# Patient Record
Sex: MALE | Race: WHITE | NOT HISPANIC OR LATINO | Employment: OTHER | RURAL
[De-identification: names, ages, dates, MRNs, and addresses within clinical notes are randomized per-mention and may not be internally consistent; named-entity substitution may affect disease eponyms.]

---

## 2020-11-16 ENCOUNTER — HISTORICAL (OUTPATIENT)
Dept: ADMINISTRATIVE | Facility: HOSPITAL | Age: 75
End: 2020-11-16

## 2020-11-17 LAB

## 2021-11-05 ENCOUNTER — HOSPITAL ENCOUNTER (INPATIENT)
Facility: HOSPITAL | Age: 76
LOS: 18 days | Discharge: NURSING, REHAB OR LONG-TERM CARE | DRG: 377 | End: 2021-11-23
Attending: EMERGENCY MEDICINE | Admitting: INTERNAL MEDICINE
Payer: MEDICARE

## 2021-11-05 DIAGNOSIS — K92.2 GIB (GASTROINTESTINAL BLEEDING): ICD-10-CM

## 2021-11-05 DIAGNOSIS — J69.0 ASPIRATION PNEUMONIA DUE TO GASTRIC SECRETIONS, UNSPECIFIED LATERALITY, UNSPECIFIED PART OF LUNG: ICD-10-CM

## 2021-11-05 DIAGNOSIS — R41.82 AMS (ALTERED MENTAL STATUS): ICD-10-CM

## 2021-11-05 DIAGNOSIS — Z01.818 PRE-OP EXAM: ICD-10-CM

## 2021-11-05 DIAGNOSIS — K92.2 GASTROINTESTINAL HEMORRHAGE, UNSPECIFIED GASTROINTESTINAL HEMORRHAGE TYPE: ICD-10-CM

## 2021-11-05 DIAGNOSIS — G93.41 ENCEPHALOPATHY, METABOLIC: ICD-10-CM

## 2021-11-05 DIAGNOSIS — K92.2 UPPER GI BLEED: Primary | ICD-10-CM

## 2021-11-05 LAB
ALBUMIN SERPL BCP-MCNC: 2.7 G/DL (ref 3.5–5)
ALBUMIN/GLOB SERPL: 0.8 {RATIO}
ALP SERPL-CCNC: 88 U/L (ref 45–115)
ALT SERPL W P-5'-P-CCNC: 25 U/L (ref 16–61)
AMMONIA PLAS-SCNC: 323 ΜMOL/L (ref 11–32)
ANION GAP SERPL CALCULATED.3IONS-SCNC: 13 MMOL/L (ref 7–16)
APAP SERPL-MCNC: <2 ΜG/ML (ref 10–30)
APTT PPP: 28 SECONDS (ref 25.2–37.3)
AST SERPL W P-5'-P-CCNC: 25 U/L (ref 15–37)
BASOPHILS # BLD AUTO: 0.02 K/UL (ref 0–0.2)
BASOPHILS NFR BLD AUTO: 0.3 % (ref 0–1)
BILIRUB DIRECT SERPL-MCNC: 0.4 MG/DL (ref 0–0.2)
BILIRUB SERPL-MCNC: 1.6 MG/DL (ref 0–1.2)
BILIRUB SERPL-MCNC: 2.3 MG/DL (ref 0–1.2)
BUN SERPL-MCNC: 24 MG/DL (ref 7–18)
BUN/CREAT SERPL: 32 (ref 6–20)
CALCIUM SERPL-MCNC: 7.8 MG/DL (ref 8.5–10.1)
CHLORIDE SERPL-SCNC: 106 MMOL/L (ref 98–107)
CO2 SERPL-SCNC: 26 MMOL/L (ref 21–32)
CREAT SERPL-MCNC: 0.75 MG/DL (ref 0.7–1.3)
DIFFERENTIAL METHOD BLD: ABNORMAL
EOSINOPHIL # BLD AUTO: 0.01 K/UL (ref 0–0.5)
EOSINOPHIL NFR BLD AUTO: 0.1 % (ref 1–4)
ERYTHROCYTE [DISTWIDTH] IN BLOOD BY AUTOMATED COUNT: 13.5 % (ref 11.5–14.5)
ETHANOL, BLOOD (CATEGORY): NOT DETECTED
GLOBULIN SER-MCNC: 3.2 G/DL (ref 2–4)
GLUCOSE SERPL-MCNC: 193 MG/DL (ref 74–106)
HAV IGM SER QL: NORMAL
HBV CORE IGM SER QL: NORMAL
HBV SURFACE AG SERPL QL IA: NORMAL
HCT VFR BLD AUTO: 27.5 % (ref 40–54)
HCT VFR BLD AUTO: 30.7 % (ref 40–54)
HCV AB SER QL: NORMAL
HGB BLD-MCNC: 10.6 G/DL (ref 13.5–18)
HGB BLD-MCNC: 9.6 G/DL (ref 13.5–18)
IMM GRANULOCYTES # BLD AUTO: 0.03 K/UL (ref 0–0.04)
IMM GRANULOCYTES NFR BLD: 0.4 % (ref 0–0.4)
INDIRECT COOMBS: NORMAL
INR BLD: 1.11 (ref 0.9–1.1)
LACTATE SERPL-SCNC: 2.8 MMOL/L (ref 0.4–2)
LACTATE SERPL-SCNC: 3 MMOL/L (ref 0.4–2)
LYMPHOCYTES # BLD AUTO: 0.99 K/UL (ref 1–4.8)
LYMPHOCYTES NFR BLD AUTO: 12.8 % (ref 27–41)
MAGNESIUM SERPL-MCNC: 1.7 MG/DL (ref 1.7–2.3)
MCH RBC QN AUTO: 33.5 PG (ref 27–31)
MCHC RBC AUTO-ENTMCNC: 34.5 G/DL (ref 32–36)
MCV RBC AUTO: 97.2 FL (ref 80–96)
MONOCYTES # BLD AUTO: 0.43 K/UL (ref 0–0.8)
MONOCYTES NFR BLD AUTO: 5.6 % (ref 2–6)
MPC BLD CALC-MCNC: 9.4 FL (ref 9.4–12.4)
NEUTROPHILS # BLD AUTO: 6.23 K/UL (ref 1.8–7.7)
NEUTROPHILS NFR BLD AUTO: 80.8 % (ref 53–65)
NRBC # BLD AUTO: 0 X10E3/UL
NRBC, AUTO (.00): 0 %
NT-PROBNP SERPL-MCNC: 59 PG/ML (ref 1–450)
OCCULT BLOOD, OTHER: NEGATIVE
PH, OTHER: 3
PLATELET # BLD AUTO: 178 K/UL (ref 150–400)
POTASSIUM SERPL-SCNC: 4.2 MMOL/L (ref 3.5–5.1)
PROT SERPL-MCNC: 5.9 G/DL (ref 6.4–8.2)
PROTHROMBIN TIME: 14.3 SECONDS (ref 11.7–14.7)
RBC # BLD AUTO: 3.16 M/UL (ref 4.6–6.2)
RH BLD: NORMAL
SALICYLATES SERPL-MCNC: <0.2 MG/DL (ref 3–30)
SODIUM SERPL-SCNC: 141 MMOL/L (ref 136–145)
TROPONIN I SERPL HS-MCNC: 15.9 PG/ML
WBC # BLD AUTO: 7.71 K/UL (ref 4.5–11)

## 2021-11-05 PROCEDURE — 99284 PR EMERGENCY DEPT VISIT,LEVEL IV: ICD-10-PCS | Mod: ,,, | Performed by: EMERGENCY MEDICINE

## 2021-11-05 PROCEDURE — 63600175 PHARM REV CODE 636 W HCPCS: Performed by: INTERNAL MEDICINE

## 2021-11-05 PROCEDURE — 82248 BILIRUBIN DIRECT: CPT | Performed by: INTERNAL MEDICINE

## 2021-11-05 PROCEDURE — 80074 ACUTE HEPATITIS PANEL: CPT | Performed by: INTERNAL MEDICINE

## 2021-11-05 PROCEDURE — 85025 COMPLETE CBC W/AUTO DIFF WBC: CPT | Performed by: EMERGENCY MEDICINE

## 2021-11-05 PROCEDURE — 99285 EMERGENCY DEPT VISIT HI MDM: CPT | Mod: 25

## 2021-11-05 PROCEDURE — 93010 EKG 12-LEAD: ICD-10-PCS | Mod: ,,, | Performed by: HOSPITALIST

## 2021-11-05 PROCEDURE — 85014 HEMATOCRIT: CPT

## 2021-11-05 PROCEDURE — 11000001 HC ACUTE MED/SURG PRIVATE ROOM

## 2021-11-05 PROCEDURE — 86900 BLOOD TYPING SEROLOGIC ABO: CPT | Performed by: EMERGENCY MEDICINE

## 2021-11-05 PROCEDURE — 96376 TX/PRO/DX INJ SAME DRUG ADON: CPT

## 2021-11-05 PROCEDURE — 85610 PROTHROMBIN TIME: CPT | Performed by: EMERGENCY MEDICINE

## 2021-11-05 PROCEDURE — 96375 TX/PRO/DX INJ NEW DRUG ADDON: CPT

## 2021-11-05 PROCEDURE — 86923 COMPATIBILITY TEST ELECTRIC: CPT

## 2021-11-05 PROCEDURE — 83735 ASSAY OF MAGNESIUM: CPT | Performed by: EMERGENCY MEDICINE

## 2021-11-05 PROCEDURE — 83880 ASSAY OF NATRIURETIC PEPTIDE: CPT | Performed by: EMERGENCY MEDICINE

## 2021-11-05 PROCEDURE — 84484 ASSAY OF TROPONIN QUANT: CPT | Performed by: EMERGENCY MEDICINE

## 2021-11-05 PROCEDURE — 96374 THER/PROPH/DIAG INJ IV PUSH: CPT

## 2021-11-05 PROCEDURE — 83605 ASSAY OF LACTIC ACID: CPT

## 2021-11-05 PROCEDURE — 63600175 PHARM REV CODE 636 W HCPCS

## 2021-11-05 PROCEDURE — 80143 DRUG ASSAY ACETAMINOPHEN: CPT | Performed by: INTERNAL MEDICINE

## 2021-11-05 PROCEDURE — 84450 TRANSFERASE (AST) (SGOT): CPT | Performed by: EMERGENCY MEDICINE

## 2021-11-05 PROCEDURE — 36415 COLL VENOUS BLD VENIPUNCTURE: CPT

## 2021-11-05 PROCEDURE — 80053 COMPREHEN METABOLIC PANEL: CPT | Performed by: EMERGENCY MEDICINE

## 2021-11-05 PROCEDURE — 36415 COLL VENOUS BLD VENIPUNCTURE: CPT | Performed by: EMERGENCY MEDICINE

## 2021-11-05 PROCEDURE — 82077 ASSAY SPEC XCP UR&BREATH IA: CPT | Performed by: INTERNAL MEDICINE

## 2021-11-05 PROCEDURE — 82140 ASSAY OF AMMONIA: CPT

## 2021-11-05 PROCEDURE — C9113 INJ PANTOPRAZOLE SODIUM, VIA: HCPCS | Performed by: INTERNAL MEDICINE

## 2021-11-05 PROCEDURE — 85730 THROMBOPLASTIN TIME PARTIAL: CPT | Performed by: EMERGENCY MEDICINE

## 2021-11-05 PROCEDURE — 93005 ELECTROCARDIOGRAM TRACING: CPT

## 2021-11-05 PROCEDURE — C9113 INJ PANTOPRAZOLE SODIUM, VIA: HCPCS | Performed by: EMERGENCY MEDICINE

## 2021-11-05 PROCEDURE — 83605 ASSAY OF LACTIC ACID: CPT | Performed by: EMERGENCY MEDICINE

## 2021-11-05 PROCEDURE — 99223 PR INITIAL HOSPITAL CARE,LEVL III: ICD-10-PCS | Mod: AI,,, | Performed by: INTERNAL MEDICINE

## 2021-11-05 PROCEDURE — 25000003 PHARM REV CODE 250: Performed by: INTERNAL MEDICINE

## 2021-11-05 PROCEDURE — 82271 OCCULT BLOOD OTHER SOURCES: CPT | Performed by: EMERGENCY MEDICINE

## 2021-11-05 PROCEDURE — 99223 1ST HOSP IP/OBS HIGH 75: CPT | Mod: AI,,, | Performed by: INTERNAL MEDICINE

## 2021-11-05 PROCEDURE — 93010 ELECTROCARDIOGRAM REPORT: CPT | Mod: ,,, | Performed by: HOSPITALIST

## 2021-11-05 PROCEDURE — 82247 BILIRUBIN TOTAL: CPT | Performed by: INTERNAL MEDICINE

## 2021-11-05 PROCEDURE — 63600175 PHARM REV CODE 636 W HCPCS: Performed by: EMERGENCY MEDICINE

## 2021-11-05 PROCEDURE — 99284 EMERGENCY DEPT VISIT MOD MDM: CPT | Mod: ,,, | Performed by: EMERGENCY MEDICINE

## 2021-11-05 PROCEDURE — 85018 HEMOGLOBIN: CPT

## 2021-11-05 PROCEDURE — 80307 DRUG TEST PRSMV CHEM ANLYZR: CPT | Performed by: INTERNAL MEDICINE

## 2021-11-05 RX ORDER — TALC
6 POWDER (GRAM) TOPICAL NIGHTLY PRN
Status: DISCONTINUED | OUTPATIENT
Start: 2021-11-05 | End: 2021-11-08

## 2021-11-05 RX ORDER — ONDANSETRON 2 MG/ML
INJECTION INTRAMUSCULAR; INTRAVENOUS
Status: COMPLETED
Start: 2021-11-05 | End: 2021-11-05

## 2021-11-05 RX ORDER — GLUCAGON 1 MG
1 KIT INJECTION
Status: DISCONTINUED | OUTPATIENT
Start: 2021-11-05 | End: 2021-11-23 | Stop reason: HOSPADM

## 2021-11-05 RX ORDER — LORAZEPAM 2 MG/ML
1 INJECTION INTRAMUSCULAR ONCE
Status: COMPLETED | OUTPATIENT
Start: 2021-11-05 | End: 2021-11-05

## 2021-11-05 RX ORDER — HALOPERIDOL 5 MG/ML
5 INJECTION INTRAMUSCULAR ONCE
Status: COMPLETED | OUTPATIENT
Start: 2021-11-05 | End: 2021-11-05

## 2021-11-05 RX ORDER — AMOXICILLIN 500 MG
1 CAPSULE ORAL DAILY
COMMUNITY

## 2021-11-05 RX ORDER — SODIUM CHLORIDE, SODIUM LACTATE, POTASSIUM CHLORIDE, CALCIUM CHLORIDE 600; 310; 30; 20 MG/100ML; MG/100ML; MG/100ML; MG/100ML
INJECTION, SOLUTION INTRAVENOUS CONTINUOUS
Status: DISCONTINUED | OUTPATIENT
Start: 2021-11-05 | End: 2021-11-07

## 2021-11-05 RX ORDER — SODIUM CHLORIDE 0.9 % (FLUSH) 0.9 %
10 SYRINGE (ML) INJECTION
Status: DISCONTINUED | OUTPATIENT
Start: 2021-11-05 | End: 2021-11-23 | Stop reason: HOSPADM

## 2021-11-05 RX ORDER — RIFAMPIN 300 MG/1
300 CAPSULE ORAL EVERY 12 HOURS
Status: ON HOLD | COMMUNITY
End: 2021-11-22 | Stop reason: HOSPADM

## 2021-11-05 RX ORDER — INSULIN ASPART 100 [IU]/ML
1-10 INJECTION, SOLUTION INTRAVENOUS; SUBCUTANEOUS EVERY 6 HOURS PRN
Status: DISCONTINUED | OUTPATIENT
Start: 2021-11-05 | End: 2021-11-23 | Stop reason: HOSPADM

## 2021-11-05 RX ORDER — ONDANSETRON 2 MG/ML
4 INJECTION INTRAMUSCULAR; INTRAVENOUS EVERY 8 HOURS PRN
Status: DISCONTINUED | OUTPATIENT
Start: 2021-11-05 | End: 2021-11-23 | Stop reason: HOSPADM

## 2021-11-05 RX ORDER — AMPICILLIN TRIHYDRATE 250 MG
CAPSULE ORAL
Status: ON HOLD | COMMUNITY
End: 2021-11-22 | Stop reason: HOSPADM

## 2021-11-05 RX ORDER — PANTOPRAZOLE SODIUM 40 MG/10ML
80 INJECTION, POWDER, LYOPHILIZED, FOR SOLUTION INTRAVENOUS
Status: COMPLETED | OUTPATIENT
Start: 2021-11-05 | End: 2021-11-05

## 2021-11-05 RX ORDER — NIACIN 500 MG/1
500 TABLET, EXTENDED RELEASE ORAL DAILY
COMMUNITY

## 2021-11-05 RX ORDER — LACTULOSE 10 G/15ML
30 SOLUTION ORAL EVERY 4 HOURS
Status: DISPENSED | OUTPATIENT
Start: 2021-11-05 | End: 2021-11-06

## 2021-11-05 RX ORDER — ACETAMINOPHEN 325 MG/1
650 TABLET ORAL EVERY 4 HOURS PRN
Status: DISCONTINUED | OUTPATIENT
Start: 2021-11-05 | End: 2021-11-05

## 2021-11-05 RX ORDER — LACTULOSE 10 G/15ML
200 SOLUTION ORAL; RECTAL EVERY 4 HOURS
Status: DISCONTINUED | OUTPATIENT
Start: 2021-11-05 | End: 2021-11-05

## 2021-11-05 RX ORDER — ACETAMINOPHEN 500 MG
TABLET ORAL
COMMUNITY

## 2021-11-05 RX ORDER — ONDANSETRON 2 MG/ML
4 INJECTION INTRAMUSCULAR; INTRAVENOUS
Status: COMPLETED | OUTPATIENT
Start: 2021-11-05 | End: 2021-11-05

## 2021-11-05 RX ORDER — SULFAMETHOXAZOLE AND TRIMETHOPRIM 800; 160 MG/1; MG/1
1 TABLET ORAL 2 TIMES DAILY
Status: ON HOLD | COMMUNITY
End: 2021-11-22 | Stop reason: HOSPADM

## 2021-11-05 RX ADMIN — PANTOPRAZOLE SODIUM 8 MG/HR: 40 INJECTION, POWDER, FOR SOLUTION INTRAVENOUS at 07:11

## 2021-11-05 RX ADMIN — LORAZEPAM 1 MG: 2 INJECTION INTRAMUSCULAR; INTRAVENOUS at 07:11

## 2021-11-05 RX ADMIN — ONDANSETRON 4 MG: 2 INJECTION INTRAMUSCULAR; INTRAVENOUS at 03:11

## 2021-11-05 RX ADMIN — SODIUM CHLORIDE, POTASSIUM CHLORIDE, SODIUM LACTATE AND CALCIUM CHLORIDE 1000 ML: 600; 310; 30; 20 INJECTION, SOLUTION INTRAVENOUS at 07:11

## 2021-11-05 RX ADMIN — SODIUM CHLORIDE, POTASSIUM CHLORIDE, SODIUM LACTATE AND CALCIUM CHLORIDE: 600; 310; 30; 20 INJECTION, SOLUTION INTRAVENOUS at 05:11

## 2021-11-05 RX ADMIN — PANTOPRAZOLE SODIUM 80 MG: 40 INJECTION, POWDER, FOR SOLUTION INTRAVENOUS at 04:11

## 2021-11-05 RX ADMIN — HALOPERIDOL LACTATE 5 MG: 5 INJECTION, SOLUTION INTRAMUSCULAR at 06:11

## 2021-11-05 RX ADMIN — LACTULOSE 200 G: 10 SOLUTION ORAL; RECTAL at 09:11

## 2021-11-06 ENCOUNTER — ANESTHESIA EVENT (OUTPATIENT)
Dept: GASTROENTEROLOGY | Facility: HOSPITAL | Age: 76
DRG: 377 | End: 2021-11-06
Payer: MEDICARE

## 2021-11-06 ENCOUNTER — ANESTHESIA (OUTPATIENT)
Dept: GASTROENTEROLOGY | Facility: HOSPITAL | Age: 76
DRG: 377 | End: 2021-11-06
Payer: MEDICARE

## 2021-11-06 PROBLEM — G93.41 ENCEPHALOPATHY, METABOLIC: Status: RESOLVED | Noted: 2021-11-05 | Resolved: 2021-11-06

## 2021-11-06 LAB
ABO + RH BLD: NORMAL
AFP-TM SERPL-MCNC: 1.5 NG/ML (ref 0–8)
ALBUMIN SERPL BCP-MCNC: 2.6 G/DL (ref 3.5–5)
ALP SERPL-CCNC: 84 U/L (ref 45–115)
ALT SERPL W P-5'-P-CCNC: 23 U/L (ref 16–61)
AMMONIA PLAS-SCNC: 130 ΜMOL/L (ref 11–32)
ANION GAP SERPL CALCULATED.3IONS-SCNC: 14 MMOL/L (ref 7–16)
AST SERPL W P-5'-P-CCNC: 39 U/L (ref 15–37)
BACTERIA #/AREA URNS HPF: ABNORMAL /HPF
BILIRUB DIRECT SERPL-MCNC: 0.3 MG/DL (ref 0–0.2)
BILIRUB SERPL-MCNC: 1.8 MG/DL (ref 0–1.2)
BILIRUB UR QL STRIP: NEGATIVE
BLD PROD TYP BPU: NORMAL
BLOOD UNIT EXPIRATION DATE: NORMAL
BLOOD UNIT TYPE CODE: 6200
BUN SERPL-MCNC: 33 MG/DL (ref 7–18)
BUN/CREAT SERPL: 33 (ref 6–20)
CALCIUM SERPL-MCNC: 8 MG/DL (ref 8.5–10.1)
CHLORIDE SERPL-SCNC: 112 MMOL/L (ref 98–107)
CLARITY UR: CLEAR
CO2 SERPL-SCNC: 22 MMOL/L (ref 21–32)
COLOR UR: YELLOW
CREAT SERPL-MCNC: 1.01 MG/DL (ref 0.7–1.3)
CROSSMATCH INTERPRETATION: NORMAL
DISPENSE STATUS: NORMAL
EST. AVERAGE GLUCOSE BLD GHB EST-MCNC: 100 MG/DL
GLUCOSE SERPL-MCNC: 120 MG/DL (ref 70–105)
GLUCOSE SERPL-MCNC: 148 MG/DL (ref 70–105)
GLUCOSE SERPL-MCNC: 166 MG/DL (ref 70–105)
GLUCOSE SERPL-MCNC: 220 MG/DL (ref 74–106)
GLUCOSE SERPL-MCNC: 222 MG/DL (ref 70–105)
GLUCOSE UR STRIP-MCNC: NEGATIVE MG/DL
HBA1C MFR BLD HPLC: 5.6 % (ref 4.5–6.6)
HCT VFR BLD AUTO: 23.8 % (ref 40–54)
HCT VFR BLD AUTO: 24.6 % (ref 40–54)
HCT VFR BLD AUTO: 24.7 % (ref 40–54)
HCT VFR BLD AUTO: 24.9 % (ref 40–54)
HCT VFR BLD AUTO: 25.6 % (ref 40–54)
HGB BLD-MCNC: 8.7 G/DL (ref 13.5–18)
HGB BLD-MCNC: 8.8 G/DL (ref 13.5–18)
HGB BLD-MCNC: 8.9 G/DL (ref 13.5–18)
HGB BLD-MCNC: 9.1 G/DL (ref 13.5–18)
HGB BLD-MCNC: 9.4 G/DL (ref 13.5–18)
KETONES UR STRIP-SCNC: 15 MG/DL
LEUKOCYTE ESTERASE UR QL STRIP: NEGATIVE
MUCOUS THREADS #/AREA URNS HPF: ABNORMAL /HPF
NITRITE UR QL STRIP: NEGATIVE
PH UR STRIP: 6 PH UNITS
POTASSIUM SERPL-SCNC: 4.1 MMOL/L (ref 3.5–5.1)
PROT SERPL-MCNC: 5.6 G/DL (ref 6.4–8.2)
PROT UR QL STRIP: NEGATIVE
RBC # UR STRIP: ABNORMAL /UL
RBC #/AREA URNS HPF: ABNORMAL /HPF
SODIUM SERPL-SCNC: 144 MMOL/L (ref 136–145)
SP GR UR STRIP: 1.02
SQUAMOUS #/AREA URNS LPF: ABNORMAL /LPF
TRICHOMONAS #/AREA URNS HPF: ABNORMAL /HPF
TSH SERPL DL<=0.005 MIU/L-ACNC: 0.89 UIU/ML (ref 0.36–3.74)
UNIT NUMBER: NORMAL
UROBILINOGEN UR STRIP-ACNC: 0.2 MG/DL
WBC #/AREA URNS HPF: ABNORMAL /HPF
YEAST #/AREA URNS HPF: ABNORMAL /HPF

## 2021-11-06 PROCEDURE — 81003 URINALYSIS AUTO W/O SCOPE: CPT | Performed by: INTERNAL MEDICINE

## 2021-11-06 PROCEDURE — 43239 EGD BIOPSY SINGLE/MULTIPLE: CPT | Mod: 59,,, | Performed by: STUDENT IN AN ORGANIZED HEALTH CARE EDUCATION/TRAINING PROGRAM

## 2021-11-06 PROCEDURE — 43239 PR EGD, FLEX, W/BIOPSY, SGL/MULTI: ICD-10-PCS | Mod: 59,,, | Performed by: STUDENT IN AN ORGANIZED HEALTH CARE EDUCATION/TRAINING PROGRAM

## 2021-11-06 PROCEDURE — D9220A PRA ANESTHESIA: Mod: CRNA,,, | Performed by: NURSE ANESTHETIST, CERTIFIED REGISTERED

## 2021-11-06 PROCEDURE — P9016 RBC LEUKOCYTES REDUCED: HCPCS

## 2021-11-06 PROCEDURE — 43255 PR EGD, FLEX, W/CTRL BLEED, ANY METHOD: ICD-10-PCS | Mod: ,,, | Performed by: STUDENT IN AN ORGANIZED HEALTH CARE EDUCATION/TRAINING PROGRAM

## 2021-11-06 PROCEDURE — 85014 HEMATOCRIT: CPT

## 2021-11-06 PROCEDURE — 25000003 PHARM REV CODE 250: Performed by: INTERNAL MEDICINE

## 2021-11-06 PROCEDURE — 99233 PR SUBSEQUENT HOSPITAL CARE,LEVL III: ICD-10-PCS | Mod: GC,,, | Performed by: INTERNAL MEDICINE

## 2021-11-06 PROCEDURE — 36415 COLL VENOUS BLD VENIPUNCTURE: CPT

## 2021-11-06 PROCEDURE — 88341 SURGICAL PATHOLOGY: ICD-10-PCS | Mod: 26,,, | Performed by: PATHOLOGY

## 2021-11-06 PROCEDURE — D9220A PRA ANESTHESIA: ICD-10-PCS | Mod: CRNA,,, | Performed by: NURSE ANESTHETIST, CERTIFIED REGISTERED

## 2021-11-06 PROCEDURE — 63600175 PHARM REV CODE 636 W HCPCS: Performed by: INTERNAL MEDICINE

## 2021-11-06 PROCEDURE — 84075 ASSAY ALKALINE PHOSPHATASE: CPT | Performed by: INTERNAL MEDICINE

## 2021-11-06 PROCEDURE — 85018 HEMOGLOBIN: CPT

## 2021-11-06 PROCEDURE — C9113 INJ PANTOPRAZOLE SODIUM, VIA: HCPCS | Performed by: INTERNAL MEDICINE

## 2021-11-06 PROCEDURE — D9220A PRA ANESTHESIA: Mod: ANES,,, | Performed by: ANESTHESIOLOGY

## 2021-11-06 PROCEDURE — 82962 GLUCOSE BLOOD TEST: CPT

## 2021-11-06 PROCEDURE — 88305 TISSUE EXAM BY PATHOLOGIST: CPT | Mod: SUR | Performed by: STUDENT IN AN ORGANIZED HEALTH CARE EDUCATION/TRAINING PROGRAM

## 2021-11-06 PROCEDURE — 88341 IMHCHEM/IMCYTCHM EA ADD ANTB: CPT | Mod: 26,,, | Performed by: PATHOLOGY

## 2021-11-06 PROCEDURE — 84443 ASSAY THYROID STIM HORMONE: CPT | Performed by: INTERNAL MEDICINE

## 2021-11-06 PROCEDURE — 88305 TISSUE EXAM BY PATHOLOGIST: CPT | Mod: 26,,, | Performed by: PATHOLOGY

## 2021-11-06 PROCEDURE — 99223 PR INITIAL HOSPITAL CARE,LEVL III: ICD-10-PCS | Mod: 25,,, | Performed by: STUDENT IN AN ORGANIZED HEALTH CARE EDUCATION/TRAINING PROGRAM

## 2021-11-06 PROCEDURE — 43239 EGD BIOPSY SINGLE/MULTIPLE: CPT

## 2021-11-06 PROCEDURE — 36415 COLL VENOUS BLD VENIPUNCTURE: CPT | Performed by: INTERNAL MEDICINE

## 2021-11-06 PROCEDURE — 25000003 PHARM REV CODE 250: Performed by: NURSE ANESTHETIST, CERTIFIED REGISTERED

## 2021-11-06 PROCEDURE — 82105 ALPHA-FETOPROTEIN SERUM: CPT | Performed by: INTERNAL MEDICINE

## 2021-11-06 PROCEDURE — D9220A PRA ANESTHESIA: ICD-10-PCS | Mod: ANES,,, | Performed by: ANESTHESIOLOGY

## 2021-11-06 PROCEDURE — 88342 IMHCHEM/IMCYTCHM 1ST ANTB: CPT | Mod: 26,,, | Performed by: PATHOLOGY

## 2021-11-06 PROCEDURE — 81001 URINALYSIS AUTO W/SCOPE: CPT | Performed by: INTERNAL MEDICINE

## 2021-11-06 PROCEDURE — 43255 EGD CONTROL BLEEDING ANY: CPT | Mod: ,,, | Performed by: STUDENT IN AN ORGANIZED HEALTH CARE EDUCATION/TRAINING PROGRAM

## 2021-11-06 PROCEDURE — 99223 1ST HOSP IP/OBS HIGH 75: CPT | Mod: 25,,, | Performed by: STUDENT IN AN ORGANIZED HEALTH CARE EDUCATION/TRAINING PROGRAM

## 2021-11-06 PROCEDURE — 88305 SURGICAL PATHOLOGY: ICD-10-PCS | Mod: 26,,, | Performed by: PATHOLOGY

## 2021-11-06 PROCEDURE — 36430 TRANSFUSION BLD/BLD COMPNT: CPT

## 2021-11-06 PROCEDURE — 11000001 HC ACUTE MED/SURG PRIVATE ROOM

## 2021-11-06 PROCEDURE — 80346 BENZODIAZEPINES1-12: CPT

## 2021-11-06 PROCEDURE — 63600175 PHARM REV CODE 636 W HCPCS: Performed by: NURSE ANESTHETIST, CERTIFIED REGISTERED

## 2021-11-06 PROCEDURE — 83036 HEMOGLOBIN GLYCOSYLATED A1C: CPT | Performed by: INTERNAL MEDICINE

## 2021-11-06 PROCEDURE — 82140 ASSAY OF AMMONIA: CPT | Performed by: INTERNAL MEDICINE

## 2021-11-06 PROCEDURE — 86923 COMPATIBILITY TEST ELECTRIC: CPT

## 2021-11-06 PROCEDURE — 99233 SBSQ HOSP IP/OBS HIGH 50: CPT | Mod: GC,,, | Performed by: INTERNAL MEDICINE

## 2021-11-06 PROCEDURE — 80358 DRUG SCREENING METHADONE: CPT

## 2021-11-06 PROCEDURE — 84450 TRANSFERASE (AST) (SGOT): CPT | Performed by: INTERNAL MEDICINE

## 2021-11-06 PROCEDURE — 80048 BASIC METABOLIC PNL TOTAL CA: CPT

## 2021-11-06 PROCEDURE — S0166 INJ OLANZAPINE 2.5MG: HCPCS | Performed by: INTERNAL MEDICINE

## 2021-11-06 PROCEDURE — 88342 SURGICAL PATHOLOGY: ICD-10-PCS | Mod: 26,,, | Performed by: PATHOLOGY

## 2021-11-06 PROCEDURE — 43255 EGD CONTROL BLEEDING ANY: CPT

## 2021-11-06 RX ORDER — ETOMIDATE 2 MG/ML
INJECTION INTRAVENOUS
Status: DISCONTINUED | OUTPATIENT
Start: 2021-11-06 | End: 2021-11-06

## 2021-11-06 RX ORDER — HALOPERIDOL 5 MG/ML
5 INJECTION INTRAMUSCULAR EVERY 6 HOURS PRN
Status: DISCONTINUED | OUTPATIENT
Start: 2021-11-06 | End: 2021-11-07

## 2021-11-06 RX ORDER — OLANZAPINE 10 MG/2ML
5 INJECTION, POWDER, FOR SOLUTION INTRAMUSCULAR EVERY 6 HOURS PRN
Status: DISCONTINUED | OUTPATIENT
Start: 2021-11-06 | End: 2021-11-11

## 2021-11-06 RX ORDER — LIDOCAINE HYDROCHLORIDE 20 MG/ML
INJECTION, SOLUTION EPIDURAL; INFILTRATION; INTRACAUDAL; PERINEURAL
Status: DISCONTINUED | OUTPATIENT
Start: 2021-11-06 | End: 2021-11-06

## 2021-11-06 RX ORDER — PROPOFOL 10 MG/ML
VIAL (ML) INTRAVENOUS
Status: DISCONTINUED | OUTPATIENT
Start: 2021-11-06 | End: 2021-11-06

## 2021-11-06 RX ORDER — HYDROCODONE BITARTRATE AND ACETAMINOPHEN 500; 5 MG/1; MG/1
TABLET ORAL
Status: DISCONTINUED | OUTPATIENT
Start: 2021-11-06 | End: 2021-11-07

## 2021-11-06 RX ORDER — LACTULOSE 10 G/15ML
30 SOLUTION ORAL EVERY 4 HOURS
Status: DISCONTINUED | OUTPATIENT
Start: 2021-11-06 | End: 2021-11-08

## 2021-11-06 RX ADMIN — HALOPERIDOL LACTATE 5 MG: 5 INJECTION, SOLUTION INTRAMUSCULAR at 04:11

## 2021-11-06 RX ADMIN — SODIUM CHLORIDE: 9 INJECTION, SOLUTION INTRAVENOUS at 10:11

## 2021-11-06 RX ADMIN — PROPOFOL 10 MG: 10 INJECTION, EMULSION INTRAVENOUS at 10:11

## 2021-11-06 RX ADMIN — OLANZAPINE 5 MG: 10 INJECTION, POWDER, FOR SOLUTION INTRAMUSCULAR at 09:11

## 2021-11-06 RX ADMIN — SODIUM CHLORIDE, POTASSIUM CHLORIDE, SODIUM LACTATE AND CALCIUM CHLORIDE: 600; 310; 30; 20 INJECTION, SOLUTION INTRAVENOUS at 01:11

## 2021-11-06 RX ADMIN — PANTOPRAZOLE SODIUM 8 MG/HR: 40 INJECTION, POWDER, FOR SOLUTION INTRAVENOUS at 06:11

## 2021-11-06 RX ADMIN — ETOMIDATE 15 MG: 2 INJECTION, SOLUTION INTRAVENOUS at 10:11

## 2021-11-06 RX ADMIN — ETOMIDATE 5 MG: 2 INJECTION, SOLUTION INTRAVENOUS at 10:11

## 2021-11-06 RX ADMIN — LACTULOSE 30 G: 20 SOLUTION ORAL at 03:11

## 2021-11-06 RX ADMIN — LACTULOSE 30 G: 20 SOLUTION ORAL at 06:11

## 2021-11-06 RX ADMIN — LIDOCAINE HYDROCHLORIDE 100 MG: 20 INJECTION, SOLUTION INTRAVENOUS at 10:11

## 2021-11-06 RX ADMIN — SODIUM CHLORIDE: 9 INJECTION, SOLUTION INTRAVENOUS at 05:11

## 2021-11-06 RX ADMIN — PANTOPRAZOLE SODIUM 8 MG/HR: 40 INJECTION, POWDER, FOR SOLUTION INTRAVENOUS at 01:11

## 2021-11-07 PROBLEM — E72.20 HYPERAMMONEMIA: Status: ACTIVE | Noted: 2021-11-07

## 2021-11-07 LAB
ABO + RH BLD: NORMAL
ABO + RH BLD: NORMAL
ANION GAP SERPL CALCULATED.3IONS-SCNC: 10 MMOL/L (ref 7–16)
BASOPHILS # BLD AUTO: 0.06 K/UL (ref 0–0.2)
BASOPHILS NFR BLD AUTO: 0.8 % (ref 0–1)
BLD PROD TYP BPU: NORMAL
BLD PROD TYP BPU: NORMAL
BLOOD UNIT EXPIRATION DATE: NORMAL
BLOOD UNIT EXPIRATION DATE: NORMAL
BLOOD UNIT TYPE CODE: 6200
BLOOD UNIT TYPE CODE: 6200
BUN SERPL-MCNC: 18 MG/DL (ref 7–18)
BUN/CREAT SERPL: 23 (ref 6–20)
CALCIUM SERPL-MCNC: 7.2 MG/DL (ref 8.5–10.1)
CHLORIDE SERPL-SCNC: 115 MMOL/L (ref 98–107)
CO2 SERPL-SCNC: 25 MMOL/L (ref 21–32)
CREAT SERPL-MCNC: 0.79 MG/DL (ref 0.7–1.3)
CROSSMATCH INTERPRETATION: NORMAL
CROSSMATCH INTERPRETATION: NORMAL
DIFFERENTIAL METHOD BLD: ABNORMAL
DISPENSE STATUS: NORMAL
DISPENSE STATUS: NORMAL
EOSINOPHIL # BLD AUTO: 0.03 K/UL (ref 0–0.5)
EOSINOPHIL NFR BLD AUTO: 0.4 % (ref 1–4)
ERYTHROCYTE [DISTWIDTH] IN BLOOD BY AUTOMATED COUNT: 15.4 % (ref 11.5–14.5)
GLUCOSE SERPL-MCNC: 143 MG/DL (ref 74–106)
GLUCOSE SERPL-MCNC: 146 MG/DL (ref 70–105)
GLUCOSE SERPL-MCNC: 150 MG/DL (ref 70–105)
GLUCOSE SERPL-MCNC: 159 MG/DL (ref 70–105)
HCT VFR BLD AUTO: 25 % (ref 40–54)
HCT VFR BLD AUTO: 25.2 % (ref 40–54)
HCT VFR BLD AUTO: 26.1 % (ref 40–54)
HCT VFR BLD AUTO: 26.5 % (ref 40–54)
HCT VFR BLD AUTO: 28.4 % (ref 40–54)
HGB BLD-MCNC: 10 G/DL (ref 13.5–18)
HGB BLD-MCNC: 8.7 G/DL (ref 13.5–18)
HGB BLD-MCNC: 9.1 G/DL (ref 13.5–18)
HGB BLD-MCNC: 9.2 G/DL (ref 13.5–18)
HGB BLD-MCNC: 9.3 G/DL (ref 13.5–18)
IMM GRANULOCYTES # BLD AUTO: 0.06 K/UL (ref 0–0.04)
IMM GRANULOCYTES NFR BLD: 0.8 % (ref 0–0.4)
LYMPHOCYTES # BLD AUTO: 1.45 K/UL (ref 1–4.8)
LYMPHOCYTES NFR BLD AUTO: 19.5 % (ref 27–41)
MAGNESIUM SERPL-MCNC: 1.6 MG/DL (ref 1.7–2.3)
MCH RBC QN AUTO: 31.6 PG (ref 27–31)
MCHC RBC AUTO-ENTMCNC: 34.7 G/DL (ref 32–36)
MCV RBC AUTO: 91.1 FL (ref 80–96)
MONOCYTES # BLD AUTO: 0.89 K/UL (ref 0–0.8)
MONOCYTES NFR BLD AUTO: 12 % (ref 2–6)
MPC BLD CALC-MCNC: 9.6 FL (ref 9.4–12.4)
NEUTROPHILS # BLD AUTO: 4.95 K/UL (ref 1.8–7.7)
NEUTROPHILS NFR BLD AUTO: 66.5 % (ref 53–65)
NRBC # BLD AUTO: 0.04 X10E3/UL
NRBC, AUTO (.00): 0.5 %
PLATELET # BLD AUTO: 99 K/UL (ref 150–400)
POTASSIUM SERPL-SCNC: 2.8 MMOL/L (ref 3.5–5.1)
RBC # BLD AUTO: 2.91 M/UL (ref 4.6–6.2)
SODIUM SERPL-SCNC: 147 MMOL/L (ref 136–145)
UNIT NUMBER: NORMAL
UNIT NUMBER: NORMAL
WBC # BLD AUTO: 7.44 K/UL (ref 4.5–11)

## 2021-11-07 PROCEDURE — 93010 EKG 12-LEAD: ICD-10-PCS | Mod: ,,, | Performed by: INTERNAL MEDICINE

## 2021-11-07 PROCEDURE — 25000003 PHARM REV CODE 250: Performed by: INTERNAL MEDICINE

## 2021-11-07 PROCEDURE — P9016 RBC LEUKOCYTES REDUCED: HCPCS

## 2021-11-07 PROCEDURE — 36430 TRANSFUSION BLD/BLD COMPNT: CPT

## 2021-11-07 PROCEDURE — 63600175 PHARM REV CODE 636 W HCPCS

## 2021-11-07 PROCEDURE — 63600175 PHARM REV CODE 636 W HCPCS: Performed by: FAMILY MEDICINE

## 2021-11-07 PROCEDURE — 99232 SBSQ HOSP IP/OBS MODERATE 35: CPT | Mod: GC,,, | Performed by: INTERNAL MEDICINE

## 2021-11-07 PROCEDURE — 83735 ASSAY OF MAGNESIUM: CPT

## 2021-11-07 PROCEDURE — 82962 GLUCOSE BLOOD TEST: CPT

## 2021-11-07 PROCEDURE — C9113 INJ PANTOPRAZOLE SODIUM, VIA: HCPCS | Performed by: INTERNAL MEDICINE

## 2021-11-07 PROCEDURE — 85014 HEMATOCRIT: CPT

## 2021-11-07 PROCEDURE — S0166 INJ OLANZAPINE 2.5MG: HCPCS | Performed by: INTERNAL MEDICINE

## 2021-11-07 PROCEDURE — 11000001 HC ACUTE MED/SURG PRIVATE ROOM

## 2021-11-07 PROCEDURE — 85018 HEMOGLOBIN: CPT

## 2021-11-07 PROCEDURE — 93010 ELECTROCARDIOGRAM REPORT: CPT | Mod: ,,, | Performed by: INTERNAL MEDICINE

## 2021-11-07 PROCEDURE — 36415 COLL VENOUS BLD VENIPUNCTURE: CPT

## 2021-11-07 PROCEDURE — P9016 RBC LEUKOCYTES REDUCED: HCPCS | Performed by: FAMILY MEDICINE

## 2021-11-07 PROCEDURE — 99232 SBSQ HOSP IP/OBS MODERATE 35: CPT | Mod: ,,, | Performed by: STUDENT IN AN ORGANIZED HEALTH CARE EDUCATION/TRAINING PROGRAM

## 2021-11-07 PROCEDURE — 63600175 PHARM REV CODE 636 W HCPCS: Performed by: INTERNAL MEDICINE

## 2021-11-07 PROCEDURE — 86923 COMPATIBILITY TEST ELECTRIC: CPT | Performed by: FAMILY MEDICINE

## 2021-11-07 PROCEDURE — 99232 PR SUBSEQUENT HOSPITAL CARE,LEVL II: ICD-10-PCS | Mod: GC,,, | Performed by: INTERNAL MEDICINE

## 2021-11-07 PROCEDURE — 25000003 PHARM REV CODE 250: Performed by: FAMILY MEDICINE

## 2021-11-07 PROCEDURE — 80048 BASIC METABOLIC PNL TOTAL CA: CPT

## 2021-11-07 PROCEDURE — 99232 PR SUBSEQUENT HOSPITAL CARE,LEVL II: ICD-10-PCS | Mod: ,,, | Performed by: STUDENT IN AN ORGANIZED HEALTH CARE EDUCATION/TRAINING PROGRAM

## 2021-11-07 PROCEDURE — 25000003 PHARM REV CODE 250

## 2021-11-07 PROCEDURE — 94761 N-INVAS EAR/PLS OXIMETRY MLT: CPT

## 2021-11-07 PROCEDURE — 85025 COMPLETE CBC W/AUTO DIFF WBC: CPT

## 2021-11-07 PROCEDURE — 93005 ELECTROCARDIOGRAM TRACING: CPT

## 2021-11-07 RX ORDER — HYDROCODONE BITARTRATE AND ACETAMINOPHEN 500; 5 MG/1; MG/1
TABLET ORAL
Status: DISCONTINUED | OUTPATIENT
Start: 2021-11-07 | End: 2021-11-23 | Stop reason: HOSPADM

## 2021-11-07 RX ORDER — MAGNESIUM SULFATE HEPTAHYDRATE 40 MG/ML
2 INJECTION, SOLUTION INTRAVENOUS ONCE
Status: COMPLETED | OUTPATIENT
Start: 2021-11-07 | End: 2021-11-07

## 2021-11-07 RX ORDER — HALOPERIDOL 5 MG/ML
5 INJECTION INTRAMUSCULAR ONCE
Status: COMPLETED | OUTPATIENT
Start: 2021-11-07 | End: 2021-11-07

## 2021-11-07 RX ORDER — HYDROCODONE BITARTRATE AND ACETAMINOPHEN 500; 5 MG/1; MG/1
TABLET ORAL
Status: DISCONTINUED | OUTPATIENT
Start: 2021-11-07 | End: 2021-11-10

## 2021-11-07 RX ADMIN — RIFAXIMIN 550 MG: 550 TABLET ORAL at 09:11

## 2021-11-07 RX ADMIN — PANTOPRAZOLE SODIUM 8 MG/HR: 40 INJECTION, POWDER, FOR SOLUTION INTRAVENOUS at 03:11

## 2021-11-07 RX ADMIN — MAGNESIUM SULFATE IN WATER 2 G: 40 INJECTION, SOLUTION INTRAVENOUS at 04:11

## 2021-11-07 RX ADMIN — FOLIC ACID-PYRIDOXINE-CYANOCOBALAMIN TAB 2.5-25-2 MG 1 TABLET: 2.5-25-2 TAB at 10:11

## 2021-11-07 RX ADMIN — LACTULOSE 30 G: 20 SOLUTION ORAL at 09:11

## 2021-11-07 RX ADMIN — LACTULOSE 30 G: 20 SOLUTION ORAL at 05:11

## 2021-11-07 RX ADMIN — HALOPERIDOL LACTATE 5 MG: 5 INJECTION, SOLUTION INTRAMUSCULAR at 01:11

## 2021-11-07 RX ADMIN — OLANZAPINE 5 MG: 10 INJECTION, POWDER, FOR SOLUTION INTRAMUSCULAR at 10:11

## 2021-11-07 RX ADMIN — LACTULOSE 30 G: 20 SOLUTION ORAL at 06:11

## 2021-11-07 RX ADMIN — SODIUM CHLORIDE, POTASSIUM CHLORIDE, SODIUM LACTATE AND CALCIUM CHLORIDE: 600; 310; 30; 20 INJECTION, SOLUTION INTRAVENOUS at 12:11

## 2021-11-07 RX ADMIN — RIFAXIMIN 550 MG: 550 TABLET ORAL at 10:11

## 2021-11-07 RX ADMIN — POTASSIUM CHLORIDE: 149 INJECTION, SOLUTION, CONCENTRATE INTRAVENOUS at 11:11

## 2021-11-07 RX ADMIN — PANTOPRAZOLE SODIUM 8 MG/HR: 40 INJECTION, POWDER, FOR SOLUTION INTRAVENOUS at 10:11

## 2021-11-07 RX ADMIN — SODIUM CHLORIDE: 9 INJECTION, SOLUTION INTRAVENOUS at 02:11

## 2021-11-07 RX ADMIN — SODIUM CHLORIDE, POTASSIUM CHLORIDE, SODIUM LACTATE AND CALCIUM CHLORIDE: 600; 310; 30; 20 INJECTION, SOLUTION INTRAVENOUS at 10:11

## 2021-11-07 RX ADMIN — PANTOPRAZOLE SODIUM 8 MG/HR: 40 INJECTION, POWDER, FOR SOLUTION INTRAVENOUS at 01:11

## 2021-11-07 RX ADMIN — SODIUM CHLORIDE: 9 INJECTION, SOLUTION INTRAVENOUS at 06:11

## 2021-11-07 RX ADMIN — LACTULOSE 30 G: 20 SOLUTION ORAL at 01:11

## 2021-11-07 RX ADMIN — LACTULOSE 30 G: 20 SOLUTION ORAL at 03:11

## 2021-11-07 RX ADMIN — LACTULOSE 30 G: 20 SOLUTION ORAL at 10:11

## 2021-11-08 LAB
ALBUMIN SERPL BCP-MCNC: 2.5 G/DL (ref 3.5–5)
ALBUMIN/GLOB SERPL: 0.9 {RATIO}
ALP SERPL-CCNC: 82 U/L (ref 45–115)
ALT SERPL W P-5'-P-CCNC: 49 U/L (ref 16–61)
ANION GAP SERPL CALCULATED.3IONS-SCNC: 10 MMOL/L (ref 7–16)
ANION GAP SERPL CALCULATED.3IONS-SCNC: 8 MMOL/L (ref 7–16)
AST SERPL W P-5'-P-CCNC: 100 U/L (ref 15–37)
BASOPHILS # BLD AUTO: 0.05 K/UL (ref 0–0.2)
BASOPHILS NFR BLD AUTO: 0.7 % (ref 0–1)
BILIRUB SERPL-MCNC: 2.1 MG/DL (ref 0–1.2)
BUN SERPL-MCNC: 11 MG/DL (ref 7–18)
BUN SERPL-MCNC: 12 MG/DL (ref 7–18)
BUN/CREAT SERPL: 16 (ref 6–20)
BUN/CREAT SERPL: 16 (ref 6–20)
CALCIUM SERPL-MCNC: 6.9 MG/DL (ref 8.5–10.1)
CALCIUM SERPL-MCNC: 7.2 MG/DL (ref 8.5–10.1)
CERULOPLASMIN SERPL-MCNC: 20 MG/DL
CHLORIDE SERPL-SCNC: 108 MMOL/L (ref 98–107)
CHLORIDE SERPL-SCNC: 113 MMOL/L (ref 98–107)
CO2 SERPL-SCNC: 25 MMOL/L (ref 21–32)
CO2 SERPL-SCNC: 28 MMOL/L (ref 21–32)
CREAT SERPL-MCNC: 0.69 MG/DL (ref 0.7–1.3)
CREAT SERPL-MCNC: 0.74 MG/DL (ref 0.7–1.3)
DIFFERENTIAL METHOD BLD: ABNORMAL
EOSINOPHIL # BLD AUTO: 0.05 K/UL (ref 0–0.5)
EOSINOPHIL NFR BLD AUTO: 0.7 % (ref 1–4)
ERYTHROCYTE [DISTWIDTH] IN BLOOD BY AUTOMATED COUNT: 15.9 % (ref 11.5–14.5)
FERRITIN SERPL-MCNC: 75 NG/ML (ref 26–388)
GLOBULIN SER-MCNC: 2.9 G/DL (ref 2–4)
GLUCOSE SERPL-MCNC: 131 MG/DL (ref 70–105)
GLUCOSE SERPL-MCNC: 144 MG/DL (ref 70–105)
GLUCOSE SERPL-MCNC: 145 MG/DL (ref 74–106)
GLUCOSE SERPL-MCNC: 150 MG/DL (ref 70–105)
GLUCOSE SERPL-MCNC: 325 MG/DL (ref 74–106)
HCT VFR BLD AUTO: 27.5 % (ref 40–54)
HCT VFR BLD AUTO: 28.1 % (ref 40–54)
HCT VFR BLD AUTO: 29.4 % (ref 40–54)
HGB BLD-MCNC: 10 G/DL (ref 13.5–18)
HGB BLD-MCNC: 10.1 G/DL (ref 13.5–18)
HGB BLD-MCNC: 10.4 G/DL (ref 13.5–18)
HIV 1+O+2 AB SERPL QL: NORMAL
IGA SERPL-MCNC: 244 MG/DL (ref 61–356)
IGG SERPL-MCNC: 972 MG/DL (ref 767–1590)
IGM SERPL-MCNC: 41 MG/DL (ref 37–286)
IMM GRANULOCYTES # BLD AUTO: 0.05 K/UL (ref 0–0.04)
IMM GRANULOCYTES NFR BLD: 0.7 % (ref 0–0.4)
IRON SATN MFR SERPL: 18 % (ref 14–50)
IRON SERPL-MCNC: 39 ΜG/DL (ref 65–175)
LYMPHOCYTES # BLD AUTO: 1.15 K/UL (ref 1–4.8)
LYMPHOCYTES NFR BLD AUTO: 16.3 % (ref 27–41)
MAGNESIUM SERPL-MCNC: 1.9 MG/DL (ref 1.7–2.3)
MCH RBC QN AUTO: 32.1 PG (ref 27–31)
MCHC RBC AUTO-ENTMCNC: 36.4 G/DL (ref 32–36)
MCV RBC AUTO: 88.1 FL (ref 80–96)
MONOCYTES # BLD AUTO: 0.89 K/UL (ref 0–0.8)
MONOCYTES NFR BLD AUTO: 12.6 % (ref 2–6)
MPC BLD CALC-MCNC: 10 FL (ref 9.4–12.4)
NEUTROPHILS # BLD AUTO: 4.86 K/UL (ref 1.8–7.7)
NEUTROPHILS NFR BLD AUTO: 69 % (ref 53–65)
NRBC # BLD AUTO: 0.05 X10E3/UL
NRBC, AUTO (.00): 0.7 %
PLATELET # BLD AUTO: 95 K/UL (ref 150–400)
POTASSIUM SERPL-SCNC: 2.6 MMOL/L (ref 3.5–5.1)
POTASSIUM SERPL-SCNC: 3.6 MMOL/L (ref 3.5–5.1)
PROT SERPL-MCNC: 5.4 G/DL (ref 6.4–8.2)
RBC # BLD AUTO: 3.12 M/UL (ref 4.6–6.2)
SODIUM SERPL-SCNC: 140 MMOL/L (ref 136–145)
SODIUM SERPL-SCNC: 145 MMOL/L (ref 136–145)
TIBC SERPL-MCNC: 216 ΜG/DL (ref 250–450)
TRANSFERRIN SERPL-MCNC: 153 MG/DL (ref 200–360)
WBC # BLD AUTO: 7.05 K/UL (ref 4.5–11)

## 2021-11-08 PROCEDURE — 85014 HEMATOCRIT: CPT | Performed by: INTERNAL MEDICINE

## 2021-11-08 PROCEDURE — S0166 INJ OLANZAPINE 2.5MG: HCPCS | Performed by: INTERNAL MEDICINE

## 2021-11-08 PROCEDURE — 11000001 HC ACUTE MED/SURG PRIVATE ROOM

## 2021-11-08 PROCEDURE — 25000003 PHARM REV CODE 250

## 2021-11-08 PROCEDURE — 85025 COMPLETE CBC W/AUTO DIFF WBC: CPT

## 2021-11-08 PROCEDURE — 84450 TRANSFERASE (AST) (SGOT): CPT

## 2021-11-08 PROCEDURE — 36415 COLL VENOUS BLD VENIPUNCTURE: CPT

## 2021-11-08 PROCEDURE — 86038 ANTINUCLEAR ANTIBODIES: CPT | Performed by: INTERNAL MEDICINE

## 2021-11-08 PROCEDURE — 82962 GLUCOSE BLOOD TEST: CPT

## 2021-11-08 PROCEDURE — 83735 ASSAY OF MAGNESIUM: CPT | Performed by: INTERNAL MEDICINE

## 2021-11-08 PROCEDURE — 63600175 PHARM REV CODE 636 W HCPCS: Performed by: INTERNAL MEDICINE

## 2021-11-08 PROCEDURE — C9113 INJ PANTOPRAZOLE SODIUM, VIA: HCPCS | Performed by: INTERNAL MEDICINE

## 2021-11-08 PROCEDURE — 99232 SBSQ HOSP IP/OBS MODERATE 35: CPT | Mod: GC,,, | Performed by: INTERNAL MEDICINE

## 2021-11-08 PROCEDURE — 82390 ASSAY OF CERULOPLASMIN: CPT | Performed by: INTERNAL MEDICINE

## 2021-11-08 PROCEDURE — 83540 ASSAY OF IRON: CPT | Performed by: INTERNAL MEDICINE

## 2021-11-08 PROCEDURE — 27000221 HC OXYGEN, UP TO 24 HOURS

## 2021-11-08 PROCEDURE — 83516 IMMUNOASSAY NONANTIBODY: CPT | Performed by: INTERNAL MEDICINE

## 2021-11-08 PROCEDURE — 25000003 PHARM REV CODE 250: Performed by: INTERNAL MEDICINE

## 2021-11-08 PROCEDURE — 94761 N-INVAS EAR/PLS OXIMETRY MLT: CPT

## 2021-11-08 PROCEDURE — 85018 HEMOGLOBIN: CPT | Performed by: INTERNAL MEDICINE

## 2021-11-08 PROCEDURE — 80053 COMPREHEN METABOLIC PANEL: CPT

## 2021-11-08 PROCEDURE — 83550 IRON BINDING TEST: CPT | Performed by: INTERNAL MEDICINE

## 2021-11-08 PROCEDURE — 82784 ASSAY IGA/IGD/IGG/IGM EACH: CPT | Performed by: INTERNAL MEDICINE

## 2021-11-08 PROCEDURE — 99232 PR SUBSEQUENT HOSPITAL CARE,LEVL II: ICD-10-PCS | Mod: GC,,, | Performed by: INTERNAL MEDICINE

## 2021-11-08 PROCEDURE — 25500020 PHARM REV CODE 255: Performed by: INTERNAL MEDICINE

## 2021-11-08 PROCEDURE — 82728 ASSAY OF FERRITIN: CPT | Performed by: INTERNAL MEDICINE

## 2021-11-08 PROCEDURE — 80048 BASIC METABOLIC PNL TOTAL CA: CPT | Mod: XB

## 2021-11-08 PROCEDURE — 87389 HIV-1 AG W/HIV-1&-2 AB AG IA: CPT | Performed by: INTERNAL MEDICINE

## 2021-11-08 PROCEDURE — 87040 BLOOD CULTURE FOR BACTERIA: CPT | Performed by: INTERNAL MEDICINE

## 2021-11-08 PROCEDURE — 63600175 PHARM REV CODE 636 W HCPCS

## 2021-11-08 PROCEDURE — 36415 COLL VENOUS BLD VENIPUNCTURE: CPT | Performed by: INTERNAL MEDICINE

## 2021-11-08 PROCEDURE — 84466 ASSAY OF TRANSFERRIN: CPT | Performed by: INTERNAL MEDICINE

## 2021-11-08 RX ORDER — SODIUM CHLORIDE, SODIUM LACTATE, POTASSIUM CHLORIDE, CALCIUM CHLORIDE 600; 310; 30; 20 MG/100ML; MG/100ML; MG/100ML; MG/100ML
INJECTION, SOLUTION INTRAVENOUS CONTINUOUS
Status: DISCONTINUED | OUTPATIENT
Start: 2021-11-08 | End: 2021-11-09

## 2021-11-08 RX ORDER — LACTULOSE 10 G/15ML
30 SOLUTION ORAL 2 TIMES DAILY
Status: DISCONTINUED | OUTPATIENT
Start: 2021-11-08 | End: 2021-11-08

## 2021-11-08 RX ORDER — TALC
9 POWDER (GRAM) TOPICAL NIGHTLY PRN
Status: DISCONTINUED | OUTPATIENT
Start: 2021-11-08 | End: 2021-11-23 | Stop reason: HOSPADM

## 2021-11-08 RX ORDER — HYDROCHLOROTHIAZIDE 25 MG/1
25 TABLET ORAL DAILY
Status: DISCONTINUED | OUTPATIENT
Start: 2021-11-08 | End: 2021-11-08

## 2021-11-08 RX ORDER — HYDRALAZINE HYDROCHLORIDE 20 MG/ML
10 INJECTION INTRAMUSCULAR; INTRAVENOUS EVERY 6 HOURS PRN
Status: DISCONTINUED | OUTPATIENT
Start: 2021-11-08 | End: 2021-11-23 | Stop reason: HOSPADM

## 2021-11-08 RX ORDER — LACTULOSE 10 G/15ML
30 SOLUTION ORAL 3 TIMES DAILY
Status: DISCONTINUED | OUTPATIENT
Start: 2021-11-08 | End: 2021-11-09

## 2021-11-08 RX ORDER — LISINOPRIL 10 MG/1
10 TABLET ORAL DAILY
Status: DISCONTINUED | OUTPATIENT
Start: 2021-11-08 | End: 2021-11-12

## 2021-11-08 RX ORDER — LISINOPRIL 10 MG/1
10 TABLET ORAL DAILY
Status: DISCONTINUED | OUTPATIENT
Start: 2021-11-08 | End: 2021-11-08

## 2021-11-08 RX ORDER — LACTULOSE 10 G/15ML
30 SOLUTION ORAL 3 TIMES DAILY
Status: DISCONTINUED | OUTPATIENT
Start: 2021-11-08 | End: 2021-11-08

## 2021-11-08 RX ORDER — HYDROCHLOROTHIAZIDE 25 MG/1
25 TABLET ORAL DAILY
Status: DISCONTINUED | OUTPATIENT
Start: 2021-11-08 | End: 2021-11-12

## 2021-11-08 RX ADMIN — OLANZAPINE 5 MG: 10 INJECTION, POWDER, FOR SOLUTION INTRAMUSCULAR at 02:11

## 2021-11-08 RX ADMIN — LACTULOSE 30 G: 20 SOLUTION ORAL at 02:11

## 2021-11-08 RX ADMIN — PANTOPRAZOLE SODIUM 8 MG/HR: 40 INJECTION, POWDER, FOR SOLUTION INTRAVENOUS at 03:11

## 2021-11-08 RX ADMIN — SODIUM CHLORIDE, POTASSIUM CHLORIDE, SODIUM LACTATE AND CALCIUM CHLORIDE: 600; 310; 30; 20 INJECTION, SOLUTION INTRAVENOUS at 11:11

## 2021-11-08 RX ADMIN — RIFAXIMIN 550 MG: 550 TABLET ORAL at 10:11

## 2021-11-08 RX ADMIN — SODIUM CHLORIDE, POTASSIUM CHLORIDE, SODIUM LACTATE AND CALCIUM CHLORIDE: 600; 310; 30; 20 INJECTION, SOLUTION INTRAVENOUS at 03:11

## 2021-11-08 RX ADMIN — RIFAXIMIN 550 MG: 550 TABLET ORAL at 09:11

## 2021-11-08 RX ADMIN — LACTULOSE 30 G: 20 SOLUTION ORAL at 06:11

## 2021-11-08 RX ADMIN — POTASSIUM BICARBONATE 40 MEQ: 391 TABLET, EFFERVESCENT ORAL at 03:11

## 2021-11-08 RX ADMIN — PERFLUTREN 1.5 ML: 6.52 INJECTION, SUSPENSION INTRAVENOUS at 02:11

## 2021-11-08 RX ADMIN — OLANZAPINE 5 MG: 10 INJECTION, POWDER, FOR SOLUTION INTRAMUSCULAR at 11:11

## 2021-11-08 RX ADMIN — PANTOPRAZOLE SODIUM 8 MG/HR: 40 INJECTION, POWDER, FOR SOLUTION INTRAVENOUS at 11:11

## 2021-11-08 RX ADMIN — MELATONIN 9 MG: at 09:11

## 2021-11-08 RX ADMIN — LISINOPRIL 10 MG: 10 TABLET ORAL at 03:11

## 2021-11-08 RX ADMIN — LACTULOSE 30 G: 20 SOLUTION ORAL at 09:11

## 2021-11-08 RX ADMIN — HYDROCHLOROTHIAZIDE 25 MG: 25 TABLET ORAL at 03:11

## 2021-11-08 RX ADMIN — FOLIC ACID-PYRIDOXINE-CYANOCOBALAMIN TAB 2.5-25-2 MG 1 TABLET: 2.5-25-2 TAB at 10:11

## 2021-11-08 RX ADMIN — POTASSIUM CHLORIDE: 149 INJECTION, SOLUTION, CONCENTRATE INTRAVENOUS at 06:11

## 2021-11-08 RX ADMIN — PANTOPRAZOLE SODIUM 8 MG/HR: 40 INJECTION, POWDER, FOR SOLUTION INTRAVENOUS at 04:11

## 2021-11-08 RX ADMIN — PANTOPRAZOLE SODIUM 8 MG/HR: 40 INJECTION, POWDER, FOR SOLUTION INTRAVENOUS at 10:11

## 2021-11-09 PROBLEM — E83.42 HYPOMAGNESEMIA: Status: ACTIVE | Noted: 2021-11-09

## 2021-11-09 PROBLEM — J98.11 ATELECTASIS: Status: ACTIVE | Noted: 2021-11-09

## 2021-11-09 PROBLEM — E87.6 HYPOKALEMIA: Status: ACTIVE | Noted: 2021-11-09

## 2021-11-09 LAB
6-ACETYLMORPHINE, URINE (RUSH): NEGATIVE 10 NG/ML
7-AMINOCLONAZEPAM, URINE (RUSH): NEGATIVE 25 NG/ML
A-HYDROXYALPRAZOLAM, URINE (RUSH): NEGATIVE 25 NG/ML
AMMONIA PLAS-SCNC: 32 ΜMOL/L (ref 11–32)
AMPHET UR QL SCN: NEGATIVE 100 NG/ML
ANA SER QL: NEGATIVE
ANION GAP SERPL CALCULATED.3IONS-SCNC: 12 MMOL/L (ref 7–16)
BENZOYLECGONINE, URINE (RUSH): NEGATIVE 100 NG/ML
BUN SERPL-MCNC: 5 MG/DL (ref 7–18)
BUN/CREAT SERPL: 10 (ref 6–20)
BUTALBITAL, URINE (RUSH): NEGATIVE 50 NG/ML
CALCIUM SERPL-MCNC: 7.5 MG/DL (ref 8.5–10.1)
CARISOPRODOL, URINE (RUSH): NEGATIVE 100 NG/ML
CHLORIDE SERPL-SCNC: 103 MMOL/L (ref 98–107)
CO2 SERPL-SCNC: 26 MMOL/L (ref 21–32)
CODEINE, URINE (RUSH): NEGATIVE 25 NG/ML
CREAT SERPL-MCNC: 0.51 MG/DL (ref 0.7–1.3)
CREAT UR-MCNC: 154 MG/DL (ref 39–259)
EDDP, URINE (RUSH): NEGATIVE 25 NG/ML
ESTROGEN SERPL-MCNC: NORMAL PG/ML
GLUCOSE SERPL-MCNC: 113 MG/DL (ref 70–105)
GLUCOSE SERPL-MCNC: 118 MG/DL (ref 70–105)
GLUCOSE SERPL-MCNC: 125 MG/DL (ref 74–106)
GLUCOSE SERPL-MCNC: 135 MG/DL (ref 70–105)
GLUCOSE SERPL-MCNC: 142 MG/DL (ref 70–105)
HCT VFR BLD AUTO: 31 % (ref 40–54)
HCT VFR BLD AUTO: 32.9 % (ref 40–54)
HCT VFR BLD AUTO: 35.8 % (ref 40–54)
HGB BLD-MCNC: 11.2 G/DL (ref 13.5–18)
HGB BLD-MCNC: 11.5 G/DL (ref 13.5–18)
HGB BLD-MCNC: 12.4 G/DL (ref 13.5–18)
HYDROCODONE, URINE (RUSH): NEGATIVE 25 NG/ML
HYDROMORPHONE, URINE (RUSH): NEGATIVE 25 NG/ML
LAB AP GROSS DESCRIPTION: NORMAL
LAB AP LABORATORY NOTES: NORMAL
LORAZEPAM, URINE (RUSH): >250 25 NG/ML
MAGNESIUM SERPL-MCNC: 1.7 MG/DL (ref 1.7–2.3)
MEPROBAMATE, URINE (RUSH): NEGATIVE 100 NG/ML
METHADONE UR QL SCN: NEGATIVE 25 NG/ML
METHAMPHET UR QL SCN: NEGATIVE 100 NG/ML
MORPHINE, URINE (RUSH): NEGATIVE 25 NG/ML
NORDIAZEPAM, URINE (RUSH): NEGATIVE 25 NG/ML
NORHYDROCODONE, URINE (RUSH): NEGATIVE 50 NG/ML
NOROXYCODONE HCL, URINE (RUSH): NEGATIVE 50 NG/ML
OXAZEPAM, URINE (RUSH): NEGATIVE 25 NG/ML
OXYCODONE UR QL SCN: NEGATIVE 25 NG/ML
OXYMORPHONE, URINE (RUSH): NEGATIVE 25 NG/ML
PH UR STRIP: 6 PH UNITS
PHENOBARBITAL, URINE (RUSH): NEGATIVE 50 NG/ML
POTASSIUM SERPL-SCNC: 3 MMOL/L (ref 3.5–5.1)
SECOBARBITAL, URINE (RUSH): NEGATIVE 50 NG/ML
SODIUM SERPL-SCNC: 138 MMOL/L (ref 136–145)
SP GR UR STRIP: 1.02
T3RU NFR SERPL: NORMAL %
TEMAZEPAM, URINE (RUSH): NEGATIVE 25 NG/ML

## 2021-11-09 PROCEDURE — 97162 PT EVAL MOD COMPLEX 30 MIN: CPT

## 2021-11-09 PROCEDURE — 63600175 PHARM REV CODE 636 W HCPCS: Performed by: INTERNAL MEDICINE

## 2021-11-09 PROCEDURE — 85018 HEMOGLOBIN: CPT | Performed by: INTERNAL MEDICINE

## 2021-11-09 PROCEDURE — 25000003 PHARM REV CODE 250: Performed by: INTERNAL MEDICINE

## 2021-11-09 PROCEDURE — 80048 BASIC METABOLIC PNL TOTAL CA: CPT

## 2021-11-09 PROCEDURE — S0166 INJ OLANZAPINE 2.5MG: HCPCS | Performed by: INTERNAL MEDICINE

## 2021-11-09 PROCEDURE — 86255 FLUORESCENT ANTIBODY SCREEN: CPT | Mod: 90 | Performed by: INTERNAL MEDICINE

## 2021-11-09 PROCEDURE — 86376 MICROSOMAL ANTIBODY EACH: CPT | Mod: 90 | Performed by: INTERNAL MEDICINE

## 2021-11-09 PROCEDURE — 11000001 HC ACUTE MED/SURG PRIVATE ROOM

## 2021-11-09 PROCEDURE — 97166 OT EVAL MOD COMPLEX 45 MIN: CPT

## 2021-11-09 PROCEDURE — 25000003 PHARM REV CODE 250: Performed by: FAMILY MEDICINE

## 2021-11-09 PROCEDURE — 94761 N-INVAS EAR/PLS OXIMETRY MLT: CPT

## 2021-11-09 PROCEDURE — 63600175 PHARM REV CODE 636 W HCPCS

## 2021-11-09 PROCEDURE — 85014 HEMATOCRIT: CPT | Performed by: INTERNAL MEDICINE

## 2021-11-09 PROCEDURE — C9113 INJ PANTOPRAZOLE SODIUM, VIA: HCPCS | Performed by: INTERNAL MEDICINE

## 2021-11-09 PROCEDURE — 82962 GLUCOSE BLOOD TEST: CPT

## 2021-11-09 PROCEDURE — 87040 BLOOD CULTURE FOR BACTERIA: CPT

## 2021-11-09 PROCEDURE — 83735 ASSAY OF MAGNESIUM: CPT

## 2021-11-09 PROCEDURE — 25000003 PHARM REV CODE 250

## 2021-11-09 PROCEDURE — 92610 EVALUATE SWALLOWING FUNCTION: CPT

## 2021-11-09 PROCEDURE — 36415 COLL VENOUS BLD VENIPUNCTURE: CPT | Performed by: INTERNAL MEDICINE

## 2021-11-09 PROCEDURE — 99232 PR SUBSEQUENT HOSPITAL CARE,LEVL II: ICD-10-PCS | Mod: ,,, | Performed by: INTERNAL MEDICINE

## 2021-11-09 PROCEDURE — 82140 ASSAY OF AMMONIA: CPT

## 2021-11-09 PROCEDURE — C9113 INJ PANTOPRAZOLE SODIUM, VIA: HCPCS

## 2021-11-09 PROCEDURE — 99232 SBSQ HOSP IP/OBS MODERATE 35: CPT | Mod: ,,, | Performed by: INTERNAL MEDICINE

## 2021-11-09 RX ORDER — POTASSIUM CHLORIDE 7.45 MG/ML
20 INJECTION INTRAVENOUS ONCE
Status: COMPLETED | OUTPATIENT
Start: 2021-11-09 | End: 2021-11-09

## 2021-11-09 RX ORDER — LACTULOSE 10 G/15ML
30 SOLUTION ORAL 3 TIMES DAILY
Status: DISCONTINUED | OUTPATIENT
Start: 2021-11-09 | End: 2021-11-10

## 2021-11-09 RX ORDER — ACETAMINOPHEN 325 MG/1
650 TABLET ORAL EVERY 6 HOURS PRN
Status: DISCONTINUED | OUTPATIENT
Start: 2021-11-09 | End: 2021-11-23 | Stop reason: HOSPADM

## 2021-11-09 RX ORDER — MAGNESIUM SULFATE HEPTAHYDRATE 40 MG/ML
2 INJECTION, SOLUTION INTRAVENOUS ONCE
Status: COMPLETED | OUTPATIENT
Start: 2021-11-09 | End: 2021-11-09

## 2021-11-09 RX ORDER — LACTULOSE 10 G/15ML
30 SOLUTION ORAL 4 TIMES DAILY
Status: DISCONTINUED | OUTPATIENT
Start: 2021-11-09 | End: 2021-11-09

## 2021-11-09 RX ORDER — CLINDAMYCIN PHOSPHATE 600 MG/50ML
600 INJECTION, SOLUTION INTRAVENOUS
Status: DISCONTINUED | OUTPATIENT
Start: 2021-11-09 | End: 2021-11-10

## 2021-11-09 RX ORDER — POTASSIUM CHLORIDE 7.45 MG/ML
10 INJECTION INTRAVENOUS ONCE
Status: DISCONTINUED | OUTPATIENT
Start: 2021-11-09 | End: 2021-11-09

## 2021-11-09 RX ORDER — PANTOPRAZOLE SODIUM 40 MG/10ML
40 INJECTION, POWDER, LYOPHILIZED, FOR SOLUTION INTRAVENOUS 2 TIMES DAILY
Status: DISCONTINUED | OUTPATIENT
Start: 2021-11-09 | End: 2021-11-23 | Stop reason: HOSPADM

## 2021-11-09 RX ADMIN — CLINDAMYCIN PHOSPHATE 600 MG: 600 INJECTION, SOLUTION INTRAVENOUS at 02:11

## 2021-11-09 RX ADMIN — SODIUM CHLORIDE: 9 INJECTION, SOLUTION INTRAVENOUS at 12:11

## 2021-11-09 RX ADMIN — MAGNESIUM SULFATE IN WATER 2 G: 40 INJECTION, SOLUTION INTRAVENOUS at 02:11

## 2021-11-09 RX ADMIN — RIFAXIMIN 550 MG: 550 TABLET ORAL at 09:11

## 2021-11-09 RX ADMIN — PANTOPRAZOLE SODIUM 8 MG/HR: 40 INJECTION, POWDER, FOR SOLUTION INTRAVENOUS at 05:11

## 2021-11-09 RX ADMIN — LACTULOSE 30 G: 20 SOLUTION ORAL at 02:11

## 2021-11-09 RX ADMIN — CLINDAMYCIN PHOSPHATE 600 MG: 600 INJECTION, SOLUTION INTRAVENOUS at 10:11

## 2021-11-09 RX ADMIN — HYDROCHLOROTHIAZIDE 25 MG: 25 TABLET ORAL at 09:11

## 2021-11-09 RX ADMIN — PANTOPRAZOLE SODIUM 40 MG: 40 INJECTION, POWDER, FOR SOLUTION INTRAVENOUS at 12:11

## 2021-11-09 RX ADMIN — LISINOPRIL 10 MG: 10 TABLET ORAL at 09:11

## 2021-11-09 RX ADMIN — OLANZAPINE 5 MG: 10 INJECTION, POWDER, FOR SOLUTION INTRAMUSCULAR at 07:11

## 2021-11-09 RX ADMIN — ACETAMINOPHEN 650 MG: 325 TABLET ORAL at 07:11

## 2021-11-09 RX ADMIN — RIFAXIMIN 550 MG: 550 TABLET ORAL at 10:11

## 2021-11-09 RX ADMIN — PIPERACILLIN AND TAZOBACTAM 4.5 G: 4; .5 INJECTION, POWDER, LYOPHILIZED, FOR SOLUTION INTRAVENOUS; PARENTERAL at 09:11

## 2021-11-09 RX ADMIN — PANTOPRAZOLE SODIUM 40 MG: 40 INJECTION, POWDER, FOR SOLUTION INTRAVENOUS at 10:11

## 2021-11-09 RX ADMIN — POTASSIUM CHLORIDE 20 MEQ: 7.46 INJECTION, SOLUTION INTRAVENOUS at 12:11

## 2021-11-09 RX ADMIN — LACTULOSE 30 G: 20 SOLUTION ORAL at 09:11

## 2021-11-09 RX ADMIN — LACTULOSE 30 G: 20 SOLUTION ORAL at 10:11

## 2021-11-10 LAB
ALBUMIN SERPL BCP-MCNC: 2.1 G/DL (ref 3.5–5)
ALBUMIN/GLOB SERPL: 0.7 {RATIO}
ALP SERPL-CCNC: 78 U/L (ref 45–115)
ALT SERPL W P-5'-P-CCNC: 39 U/L (ref 16–61)
ANION GAP SERPL CALCULATED.3IONS-SCNC: 11 MMOL/L (ref 7–16)
AORTIC VALVE CUSP SEPERATION: 20.5 CM
AST SERPL W P-5'-P-CCNC: 46 U/L (ref 15–37)
AV INDEX (PROSTH): 0.89
AV MEAN GRADIENT: 6 MMHG
AV VALVE AREA: 2.82 CM2
BASOPHILS # BLD AUTO: 0.03 K/UL (ref 0–0.2)
BASOPHILS NFR BLD AUTO: 0.2 % (ref 0–1)
BILIRUB SERPL-MCNC: 2.8 MG/DL (ref 0–1.2)
BSA FOR ECHO PROCEDURE: 1.94 M2
BUN SERPL-MCNC: 9 MG/DL (ref 7–18)
BUN/CREAT SERPL: 12 (ref 6–20)
CALCIUM SERPL-MCNC: 7.2 MG/DL (ref 8.5–10.1)
CHLORIDE SERPL-SCNC: 102 MMOL/L (ref 98–107)
CO2 SERPL-SCNC: 28 MMOL/L (ref 21–32)
CREAT SERPL-MCNC: 0.77 MG/DL (ref 0.7–1.3)
CV ECHO LV RWT: 0.51 CM
DIFFERENTIAL METHOD BLD: ABNORMAL
DOP CALC AO VTI: 30.99 CM
DOP CALC LVOT AREA: 3.2 CM2
DOP CALC LVOT DIAMETER: 2.01 CM
DOP CALC LVOT STROKE VOLUME: 87.25 CM3
DOP CALCLVOT PEAK VEL VTI: 27.51 CM
E WAVE DECELERATION TIME: 150 MSEC
E/A RATIO: 0.77
E/E' RATIO: 7.12 M/S
ECHO EF ESTIMATED: 79 %
ECHO LV POSTERIOR WALL: 1.06 CM (ref 0.6–1.1)
EJECTION FRACTION: 50 %
EOSINOPHIL # BLD AUTO: 0.11 K/UL (ref 0–0.5)
EOSINOPHIL NFR BLD AUTO: 0.8 % (ref 1–4)
ERYTHROCYTE [DISTWIDTH] IN BLOOD BY AUTOMATED COUNT: 15.6 % (ref 11.5–14.5)
FRACTIONAL SHORTENING: 47 % (ref 28–44)
GLOBULIN SER-MCNC: 3.2 G/DL (ref 2–4)
GLUCOSE SERPL-MCNC: 112 MG/DL (ref 70–105)
GLUCOSE SERPL-MCNC: 115 MG/DL (ref 70–105)
GLUCOSE SERPL-MCNC: 133 MG/DL (ref 70–105)
GLUCOSE SERPL-MCNC: 99 MG/DL (ref 70–105)
GLUCOSE SERPL-MCNC: 99 MG/DL (ref 74–106)
HCT VFR BLD AUTO: 28.9 % (ref 40–54)
HCT VFR BLD AUTO: 30.6 % (ref 40–54)
HCT VFR BLD AUTO: 31.5 % (ref 40–54)
HCT VFR BLD AUTO: 34 % (ref 40–54)
HGB BLD-MCNC: 10.3 G/DL (ref 13.5–18)
HGB BLD-MCNC: 10.5 G/DL (ref 13.5–18)
HGB BLD-MCNC: 10.9 G/DL (ref 13.5–18)
HGB BLD-MCNC: 11.5 G/DL (ref 13.5–18)
IMM GRANULOCYTES # BLD AUTO: 0.09 K/UL (ref 0–0.04)
IMM GRANULOCYTES NFR BLD: 0.7 % (ref 0–0.4)
INDIRECT COOMBS: NORMAL
INTERVENTRICULAR SEPTUM: 1.06 CM (ref 0.6–1.1)
LEFT ATRIUM SIZE: 2.93 CM
LEFT INTERNAL DIMENSION IN SYSTOLE: 2.18 CM (ref 2.1–4)
LEFT VENTRICLE DIASTOLIC VOLUME INDEX: 39.15 ML/M2
LEFT VENTRICLE DIASTOLIC VOLUME: 75.95 ML
LEFT VENTRICLE MASS INDEX: 75 G/M2
LEFT VENTRICLE SYSTOLIC VOLUME INDEX: 8.2 ML/M2
LEFT VENTRICLE SYSTOLIC VOLUME: 15.83 ML
LEFT VENTRICULAR INTERNAL DIMENSION IN DIASTOLE: 4.14 CM (ref 3.5–6)
LEFT VENTRICULAR MASS: 145.66 G
LV LATERAL E/E' RATIO: 7.42 M/S
LV SEPTAL E/E' RATIO: 6.85 M/S
LVOT MG: 3 MMHG
LYMPHOCYTES # BLD AUTO: 1.08 K/UL (ref 1–4.8)
LYMPHOCYTES NFR BLD AUTO: 7.8 % (ref 27–41)
MAGNESIUM SERPL-MCNC: 2.2 MG/DL (ref 1.7–2.3)
MCH RBC QN AUTO: 31.8 PG (ref 27–31)
MCHC RBC AUTO-ENTMCNC: 34.6 G/DL (ref 32–36)
MCV RBC AUTO: 91.8 FL (ref 80–96)
MONOCYTES # BLD AUTO: 1.17 K/UL (ref 0–0.8)
MONOCYTES NFR BLD AUTO: 8.5 % (ref 2–6)
MPC BLD CALC-MCNC: 10.1 FL (ref 9.4–12.4)
MV PEAK A VEL: 1.16 M/S
MV PEAK E VEL: 0.89 M/S
NEUTROPHILS # BLD AUTO: 11.29 K/UL (ref 1.8–7.7)
NEUTROPHILS NFR BLD AUTO: 82 % (ref 53–65)
NRBC # BLD AUTO: 0 X10E3/UL
NRBC, AUTO (.00): 0 %
PISA TR MAX VEL: 2.47 M/S
PLATELET # BLD AUTO: 135 K/UL (ref 150–400)
POTASSIUM SERPL-SCNC: 2.9 MMOL/L (ref 3.5–5.1)
POTASSIUM SERPL-SCNC: 3.3 MMOL/L (ref 3.5–5.1)
PROT SERPL-MCNC: 5.3 G/DL (ref 6.4–8.2)
RBC # BLD AUTO: 3.43 M/UL (ref 4.6–6.2)
RH BLD: NORMAL
SODIUM SERPL-SCNC: 138 MMOL/L (ref 136–145)
TDI LATERAL: 0.12 M/S
TDI SEPTAL: 0.13 M/S
TDI: 0.13 M/S
TR MAX PG: 24 MMHG
WBC # BLD AUTO: 13.77 K/UL (ref 4.5–11)

## 2021-11-10 PROCEDURE — 25000003 PHARM REV CODE 250: Performed by: INTERNAL MEDICINE

## 2021-11-10 PROCEDURE — 25000003 PHARM REV CODE 250

## 2021-11-10 PROCEDURE — 99232 PR SUBSEQUENT HOSPITAL CARE,LEVL II: ICD-10-PCS | Mod: ,,, | Performed by: INTERNAL MEDICINE

## 2021-11-10 PROCEDURE — 94761 N-INVAS EAR/PLS OXIMETRY MLT: CPT

## 2021-11-10 PROCEDURE — 97530 THERAPEUTIC ACTIVITIES: CPT

## 2021-11-10 PROCEDURE — 99232 SBSQ HOSP IP/OBS MODERATE 35: CPT | Mod: ,,, | Performed by: INTERNAL MEDICINE

## 2021-11-10 PROCEDURE — C9113 INJ PANTOPRAZOLE SODIUM, VIA: HCPCS

## 2021-11-10 PROCEDURE — 82962 GLUCOSE BLOOD TEST: CPT

## 2021-11-10 PROCEDURE — 84450 TRANSFERASE (AST) (SGOT): CPT

## 2021-11-10 PROCEDURE — 97535 SELF CARE MNGMENT TRAINING: CPT

## 2021-11-10 PROCEDURE — 83735 ASSAY OF MAGNESIUM: CPT

## 2021-11-10 PROCEDURE — 36415 COLL VENOUS BLD VENIPUNCTURE: CPT

## 2021-11-10 PROCEDURE — 85018 HEMOGLOBIN: CPT

## 2021-11-10 PROCEDURE — 25500020 PHARM REV CODE 255: Performed by: INTERNAL MEDICINE

## 2021-11-10 PROCEDURE — 97110 THERAPEUTIC EXERCISES: CPT

## 2021-11-10 PROCEDURE — 80053 COMPREHEN METABOLIC PANEL: CPT

## 2021-11-10 PROCEDURE — 25000003 PHARM REV CODE 250: Performed by: FAMILY MEDICINE

## 2021-11-10 PROCEDURE — 85025 COMPLETE CBC W/AUTO DIFF WBC: CPT

## 2021-11-10 PROCEDURE — 85014 HEMATOCRIT: CPT

## 2021-11-10 PROCEDURE — 63600175 PHARM REV CODE 636 W HCPCS: Performed by: INTERNAL MEDICINE

## 2021-11-10 PROCEDURE — 84075 ASSAY ALKALINE PHOSPHATASE: CPT

## 2021-11-10 PROCEDURE — 11000001 HC ACUTE MED/SURG PRIVATE ROOM

## 2021-11-10 PROCEDURE — 87641 MR-STAPH DNA AMP PROBE: CPT

## 2021-11-10 PROCEDURE — 84132 ASSAY OF SERUM POTASSIUM: CPT

## 2021-11-10 PROCEDURE — 86900 BLOOD TYPING SEROLOGIC ABO: CPT

## 2021-11-10 PROCEDURE — 63600175 PHARM REV CODE 636 W HCPCS

## 2021-11-10 RX ORDER — LACTULOSE 10 G/15ML
30 SOLUTION ORAL DAILY
Status: DISCONTINUED | OUTPATIENT
Start: 2021-11-11 | End: 2021-11-10

## 2021-11-10 RX ORDER — LACTULOSE 10 G/15ML
20 SOLUTION ORAL DAILY
Status: DISCONTINUED | OUTPATIENT
Start: 2021-11-11 | End: 2021-11-23 | Stop reason: HOSPADM

## 2021-11-10 RX ORDER — SODIUM CHLORIDE, SODIUM LACTATE, POTASSIUM CHLORIDE, CALCIUM CHLORIDE 600; 310; 30; 20 MG/100ML; MG/100ML; MG/100ML; MG/100ML
INJECTION, SOLUTION INTRAVENOUS CONTINUOUS
Status: DISCONTINUED | OUTPATIENT
Start: 2021-11-10 | End: 2021-11-10

## 2021-11-10 RX ORDER — POTASSIUM CHLORIDE 7.45 MG/ML
40 INJECTION INTRAVENOUS ONCE
Status: COMPLETED | OUTPATIENT
Start: 2021-11-10 | End: 2021-11-10

## 2021-11-10 RX ORDER — HALOPERIDOL 5 MG/ML
1 INJECTION INTRAMUSCULAR EVERY 6 HOURS PRN
Status: COMPLETED | OUTPATIENT
Start: 2021-11-10 | End: 2021-11-11

## 2021-11-10 RX ORDER — POTASSIUM CHLORIDE 7.45 MG/ML
20 INJECTION INTRAVENOUS ONCE
Status: COMPLETED | OUTPATIENT
Start: 2021-11-10 | End: 2021-11-10

## 2021-11-10 RX ADMIN — PIPERACILLIN SODIUM AND TAZOBACTAM SODIUM 4.5 G: 4; .5 INJECTION, POWDER, LYOPHILIZED, FOR SOLUTION INTRAVENOUS at 12:11

## 2021-11-10 RX ADMIN — RIFAXIMIN 550 MG: 550 TABLET ORAL at 09:11

## 2021-11-10 RX ADMIN — SODIUM CHLORIDE: 9 INJECTION, SOLUTION INTRAVENOUS at 12:11

## 2021-11-10 RX ADMIN — SODIUM CHLORIDE, POTASSIUM CHLORIDE, SODIUM LACTATE AND CALCIUM CHLORIDE: 600; 310; 30; 20 INJECTION, SOLUTION INTRAVENOUS at 01:11

## 2021-11-10 RX ADMIN — POTASSIUM CHLORIDE 40 MEQ: 7.46 INJECTION, SOLUTION INTRAVENOUS at 03:11

## 2021-11-10 RX ADMIN — PANTOPRAZOLE SODIUM 40 MG: 40 INJECTION, POWDER, FOR SOLUTION INTRAVENOUS at 10:11

## 2021-11-10 RX ADMIN — LACTULOSE 30 G: 20 SOLUTION ORAL at 09:11

## 2021-11-10 RX ADMIN — HALOPERIDOL LACTATE 1 MG: 5 INJECTION, SOLUTION INTRAMUSCULAR at 03:11

## 2021-11-10 RX ADMIN — MELATONIN 9 MG: at 10:11

## 2021-11-10 RX ADMIN — HYDROCHLOROTHIAZIDE 25 MG: 25 TABLET ORAL at 09:11

## 2021-11-10 RX ADMIN — CLINDAMYCIN PHOSPHATE 600 MG: 600 INJECTION, SOLUTION INTRAVENOUS at 04:11

## 2021-11-10 RX ADMIN — SODIUM CHLORIDE: 9 INJECTION, SOLUTION INTRAVENOUS at 05:11

## 2021-11-10 RX ADMIN — PIPERACILLIN SODIUM AND TAZOBACTAM SODIUM 4.5 G: 4; .5 INJECTION, POWDER, LYOPHILIZED, FOR SOLUTION INTRAVENOUS at 10:11

## 2021-11-10 RX ADMIN — RIFAXIMIN 550 MG: 550 TABLET ORAL at 10:11

## 2021-11-10 RX ADMIN — IOPAMIDOL 100 ML: 755 INJECTION, SOLUTION INTRAVENOUS at 10:11

## 2021-11-10 RX ADMIN — POTASSIUM CHLORIDE 20 MEQ: 7.46 INJECTION, SOLUTION INTRAVENOUS at 09:11

## 2021-11-10 RX ADMIN — PANTOPRAZOLE SODIUM 40 MG: 40 INJECTION, POWDER, FOR SOLUTION INTRAVENOUS at 09:11

## 2021-11-10 RX ADMIN — FOLIC ACID-PYRIDOXINE-CYANOCOBALAMIN TAB 2.5-25-2 MG 1 TABLET: 2.5-25-2 TAB at 09:11

## 2021-11-11 PROBLEM — J69.0 ASPIRATION PNEUMONIA: Status: ACTIVE | Noted: 2021-11-09

## 2021-11-11 LAB
ANION GAP SERPL CALCULATED.3IONS-SCNC: 8 MMOL/L (ref 7–16)
ANTI-MITOCHONDRIAL (M2) AB INDEX: 0.46
BASOPHILS # BLD AUTO: 0.04 K/UL (ref 0–0.2)
BASOPHILS NFR BLD AUTO: 0.3 % (ref 0–1)
BUN SERPL-MCNC: 12 MG/DL (ref 7–18)
BUN/CREAT SERPL: 23 (ref 6–20)
CALCIUM SERPL-MCNC: 7.1 MG/DL (ref 8.5–10.1)
CHLORIDE SERPL-SCNC: 101 MMOL/L (ref 98–107)
CO2 SERPL-SCNC: 29 MMOL/L (ref 21–32)
CREAT SERPL-MCNC: 0.53 MG/DL (ref 0.7–1.3)
DIFFERENTIAL METHOD BLD: ABNORMAL
EOSINOPHIL # BLD AUTO: 0.13 K/UL (ref 0–0.5)
EOSINOPHIL NFR BLD AUTO: 1.1 % (ref 1–4)
ERYTHROCYTE [DISTWIDTH] IN BLOOD BY AUTOMATED COUNT: 15.9 % (ref 11.5–14.5)
GLUCOSE SERPL-MCNC: 134 MG/DL (ref 70–105)
GLUCOSE SERPL-MCNC: 137 MG/DL (ref 74–106)
GLUCOSE SERPL-MCNC: 139 MG/DL (ref 70–105)
GLUCOSE SERPL-MCNC: 194 MG/DL (ref 70–105)
HCT VFR BLD AUTO: 28.9 % (ref 40–54)
HCT VFR BLD AUTO: 30.3 % (ref 40–54)
HCT VFR BLD AUTO: 31.1 % (ref 40–54)
HGB BLD-MCNC: 10.2 G/DL (ref 13.5–18)
HGB BLD-MCNC: 10.7 G/DL (ref 13.5–18)
HGB BLD-MCNC: 10.8 G/DL (ref 13.5–18)
HGB FRACT BLD ELPH-IMP: NEGATIVE
IMM GRANULOCYTES # BLD AUTO: 0.08 K/UL (ref 0–0.04)
IMM GRANULOCYTES NFR BLD: 0.7 % (ref 0–0.4)
LKM-1 AB SER-ACNC: <5 U
LYMPHOCYTES # BLD AUTO: 1.04 K/UL (ref 1–4.8)
LYMPHOCYTES NFR BLD AUTO: 8.8 % (ref 27–41)
MAGNESIUM SERPL-MCNC: 2 MG/DL (ref 1.7–2.3)
MCH RBC QN AUTO: 31.8 PG (ref 27–31)
MCHC RBC AUTO-ENTMCNC: 34.7 G/DL (ref 32–36)
MCV RBC AUTO: 91.5 FL (ref 80–96)
METHICILLIN RESISTANT STAPHYLOCOCCUS AUREUS: NEGATIVE
MONOCYTES # BLD AUTO: 1.14 K/UL (ref 0–0.8)
MONOCYTES NFR BLD AUTO: 9.7 % (ref 2–6)
MPC BLD CALC-MCNC: 9.9 FL (ref 9.4–12.4)
NEUTROPHILS # BLD AUTO: 9.33 K/UL (ref 1.8–7.7)
NEUTROPHILS NFR BLD AUTO: 79.4 % (ref 53–65)
NRBC # BLD AUTO: 0 X10E3/UL
NRBC, AUTO (.00): 0 %
PHOSPHATE SERPL-MCNC: 2.2 MG/DL (ref 2.5–4.5)
PLATELET # BLD AUTO: 155 K/UL (ref 150–400)
POTASSIUM SERPL-SCNC: 3.3 MMOL/L (ref 3.5–5.1)
RBC # BLD AUTO: 3.4 M/UL (ref 4.6–6.2)
SODIUM SERPL-SCNC: 135 MMOL/L (ref 136–145)
WBC # BLD AUTO: 11.76 K/UL (ref 4.5–11)

## 2021-11-11 PROCEDURE — 85018 HEMOGLOBIN: CPT

## 2021-11-11 PROCEDURE — 99233 PR SUBSEQUENT HOSPITAL CARE,LEVL III: ICD-10-PCS | Mod: ,,, | Performed by: INTERNAL MEDICINE

## 2021-11-11 PROCEDURE — 83735 ASSAY OF MAGNESIUM: CPT

## 2021-11-11 PROCEDURE — 85014 HEMATOCRIT: CPT

## 2021-11-11 PROCEDURE — 85025 COMPLETE CBC W/AUTO DIFF WBC: CPT

## 2021-11-11 PROCEDURE — 36415 COLL VENOUS BLD VENIPUNCTURE: CPT

## 2021-11-11 PROCEDURE — 25000003 PHARM REV CODE 250

## 2021-11-11 PROCEDURE — 25000003 PHARM REV CODE 250: Performed by: INTERNAL MEDICINE

## 2021-11-11 PROCEDURE — 82962 GLUCOSE BLOOD TEST: CPT

## 2021-11-11 PROCEDURE — 94761 N-INVAS EAR/PLS OXIMETRY MLT: CPT

## 2021-11-11 PROCEDURE — 63600175 PHARM REV CODE 636 W HCPCS: Performed by: INTERNAL MEDICINE

## 2021-11-11 PROCEDURE — 97530 THERAPEUTIC ACTIVITIES: CPT

## 2021-11-11 PROCEDURE — 80048 BASIC METABOLIC PNL TOTAL CA: CPT

## 2021-11-11 PROCEDURE — 27000221 HC OXYGEN, UP TO 24 HOURS

## 2021-11-11 PROCEDURE — 63600175 PHARM REV CODE 636 W HCPCS

## 2021-11-11 PROCEDURE — 99233 SBSQ HOSP IP/OBS HIGH 50: CPT | Mod: ,,, | Performed by: INTERNAL MEDICINE

## 2021-11-11 PROCEDURE — 84100 ASSAY OF PHOSPHORUS: CPT

## 2021-11-11 PROCEDURE — 97116 GAIT TRAINING THERAPY: CPT

## 2021-11-11 PROCEDURE — 11000001 HC ACUTE MED/SURG PRIVATE ROOM

## 2021-11-11 PROCEDURE — C9113 INJ PANTOPRAZOLE SODIUM, VIA: HCPCS

## 2021-11-11 RX ORDER — POTASSIUM CHLORIDE 20 MEQ/1
40 TABLET, EXTENDED RELEASE ORAL ONCE
Status: COMPLETED | OUTPATIENT
Start: 2021-11-11 | End: 2021-11-11

## 2021-11-11 RX ORDER — HYDROXYZINE HYDROCHLORIDE 25 MG/ML
50 INJECTION, SOLUTION INTRAMUSCULAR EVERY 6 HOURS PRN
Status: CANCELLED | OUTPATIENT
Start: 2021-11-11

## 2021-11-11 RX ORDER — QUETIAPINE FUMARATE 25 MG/1
25 TABLET, FILM COATED ORAL DAILY
Status: DISCONTINUED | OUTPATIENT
Start: 2021-11-11 | End: 2021-11-12

## 2021-11-11 RX ORDER — OLANZAPINE 5 MG/1
5 TABLET ORAL DAILY
Status: DISCONTINUED | OUTPATIENT
Start: 2021-11-11 | End: 2021-11-12

## 2021-11-11 RX ORDER — POTASSIUM CHLORIDE 7.45 MG/ML
20 INJECTION INTRAVENOUS ONCE
Status: COMPLETED | OUTPATIENT
Start: 2021-11-11 | End: 2021-11-11

## 2021-11-11 RX ADMIN — PIPERACILLIN SODIUM AND TAZOBACTAM SODIUM 4.5 G: 4; .5 INJECTION, POWDER, LYOPHILIZED, FOR SOLUTION INTRAVENOUS at 06:11

## 2021-11-11 RX ADMIN — VANCOMYCIN HYDROCHLORIDE 1500 MG: 1 INJECTION, POWDER, LYOPHILIZED, FOR SOLUTION INTRAVENOUS at 01:11

## 2021-11-11 RX ADMIN — RIFAXIMIN 550 MG: 550 TABLET ORAL at 09:11

## 2021-11-11 RX ADMIN — MELATONIN 9 MG: at 10:11

## 2021-11-11 RX ADMIN — POTASSIUM CHLORIDE 40 MEQ: 20 TABLET, EXTENDED RELEASE ORAL at 12:11

## 2021-11-11 RX ADMIN — HYDROCHLOROTHIAZIDE 25 MG: 25 TABLET ORAL at 09:11

## 2021-11-11 RX ADMIN — PANTOPRAZOLE SODIUM 40 MG: 40 INJECTION, POWDER, FOR SOLUTION INTRAVENOUS at 09:11

## 2021-11-11 RX ADMIN — QUETIAPINE 25 MG: 25 TABLET ORAL at 12:11

## 2021-11-11 RX ADMIN — FOLIC ACID-PYRIDOXINE-CYANOCOBALAMIN TAB 2.5-25-2 MG 1 TABLET: 2.5-25-2 TAB at 09:11

## 2021-11-11 RX ADMIN — PANTOPRAZOLE SODIUM 40 MG: 40 INJECTION, POWDER, FOR SOLUTION INTRAVENOUS at 10:11

## 2021-11-11 RX ADMIN — RIFAXIMIN 550 MG: 550 TABLET ORAL at 10:11

## 2021-11-11 RX ADMIN — OLANZAPINE 5 MG: 5 TABLET, FILM COATED ORAL at 03:11

## 2021-11-11 RX ADMIN — LACTULOSE 20 G: 20 SOLUTION ORAL at 09:11

## 2021-11-11 RX ADMIN — PIPERACILLIN SODIUM AND TAZOBACTAM SODIUM 4.5 G: 4; .5 INJECTION, POWDER, LYOPHILIZED, FOR SOLUTION INTRAVENOUS at 10:11

## 2021-11-11 RX ADMIN — PIPERACILLIN SODIUM AND TAZOBACTAM SODIUM 4.5 G: 4; .5 INJECTION, POWDER, LYOPHILIZED, FOR SOLUTION INTRAVENOUS at 03:11

## 2021-11-11 RX ADMIN — POTASSIUM CHLORIDE 20 MEQ: 7.46 INJECTION, SOLUTION INTRAVENOUS at 09:11

## 2021-11-11 RX ADMIN — HALOPERIDOL LACTATE 1 MG: 5 INJECTION, SOLUTION INTRAMUSCULAR at 01:11

## 2021-11-12 LAB
ANION GAP SERPL CALCULATED.3IONS-SCNC: 5 MMOL/L (ref 7–16)
ANISOCYTOSIS BLD QL SMEAR: ABNORMAL
BASOPHILS # BLD AUTO: 0.04 K/UL (ref 0–0.2)
BASOPHILS NFR BLD AUTO: 0.3 % (ref 0–1)
BASOPHILS NFR BLD MANUAL: 1 % (ref 0–1)
BUN SERPL-MCNC: 14 MG/DL (ref 7–18)
BUN/CREAT SERPL: 12 (ref 6–20)
CALCIUM SERPL-MCNC: 7.5 MG/DL (ref 8.5–10.1)
CHLORIDE SERPL-SCNC: 102 MMOL/L (ref 98–107)
CO2 SERPL-SCNC: 32 MMOL/L (ref 21–32)
CREAT SERPL-MCNC: 1.16 MG/DL (ref 0.7–1.3)
DIFFERENTIAL METHOD BLD: ABNORMAL
EOSINOPHIL # BLD AUTO: 0.05 K/UL (ref 0–0.5)
EOSINOPHIL NFR BLD AUTO: 0.4 % (ref 1–4)
EOSINOPHIL NFR BLD MANUAL: 1 % (ref 1–4)
ERYTHROCYTE [DISTWIDTH] IN BLOOD BY AUTOMATED COUNT: 15.3 % (ref 11.5–14.5)
GLUCOSE SERPL-MCNC: 136 MG/DL (ref 74–106)
GLUCOSE SERPL-MCNC: 139 MG/DL (ref 70–105)
GLUCOSE SERPL-MCNC: 189 MG/DL (ref 70–105)
HCT VFR BLD AUTO: 30 % (ref 40–54)
HCT VFR BLD AUTO: 31.3 % (ref 40–54)
HGB BLD-MCNC: 10.5 G/DL (ref 13.5–18)
HGB BLD-MCNC: 11.2 G/DL (ref 13.5–18)
IMM GRANULOCYTES # BLD AUTO: 0.06 K/UL (ref 0–0.04)
IMM GRANULOCYTES NFR BLD: 0.5 % (ref 0–0.4)
LYMPHOCYTES # BLD AUTO: 0.69 K/UL (ref 1–4.8)
LYMPHOCYTES NFR BLD AUTO: 5.4 % (ref 27–41)
LYMPHOCYTES NFR BLD MANUAL: 2 % (ref 27–41)
MCH RBC QN AUTO: 32.3 PG (ref 27–31)
MCHC RBC AUTO-ENTMCNC: 35.8 G/DL (ref 32–36)
MCV RBC AUTO: 90.2 FL (ref 80–96)
MONOCYTES # BLD AUTO: 1.05 K/UL (ref 0–0.8)
MONOCYTES NFR BLD AUTO: 8.2 % (ref 2–6)
MONOCYTES NFR BLD MANUAL: 7 % (ref 2–6)
MPC BLD CALC-MCNC: 9.5 FL (ref 9.4–12.4)
NEUTROPHILS # BLD AUTO: 10.84 K/UL (ref 1.8–7.7)
NEUTROPHILS NFR BLD AUTO: 85.2 % (ref 53–65)
NEUTS BAND NFR BLD MANUAL: 9 % (ref 1–5)
NEUTS SEG NFR BLD MANUAL: 80 % (ref 50–62)
NRBC # BLD AUTO: 0 X10E3/UL
NRBC, AUTO (.00): 0 %
OVALOCYTES BLD QL SMEAR: ABNORMAL
PLATELET # BLD AUTO: 166 K/UL (ref 150–400)
PLATELET MORPHOLOGY: ABNORMAL
POLYCHROMASIA BLD QL SMEAR: ABNORMAL
POTASSIUM SERPL-SCNC: 3.6 MMOL/L (ref 3.5–5.1)
RBC # BLD AUTO: 3.47 M/UL (ref 4.6–6.2)
SMOOTH MUSCLE AB SER QL IF: NEGATIVE
SODIUM SERPL-SCNC: 135 MMOL/L (ref 136–145)
TARGETS BLD QL SMEAR: ABNORMAL
WBC # BLD AUTO: 12.73 K/UL (ref 4.5–11)

## 2021-11-12 PROCEDURE — 36415 COLL VENOUS BLD VENIPUNCTURE: CPT

## 2021-11-12 PROCEDURE — 99232 SBSQ HOSP IP/OBS MODERATE 35: CPT | Mod: ,,, | Performed by: INTERNAL MEDICINE

## 2021-11-12 PROCEDURE — 99900026 HC AIRWAY MAINTENANCE (STAT)

## 2021-11-12 PROCEDURE — 27000221 HC OXYGEN, UP TO 24 HOURS

## 2021-11-12 PROCEDURE — 94761 N-INVAS EAR/PLS OXIMETRY MLT: CPT

## 2021-11-12 PROCEDURE — 25000003 PHARM REV CODE 250

## 2021-11-12 PROCEDURE — 99232 PR SUBSEQUENT HOSPITAL CARE,LEVL II: ICD-10-PCS | Mod: ,,, | Performed by: INTERNAL MEDICINE

## 2021-11-12 PROCEDURE — 99900035 HC TECH TIME PER 15 MIN (STAT)

## 2021-11-12 PROCEDURE — 25000003 PHARM REV CODE 250: Performed by: INTERNAL MEDICINE

## 2021-11-12 PROCEDURE — 63600175 PHARM REV CODE 636 W HCPCS: Performed by: INTERNAL MEDICINE

## 2021-11-12 PROCEDURE — 85014 HEMATOCRIT: CPT

## 2021-11-12 PROCEDURE — 63600175 PHARM REV CODE 636 W HCPCS

## 2021-11-12 PROCEDURE — 80048 BASIC METABOLIC PNL TOTAL CA: CPT

## 2021-11-12 PROCEDURE — 11000001 HC ACUTE MED/SURG PRIVATE ROOM

## 2021-11-12 PROCEDURE — 85025 COMPLETE CBC W/AUTO DIFF WBC: CPT

## 2021-11-12 PROCEDURE — 82962 GLUCOSE BLOOD TEST: CPT

## 2021-11-12 PROCEDURE — 85018 HEMOGLOBIN: CPT

## 2021-11-12 PROCEDURE — C9113 INJ PANTOPRAZOLE SODIUM, VIA: HCPCS

## 2021-11-12 RX ORDER — HEPARIN SODIUM 5000 [USP'U]/ML
5000 INJECTION, SOLUTION INTRAVENOUS; SUBCUTANEOUS EVERY 12 HOURS
Status: COMPLETED | OUTPATIENT
Start: 2021-11-12 | End: 2021-11-16

## 2021-11-12 RX ORDER — QUETIAPINE FUMARATE 25 MG/1
25 TABLET, FILM COATED ORAL 2 TIMES DAILY
Status: DISCONTINUED | OUTPATIENT
Start: 2021-11-12 | End: 2021-11-13

## 2021-11-12 RX ORDER — LISINOPRIL 20 MG/1
20 TABLET ORAL DAILY
Status: DISCONTINUED | OUTPATIENT
Start: 2021-11-12 | End: 2021-11-13

## 2021-11-12 RX ORDER — LORAZEPAM 2 MG/ML
2 INJECTION INTRAMUSCULAR ONCE
Status: COMPLETED | OUTPATIENT
Start: 2021-11-12 | End: 2021-11-12

## 2021-11-12 RX ADMIN — AZITHROMYCIN MONOHYDRATE 500 MG: 500 INJECTION, POWDER, LYOPHILIZED, FOR SOLUTION INTRAVENOUS at 12:11

## 2021-11-12 RX ADMIN — LISINOPRIL 20 MG: 20 TABLET ORAL at 08:11

## 2021-11-12 RX ADMIN — FOLIC ACID-PYRIDOXINE-CYANOCOBALAMIN TAB 2.5-25-2 MG 1 TABLET: 2.5-25-2 TAB at 08:11

## 2021-11-12 RX ADMIN — QUETIAPINE 25 MG: 25 TABLET ORAL at 10:11

## 2021-11-12 RX ADMIN — VANCOMYCIN HYDROCHLORIDE 1500 MG: 1 INJECTION, POWDER, LYOPHILIZED, FOR SOLUTION INTRAVENOUS at 12:11

## 2021-11-12 RX ADMIN — PIPERACILLIN SODIUM AND TAZOBACTAM SODIUM 4.5 G: 4; .5 INJECTION, POWDER, LYOPHILIZED, FOR SOLUTION INTRAVENOUS at 11:11

## 2021-11-12 RX ADMIN — PANTOPRAZOLE SODIUM 40 MG: 40 INJECTION, POWDER, FOR SOLUTION INTRAVENOUS at 08:11

## 2021-11-12 RX ADMIN — RIFAXIMIN 550 MG: 550 TABLET ORAL at 08:11

## 2021-11-12 RX ADMIN — HEPARIN SODIUM 5000 UNITS: 5000 INJECTION INTRAVENOUS; SUBCUTANEOUS at 12:11

## 2021-11-12 RX ADMIN — PANTOPRAZOLE SODIUM 40 MG: 40 INJECTION, POWDER, FOR SOLUTION INTRAVENOUS at 10:11

## 2021-11-12 RX ADMIN — LACTULOSE 20 G: 20 SOLUTION ORAL at 08:11

## 2021-11-12 RX ADMIN — PIPERACILLIN SODIUM AND TAZOBACTAM SODIUM 4.5 G: 4; .5 INJECTION, POWDER, LYOPHILIZED, FOR SOLUTION INTRAVENOUS at 06:11

## 2021-11-12 RX ADMIN — QUETIAPINE 25 MG: 25 TABLET ORAL at 08:11

## 2021-11-12 RX ADMIN — PIPERACILLIN SODIUM AND TAZOBACTAM SODIUM 4.5 G: 4; .5 INJECTION, POWDER, LYOPHILIZED, FOR SOLUTION INTRAVENOUS at 03:11

## 2021-11-12 RX ADMIN — RIFAXIMIN 550 MG: 550 TABLET ORAL at 10:11

## 2021-11-12 RX ADMIN — LORAZEPAM 2 MG: 2 INJECTION INTRAMUSCULAR; INTRAVENOUS at 12:11

## 2021-11-12 RX ADMIN — HEPARIN SODIUM 5000 UNITS: 5000 INJECTION INTRAVENOUS; SUBCUTANEOUS at 09:11

## 2021-11-13 PROBLEM — E83.42 HYPOMAGNESEMIA: Status: RESOLVED | Noted: 2021-11-09 | Resolved: 2021-11-13

## 2021-11-13 PROBLEM — N17.9 AKI (ACUTE KIDNEY INJURY): Status: ACTIVE | Noted: 2021-11-13

## 2021-11-13 LAB
ANION GAP SERPL CALCULATED.3IONS-SCNC: 7 MMOL/L (ref 7–16)
BACTERIA BLD CULT: NORMAL
BACTERIA BLD CULT: NORMAL
BASOPHILS # BLD AUTO: 0.05 K/UL (ref 0–0.2)
BASOPHILS NFR BLD AUTO: 0.5 % (ref 0–1)
BUN SERPL-MCNC: 20 MG/DL (ref 7–18)
BUN/CREAT SERPL: 13 (ref 6–20)
CALCIUM SERPL-MCNC: 7.1 MG/DL (ref 8.5–10.1)
CHLORIDE SERPL-SCNC: 102 MMOL/L (ref 98–107)
CO2 SERPL-SCNC: 31 MMOL/L (ref 21–32)
CREAT SERPL-MCNC: 1.49 MG/DL (ref 0.7–1.3)
DIFFERENTIAL METHOD BLD: ABNORMAL
EOSINOPHIL # BLD AUTO: 0.09 K/UL (ref 0–0.5)
EOSINOPHIL NFR BLD AUTO: 0.9 % (ref 1–4)
ERYTHROCYTE [DISTWIDTH] IN BLOOD BY AUTOMATED COUNT: 15.3 % (ref 11.5–14.5)
GLUCOSE SERPL-MCNC: 129 MG/DL (ref 74–106)
GLUCOSE SERPL-MCNC: 134 MG/DL (ref 70–105)
GLUCOSE SERPL-MCNC: 139 MG/DL (ref 70–105)
GLUCOSE SERPL-MCNC: 141 MG/DL (ref 70–105)
GLUCOSE SERPL-MCNC: 155 MG/DL (ref 70–105)
HCT VFR BLD AUTO: 28.5 % (ref 40–54)
HGB BLD-MCNC: 9.8 G/DL (ref 13.5–18)
IMM GRANULOCYTES # BLD AUTO: 0.05 K/UL (ref 0–0.04)
IMM GRANULOCYTES NFR BLD: 0.5 % (ref 0–0.4)
LYMPHOCYTES # BLD AUTO: 0.96 K/UL (ref 1–4.8)
LYMPHOCYTES NFR BLD AUTO: 9.5 % (ref 27–41)
MAGNESIUM SERPL-MCNC: 2.4 MG/DL (ref 1.7–2.3)
MCH RBC QN AUTO: 31.7 PG (ref 27–31)
MCHC RBC AUTO-ENTMCNC: 34.4 G/DL (ref 32–36)
MCV RBC AUTO: 92.2 FL (ref 80–96)
MONOCYTES # BLD AUTO: 0.76 K/UL (ref 0–0.8)
MONOCYTES NFR BLD AUTO: 7.5 % (ref 2–6)
MPC BLD CALC-MCNC: 10.1 FL (ref 9.4–12.4)
NEUTROPHILS # BLD AUTO: 8.16 K/UL (ref 1.8–7.7)
NEUTROPHILS NFR BLD AUTO: 81.1 % (ref 53–65)
NRBC # BLD AUTO: 0 X10E3/UL
NRBC, AUTO (.00): 0 %
PLATELET # BLD AUTO: 191 K/UL (ref 150–400)
POTASSIUM SERPL-SCNC: 3.2 MMOL/L (ref 3.5–5.1)
RBC # BLD AUTO: 3.09 M/UL (ref 4.6–6.2)
SODIUM SERPL-SCNC: 137 MMOL/L (ref 136–145)
WBC # BLD AUTO: 10.07 K/UL (ref 4.5–11)

## 2021-11-13 PROCEDURE — 82962 GLUCOSE BLOOD TEST: CPT

## 2021-11-13 PROCEDURE — 83735 ASSAY OF MAGNESIUM: CPT | Performed by: INTERNAL MEDICINE

## 2021-11-13 PROCEDURE — 36415 COLL VENOUS BLD VENIPUNCTURE: CPT

## 2021-11-13 PROCEDURE — 99900026 HC AIRWAY MAINTENANCE (STAT)

## 2021-11-13 PROCEDURE — 11000001 HC ACUTE MED/SURG PRIVATE ROOM

## 2021-11-13 PROCEDURE — 27000221 HC OXYGEN, UP TO 24 HOURS

## 2021-11-13 PROCEDURE — 99232 PR SUBSEQUENT HOSPITAL CARE,LEVL II: ICD-10-PCS | Mod: ,,, | Performed by: INTERNAL MEDICINE

## 2021-11-13 PROCEDURE — 99900035 HC TECH TIME PER 15 MIN (STAT)

## 2021-11-13 PROCEDURE — 63600175 PHARM REV CODE 636 W HCPCS

## 2021-11-13 PROCEDURE — 99232 SBSQ HOSP IP/OBS MODERATE 35: CPT | Mod: ,,, | Performed by: INTERNAL MEDICINE

## 2021-11-13 PROCEDURE — 25000003 PHARM REV CODE 250

## 2021-11-13 PROCEDURE — 80048 BASIC METABOLIC PNL TOTAL CA: CPT

## 2021-11-13 PROCEDURE — 85025 COMPLETE CBC W/AUTO DIFF WBC: CPT

## 2021-11-13 PROCEDURE — 25000003 PHARM REV CODE 250: Performed by: INTERNAL MEDICINE

## 2021-11-13 PROCEDURE — C9113 INJ PANTOPRAZOLE SODIUM, VIA: HCPCS

## 2021-11-13 PROCEDURE — 63600175 PHARM REV CODE 636 W HCPCS: Performed by: INTERNAL MEDICINE

## 2021-11-13 PROCEDURE — 94761 N-INVAS EAR/PLS OXIMETRY MLT: CPT

## 2021-11-13 RX ORDER — SODIUM CHLORIDE 9 MG/ML
INJECTION, SOLUTION INTRAVENOUS CONTINUOUS
Status: DISCONTINUED | OUTPATIENT
Start: 2021-11-13 | End: 2021-11-13

## 2021-11-13 RX ORDER — AMLODIPINE BESYLATE 10 MG/1
10 TABLET ORAL DAILY
Status: DISCONTINUED | OUTPATIENT
Start: 2021-11-13 | End: 2021-11-23 | Stop reason: HOSPADM

## 2021-11-13 RX ORDER — QUETIAPINE FUMARATE 25 MG/1
50 TABLET, FILM COATED ORAL 2 TIMES DAILY
Status: DISCONTINUED | OUTPATIENT
Start: 2021-11-13 | End: 2021-11-13

## 2021-11-13 RX ORDER — QUETIAPINE FUMARATE 25 MG/1
50 TABLET, FILM COATED ORAL 2 TIMES DAILY
Status: DISCONTINUED | OUTPATIENT
Start: 2021-11-13 | End: 2021-11-23 | Stop reason: HOSPADM

## 2021-11-13 RX ADMIN — HEPARIN SODIUM 5000 UNITS: 5000 INJECTION INTRAVENOUS; SUBCUTANEOUS at 10:11

## 2021-11-13 RX ADMIN — POTASSIUM BICARBONATE 40 MEQ: 391 TABLET, EFFERVESCENT ORAL at 09:11

## 2021-11-13 RX ADMIN — QUETIAPINE 50 MG: 25 TABLET ORAL at 09:11

## 2021-11-13 RX ADMIN — HEPARIN SODIUM 5000 UNITS: 5000 INJECTION INTRAVENOUS; SUBCUTANEOUS at 09:11

## 2021-11-13 RX ADMIN — RIFAXIMIN 550 MG: 550 TABLET ORAL at 09:11

## 2021-11-13 RX ADMIN — SODIUM CHLORIDE, POTASSIUM CHLORIDE, SODIUM LACTATE AND CALCIUM CHLORIDE 1000 ML: 600; 310; 30; 20 INJECTION, SOLUTION INTRAVENOUS at 10:11

## 2021-11-13 RX ADMIN — PANTOPRAZOLE SODIUM 40 MG: 40 INJECTION, POWDER, FOR SOLUTION INTRAVENOUS at 10:11

## 2021-11-13 RX ADMIN — AZITHROMYCIN MONOHYDRATE 500 MG: 500 INJECTION, POWDER, LYOPHILIZED, FOR SOLUTION INTRAVENOUS at 01:11

## 2021-11-13 RX ADMIN — AMLODIPINE BESYLATE 10 MG: 10 TABLET ORAL at 10:11

## 2021-11-13 RX ADMIN — FOLIC ACID-PYRIDOXINE-CYANOCOBALAMIN TAB 2.5-25-2 MG 1 TABLET: 2.5-25-2 TAB at 10:11

## 2021-11-13 RX ADMIN — PIPERACILLIN SODIUM AND TAZOBACTAM SODIUM 4.5 G: 4; .5 INJECTION, POWDER, LYOPHILIZED, FOR SOLUTION INTRAVENOUS at 09:11

## 2021-11-13 RX ADMIN — RIFAXIMIN 550 MG: 550 TABLET ORAL at 10:11

## 2021-11-13 RX ADMIN — LACTULOSE 20 G: 20 SOLUTION ORAL at 10:11

## 2021-11-13 RX ADMIN — POTASSIUM BICARBONATE 40 MEQ: 391 TABLET, EFFERVESCENT ORAL at 10:11

## 2021-11-13 RX ADMIN — QUETIAPINE 25 MG: 25 TABLET ORAL at 10:11

## 2021-11-13 RX ADMIN — PIPERACILLIN SODIUM AND TAZOBACTAM SODIUM 4.5 G: 4; .5 INJECTION, POWDER, LYOPHILIZED, FOR SOLUTION INTRAVENOUS at 01:11

## 2021-11-13 RX ADMIN — PIPERACILLIN SODIUM AND TAZOBACTAM SODIUM 4.5 G: 4; .5 INJECTION, POWDER, LYOPHILIZED, FOR SOLUTION INTRAVENOUS at 06:11

## 2021-11-13 RX ADMIN — PANTOPRAZOLE SODIUM 40 MG: 40 INJECTION, POWDER, FOR SOLUTION INTRAVENOUS at 09:11

## 2021-11-14 LAB
ANION GAP SERPL CALCULATED.3IONS-SCNC: 9 MMOL/L (ref 7–16)
BASOPHILS # BLD AUTO: 0.05 K/UL (ref 0–0.2)
BASOPHILS NFR BLD AUTO: 0.8 % (ref 0–1)
BUN SERPL-MCNC: 18 MG/DL (ref 7–18)
BUN/CREAT SERPL: 13 (ref 6–20)
CALCIUM SERPL-MCNC: 7.3 MG/DL (ref 8.5–10.1)
CHLORIDE SERPL-SCNC: 103 MMOL/L (ref 98–107)
CO2 SERPL-SCNC: 31 MMOL/L (ref 21–32)
CREAT SERPL-MCNC: 1.41 MG/DL (ref 0.7–1.3)
DIFFERENTIAL METHOD BLD: ABNORMAL
EOSINOPHIL # BLD AUTO: 0.16 K/UL (ref 0–0.5)
EOSINOPHIL NFR BLD AUTO: 2.6 % (ref 1–4)
ERYTHROCYTE [DISTWIDTH] IN BLOOD BY AUTOMATED COUNT: 15.3 % (ref 11.5–14.5)
GLUCOSE SERPL-MCNC: 117 MG/DL (ref 70–105)
GLUCOSE SERPL-MCNC: 127 MG/DL (ref 70–105)
GLUCOSE SERPL-MCNC: 140 MG/DL (ref 74–106)
HCT VFR BLD AUTO: 28.2 % (ref 40–54)
HGB BLD-MCNC: 9.8 G/DL (ref 13.5–18)
IMM GRANULOCYTES # BLD AUTO: 0.05 K/UL (ref 0–0.04)
IMM GRANULOCYTES NFR BLD: 0.8 % (ref 0–0.4)
LYMPHOCYTES # BLD AUTO: 0.76 K/UL (ref 1–4.8)
LYMPHOCYTES NFR BLD AUTO: 12.5 % (ref 27–41)
MCH RBC QN AUTO: 31.5 PG (ref 27–31)
MCHC RBC AUTO-ENTMCNC: 34.8 G/DL (ref 32–36)
MCV RBC AUTO: 90.7 FL (ref 80–96)
MONOCYTES # BLD AUTO: 0.69 K/UL (ref 0–0.8)
MONOCYTES NFR BLD AUTO: 11.3 % (ref 2–6)
MPC BLD CALC-MCNC: 9.4 FL (ref 9.4–12.4)
NEUTROPHILS # BLD AUTO: 4.39 K/UL (ref 1.8–7.7)
NEUTROPHILS NFR BLD AUTO: 72 % (ref 53–65)
NRBC # BLD AUTO: 0 X10E3/UL
NRBC, AUTO (.00): 0 %
PLATELET # BLD AUTO: 212 K/UL (ref 150–400)
POTASSIUM SERPL-SCNC: 4.2 MMOL/L (ref 3.5–5.1)
RBC # BLD AUTO: 3.11 M/UL (ref 4.6–6.2)
SODIUM SERPL-SCNC: 139 MMOL/L (ref 136–145)
WBC # BLD AUTO: 6.1 K/UL (ref 4.5–11)

## 2021-11-14 PROCEDURE — 63600175 PHARM REV CODE 636 W HCPCS

## 2021-11-14 PROCEDURE — 36415 COLL VENOUS BLD VENIPUNCTURE: CPT

## 2021-11-14 PROCEDURE — 25000003 PHARM REV CODE 250: Performed by: INTERNAL MEDICINE

## 2021-11-14 PROCEDURE — 99900026 HC AIRWAY MAINTENANCE (STAT)

## 2021-11-14 PROCEDURE — 82962 GLUCOSE BLOOD TEST: CPT

## 2021-11-14 PROCEDURE — 27000221 HC OXYGEN, UP TO 24 HOURS

## 2021-11-14 PROCEDURE — 99232 SBSQ HOSP IP/OBS MODERATE 35: CPT | Mod: ,,, | Performed by: INTERNAL MEDICINE

## 2021-11-14 PROCEDURE — 25000003 PHARM REV CODE 250

## 2021-11-14 PROCEDURE — 94761 N-INVAS EAR/PLS OXIMETRY MLT: CPT

## 2021-11-14 PROCEDURE — 99232 PR SUBSEQUENT HOSPITAL CARE,LEVL II: ICD-10-PCS | Mod: ,,, | Performed by: INTERNAL MEDICINE

## 2021-11-14 PROCEDURE — 11000001 HC ACUTE MED/SURG PRIVATE ROOM

## 2021-11-14 PROCEDURE — 99900035 HC TECH TIME PER 15 MIN (STAT)

## 2021-11-14 PROCEDURE — 85025 COMPLETE CBC W/AUTO DIFF WBC: CPT

## 2021-11-14 PROCEDURE — 80048 BASIC METABOLIC PNL TOTAL CA: CPT

## 2021-11-14 PROCEDURE — C9113 INJ PANTOPRAZOLE SODIUM, VIA: HCPCS

## 2021-11-14 RX ORDER — SODIUM CHLORIDE, SODIUM LACTATE, POTASSIUM CHLORIDE, CALCIUM CHLORIDE 600; 310; 30; 20 MG/100ML; MG/100ML; MG/100ML; MG/100ML
INJECTION, SOLUTION INTRAVENOUS CONTINUOUS
Status: DISCONTINUED | OUTPATIENT
Start: 2021-11-14 | End: 2021-11-18

## 2021-11-14 RX ADMIN — PIPERACILLIN SODIUM AND TAZOBACTAM SODIUM 4.5 G: 4; .5 INJECTION, POWDER, LYOPHILIZED, FOR SOLUTION INTRAVENOUS at 03:11

## 2021-11-14 RX ADMIN — AMLODIPINE BESYLATE 10 MG: 10 TABLET ORAL at 11:11

## 2021-11-14 RX ADMIN — HEPARIN SODIUM 5000 UNITS: 5000 INJECTION INTRAVENOUS; SUBCUTANEOUS at 11:11

## 2021-11-14 RX ADMIN — PANTOPRAZOLE SODIUM 40 MG: 40 INJECTION, POWDER, FOR SOLUTION INTRAVENOUS at 09:11

## 2021-11-14 RX ADMIN — LACTULOSE 20 G: 20 SOLUTION ORAL at 11:11

## 2021-11-14 RX ADMIN — RIFAXIMIN 550 MG: 550 TABLET ORAL at 11:11

## 2021-11-14 RX ADMIN — QUETIAPINE 50 MG: 25 TABLET ORAL at 11:11

## 2021-11-14 RX ADMIN — PANTOPRAZOLE SODIUM 40 MG: 40 INJECTION, POWDER, FOR SOLUTION INTRAVENOUS at 11:11

## 2021-11-14 RX ADMIN — SODIUM CHLORIDE, POTASSIUM CHLORIDE, SODIUM LACTATE AND CALCIUM CHLORIDE: 600; 310; 30; 20 INJECTION, SOLUTION INTRAVENOUS at 10:11

## 2021-11-14 RX ADMIN — AZITHROMYCIN MONOHYDRATE 500 MG: 500 INJECTION, POWDER, LYOPHILIZED, FOR SOLUTION INTRAVENOUS at 12:11

## 2021-11-14 RX ADMIN — FOLIC ACID-PYRIDOXINE-CYANOCOBALAMIN TAB 2.5-25-2 MG 1 TABLET: 2.5-25-2 TAB at 11:11

## 2021-11-14 RX ADMIN — SODIUM CHLORIDE, POTASSIUM CHLORIDE, SODIUM LACTATE AND CALCIUM CHLORIDE: 600; 310; 30; 20 INJECTION, SOLUTION INTRAVENOUS at 11:11

## 2021-11-14 RX ADMIN — HEPARIN SODIUM 5000 UNITS: 5000 INJECTION INTRAVENOUS; SUBCUTANEOUS at 09:11

## 2021-11-14 RX ADMIN — PIPERACILLIN SODIUM AND TAZOBACTAM SODIUM 4.5 G: 4; .5 INJECTION, POWDER, LYOPHILIZED, FOR SOLUTION INTRAVENOUS at 11:11

## 2021-11-14 RX ADMIN — PIPERACILLIN SODIUM AND TAZOBACTAM SODIUM 4.5 G: 4; .5 INJECTION, POWDER, LYOPHILIZED, FOR SOLUTION INTRAVENOUS at 06:11

## 2021-11-15 LAB
ANION GAP SERPL CALCULATED.3IONS-SCNC: 9 MMOL/L (ref 7–16)
BACTERIA BLD CULT: NORMAL
BACTERIA BLD CULT: NORMAL
BASOPHILS # BLD AUTO: 0.05 K/UL (ref 0–0.2)
BASOPHILS NFR BLD AUTO: 0.9 % (ref 0–1)
BUN SERPL-MCNC: 15 MG/DL (ref 7–18)
BUN/CREAT SERPL: 14 (ref 6–20)
CALCIUM SERPL-MCNC: 8.4 MG/DL (ref 8.5–10.1)
CHLORIDE SERPL-SCNC: 103 MMOL/L (ref 98–107)
CO2 SERPL-SCNC: 33 MMOL/L (ref 21–32)
CREAT SERPL-MCNC: 1.11 MG/DL (ref 0.7–1.3)
DIFFERENTIAL METHOD BLD: ABNORMAL
EOSINOPHIL # BLD AUTO: 0.29 K/UL (ref 0–0.5)
EOSINOPHIL NFR BLD AUTO: 5.2 % (ref 1–4)
ERYTHROCYTE [DISTWIDTH] IN BLOOD BY AUTOMATED COUNT: 15.3 % (ref 11.5–14.5)
GLUCOSE SERPL-MCNC: 113 MG/DL (ref 70–105)
GLUCOSE SERPL-MCNC: 130 MG/DL (ref 70–105)
GLUCOSE SERPL-MCNC: 132 MG/DL (ref 74–106)
GLUCOSE SERPL-MCNC: 143 MG/DL (ref 70–105)
HCT VFR BLD AUTO: 31.8 % (ref 40–54)
HGB BLD-MCNC: 10.5 G/DL (ref 13.5–18)
IMM GRANULOCYTES # BLD AUTO: 0.03 K/UL (ref 0–0.04)
IMM GRANULOCYTES NFR BLD: 0.5 % (ref 0–0.4)
LYMPHOCYTES # BLD AUTO: 0.76 K/UL (ref 1–4.8)
LYMPHOCYTES NFR BLD AUTO: 13.6 % (ref 27–41)
MCH RBC QN AUTO: 31 PG (ref 27–31)
MCHC RBC AUTO-ENTMCNC: 33 G/DL (ref 32–36)
MCV RBC AUTO: 93.8 FL (ref 80–96)
MONOCYTES # BLD AUTO: 0.49 K/UL (ref 0–0.8)
MONOCYTES NFR BLD AUTO: 8.8 % (ref 2–6)
MPC BLD CALC-MCNC: 9.9 FL (ref 9.4–12.4)
NEUTROPHILS # BLD AUTO: 3.96 K/UL (ref 1.8–7.7)
NEUTROPHILS NFR BLD AUTO: 71 % (ref 53–65)
NRBC # BLD AUTO: 0 X10E3/UL
NRBC, AUTO (.00): 0 %
PLATELET # BLD AUTO: 242 K/UL (ref 150–400)
POTASSIUM SERPL-SCNC: 4.2 MMOL/L (ref 3.5–5.1)
RBC # BLD AUTO: 3.39 M/UL (ref 4.6–6.2)
SODIUM SERPL-SCNC: 141 MMOL/L (ref 136–145)
WBC # BLD AUTO: 5.58 K/UL (ref 4.5–11)

## 2021-11-15 PROCEDURE — 25000003 PHARM REV CODE 250

## 2021-11-15 PROCEDURE — 11000001 HC ACUTE MED/SURG PRIVATE ROOM

## 2021-11-15 PROCEDURE — 97535 SELF CARE MNGMENT TRAINING: CPT

## 2021-11-15 PROCEDURE — 99900035 HC TECH TIME PER 15 MIN (STAT)

## 2021-11-15 PROCEDURE — 30200315 PPD INTRADERMAL TEST REV CODE 302

## 2021-11-15 PROCEDURE — 86580 TB INTRADERMAL TEST: CPT

## 2021-11-15 PROCEDURE — 92610 EVALUATE SWALLOWING FUNCTION: CPT

## 2021-11-15 PROCEDURE — 25000003 PHARM REV CODE 250: Performed by: INTERNAL MEDICINE

## 2021-11-15 PROCEDURE — 36415 COLL VENOUS BLD VENIPUNCTURE: CPT

## 2021-11-15 PROCEDURE — 97530 THERAPEUTIC ACTIVITIES: CPT

## 2021-11-15 PROCEDURE — 82962 GLUCOSE BLOOD TEST: CPT

## 2021-11-15 PROCEDURE — 99232 PR SUBSEQUENT HOSPITAL CARE,LEVL II: ICD-10-PCS | Mod: GC,,, | Performed by: FAMILY MEDICINE

## 2021-11-15 PROCEDURE — C9113 INJ PANTOPRAZOLE SODIUM, VIA: HCPCS

## 2021-11-15 PROCEDURE — 94761 N-INVAS EAR/PLS OXIMETRY MLT: CPT

## 2021-11-15 PROCEDURE — 63600175 PHARM REV CODE 636 W HCPCS

## 2021-11-15 PROCEDURE — 27000221 HC OXYGEN, UP TO 24 HOURS

## 2021-11-15 PROCEDURE — 99232 SBSQ HOSP IP/OBS MODERATE 35: CPT | Mod: GC,,, | Performed by: FAMILY MEDICINE

## 2021-11-15 PROCEDURE — 99900026 HC AIRWAY MAINTENANCE (STAT)

## 2021-11-15 PROCEDURE — 80048 BASIC METABOLIC PNL TOTAL CA: CPT

## 2021-11-15 PROCEDURE — 85025 COMPLETE CBC W/AUTO DIFF WBC: CPT

## 2021-11-15 RX ADMIN — SODIUM CHLORIDE, POTASSIUM CHLORIDE, SODIUM LACTATE AND CALCIUM CHLORIDE: 600; 310; 30; 20 INJECTION, SOLUTION INTRAVENOUS at 10:11

## 2021-11-15 RX ADMIN — AZITHROMYCIN MONOHYDRATE 500 MG: 500 INJECTION, POWDER, LYOPHILIZED, FOR SOLUTION INTRAVENOUS at 01:11

## 2021-11-15 RX ADMIN — HEPARIN SODIUM 5000 UNITS: 5000 INJECTION INTRAVENOUS; SUBCUTANEOUS at 09:11

## 2021-11-15 RX ADMIN — PANTOPRAZOLE SODIUM 40 MG: 40 INJECTION, POWDER, FOR SOLUTION INTRAVENOUS at 10:11

## 2021-11-15 RX ADMIN — HEPARIN SODIUM 5000 UNITS: 5000 INJECTION INTRAVENOUS; SUBCUTANEOUS at 10:11

## 2021-11-15 RX ADMIN — FOLIC ACID-PYRIDOXINE-CYANOCOBALAMIN TAB 2.5-25-2 MG 1 TABLET: 2.5-25-2 TAB at 09:11

## 2021-11-15 RX ADMIN — TUBERCULIN PURIFIED PROTEIN DERIVATIVE 5 UNITS: 5 INJECTION, SOLUTION INTRADERMAL at 02:11

## 2021-11-15 RX ADMIN — PANTOPRAZOLE SODIUM 40 MG: 40 INJECTION, POWDER, FOR SOLUTION INTRAVENOUS at 09:11

## 2021-11-15 RX ADMIN — PIPERACILLIN SODIUM AND TAZOBACTAM SODIUM 4.5 G: 4; .5 INJECTION, POWDER, LYOPHILIZED, FOR SOLUTION INTRAVENOUS at 03:11

## 2021-11-15 RX ADMIN — PIPERACILLIN SODIUM AND TAZOBACTAM SODIUM 4.5 G: 4; .5 INJECTION, POWDER, LYOPHILIZED, FOR SOLUTION INTRAVENOUS at 06:11

## 2021-11-15 RX ADMIN — SODIUM CHLORIDE, POTASSIUM CHLORIDE, SODIUM LACTATE AND CALCIUM CHLORIDE: 600; 310; 30; 20 INJECTION, SOLUTION INTRAVENOUS at 09:11

## 2021-11-15 RX ADMIN — AMLODIPINE BESYLATE 10 MG: 10 TABLET ORAL at 09:11

## 2021-11-15 RX ADMIN — PIPERACILLIN SODIUM AND TAZOBACTAM SODIUM 4.5 G: 4; .5 INJECTION, POWDER, LYOPHILIZED, FOR SOLUTION INTRAVENOUS at 10:11

## 2021-11-15 RX ADMIN — LACTULOSE 20 G: 20 SOLUTION ORAL at 09:11

## 2021-11-15 RX ADMIN — RIFAXIMIN 550 MG: 550 TABLET ORAL at 09:11

## 2021-11-15 RX ADMIN — QUETIAPINE 50 MG: 25 TABLET ORAL at 09:11

## 2021-11-15 RX ADMIN — QUETIAPINE 50 MG: 25 TABLET ORAL at 10:11

## 2021-11-15 RX ADMIN — RIFAXIMIN 550 MG: 550 TABLET ORAL at 10:11

## 2021-11-16 PROBLEM — E87.6 HYPOKALEMIA: Status: RESOLVED | Noted: 2021-11-09 | Resolved: 2021-11-16

## 2021-11-16 LAB
ANION GAP SERPL CALCULATED.3IONS-SCNC: 5 MMOL/L (ref 7–16)
BASOPHILS # BLD AUTO: 0.05 K/UL (ref 0–0.2)
BASOPHILS NFR BLD AUTO: 1 % (ref 0–1)
BUN SERPL-MCNC: 15 MG/DL (ref 7–18)
BUN/CREAT SERPL: 14 (ref 6–20)
CALCIUM SERPL-MCNC: 8.3 MG/DL (ref 8.5–10.1)
CHLORIDE SERPL-SCNC: 105 MMOL/L (ref 98–107)
CO2 SERPL-SCNC: 34 MMOL/L (ref 21–32)
CREAT SERPL-MCNC: 1.09 MG/DL (ref 0.7–1.3)
DIFFERENTIAL METHOD BLD: ABNORMAL
EOSINOPHIL # BLD AUTO: 0.3 K/UL (ref 0–0.5)
EOSINOPHIL NFR BLD AUTO: 6.3 % (ref 1–4)
ERYTHROCYTE [DISTWIDTH] IN BLOOD BY AUTOMATED COUNT: 15.2 % (ref 11.5–14.5)
GLUCOSE SERPL-MCNC: 112 MG/DL (ref 70–105)
GLUCOSE SERPL-MCNC: 120 MG/DL (ref 70–105)
GLUCOSE SERPL-MCNC: 127 MG/DL (ref 70–105)
GLUCOSE SERPL-MCNC: 139 MG/DL (ref 74–106)
HCT VFR BLD AUTO: 30.1 % (ref 40–54)
HGB BLD-MCNC: 9.7 G/DL (ref 13.5–18)
IMM GRANULOCYTES # BLD AUTO: 0.03 K/UL (ref 0–0.04)
IMM GRANULOCYTES NFR BLD: 0.6 % (ref 0–0.4)
LYMPHOCYTES # BLD AUTO: 0.81 K/UL (ref 1–4.8)
LYMPHOCYTES NFR BLD AUTO: 16.9 % (ref 27–41)
MCH RBC QN AUTO: 30.4 PG (ref 27–31)
MCHC RBC AUTO-ENTMCNC: 32.2 G/DL (ref 32–36)
MCV RBC AUTO: 94.4 FL (ref 80–96)
MONOCYTES # BLD AUTO: 0.43 K/UL (ref 0–0.8)
MONOCYTES NFR BLD AUTO: 9 % (ref 2–6)
MPC BLD CALC-MCNC: 9.4 FL (ref 9.4–12.4)
NEUTROPHILS # BLD AUTO: 3.16 K/UL (ref 1.8–7.7)
NEUTROPHILS NFR BLD AUTO: 66.2 % (ref 53–65)
NRBC # BLD AUTO: 0 X10E3/UL
NRBC, AUTO (.00): 0 %
PLATELET # BLD AUTO: 241 K/UL (ref 150–400)
POTASSIUM SERPL-SCNC: 4.5 MMOL/L (ref 3.5–5.1)
RBC # BLD AUTO: 3.19 M/UL (ref 4.6–6.2)
SODIUM SERPL-SCNC: 139 MMOL/L (ref 136–145)
WBC # BLD AUTO: 4.78 K/UL (ref 4.5–11)

## 2021-11-16 PROCEDURE — 80048 BASIC METABOLIC PNL TOTAL CA: CPT

## 2021-11-16 PROCEDURE — 25000003 PHARM REV CODE 250

## 2021-11-16 PROCEDURE — 92611 MOTION FLUOROSCOPY/SWALLOW: CPT

## 2021-11-16 PROCEDURE — 94761 N-INVAS EAR/PLS OXIMETRY MLT: CPT

## 2021-11-16 PROCEDURE — 25000003 PHARM REV CODE 250: Performed by: INTERNAL MEDICINE

## 2021-11-16 PROCEDURE — 97110 THERAPEUTIC EXERCISES: CPT | Mod: CQ

## 2021-11-16 PROCEDURE — 82962 GLUCOSE BLOOD TEST: CPT

## 2021-11-16 PROCEDURE — 99223 1ST HOSP IP/OBS HIGH 75: CPT | Mod: ,,, | Performed by: SURGERY

## 2021-11-16 PROCEDURE — 97116 GAIT TRAINING THERAPY: CPT | Mod: CQ

## 2021-11-16 PROCEDURE — 99232 SBSQ HOSP IP/OBS MODERATE 35: CPT | Mod: GC,,, | Performed by: FAMILY MEDICINE

## 2021-11-16 PROCEDURE — 85025 COMPLETE CBC W/AUTO DIFF WBC: CPT

## 2021-11-16 PROCEDURE — 63600175 PHARM REV CODE 636 W HCPCS

## 2021-11-16 PROCEDURE — C9113 INJ PANTOPRAZOLE SODIUM, VIA: HCPCS

## 2021-11-16 PROCEDURE — 11000001 HC ACUTE MED/SURG PRIVATE ROOM

## 2021-11-16 PROCEDURE — 99232 PR SUBSEQUENT HOSPITAL CARE,LEVL II: ICD-10-PCS | Mod: GC,,, | Performed by: FAMILY MEDICINE

## 2021-11-16 PROCEDURE — 27000221 HC OXYGEN, UP TO 24 HOURS

## 2021-11-16 PROCEDURE — 99223 PR INITIAL HOSPITAL CARE,LEVL III: ICD-10-PCS | Mod: ,,, | Performed by: SURGERY

## 2021-11-16 PROCEDURE — 97110 THERAPEUTIC EXERCISES: CPT

## 2021-11-16 PROCEDURE — 36415 COLL VENOUS BLD VENIPUNCTURE: CPT

## 2021-11-16 PROCEDURE — 92610 EVALUATE SWALLOWING FUNCTION: CPT

## 2021-11-16 RX ADMIN — FOLIC ACID-PYRIDOXINE-CYANOCOBALAMIN TAB 2.5-25-2 MG 1 TABLET: 2.5-25-2 TAB at 09:11

## 2021-11-16 RX ADMIN — PANTOPRAZOLE SODIUM 40 MG: 40 INJECTION, POWDER, FOR SOLUTION INTRAVENOUS at 09:11

## 2021-11-16 RX ADMIN — HEPARIN SODIUM 5000 UNITS: 5000 INJECTION INTRAVENOUS; SUBCUTANEOUS at 09:11

## 2021-11-16 RX ADMIN — SODIUM CHLORIDE, POTASSIUM CHLORIDE, SODIUM LACTATE AND CALCIUM CHLORIDE: 600; 310; 30; 20 INJECTION, SOLUTION INTRAVENOUS at 09:11

## 2021-11-16 RX ADMIN — AZITHROMYCIN MONOHYDRATE 500 MG: 500 INJECTION, POWDER, LYOPHILIZED, FOR SOLUTION INTRAVENOUS at 02:11

## 2021-11-16 RX ADMIN — AMLODIPINE BESYLATE 10 MG: 10 TABLET ORAL at 09:11

## 2021-11-16 RX ADMIN — RIFAXIMIN 550 MG: 550 TABLET ORAL at 09:11

## 2021-11-16 RX ADMIN — LACTULOSE 20 G: 20 SOLUTION ORAL at 09:11

## 2021-11-17 ENCOUNTER — ANESTHESIA EVENT (OUTPATIENT)
Dept: SURGERY | Facility: HOSPITAL | Age: 76
DRG: 377 | End: 2021-11-17
Payer: MEDICARE

## 2021-11-17 ENCOUNTER — ANESTHESIA (OUTPATIENT)
Dept: SURGERY | Facility: HOSPITAL | Age: 76
DRG: 377 | End: 2021-11-17
Payer: MEDICARE

## 2021-11-17 VITALS
DIASTOLIC BLOOD PRESSURE: 66 MMHG | SYSTOLIC BLOOD PRESSURE: 153 MMHG | OXYGEN SATURATION: 98 % | RESPIRATION RATE: 9 BRPM | HEART RATE: 87 BPM

## 2021-11-17 LAB
ANION GAP SERPL CALCULATED.3IONS-SCNC: 10 MMOL/L (ref 7–16)
BASOPHILS # BLD AUTO: 0.06 K/UL (ref 0–0.2)
BASOPHILS NFR BLD AUTO: 1.3 % (ref 0–1)
BUN SERPL-MCNC: 15 MG/DL (ref 7–18)
BUN/CREAT SERPL: 15 (ref 6–20)
CALCIUM SERPL-MCNC: 8.2 MG/DL (ref 8.5–10.1)
CHLORIDE SERPL-SCNC: 107 MMOL/L (ref 98–107)
CO2 SERPL-SCNC: 28 MMOL/L (ref 21–32)
CREAT SERPL-MCNC: 0.97 MG/DL (ref 0.7–1.3)
DIFFERENTIAL METHOD BLD: ABNORMAL
EOSINOPHIL # BLD AUTO: 0.33 K/UL (ref 0–0.5)
EOSINOPHIL NFR BLD AUTO: 7.1 % (ref 1–4)
ERYTHROCYTE [DISTWIDTH] IN BLOOD BY AUTOMATED COUNT: 15.7 % (ref 11.5–14.5)
GLUCOSE SERPL-MCNC: 74 MG/DL (ref 70–105)
GLUCOSE SERPL-MCNC: 76 MG/DL (ref 70–105)
GLUCOSE SERPL-MCNC: 77 MG/DL (ref 74–106)
GLUCOSE SERPL-MCNC: 83 MG/DL (ref 70–105)
HCT VFR BLD AUTO: 29.7 % (ref 40–54)
HGB BLD-MCNC: 9.6 G/DL (ref 13.5–18)
IMM GRANULOCYTES # BLD AUTO: 0.04 K/UL (ref 0–0.04)
IMM GRANULOCYTES NFR BLD: 0.9 % (ref 0–0.4)
LYMPHOCYTES # BLD AUTO: 1 K/UL (ref 1–4.8)
LYMPHOCYTES NFR BLD AUTO: 21.6 % (ref 27–41)
MCH RBC QN AUTO: 30.8 PG (ref 27–31)
MCHC RBC AUTO-ENTMCNC: 32.3 G/DL (ref 32–36)
MCV RBC AUTO: 95.2 FL (ref 80–96)
MONOCYTES # BLD AUTO: 0.36 K/UL (ref 0–0.8)
MONOCYTES NFR BLD AUTO: 7.8 % (ref 2–6)
MPC BLD CALC-MCNC: 9.8 FL (ref 9.4–12.4)
NEUTROPHILS # BLD AUTO: 2.83 K/UL (ref 1.8–7.7)
NEUTROPHILS NFR BLD AUTO: 61.3 % (ref 53–65)
NRBC # BLD AUTO: 0 X10E3/UL
NRBC, AUTO (.00): 0 %
PLATELET # BLD AUTO: 238 K/UL (ref 150–400)
POTASSIUM SERPL-SCNC: 4.6 MMOL/L (ref 3.5–5.1)
RBC # BLD AUTO: 3.12 M/UL (ref 4.6–6.2)
SODIUM SERPL-SCNC: 140 MMOL/L (ref 136–145)
WBC # BLD AUTO: 4.62 K/UL (ref 4.5–11)

## 2021-11-17 PROCEDURE — 82962 GLUCOSE BLOOD TEST: CPT

## 2021-11-17 PROCEDURE — 97110 THERAPEUTIC EXERCISES: CPT | Mod: CQ

## 2021-11-17 PROCEDURE — 92526 ORAL FUNCTION THERAPY: CPT

## 2021-11-17 PROCEDURE — 37000008 HC ANESTHESIA 1ST 15 MINUTES: Performed by: SURGERY

## 2021-11-17 PROCEDURE — 85025 COMPLETE CBC W/AUTO DIFF WBC: CPT

## 2021-11-17 PROCEDURE — 25000003 PHARM REV CODE 250: Performed by: NURSE ANESTHETIST, CERTIFIED REGISTERED

## 2021-11-17 PROCEDURE — D9220A PRA ANESTHESIA: ICD-10-PCS | Mod: CRNA,,, | Performed by: NURSE ANESTHETIST, CERTIFIED REGISTERED

## 2021-11-17 PROCEDURE — 36000707: Performed by: SURGERY

## 2021-11-17 PROCEDURE — 27000716 HC OXISENSOR PROBE, ANY SIZE: Performed by: NURSE ANESTHETIST, CERTIFIED REGISTERED

## 2021-11-17 PROCEDURE — 11000001 HC ACUTE MED/SURG PRIVATE ROOM

## 2021-11-17 PROCEDURE — D9220A PRA ANESTHESIA: Mod: CRNA,,, | Performed by: NURSE ANESTHETIST, CERTIFIED REGISTERED

## 2021-11-17 PROCEDURE — D9220A PRA ANESTHESIA: Mod: ANES,,, | Performed by: ANESTHESIOLOGY

## 2021-11-17 PROCEDURE — 80048 BASIC METABOLIC PNL TOTAL CA: CPT

## 2021-11-17 PROCEDURE — D9220A PRA ANESTHESIA: ICD-10-PCS | Mod: ANES,,, | Performed by: ANESTHESIOLOGY

## 2021-11-17 PROCEDURE — 37000009 HC ANESTHESIA EA ADD 15 MINS: Performed by: SURGERY

## 2021-11-17 PROCEDURE — 63600175 PHARM REV CODE 636 W HCPCS: Performed by: NURSE ANESTHETIST, CERTIFIED REGISTERED

## 2021-11-17 PROCEDURE — 71000033 HC RECOVERY, INTIAL HOUR: Performed by: SURGERY

## 2021-11-17 PROCEDURE — 99232 PR SUBSEQUENT HOSPITAL CARE,LEVL II: ICD-10-PCS | Mod: GC,,, | Performed by: FAMILY MEDICINE

## 2021-11-17 PROCEDURE — C9113 INJ PANTOPRAZOLE SODIUM, VIA: HCPCS

## 2021-11-17 PROCEDURE — 27000284 HC CANNULA NASAL: Performed by: NURSE ANESTHETIST, CERTIFIED REGISTERED

## 2021-11-17 PROCEDURE — 25000003 PHARM REV CODE 250: Performed by: INTERNAL MEDICINE

## 2021-11-17 PROCEDURE — 43246 EGD PLACE GASTROSTOMY TUBE: CPT | Mod: ,,, | Performed by: SURGERY

## 2021-11-17 PROCEDURE — 63600175 PHARM REV CODE 636 W HCPCS

## 2021-11-17 PROCEDURE — 43246 PR EGD, FLEX, W/PLCMT, GASTROSTOMY TUBE: ICD-10-PCS | Mod: ,,, | Performed by: SURGERY

## 2021-11-17 PROCEDURE — 27201423 OPTIME MED/SURG SUP & DEVICES STERILE SUPPLY: Performed by: SURGERY

## 2021-11-17 PROCEDURE — 94761 N-INVAS EAR/PLS OXIMETRY MLT: CPT

## 2021-11-17 PROCEDURE — 36000706: Performed by: SURGERY

## 2021-11-17 PROCEDURE — 99232 SBSQ HOSP IP/OBS MODERATE 35: CPT | Mod: GC,,, | Performed by: FAMILY MEDICINE

## 2021-11-17 PROCEDURE — 97116 GAIT TRAINING THERAPY: CPT | Mod: CQ

## 2021-11-17 PROCEDURE — 36415 COLL VENOUS BLD VENIPUNCTURE: CPT

## 2021-11-17 RX ORDER — LOSARTAN POTASSIUM 50 MG/1
50 TABLET ORAL DAILY
Status: DISCONTINUED | OUTPATIENT
Start: 2021-11-17 | End: 2021-11-21

## 2021-11-17 RX ORDER — LIDOCAINE HYDROCHLORIDE 20 MG/ML
INJECTION, SOLUTION EPIDURAL; INFILTRATION; INTRACAUDAL; PERINEURAL
Status: DISCONTINUED | OUTPATIENT
Start: 2021-11-17 | End: 2021-11-17

## 2021-11-17 RX ORDER — DIPHENHYDRAMINE HYDROCHLORIDE 50 MG/ML
25 INJECTION INTRAMUSCULAR; INTRAVENOUS EVERY 6 HOURS PRN
Status: DISCONTINUED | OUTPATIENT
Start: 2021-11-17 | End: 2021-11-17 | Stop reason: HOSPADM

## 2021-11-17 RX ORDER — ONDANSETRON 2 MG/ML
4 INJECTION INTRAMUSCULAR; INTRAVENOUS DAILY PRN
Status: DISCONTINUED | OUTPATIENT
Start: 2021-11-17 | End: 2021-11-17 | Stop reason: HOSPADM

## 2021-11-17 RX ORDER — PROPOFOL 10 MG/ML
VIAL (ML) INTRAVENOUS
Status: DISCONTINUED | OUTPATIENT
Start: 2021-11-17 | End: 2021-11-17

## 2021-11-17 RX ORDER — ETOMIDATE 2 MG/ML
INJECTION INTRAVENOUS
Status: DISCONTINUED | OUTPATIENT
Start: 2021-11-17 | End: 2021-11-17

## 2021-11-17 RX ADMIN — PROPOFOL 25 MG: 10 INJECTION, EMULSION INTRAVENOUS at 04:11

## 2021-11-17 RX ADMIN — SODIUM CHLORIDE: 9 INJECTION, SOLUTION INTRAVENOUS at 03:11

## 2021-11-17 RX ADMIN — ETOMIDATE 5 MG: 2 INJECTION, SOLUTION INTRAVENOUS at 04:11

## 2021-11-17 RX ADMIN — PANTOPRAZOLE SODIUM 40 MG: 40 INJECTION, POWDER, FOR SOLUTION INTRAVENOUS at 09:11

## 2021-11-17 RX ADMIN — RIFAXIMIN 550 MG: 550 TABLET ORAL at 09:11

## 2021-11-17 RX ADMIN — PANTOPRAZOLE SODIUM 40 MG: 40 INJECTION, POWDER, FOR SOLUTION INTRAVENOUS at 10:11

## 2021-11-17 RX ADMIN — LIDOCAINE HYDROCHLORIDE 60 MG: 20 INJECTION, SOLUTION INTRAVENOUS at 04:11

## 2021-11-17 RX ADMIN — QUETIAPINE 50 MG: 25 TABLET ORAL at 09:11

## 2021-11-17 RX ADMIN — SODIUM CHLORIDE, POTASSIUM CHLORIDE, SODIUM LACTATE AND CALCIUM CHLORIDE: 600; 310; 30; 20 INJECTION, SOLUTION INTRAVENOUS at 12:11

## 2021-11-17 RX ADMIN — SODIUM CHLORIDE, POTASSIUM CHLORIDE, SODIUM LACTATE AND CALCIUM CHLORIDE: 600; 310; 30; 20 INJECTION, SOLUTION INTRAVENOUS at 09:11

## 2021-11-18 PROBLEM — J69.0 ASPIRATION PNEUMONIA: Status: RESOLVED | Noted: 2021-11-09 | Resolved: 2021-11-18

## 2021-11-18 LAB
ANION GAP SERPL CALCULATED.3IONS-SCNC: 11 MMOL/L (ref 7–16)
BASOPHILS # BLD AUTO: 0.05 K/UL (ref 0–0.2)
BASOPHILS NFR BLD AUTO: 1.2 % (ref 0–1)
BUN SERPL-MCNC: 15 MG/DL (ref 7–18)
BUN/CREAT SERPL: 15 (ref 6–20)
CALCIUM SERPL-MCNC: 8 MG/DL (ref 8.5–10.1)
CHLORIDE SERPL-SCNC: 108 MMOL/L (ref 98–107)
CO2 SERPL-SCNC: 28 MMOL/L (ref 21–32)
CREAT SERPL-MCNC: 0.99 MG/DL (ref 0.7–1.3)
DIFFERENTIAL METHOD BLD: ABNORMAL
EOSINOPHIL # BLD AUTO: 0.19 K/UL (ref 0–0.5)
EOSINOPHIL NFR BLD AUTO: 4.5 % (ref 1–4)
ERYTHROCYTE [DISTWIDTH] IN BLOOD BY AUTOMATED COUNT: 15.5 % (ref 11.5–14.5)
GLUCOSE SERPL-MCNC: 112 MG/DL (ref 70–105)
GLUCOSE SERPL-MCNC: 121 MG/DL (ref 70–105)
GLUCOSE SERPL-MCNC: 126 MG/DL (ref 70–105)
GLUCOSE SERPL-MCNC: 64 MG/DL (ref 70–105)
GLUCOSE SERPL-MCNC: 82 MG/DL (ref 70–105)
GLUCOSE SERPL-MCNC: 88 MG/DL (ref 74–106)
HCT VFR BLD AUTO: 28.9 % (ref 40–54)
HGB BLD-MCNC: 9.4 G/DL (ref 13.5–18)
IMM GRANULOCYTES # BLD AUTO: 0.01 K/UL (ref 0–0.04)
IMM GRANULOCYTES NFR BLD: 0.2 % (ref 0–0.4)
LYMPHOCYTES # BLD AUTO: 1.04 K/UL (ref 1–4.8)
LYMPHOCYTES NFR BLD AUTO: 24.5 % (ref 27–41)
MCH RBC QN AUTO: 30.7 PG (ref 27–31)
MCHC RBC AUTO-ENTMCNC: 32.5 G/DL (ref 32–36)
MCV RBC AUTO: 94.4 FL (ref 80–96)
MONOCYTES # BLD AUTO: 0.4 K/UL (ref 0–0.8)
MONOCYTES NFR BLD AUTO: 9.4 % (ref 2–6)
MPC BLD CALC-MCNC: 9.6 FL (ref 9.4–12.4)
NEUTROPHILS # BLD AUTO: 2.56 K/UL (ref 1.8–7.7)
NEUTROPHILS NFR BLD AUTO: 60.2 % (ref 53–65)
NRBC # BLD AUTO: 0 X10E3/UL
NRBC, AUTO (.00): 0 %
PLATELET # BLD AUTO: 294 K/UL (ref 150–400)
POTASSIUM SERPL-SCNC: 3.8 MMOL/L (ref 3.5–5.1)
RBC # BLD AUTO: 3.06 M/UL (ref 4.6–6.2)
SODIUM SERPL-SCNC: 143 MMOL/L (ref 136–145)
TB INDURATION 48 - 72 HR READ: 0 0 MM
WBC # BLD AUTO: 4.25 K/UL (ref 4.5–11)

## 2021-11-18 PROCEDURE — 63600175 PHARM REV CODE 636 W HCPCS: Performed by: FAMILY MEDICINE

## 2021-11-18 PROCEDURE — 11000001 HC ACUTE MED/SURG PRIVATE ROOM

## 2021-11-18 PROCEDURE — 94761 N-INVAS EAR/PLS OXIMETRY MLT: CPT

## 2021-11-18 PROCEDURE — 97116 GAIT TRAINING THERAPY: CPT | Mod: CQ

## 2021-11-18 PROCEDURE — 92526 ORAL FUNCTION THERAPY: CPT

## 2021-11-18 PROCEDURE — 82962 GLUCOSE BLOOD TEST: CPT

## 2021-11-18 PROCEDURE — 25000003 PHARM REV CODE 250: Performed by: INTERNAL MEDICINE

## 2021-11-18 PROCEDURE — 25000003 PHARM REV CODE 250

## 2021-11-18 PROCEDURE — 80048 BASIC METABOLIC PNL TOTAL CA: CPT

## 2021-11-18 PROCEDURE — 85025 COMPLETE CBC W/AUTO DIFF WBC: CPT

## 2021-11-18 PROCEDURE — 97110 THERAPEUTIC EXERCISES: CPT | Mod: CQ

## 2021-11-18 PROCEDURE — 63600175 PHARM REV CODE 636 W HCPCS

## 2021-11-18 PROCEDURE — 99232 SBSQ HOSP IP/OBS MODERATE 35: CPT | Mod: GC,,, | Performed by: FAMILY MEDICINE

## 2021-11-18 PROCEDURE — C9113 INJ PANTOPRAZOLE SODIUM, VIA: HCPCS

## 2021-11-18 PROCEDURE — 97110 THERAPEUTIC EXERCISES: CPT

## 2021-11-18 PROCEDURE — 25000003 PHARM REV CODE 250: Performed by: FAMILY MEDICINE

## 2021-11-18 PROCEDURE — 36415 COLL VENOUS BLD VENIPUNCTURE: CPT

## 2021-11-18 PROCEDURE — 99232 PR SUBSEQUENT HOSPITAL CARE,LEVL II: ICD-10-PCS | Mod: GC,,, | Performed by: FAMILY MEDICINE

## 2021-11-18 PROCEDURE — 27000221 HC OXYGEN, UP TO 24 HOURS

## 2021-11-18 RX ORDER — SODIUM CHLORIDE, SODIUM LACTATE, POTASSIUM CHLORIDE, CALCIUM CHLORIDE 600; 310; 30; 20 MG/100ML; MG/100ML; MG/100ML; MG/100ML
INJECTION, SOLUTION INTRAVENOUS CONTINUOUS
Status: DISCONTINUED | OUTPATIENT
Start: 2021-11-18 | End: 2021-11-23 | Stop reason: HOSPADM

## 2021-11-18 RX ORDER — DEXTROSE, SODIUM CHLORIDE, SODIUM LACTATE, POTASSIUM CHLORIDE, AND CALCIUM CHLORIDE 5; .6; .31; .03; .02 G/100ML; G/100ML; G/100ML; G/100ML; G/100ML
INJECTION, SOLUTION INTRAVENOUS CONTINUOUS
Status: DISCONTINUED | OUTPATIENT
Start: 2021-11-18 | End: 2021-11-18

## 2021-11-18 RX ADMIN — LACTULOSE 20 G: 20 SOLUTION ORAL at 09:11

## 2021-11-18 RX ADMIN — QUETIAPINE 50 MG: 25 TABLET ORAL at 09:11

## 2021-11-18 RX ADMIN — LOSARTAN POTASSIUM 50 MG: 50 TABLET, FILM COATED ORAL at 09:11

## 2021-11-18 RX ADMIN — AMLODIPINE BESYLATE 10 MG: 10 TABLET ORAL at 09:11

## 2021-11-18 RX ADMIN — SODIUM CHLORIDE, SODIUM LACTATE, POTASSIUM CHLORIDE, CALCIUM CHLORIDE AND DEXTROSE MONOHYDRATE 100 ML/HR: 5; 600; 310; 30; 20 INJECTION, SOLUTION INTRAVENOUS at 06:11

## 2021-11-18 RX ADMIN — PANTOPRAZOLE SODIUM 40 MG: 40 INJECTION, POWDER, FOR SOLUTION INTRAVENOUS at 09:11

## 2021-11-18 RX ADMIN — SODIUM CHLORIDE, POTASSIUM CHLORIDE, SODIUM LACTATE AND CALCIUM CHLORIDE: 600; 310; 30; 20 INJECTION, SOLUTION INTRAVENOUS at 09:11

## 2021-11-18 RX ADMIN — DEXTROSE MONOHYDRATE 12.5 G: 25 INJECTION, SOLUTION INTRAVENOUS at 01:11

## 2021-11-18 RX ADMIN — FOLIC ACID-PYRIDOXINE-CYANOCOBALAMIN TAB 2.5-25-2 MG 1 TABLET: 2.5-25-2 TAB at 09:11

## 2021-11-18 RX ADMIN — RIFAXIMIN 550 MG: 550 TABLET ORAL at 09:11

## 2021-11-19 LAB
ANION GAP SERPL CALCULATED.3IONS-SCNC: 10 MMOL/L (ref 7–16)
BASOPHILS # BLD AUTO: 0.05 K/UL (ref 0–0.2)
BASOPHILS NFR BLD AUTO: 1 % (ref 0–1)
BUN SERPL-MCNC: 10 MG/DL (ref 7–18)
BUN/CREAT SERPL: 11 (ref 6–20)
CALCIUM SERPL-MCNC: 7.9 MG/DL (ref 8.5–10.1)
CHLORIDE SERPL-SCNC: 108 MMOL/L (ref 98–107)
CO2 SERPL-SCNC: 29 MMOL/L (ref 21–32)
CREAT SERPL-MCNC: 0.89 MG/DL (ref 0.7–1.3)
DIFFERENTIAL METHOD BLD: ABNORMAL
EOSINOPHIL # BLD AUTO: 0.2 K/UL (ref 0–0.5)
EOSINOPHIL NFR BLD AUTO: 4 % (ref 1–4)
ERYTHROCYTE [DISTWIDTH] IN BLOOD BY AUTOMATED COUNT: 15.3 % (ref 11.5–14.5)
GLUCOSE SERPL-MCNC: 115 MG/DL (ref 70–105)
GLUCOSE SERPL-MCNC: 120 MG/DL (ref 74–106)
GLUCOSE SERPL-MCNC: 129 MG/DL (ref 70–105)
GLUCOSE SERPL-MCNC: 95 MG/DL (ref 70–105)
HCT VFR BLD AUTO: 29 % (ref 40–54)
HGB BLD-MCNC: 9.7 G/DL (ref 13.5–18)
IMM GRANULOCYTES # BLD AUTO: 0.02 K/UL (ref 0–0.04)
IMM GRANULOCYTES NFR BLD: 0.4 % (ref 0–0.4)
LYMPHOCYTES # BLD AUTO: 0.83 K/UL (ref 1–4.8)
LYMPHOCYTES NFR BLD AUTO: 16.4 % (ref 27–41)
MCH RBC QN AUTO: 31.3 PG (ref 27–31)
MCHC RBC AUTO-ENTMCNC: 33.4 G/DL (ref 32–36)
MCV RBC AUTO: 93.5 FL (ref 80–96)
MONOCYTES # BLD AUTO: 0.45 K/UL (ref 0–0.8)
MONOCYTES NFR BLD AUTO: 8.9 % (ref 2–6)
MPC BLD CALC-MCNC: 9.7 FL (ref 9.4–12.4)
NEUTROPHILS # BLD AUTO: 3.51 K/UL (ref 1.8–7.7)
NEUTROPHILS NFR BLD AUTO: 69.3 % (ref 53–65)
NRBC # BLD AUTO: 0 X10E3/UL
NRBC, AUTO (.00): 0 %
PLATELET # BLD AUTO: 286 K/UL (ref 150–400)
POTASSIUM SERPL-SCNC: 3.6 MMOL/L (ref 3.5–5.1)
RBC # BLD AUTO: 3.1 M/UL (ref 4.6–6.2)
SODIUM SERPL-SCNC: 143 MMOL/L (ref 136–145)
WBC # BLD AUTO: 5.06 K/UL (ref 4.5–11)

## 2021-11-19 PROCEDURE — 80048 BASIC METABOLIC PNL TOTAL CA: CPT

## 2021-11-19 PROCEDURE — 97116 GAIT TRAINING THERAPY: CPT | Mod: CQ

## 2021-11-19 PROCEDURE — C9113 INJ PANTOPRAZOLE SODIUM, VIA: HCPCS

## 2021-11-19 PROCEDURE — 97110 THERAPEUTIC EXERCISES: CPT | Mod: CQ

## 2021-11-19 PROCEDURE — 25000003 PHARM REV CODE 250: Performed by: INTERNAL MEDICINE

## 2021-11-19 PROCEDURE — 25000003 PHARM REV CODE 250

## 2021-11-19 PROCEDURE — 82962 GLUCOSE BLOOD TEST: CPT

## 2021-11-19 PROCEDURE — 92526 ORAL FUNCTION THERAPY: CPT

## 2021-11-19 PROCEDURE — 11000001 HC ACUTE MED/SURG PRIVATE ROOM

## 2021-11-19 PROCEDURE — 25000003 PHARM REV CODE 250: Performed by: FAMILY MEDICINE

## 2021-11-19 PROCEDURE — 97110 THERAPEUTIC EXERCISES: CPT | Mod: CO

## 2021-11-19 PROCEDURE — 99232 SBSQ HOSP IP/OBS MODERATE 35: CPT | Mod: GC,,, | Performed by: FAMILY MEDICINE

## 2021-11-19 PROCEDURE — 36415 COLL VENOUS BLD VENIPUNCTURE: CPT

## 2021-11-19 PROCEDURE — 85025 COMPLETE CBC W/AUTO DIFF WBC: CPT

## 2021-11-19 PROCEDURE — 99232 PR SUBSEQUENT HOSPITAL CARE,LEVL II: ICD-10-PCS | Mod: GC,,, | Performed by: FAMILY MEDICINE

## 2021-11-19 PROCEDURE — 63600175 PHARM REV CODE 636 W HCPCS

## 2021-11-19 RX ADMIN — PANTOPRAZOLE SODIUM 40 MG: 40 INJECTION, POWDER, FOR SOLUTION INTRAVENOUS at 08:11

## 2021-11-19 RX ADMIN — RIFAXIMIN 550 MG: 550 TABLET ORAL at 08:11

## 2021-11-19 RX ADMIN — AMLODIPINE BESYLATE 10 MG: 10 TABLET ORAL at 08:11

## 2021-11-19 RX ADMIN — FOLIC ACID-PYRIDOXINE-CYANOCOBALAMIN TAB 2.5-25-2 MG 1 TABLET: 2.5-25-2 TAB at 08:11

## 2021-11-19 RX ADMIN — RIFAXIMIN 550 MG: 550 TABLET ORAL at 10:11

## 2021-11-19 RX ADMIN — SODIUM CHLORIDE, POTASSIUM CHLORIDE, SODIUM LACTATE AND CALCIUM CHLORIDE: 600; 310; 30; 20 INJECTION, SOLUTION INTRAVENOUS at 05:11

## 2021-11-19 RX ADMIN — PANTOPRAZOLE SODIUM 40 MG: 40 INJECTION, POWDER, FOR SOLUTION INTRAVENOUS at 09:11

## 2021-11-19 RX ADMIN — SODIUM CHLORIDE, POTASSIUM CHLORIDE, SODIUM LACTATE AND CALCIUM CHLORIDE: 600; 310; 30; 20 INJECTION, SOLUTION INTRAVENOUS at 03:11

## 2021-11-19 RX ADMIN — QUETIAPINE 50 MG: 25 TABLET ORAL at 10:11

## 2021-11-19 RX ADMIN — LACTULOSE 20 G: 20 SOLUTION ORAL at 08:11

## 2021-11-19 RX ADMIN — LOSARTAN POTASSIUM 50 MG: 50 TABLET, FILM COATED ORAL at 08:11

## 2021-11-19 RX ADMIN — QUETIAPINE 50 MG: 25 TABLET ORAL at 08:11

## 2021-11-20 LAB
ANION GAP SERPL CALCULATED.3IONS-SCNC: 7 MMOL/L (ref 7–16)
BASOPHILS # BLD AUTO: 0.04 K/UL (ref 0–0.2)
BASOPHILS NFR BLD AUTO: 0.8 % (ref 0–1)
BUN SERPL-MCNC: 12 MG/DL (ref 7–18)
BUN/CREAT SERPL: 13 (ref 6–20)
CALCIUM SERPL-MCNC: 7.7 MG/DL (ref 8.5–10.1)
CHLORIDE SERPL-SCNC: 109 MMOL/L (ref 98–107)
CO2 SERPL-SCNC: 29 MMOL/L (ref 21–32)
CREAT SERPL-MCNC: 0.92 MG/DL (ref 0.7–1.3)
DIFFERENTIAL METHOD BLD: ABNORMAL
EOSINOPHIL # BLD AUTO: 0.21 K/UL (ref 0–0.5)
EOSINOPHIL NFR BLD AUTO: 4.3 % (ref 1–4)
ERYTHROCYTE [DISTWIDTH] IN BLOOD BY AUTOMATED COUNT: 15.7 % (ref 11.5–14.5)
GLUCOSE SERPL-MCNC: 101 MG/DL (ref 70–105)
GLUCOSE SERPL-MCNC: 104 MG/DL (ref 70–105)
GLUCOSE SERPL-MCNC: 109 MG/DL (ref 70–105)
GLUCOSE SERPL-MCNC: 119 MG/DL (ref 74–106)
GLUCOSE SERPL-MCNC: 125 MG/DL (ref 70–105)
HCT VFR BLD AUTO: 29.1 % (ref 40–54)
HGB BLD-MCNC: 9.3 G/DL (ref 13.5–18)
IMM GRANULOCYTES # BLD AUTO: 0.02 K/UL (ref 0–0.04)
IMM GRANULOCYTES NFR BLD: 0.4 % (ref 0–0.4)
LYMPHOCYTES # BLD AUTO: 0.8 K/UL (ref 1–4.8)
LYMPHOCYTES NFR BLD AUTO: 16.6 % (ref 27–41)
MCH RBC QN AUTO: 30.9 PG (ref 27–31)
MCHC RBC AUTO-ENTMCNC: 32 G/DL (ref 32–36)
MCV RBC AUTO: 96.7 FL (ref 80–96)
MONOCYTES # BLD AUTO: 0.46 K/UL (ref 0–0.8)
MONOCYTES NFR BLD AUTO: 9.5 % (ref 2–6)
MPC BLD CALC-MCNC: 9.8 FL (ref 9.4–12.4)
NEUTROPHILS # BLD AUTO: 3.3 K/UL (ref 1.8–7.7)
NEUTROPHILS NFR BLD AUTO: 68.4 % (ref 53–65)
NRBC # BLD AUTO: 0 X10E3/UL
NRBC, AUTO (.00): 0 %
PLATELET # BLD AUTO: 282 K/UL (ref 150–400)
POTASSIUM SERPL-SCNC: 3.6 MMOL/L (ref 3.5–5.1)
RBC # BLD AUTO: 3.01 M/UL (ref 4.6–6.2)
SODIUM SERPL-SCNC: 141 MMOL/L (ref 136–145)
WBC # BLD AUTO: 4.83 K/UL (ref 4.5–11)

## 2021-11-20 PROCEDURE — 82962 GLUCOSE BLOOD TEST: CPT

## 2021-11-20 PROCEDURE — 99232 SBSQ HOSP IP/OBS MODERATE 35: CPT | Mod: GC,,, | Performed by: FAMILY MEDICINE

## 2021-11-20 PROCEDURE — 25000003 PHARM REV CODE 250: Performed by: INTERNAL MEDICINE

## 2021-11-20 PROCEDURE — 11000001 HC ACUTE MED/SURG PRIVATE ROOM

## 2021-11-20 PROCEDURE — 63600175 PHARM REV CODE 636 W HCPCS

## 2021-11-20 PROCEDURE — 25000003 PHARM REV CODE 250

## 2021-11-20 PROCEDURE — 85025 COMPLETE CBC W/AUTO DIFF WBC: CPT

## 2021-11-20 PROCEDURE — 99232 PR SUBSEQUENT HOSPITAL CARE,LEVL II: ICD-10-PCS | Mod: GC,,, | Performed by: FAMILY MEDICINE

## 2021-11-20 PROCEDURE — C9113 INJ PANTOPRAZOLE SODIUM, VIA: HCPCS

## 2021-11-20 PROCEDURE — 80048 BASIC METABOLIC PNL TOTAL CA: CPT

## 2021-11-20 PROCEDURE — 36415 COLL VENOUS BLD VENIPUNCTURE: CPT

## 2021-11-20 RX ADMIN — PANTOPRAZOLE SODIUM 40 MG: 40 INJECTION, POWDER, FOR SOLUTION INTRAVENOUS at 10:11

## 2021-11-20 RX ADMIN — PANTOPRAZOLE SODIUM 40 MG: 40 INJECTION, POWDER, FOR SOLUTION INTRAVENOUS at 09:11

## 2021-11-20 RX ADMIN — SODIUM CHLORIDE, POTASSIUM CHLORIDE, SODIUM LACTATE AND CALCIUM CHLORIDE: 600; 310; 30; 20 INJECTION, SOLUTION INTRAVENOUS at 02:11

## 2021-11-20 RX ADMIN — RIFAXIMIN 550 MG: 550 TABLET ORAL at 10:11

## 2021-11-20 RX ADMIN — LACTULOSE 20 G: 20 SOLUTION ORAL at 10:11

## 2021-11-20 RX ADMIN — SODIUM CHLORIDE, POTASSIUM CHLORIDE, SODIUM LACTATE AND CALCIUM CHLORIDE: 600; 310; 30; 20 INJECTION, SOLUTION INTRAVENOUS at 01:11

## 2021-11-20 RX ADMIN — QUETIAPINE 50 MG: 25 TABLET ORAL at 10:11

## 2021-11-20 RX ADMIN — QUETIAPINE 50 MG: 25 TABLET ORAL at 09:11

## 2021-11-20 RX ADMIN — RIFAXIMIN 550 MG: 550 TABLET ORAL at 09:11

## 2021-11-21 PROBLEM — N17.9 AKI (ACUTE KIDNEY INJURY): Status: RESOLVED | Noted: 2021-11-13 | Resolved: 2021-11-21

## 2021-11-21 LAB
ANION GAP SERPL CALCULATED.3IONS-SCNC: 9 MMOL/L (ref 7–16)
BASOPHILS # BLD AUTO: 0.05 K/UL (ref 0–0.2)
BASOPHILS NFR BLD AUTO: 1 % (ref 0–1)
BUN SERPL-MCNC: 12 MG/DL (ref 7–18)
BUN/CREAT SERPL: 14 (ref 6–20)
CALCIUM SERPL-MCNC: 7.9 MG/DL (ref 8.5–10.1)
CHLORIDE SERPL-SCNC: 110 MMOL/L (ref 98–107)
CO2 SERPL-SCNC: 30 MMOL/L (ref 21–32)
CREAT SERPL-MCNC: 0.83 MG/DL (ref 0.7–1.3)
DIFFERENTIAL METHOD BLD: ABNORMAL
EOSINOPHIL # BLD AUTO: 0.2 K/UL (ref 0–0.5)
EOSINOPHIL NFR BLD AUTO: 4.2 % (ref 1–4)
ERYTHROCYTE [DISTWIDTH] IN BLOOD BY AUTOMATED COUNT: 15.8 % (ref 11.5–14.5)
GLUCOSE SERPL-MCNC: 127 MG/DL (ref 70–105)
GLUCOSE SERPL-MCNC: 89 MG/DL (ref 70–105)
GLUCOSE SERPL-MCNC: 91 MG/DL (ref 74–106)
GLUCOSE SERPL-MCNC: 95 MG/DL (ref 70–105)
HCT VFR BLD AUTO: 28.2 % (ref 40–54)
HGB BLD-MCNC: 9.4 G/DL (ref 13.5–18)
IMM GRANULOCYTES # BLD AUTO: 0.02 K/UL (ref 0–0.04)
IMM GRANULOCYTES NFR BLD: 0.4 % (ref 0–0.4)
LYMPHOCYTES # BLD AUTO: 0.99 K/UL (ref 1–4.8)
LYMPHOCYTES NFR BLD AUTO: 20.6 % (ref 27–41)
MCH RBC QN AUTO: 31.1 PG (ref 27–31)
MCHC RBC AUTO-ENTMCNC: 33.3 G/DL (ref 32–36)
MCV RBC AUTO: 93.4 FL (ref 80–96)
MONOCYTES # BLD AUTO: 0.47 K/UL (ref 0–0.8)
MONOCYTES NFR BLD AUTO: 9.8 % (ref 2–6)
MPC BLD CALC-MCNC: 9.7 FL (ref 9.4–12.4)
NEUTROPHILS # BLD AUTO: 3.07 K/UL (ref 1.8–7.7)
NEUTROPHILS NFR BLD AUTO: 64 % (ref 53–65)
NRBC # BLD AUTO: 0 X10E3/UL
NRBC, AUTO (.00): 0 %
PLATELET # BLD AUTO: 301 K/UL (ref 150–400)
POTASSIUM SERPL-SCNC: 3.6 MMOL/L (ref 3.5–5.1)
RBC # BLD AUTO: 3.02 M/UL (ref 4.6–6.2)
SODIUM SERPL-SCNC: 145 MMOL/L (ref 136–145)
WBC # BLD AUTO: 4.8 K/UL (ref 4.5–11)

## 2021-11-21 PROCEDURE — 25000003 PHARM REV CODE 250

## 2021-11-21 PROCEDURE — 25000003 PHARM REV CODE 250: Performed by: INTERNAL MEDICINE

## 2021-11-21 PROCEDURE — 85025 COMPLETE CBC W/AUTO DIFF WBC: CPT

## 2021-11-21 PROCEDURE — 80048 BASIC METABOLIC PNL TOTAL CA: CPT

## 2021-11-21 PROCEDURE — 99232 SBSQ HOSP IP/OBS MODERATE 35: CPT | Mod: GC,,, | Performed by: FAMILY MEDICINE

## 2021-11-21 PROCEDURE — 11000001 HC ACUTE MED/SURG PRIVATE ROOM

## 2021-11-21 PROCEDURE — C9113 INJ PANTOPRAZOLE SODIUM, VIA: HCPCS

## 2021-11-21 PROCEDURE — 36415 COLL VENOUS BLD VENIPUNCTURE: CPT

## 2021-11-21 PROCEDURE — 63600175 PHARM REV CODE 636 W HCPCS

## 2021-11-21 PROCEDURE — 25000003 PHARM REV CODE 250: Performed by: FAMILY MEDICINE

## 2021-11-21 PROCEDURE — 82962 GLUCOSE BLOOD TEST: CPT

## 2021-11-21 PROCEDURE — 99232 PR SUBSEQUENT HOSPITAL CARE,LEVL II: ICD-10-PCS | Mod: GC,,, | Performed by: FAMILY MEDICINE

## 2021-11-21 PROCEDURE — 94761 N-INVAS EAR/PLS OXIMETRY MLT: CPT

## 2021-11-21 RX ORDER — LOSARTAN POTASSIUM 100 MG/1
100 TABLET ORAL DAILY
Status: DISCONTINUED | OUTPATIENT
Start: 2021-11-21 | End: 2021-11-23 | Stop reason: HOSPADM

## 2021-11-21 RX ADMIN — PANTOPRAZOLE SODIUM 40 MG: 40 INJECTION, POWDER, FOR SOLUTION INTRAVENOUS at 09:11

## 2021-11-21 RX ADMIN — FOLIC ACID-PYRIDOXINE-CYANOCOBALAMIN TAB 2.5-25-2 MG 1 TABLET: 2.5-25-2 TAB at 08:11

## 2021-11-21 RX ADMIN — RIFAXIMIN 550 MG: 550 TABLET ORAL at 08:11

## 2021-11-21 RX ADMIN — PANTOPRAZOLE SODIUM 40 MG: 40 INJECTION, POWDER, FOR SOLUTION INTRAVENOUS at 08:11

## 2021-11-21 RX ADMIN — LOSARTAN POTASSIUM 100 MG: 100 TABLET, FILM COATED ORAL at 08:11

## 2021-11-21 RX ADMIN — QUETIAPINE 50 MG: 25 TABLET ORAL at 08:11

## 2021-11-21 RX ADMIN — QUETIAPINE 50 MG: 25 TABLET ORAL at 09:11

## 2021-11-21 RX ADMIN — RIFAXIMIN 550 MG: 550 TABLET ORAL at 09:11

## 2021-11-21 RX ADMIN — AMLODIPINE BESYLATE 10 MG: 10 TABLET ORAL at 08:11

## 2021-11-21 RX ADMIN — LACTULOSE 20 G: 20 SOLUTION ORAL at 08:11

## 2021-11-22 PROBLEM — K92.2 GIB (GASTROINTESTINAL BLEEDING): Status: RESOLVED | Noted: 2021-11-05 | Resolved: 2021-11-22

## 2021-11-22 PROBLEM — E72.20 HYPERAMMONEMIA: Status: RESOLVED | Noted: 2021-11-07 | Resolved: 2021-11-22

## 2021-11-22 PROBLEM — R41.82 AMS (ALTERED MENTAL STATUS): Status: RESOLVED | Noted: 2021-11-05 | Resolved: 2021-11-22

## 2021-11-22 LAB
GLUCOSE SERPL-MCNC: 103 MG/DL (ref 70–105)
GLUCOSE SERPL-MCNC: 126 MG/DL (ref 70–105)

## 2021-11-22 PROCEDURE — 11000001 HC ACUTE MED/SURG PRIVATE ROOM

## 2021-11-22 PROCEDURE — 97110 THERAPEUTIC EXERCISES: CPT

## 2021-11-22 PROCEDURE — 25000003 PHARM REV CODE 250: Performed by: INTERNAL MEDICINE

## 2021-11-22 PROCEDURE — 99232 SBSQ HOSP IP/OBS MODERATE 35: CPT | Mod: GC,,, | Performed by: INTERNAL MEDICINE

## 2021-11-22 PROCEDURE — 25000003 PHARM REV CODE 250

## 2021-11-22 PROCEDURE — C9113 INJ PANTOPRAZOLE SODIUM, VIA: HCPCS

## 2021-11-22 PROCEDURE — 82962 GLUCOSE BLOOD TEST: CPT

## 2021-11-22 PROCEDURE — 92526 ORAL FUNCTION THERAPY: CPT

## 2021-11-22 PROCEDURE — 25000003 PHARM REV CODE 250: Performed by: FAMILY MEDICINE

## 2021-11-22 PROCEDURE — 99232 PR SUBSEQUENT HOSPITAL CARE,LEVL II: ICD-10-PCS | Mod: GC,,, | Performed by: INTERNAL MEDICINE

## 2021-11-22 PROCEDURE — 97116 GAIT TRAINING THERAPY: CPT

## 2021-11-22 PROCEDURE — 94761 N-INVAS EAR/PLS OXIMETRY MLT: CPT

## 2021-11-22 PROCEDURE — 63600175 PHARM REV CODE 636 W HCPCS

## 2021-11-22 RX ORDER — AMLODIPINE BESYLATE 10 MG/1
10 TABLET ORAL DAILY
Qty: 30 TABLET | Refills: 11 | Status: ON HOLD | OUTPATIENT
Start: 2021-11-22 | End: 2023-08-23 | Stop reason: HOSPADM

## 2021-11-22 RX ORDER — QUETIAPINE FUMARATE 50 MG/1
50 TABLET, FILM COATED ORAL 2 TIMES DAILY
Qty: 60 TABLET | Refills: 11 | Status: SHIPPED | OUTPATIENT
Start: 2021-11-22 | End: 2023-10-09

## 2021-11-22 RX ORDER — LOSARTAN POTASSIUM 100 MG/1
100 TABLET ORAL DAILY
Qty: 90 TABLET | Refills: 3 | Status: ON HOLD | OUTPATIENT
Start: 2021-11-22 | End: 2023-08-23 | Stop reason: HOSPADM

## 2021-11-22 RX ADMIN — RIFAXIMIN 550 MG: 550 TABLET ORAL at 09:11

## 2021-11-22 RX ADMIN — PANTOPRAZOLE SODIUM 40 MG: 40 INJECTION, POWDER, FOR SOLUTION INTRAVENOUS at 09:11

## 2021-11-22 RX ADMIN — LOSARTAN POTASSIUM 100 MG: 100 TABLET, FILM COATED ORAL at 09:11

## 2021-11-22 RX ADMIN — FOLIC ACID-PYRIDOXINE-CYANOCOBALAMIN TAB 2.5-25-2 MG 1 TABLET: 2.5-25-2 TAB at 09:11

## 2021-11-22 RX ADMIN — QUETIAPINE 50 MG: 25 TABLET ORAL at 09:11

## 2021-11-22 RX ADMIN — LACTULOSE 20 G: 20 SOLUTION ORAL at 09:11

## 2021-11-22 RX ADMIN — AMLODIPINE BESYLATE 10 MG: 10 TABLET ORAL at 09:11

## 2021-11-23 ENCOUNTER — HOSPITAL ENCOUNTER (INPATIENT)
Facility: HOSPITAL | Age: 76
LOS: 28 days | Discharge: HOME | DRG: 556 | End: 2021-12-21
Attending: FAMILY MEDICINE | Admitting: FAMILY MEDICINE
Payer: MEDICARE

## 2021-11-23 VITALS
DIASTOLIC BLOOD PRESSURE: 50 MMHG | HEIGHT: 70 IN | HEART RATE: 84 BPM | WEIGHT: 167.56 LBS | SYSTOLIC BLOOD PRESSURE: 105 MMHG | OXYGEN SATURATION: 95 % | TEMPERATURE: 98 F | BODY MASS INDEX: 23.99 KG/M2 | RESPIRATION RATE: 18 BRPM

## 2021-11-23 DIAGNOSIS — M62.81 MUSCLE WEAKNESS: ICD-10-CM

## 2021-11-23 DIAGNOSIS — K92.2 GI BLEED: ICD-10-CM

## 2021-11-23 PROBLEM — R13.10 DIFFICULTY SWALLOWING: Status: ACTIVE | Noted: 2021-11-23

## 2021-11-23 LAB
GLUCOSE SERPL-MCNC: 114 MG/DL (ref 70–105)
GLUCOSE SERPL-MCNC: 118 MG/DL (ref 70–105)
GLUCOSE SERPL-MCNC: 126 MG/DL (ref 70–105)

## 2021-11-23 PROCEDURE — 25000003 PHARM REV CODE 250: Performed by: FAMILY MEDICINE

## 2021-11-23 PROCEDURE — 99239 PR HOSPITAL DISCHARGE DAY,>30 MIN: ICD-10-PCS | Mod: GC,,, | Performed by: INTERNAL MEDICINE

## 2021-11-23 PROCEDURE — 11000004 HC SNF PRIVATE

## 2021-11-23 PROCEDURE — 27000941

## 2021-11-23 PROCEDURE — 25000003 PHARM REV CODE 250: Performed by: INTERNAL MEDICINE

## 2021-11-23 PROCEDURE — 25000003 PHARM REV CODE 250: Performed by: NURSE PRACTITIONER

## 2021-11-23 PROCEDURE — 92526 ORAL FUNCTION THERAPY: CPT

## 2021-11-23 PROCEDURE — 99306 PR NURSING FACILITY CARE, INIT, HIGH SEVERITY: ICD-10-PCS | Mod: AI,,, | Performed by: FAMILY MEDICINE

## 2021-11-23 PROCEDURE — 25000003 PHARM REV CODE 250

## 2021-11-23 PROCEDURE — 99239 HOSP IP/OBS DSCHRG MGMT >30: CPT | Mod: GC,,, | Performed by: INTERNAL MEDICINE

## 2021-11-23 PROCEDURE — 97110 THERAPEUTIC EXERCISES: CPT

## 2021-11-23 PROCEDURE — 63600175 PHARM REV CODE 636 W HCPCS

## 2021-11-23 PROCEDURE — 82962 GLUCOSE BLOOD TEST: CPT

## 2021-11-23 PROCEDURE — 99306 1ST NF CARE HIGH MDM 50: CPT | Mod: AI,,, | Performed by: FAMILY MEDICINE

## 2021-11-23 PROCEDURE — C9113 INJ PANTOPRAZOLE SODIUM, VIA: HCPCS

## 2021-11-23 RX ORDER — LOSARTAN POTASSIUM 50 MG/1
100 TABLET ORAL DAILY
Status: DISCONTINUED | OUTPATIENT
Start: 2021-11-24 | End: 2021-12-21 | Stop reason: HOSPADM

## 2021-11-23 RX ORDER — LACTULOSE 10 G/15ML
20 SOLUTION ORAL; RECTAL 3 TIMES DAILY
Status: ON HOLD | COMMUNITY
End: 2023-08-17 | Stop reason: SDUPTHER

## 2021-11-23 RX ORDER — LACTULOSE 10 G/15ML
20 SOLUTION ORAL 3 TIMES DAILY
Status: DISCONTINUED | OUTPATIENT
Start: 2021-11-23 | End: 2021-12-08

## 2021-11-23 RX ORDER — LACTOBACILLUS ACIDOPHILUS 500MM CELL
1 CAPSULE ORAL
Status: DISCONTINUED | OUTPATIENT
Start: 2021-11-24 | End: 2021-12-08

## 2021-11-23 RX ORDER — QUETIAPINE FUMARATE 25 MG/1
50 TABLET, FILM COATED ORAL 2 TIMES DAILY
Status: DISCONTINUED | OUTPATIENT
Start: 2021-11-23 | End: 2021-12-21 | Stop reason: HOSPADM

## 2021-11-23 RX ORDER — AMOXICILLIN 500 MG
1 CAPSULE ORAL DAILY
Status: DISCONTINUED | OUTPATIENT
Start: 2021-11-24 | End: 2021-11-24

## 2021-11-23 RX ORDER — AMLODIPINE BESYLATE 5 MG/1
10 TABLET ORAL DAILY
Status: DISCONTINUED | OUTPATIENT
Start: 2021-11-24 | End: 2021-12-21 | Stop reason: HOSPADM

## 2021-11-23 RX ORDER — NIACIN 500 MG/1
500 TABLET, EXTENDED RELEASE ORAL DAILY
Status: DISCONTINUED | OUTPATIENT
Start: 2021-11-24 | End: 2021-11-24

## 2021-11-23 RX ADMIN — QUETIAPINE 50 MG: 25 TABLET ORAL at 10:11

## 2021-11-23 RX ADMIN — LACTULOSE 20 G: 20 SOLUTION ORAL at 10:11

## 2021-11-23 RX ADMIN — PANTOPRAZOLE SODIUM 40 MG: 40 INJECTION, POWDER, FOR SOLUTION INTRAVENOUS at 10:11

## 2021-11-23 RX ADMIN — LOSARTAN POTASSIUM 100 MG: 100 TABLET, FILM COATED ORAL at 10:11

## 2021-11-23 RX ADMIN — QUETIAPINE FUMARATE 50 MG: 25 TABLET ORAL at 08:11

## 2021-11-23 RX ADMIN — FOLIC ACID-PYRIDOXINE-CYANOCOBALAMIN TAB 2.5-25-2 MG 1 TABLET: 2.5-25-2 TAB at 10:11

## 2021-11-23 RX ADMIN — RIFAXIMIN 550 MG: 550 TABLET ORAL at 10:11

## 2021-11-23 RX ADMIN — LACTULOSE 20 G: 10 SOLUTION ORAL at 08:11

## 2021-11-23 RX ADMIN — AMLODIPINE BESYLATE 10 MG: 10 TABLET ORAL at 10:11

## 2021-11-24 LAB
GLUCOSE SERPL-MCNC: 112 MG/DL (ref 70–105)
GLUCOSE SERPL-MCNC: 114 MG/DL (ref 70–105)

## 2021-11-24 PROCEDURE — 92522 EVALUATE SPEECH PRODUCTION: CPT

## 2021-11-24 PROCEDURE — 27000941

## 2021-11-24 PROCEDURE — 25000003 PHARM REV CODE 250: Performed by: NURSE PRACTITIONER

## 2021-11-24 PROCEDURE — 92610 EVALUATE SWALLOWING FUNCTION: CPT

## 2021-11-24 PROCEDURE — 97161 PT EVAL LOW COMPLEX 20 MIN: CPT

## 2021-11-24 PROCEDURE — 97166 OT EVAL MOD COMPLEX 45 MIN: CPT

## 2021-11-24 PROCEDURE — 11000004 HC SNF PRIVATE

## 2021-11-24 PROCEDURE — 82962 GLUCOSE BLOOD TEST: CPT

## 2021-11-24 RX ORDER — NIACIN 500 MG/1
500 TABLET, EXTENDED RELEASE ORAL DAILY
Status: DISCONTINUED | OUTPATIENT
Start: 2021-11-25 | End: 2021-12-21 | Stop reason: HOSPADM

## 2021-11-24 RX ORDER — AMOXICILLIN 500 MG
1 CAPSULE ORAL DAILY
Status: DISCONTINUED | OUTPATIENT
Start: 2021-11-25 | End: 2021-12-21 | Stop reason: HOSPADM

## 2021-11-24 RX ADMIN — Medication 1 CAPSULE: at 04:11

## 2021-11-24 RX ADMIN — QUETIAPINE FUMARATE 50 MG: 25 TABLET ORAL at 09:11

## 2021-11-24 RX ADMIN — LOSARTAN POTASSIUM 100 MG: 50 TABLET, FILM COATED ORAL at 09:11

## 2021-11-24 RX ADMIN — LACTULOSE 20 G: 10 SOLUTION ORAL at 02:11

## 2021-11-24 RX ADMIN — Medication 1 CAPSULE: at 01:11

## 2021-11-24 RX ADMIN — AMLODIPINE BESYLATE 10 MG: 5 TABLET ORAL at 09:11

## 2021-11-24 RX ADMIN — MULTIPLE VITAMINS W/ MINERALS TAB 1 TABLET: TAB at 09:11

## 2021-11-24 RX ADMIN — RIFAXIMIN 550 MG: 550 TABLET ORAL at 09:11

## 2021-11-24 RX ADMIN — LACTULOSE 20 G: 10 SOLUTION ORAL at 09:11

## 2021-11-24 RX ADMIN — Medication 1 CAPSULE: at 09:11

## 2021-11-25 LAB
GLUCOSE SERPL-MCNC: 105 MG/DL (ref 70–105)
GLUCOSE SERPL-MCNC: 124 MG/DL (ref 70–105)
GLUCOSE SERPL-MCNC: 134 MG/DL (ref 70–105)
GLUCOSE SERPL-MCNC: 95 MG/DL (ref 70–105)

## 2021-11-25 PROCEDURE — 82962 GLUCOSE BLOOD TEST: CPT

## 2021-11-25 PROCEDURE — 27000987 HC MATTRESS, MATRIX LOW PROFILE

## 2021-11-25 PROCEDURE — 27000941

## 2021-11-25 PROCEDURE — 11000004 HC SNF PRIVATE

## 2021-11-25 PROCEDURE — 25000003 PHARM REV CODE 250: Performed by: NURSE PRACTITIONER

## 2021-11-25 RX ADMIN — Medication 1 CAPSULE: at 08:11

## 2021-11-25 RX ADMIN — LACTULOSE 20 G: 10 SOLUTION ORAL at 08:11

## 2021-11-25 RX ADMIN — Medication 1 CAPSULE: at 04:11

## 2021-11-25 RX ADMIN — QUETIAPINE FUMARATE 50 MG: 25 TABLET ORAL at 08:11

## 2021-11-25 RX ADMIN — LACTULOSE 20 G: 10 SOLUTION ORAL at 09:11

## 2021-11-25 RX ADMIN — AMLODIPINE BESYLATE 10 MG: 5 TABLET ORAL at 08:11

## 2021-11-25 RX ADMIN — MULTIPLE VITAMINS W/ MINERALS TAB 1 TABLET: TAB at 08:11

## 2021-11-25 RX ADMIN — RIFAXIMIN 550 MG: 550 TABLET ORAL at 09:11

## 2021-11-25 RX ADMIN — LACTULOSE 20 G: 10 SOLUTION ORAL at 02:11

## 2021-11-25 RX ADMIN — RIFAXIMIN 550 MG: 550 TABLET ORAL at 08:11

## 2021-11-25 RX ADMIN — QUETIAPINE FUMARATE 50 MG: 25 TABLET ORAL at 09:11

## 2021-11-25 RX ADMIN — Medication 1 CAPSULE: at 11:11

## 2021-11-26 PROBLEM — M62.81 MUSCLE WEAKNESS: Status: ACTIVE | Noted: 2021-11-26

## 2021-11-26 LAB
GLUCOSE SERPL-MCNC: 119 MG/DL (ref 70–105)
GLUCOSE SERPL-MCNC: 123 MG/DL (ref 70–105)
GLUCOSE SERPL-MCNC: 143 MG/DL (ref 70–105)

## 2021-11-26 PROCEDURE — 27000987 HC MATTRESS, MATRIX LOW PROFILE

## 2021-11-26 PROCEDURE — 82962 GLUCOSE BLOOD TEST: CPT

## 2021-11-26 PROCEDURE — 92526 ORAL FUNCTION THERAPY: CPT

## 2021-11-26 PROCEDURE — 27000941

## 2021-11-26 PROCEDURE — 11000004 HC SNF PRIVATE

## 2021-11-26 PROCEDURE — 97110 THERAPEUTIC EXERCISES: CPT

## 2021-11-26 PROCEDURE — 97110 THERAPEUTIC EXERCISES: CPT | Mod: CQ

## 2021-11-26 PROCEDURE — 97116 GAIT TRAINING THERAPY: CPT | Mod: CQ

## 2021-11-26 PROCEDURE — 97535 SELF CARE MNGMENT TRAINING: CPT

## 2021-11-26 PROCEDURE — 25000003 PHARM REV CODE 250: Performed by: NURSE PRACTITIONER

## 2021-11-26 PROCEDURE — 97530 THERAPEUTIC ACTIVITIES: CPT

## 2021-11-26 RX ADMIN — MULTIPLE VITAMINS W/ MINERALS TAB 1 TABLET: TAB at 09:11

## 2021-11-26 RX ADMIN — Medication 1 CAPSULE: at 12:11

## 2021-11-26 RX ADMIN — QUETIAPINE FUMARATE 50 MG: 25 TABLET ORAL at 08:11

## 2021-11-26 RX ADMIN — LACTULOSE 20 G: 10 SOLUTION ORAL at 09:11

## 2021-11-26 RX ADMIN — LOSARTAN POTASSIUM 100 MG: 50 TABLET, FILM COATED ORAL at 09:11

## 2021-11-26 RX ADMIN — Medication 1 CAPSULE: at 04:11

## 2021-11-26 RX ADMIN — LACTULOSE 20 G: 10 SOLUTION ORAL at 04:11

## 2021-11-26 RX ADMIN — RIFAXIMIN 550 MG: 550 TABLET ORAL at 09:11

## 2021-11-26 RX ADMIN — LACTULOSE 20 G: 10 SOLUTION ORAL at 08:11

## 2021-11-26 RX ADMIN — RIFAXIMIN 550 MG: 550 TABLET ORAL at 08:11

## 2021-11-26 RX ADMIN — Medication 1 CAPSULE: at 09:11

## 2021-11-26 RX ADMIN — QUETIAPINE FUMARATE 50 MG: 25 TABLET ORAL at 09:11

## 2021-11-26 RX ADMIN — AMLODIPINE BESYLATE 10 MG: 5 TABLET ORAL at 09:11

## 2021-11-27 LAB
GLUCOSE SERPL-MCNC: 121 MG/DL (ref 70–105)
GLUCOSE SERPL-MCNC: 123 MG/DL (ref 70–105)
GLUCOSE SERPL-MCNC: 127 MG/DL (ref 70–105)
GLUCOSE SERPL-MCNC: 138 MG/DL (ref 70–105)

## 2021-11-27 PROCEDURE — 25000003 PHARM REV CODE 250: Performed by: NURSE PRACTITIONER

## 2021-11-27 PROCEDURE — 82962 GLUCOSE BLOOD TEST: CPT

## 2021-11-27 PROCEDURE — 11000004 HC SNF PRIVATE

## 2021-11-27 PROCEDURE — 27000941

## 2021-11-27 RX ADMIN — LOSARTAN POTASSIUM 100 MG: 50 TABLET, FILM COATED ORAL at 09:11

## 2021-11-27 RX ADMIN — Medication 1 CAPSULE: at 11:11

## 2021-11-27 RX ADMIN — AMLODIPINE BESYLATE 10 MG: 5 TABLET ORAL at 09:11

## 2021-11-27 RX ADMIN — RIFAXIMIN 550 MG: 550 TABLET ORAL at 09:11

## 2021-11-27 RX ADMIN — LACTULOSE 20 G: 10 SOLUTION ORAL at 04:11

## 2021-11-27 RX ADMIN — QUETIAPINE FUMARATE 50 MG: 25 TABLET ORAL at 09:11

## 2021-11-27 RX ADMIN — RIFAXIMIN 550 MG: 550 TABLET ORAL at 08:11

## 2021-11-27 RX ADMIN — MULTIPLE VITAMINS W/ MINERALS TAB 1 TABLET: TAB at 09:11

## 2021-11-27 RX ADMIN — Medication 1 CAPSULE: at 09:11

## 2021-11-27 RX ADMIN — QUETIAPINE FUMARATE 50 MG: 25 TABLET ORAL at 08:11

## 2021-11-27 RX ADMIN — LACTULOSE 20 G: 10 SOLUTION ORAL at 09:11

## 2021-11-27 RX ADMIN — Medication 1 CAPSULE: at 06:11

## 2021-11-27 RX ADMIN — LACTULOSE 20 G: 10 SOLUTION ORAL at 08:11

## 2021-11-28 LAB
GLUCOSE SERPL-MCNC: 110 MG/DL (ref 70–105)
GLUCOSE SERPL-MCNC: 110 MG/DL (ref 70–105)
GLUCOSE SERPL-MCNC: 117 MG/DL (ref 70–105)
GLUCOSE SERPL-MCNC: 142 MG/DL (ref 70–105)

## 2021-11-28 PROCEDURE — 25000003 PHARM REV CODE 250: Performed by: NURSE PRACTITIONER

## 2021-11-28 PROCEDURE — 27000941

## 2021-11-28 PROCEDURE — 82962 GLUCOSE BLOOD TEST: CPT

## 2021-11-28 PROCEDURE — 11000004 HC SNF PRIVATE

## 2021-11-28 PROCEDURE — 27201922

## 2021-11-28 RX ADMIN — Medication 1 CAPSULE: at 04:11

## 2021-11-28 RX ADMIN — LACTULOSE 20 G: 10 SOLUTION ORAL at 09:11

## 2021-11-28 RX ADMIN — RIFAXIMIN 550 MG: 550 TABLET ORAL at 09:11

## 2021-11-28 RX ADMIN — AMLODIPINE BESYLATE 10 MG: 5 TABLET ORAL at 09:11

## 2021-11-28 RX ADMIN — MULTIPLE VITAMINS W/ MINERALS TAB 1 TABLET: TAB at 09:11

## 2021-11-28 RX ADMIN — Medication 1 CAPSULE: at 11:11

## 2021-11-28 RX ADMIN — LOSARTAN POTASSIUM 100 MG: 50 TABLET, FILM COATED ORAL at 09:11

## 2021-11-28 RX ADMIN — QUETIAPINE FUMARATE 50 MG: 25 TABLET ORAL at 09:11

## 2021-11-28 RX ADMIN — Medication 1 CAPSULE: at 09:11

## 2021-11-29 LAB
GLUCOSE SERPL-MCNC: 117 MG/DL (ref 70–105)
GLUCOSE SERPL-MCNC: 130 MG/DL (ref 70–105)
GLUCOSE SERPL-MCNC: 172 MG/DL (ref 70–105)
GLUCOSE SERPL-MCNC: 97 MG/DL (ref 70–105)

## 2021-11-29 PROCEDURE — 97110 THERAPEUTIC EXERCISES: CPT | Mod: CQ

## 2021-11-29 PROCEDURE — 27000941

## 2021-11-29 PROCEDURE — 25000003 PHARM REV CODE 250: Performed by: NURSE PRACTITIONER

## 2021-11-29 PROCEDURE — 97535 SELF CARE MNGMENT TRAINING: CPT

## 2021-11-29 PROCEDURE — 11000004 HC SNF PRIVATE

## 2021-11-29 PROCEDURE — 97116 GAIT TRAINING THERAPY: CPT | Mod: CQ

## 2021-11-29 PROCEDURE — 97530 THERAPEUTIC ACTIVITIES: CPT

## 2021-11-29 PROCEDURE — 97110 THERAPEUTIC EXERCISES: CPT

## 2021-11-29 PROCEDURE — 82962 GLUCOSE BLOOD TEST: CPT

## 2021-11-29 PROCEDURE — 92526 ORAL FUNCTION THERAPY: CPT

## 2021-11-29 RX ADMIN — Medication 1 CAPSULE: at 11:11

## 2021-11-29 RX ADMIN — LACTULOSE 20 G: 10 SOLUTION ORAL at 09:11

## 2021-11-29 RX ADMIN — RIFAXIMIN 550 MG: 550 TABLET ORAL at 08:11

## 2021-11-29 RX ADMIN — MULTIPLE VITAMINS W/ MINERALS TAB 1 TABLET: TAB at 08:11

## 2021-11-29 RX ADMIN — QUETIAPINE FUMARATE 50 MG: 25 TABLET ORAL at 08:11

## 2021-11-29 RX ADMIN — Medication 1 CAPSULE: at 08:11

## 2021-11-29 RX ADMIN — Medication 1 CAPSULE: at 04:11

## 2021-11-29 RX ADMIN — RIFAXIMIN 550 MG: 550 TABLET ORAL at 09:11

## 2021-11-29 RX ADMIN — QUETIAPINE FUMARATE 50 MG: 25 TABLET ORAL at 09:11

## 2021-11-30 LAB
AMMONIA PLAS-SCNC: <10 ΜMOL/L (ref 11–32)
BACTERIA #/AREA URNS HPF: ABNORMAL /HPF
BILIRUB UR QL STRIP: NEGATIVE
CLARITY UR: ABNORMAL
COLOR UR: YELLOW
GLUCOSE SERPL-MCNC: 124 MG/DL (ref 70–105)
GLUCOSE SERPL-MCNC: 130 MG/DL (ref 70–105)
GLUCOSE SERPL-MCNC: 152 MG/DL (ref 70–105)
GLUCOSE SERPL-MCNC: 160 MG/DL (ref 70–105)
GLUCOSE UR STRIP-MCNC: NEGATIVE MG/DL
KETONES UR STRIP-SCNC: ABNORMAL MG/DL
LEUKOCYTE ESTERASE UR QL STRIP: ABNORMAL
MUCOUS THREADS #/AREA URNS HPF: ABNORMAL /HPF
NITRITE UR QL STRIP: NEGATIVE
PH UR STRIP: 7 PH UNITS
PROT UR QL STRIP: 30
RBC # UR STRIP: ABNORMAL /UL
RBC #/AREA URNS HPF: ABNORMAL /HPF
SP GR UR STRIP: 1.01
SQUAMOUS #/AREA URNS LPF: ABNORMAL /LPF
UROBILINOGEN UR STRIP-ACNC: 0.2 MG/DL
WBC #/AREA URNS HPF: ABNORMAL /HPF

## 2021-11-30 PROCEDURE — 97530 THERAPEUTIC ACTIVITIES: CPT

## 2021-11-30 PROCEDURE — 25000003 PHARM REV CODE 250: Performed by: NURSE PRACTITIONER

## 2021-11-30 PROCEDURE — 97116 GAIT TRAINING THERAPY: CPT | Mod: CQ

## 2021-11-30 PROCEDURE — 97110 THERAPEUTIC EXERCISES: CPT

## 2021-11-30 PROCEDURE — 25000003 PHARM REV CODE 250: Performed by: FAMILY MEDICINE

## 2021-11-30 PROCEDURE — 81001 URINALYSIS AUTO W/SCOPE: CPT | Performed by: FAMILY MEDICINE

## 2021-11-30 PROCEDURE — 87086 URINE CULTURE/COLONY COUNT: CPT | Performed by: FAMILY MEDICINE

## 2021-11-30 PROCEDURE — 82140 ASSAY OF AMMONIA: CPT | Performed by: FAMILY MEDICINE

## 2021-11-30 PROCEDURE — 97110 THERAPEUTIC EXERCISES: CPT | Mod: CQ

## 2021-11-30 PROCEDURE — 82962 GLUCOSE BLOOD TEST: CPT

## 2021-11-30 PROCEDURE — 99308 PR NURSING FAC CARE, SUBSEQ, MINOR COMPLIC: ICD-10-PCS | Mod: ,,, | Performed by: FAMILY MEDICINE

## 2021-11-30 PROCEDURE — 81003 URINALYSIS AUTO W/O SCOPE: CPT | Performed by: FAMILY MEDICINE

## 2021-11-30 PROCEDURE — 11000004 HC SNF PRIVATE

## 2021-11-30 PROCEDURE — 92526 ORAL FUNCTION THERAPY: CPT

## 2021-11-30 PROCEDURE — 97535 SELF CARE MNGMENT TRAINING: CPT

## 2021-11-30 PROCEDURE — 97530 THERAPEUTIC ACTIVITIES: CPT | Mod: CQ

## 2021-11-30 PROCEDURE — 36415 COLL VENOUS BLD VENIPUNCTURE: CPT | Performed by: FAMILY MEDICINE

## 2021-11-30 PROCEDURE — 99308 SBSQ NF CARE LOW MDM 20: CPT | Mod: ,,, | Performed by: FAMILY MEDICINE

## 2021-11-30 PROCEDURE — 27000941

## 2021-11-30 RX ORDER — SULFAMETHOXAZOLE AND TRIMETHOPRIM 800; 160 MG/1; MG/1
1 TABLET ORAL 2 TIMES DAILY
Status: COMPLETED | OUTPATIENT
Start: 2021-11-30 | End: 2021-12-10

## 2021-11-30 RX ADMIN — Medication 1 CAPSULE: at 04:11

## 2021-11-30 RX ADMIN — LACTULOSE 20 G: 10 SOLUTION ORAL at 09:11

## 2021-11-30 RX ADMIN — SULFAMETHOXAZOLE AND TRIMETHOPRIM 1 TABLET: 800; 160 TABLET ORAL at 09:11

## 2021-11-30 RX ADMIN — MULTIPLE VITAMINS W/ MINERALS TAB 1 TABLET: TAB at 08:11

## 2021-11-30 RX ADMIN — Medication 1 CAPSULE: at 08:11

## 2021-11-30 RX ADMIN — RIFAXIMIN 550 MG: 550 TABLET ORAL at 09:11

## 2021-11-30 RX ADMIN — RIFAXIMIN 550 MG: 550 TABLET ORAL at 08:11

## 2021-11-30 RX ADMIN — QUETIAPINE FUMARATE 50 MG: 25 TABLET ORAL at 09:11

## 2021-11-30 RX ADMIN — Medication 1 CAPSULE: at 11:11

## 2021-11-30 RX ADMIN — QUETIAPINE FUMARATE 50 MG: 25 TABLET ORAL at 08:11

## 2021-11-30 RX ADMIN — LACTULOSE 20 G: 10 SOLUTION ORAL at 08:11

## 2021-11-30 RX ADMIN — AMLODIPINE BESYLATE 10 MG: 5 TABLET ORAL at 08:11

## 2021-11-30 RX ADMIN — LACTULOSE 20 G: 10 SOLUTION ORAL at 02:11

## 2021-12-01 LAB
ANION GAP SERPL CALCULATED.3IONS-SCNC: 10 MMOL/L (ref 7–16)
BASOPHILS # BLD AUTO: 0.04 K/UL (ref 0–0.2)
BASOPHILS NFR BLD AUTO: 0.6 % (ref 0–1)
BUN SERPL-MCNC: 40 MG/DL (ref 7–18)
BUN/CREAT SERPL: 35 (ref 6–20)
CALCIUM SERPL-MCNC: 9.6 MG/DL (ref 8.5–10.1)
CHLORIDE SERPL-SCNC: 117 MMOL/L (ref 98–107)
CO2 SERPL-SCNC: 33 MMOL/L (ref 21–32)
CREAT SERPL-MCNC: 1.13 MG/DL (ref 0.7–1.3)
DIFFERENTIAL METHOD BLD: ABNORMAL
EOSINOPHIL # BLD AUTO: 0.14 K/UL (ref 0–0.5)
EOSINOPHIL NFR BLD AUTO: 2.3 % (ref 1–4)
ERYTHROCYTE [DISTWIDTH] IN BLOOD BY AUTOMATED COUNT: 16.5 % (ref 11.5–14.5)
GLUCOSE SERPL-MCNC: 109 MG/DL (ref 70–105)
GLUCOSE SERPL-MCNC: 121 MG/DL (ref 70–105)
GLUCOSE SERPL-MCNC: 133 MG/DL (ref 70–105)
GLUCOSE SERPL-MCNC: 142 MG/DL (ref 74–106)
GLUCOSE SERPL-MCNC: 207 MG/DL (ref 70–105)
HCT VFR BLD AUTO: 41 % (ref 40–54)
HGB BLD-MCNC: 12.5 G/DL (ref 13.5–18)
LYMPHOCYTES # BLD AUTO: 1.21 K/UL (ref 1–4.8)
LYMPHOCYTES NFR BLD AUTO: 19.6 % (ref 27–41)
MCH RBC QN AUTO: 32 PG (ref 27–31)
MCHC RBC AUTO-ENTMCNC: 30.5 G/DL (ref 32–36)
MCV RBC AUTO: 104.9 FL (ref 80–96)
MONOCYTES # BLD AUTO: 0.63 K/UL (ref 0–0.8)
MONOCYTES NFR BLD AUTO: 10.2 % (ref 2–6)
MPC BLD CALC-MCNC: 10.9 FL (ref 9.4–12.4)
NEUTROPHILS # BLD AUTO: 4.15 K/UL (ref 1.8–7.7)
NEUTROPHILS NFR BLD AUTO: 67.3 % (ref 53–65)
PLATELET # BLD AUTO: 187 K/UL (ref 150–400)
POTASSIUM SERPL-SCNC: 4.7 MMOL/L (ref 3.5–5.1)
RBC # BLD AUTO: 3.91 M/UL (ref 4.6–6.2)
SODIUM SERPL-SCNC: 155 MMOL/L (ref 136–145)
WBC # BLD AUTO: 6.17 K/UL (ref 4.5–11)

## 2021-12-01 PROCEDURE — 36415 COLL VENOUS BLD VENIPUNCTURE: CPT | Performed by: NURSE PRACTITIONER

## 2021-12-01 PROCEDURE — 97110 THERAPEUTIC EXERCISES: CPT | Mod: CQ

## 2021-12-01 PROCEDURE — 27000987 HC MATTRESS, MATRIX LOW PROFILE

## 2021-12-01 PROCEDURE — 11000004 HC SNF PRIVATE

## 2021-12-01 PROCEDURE — 97535 SELF CARE MNGMENT TRAINING: CPT

## 2021-12-01 PROCEDURE — 82962 GLUCOSE BLOOD TEST: CPT

## 2021-12-01 PROCEDURE — 92526 ORAL FUNCTION THERAPY: CPT

## 2021-12-01 PROCEDURE — 27000941

## 2021-12-01 PROCEDURE — 25000003 PHARM REV CODE 250: Performed by: FAMILY MEDICINE

## 2021-12-01 PROCEDURE — 97116 GAIT TRAINING THERAPY: CPT | Mod: CQ

## 2021-12-01 PROCEDURE — 80048 BASIC METABOLIC PNL TOTAL CA: CPT | Performed by: NURSE PRACTITIONER

## 2021-12-01 PROCEDURE — 97110 THERAPEUTIC EXERCISES: CPT

## 2021-12-01 PROCEDURE — 85025 COMPLETE CBC W/AUTO DIFF WBC: CPT | Performed by: NURSE PRACTITIONER

## 2021-12-01 PROCEDURE — 97530 THERAPEUTIC ACTIVITIES: CPT

## 2021-12-01 PROCEDURE — 25000003 PHARM REV CODE 250: Performed by: NURSE PRACTITIONER

## 2021-12-01 RX ORDER — METOCLOPRAMIDE 5 MG/1
5 TABLET ORAL
Status: DISCONTINUED | OUTPATIENT
Start: 2021-12-01 | End: 2021-12-08

## 2021-12-01 RX ADMIN — SULFAMETHOXAZOLE AND TRIMETHOPRIM 1 TABLET: 800; 160 TABLET ORAL at 08:12

## 2021-12-01 RX ADMIN — LOSARTAN POTASSIUM 100 MG: 50 TABLET, FILM COATED ORAL at 08:12

## 2021-12-01 RX ADMIN — RIFAXIMIN 550 MG: 550 TABLET ORAL at 08:12

## 2021-12-01 RX ADMIN — RIFAXIMIN 550 MG: 550 TABLET ORAL at 09:12

## 2021-12-01 RX ADMIN — LACTULOSE 20 G: 10 SOLUTION ORAL at 09:12

## 2021-12-01 RX ADMIN — MULTIPLE VITAMINS W/ MINERALS TAB 1 TABLET: TAB at 08:12

## 2021-12-01 RX ADMIN — METOCLOPRAMIDE 5 MG: 5 TABLET ORAL at 03:12

## 2021-12-01 RX ADMIN — QUETIAPINE FUMARATE 50 MG: 25 TABLET ORAL at 09:12

## 2021-12-01 RX ADMIN — SULFAMETHOXAZOLE AND TRIMETHOPRIM 1 TABLET: 800; 160 TABLET ORAL at 09:12

## 2021-12-01 RX ADMIN — Medication 1 CAPSULE: at 05:12

## 2021-12-01 RX ADMIN — LACTULOSE 20 G: 10 SOLUTION ORAL at 03:12

## 2021-12-01 RX ADMIN — LACTULOSE 20 G: 10 SOLUTION ORAL at 08:12

## 2021-12-01 RX ADMIN — Medication 1 CAPSULE: at 08:12

## 2021-12-01 RX ADMIN — Medication 1 CAPSULE: at 11:12

## 2021-12-01 RX ADMIN — QUETIAPINE FUMARATE 50 MG: 25 TABLET ORAL at 08:12

## 2021-12-01 RX ADMIN — AMLODIPINE BESYLATE 10 MG: 5 TABLET ORAL at 08:12

## 2021-12-02 LAB
GLUCOSE SERPL-MCNC: 118 MG/DL (ref 70–105)
GLUCOSE SERPL-MCNC: 123 MG/DL (ref 70–105)
GLUCOSE SERPL-MCNC: 138 MG/DL (ref 70–105)
GLUCOSE SERPL-MCNC: 141 MG/DL (ref 70–105)
UA COMPLETE W REFLEX CULTURE PNL UR: NO GROWTH

## 2021-12-02 PROCEDURE — 97535 SELF CARE MNGMENT TRAINING: CPT

## 2021-12-02 PROCEDURE — 25000003 PHARM REV CODE 250: Performed by: FAMILY MEDICINE

## 2021-12-02 PROCEDURE — 97530 THERAPEUTIC ACTIVITIES: CPT

## 2021-12-02 PROCEDURE — 25000003 PHARM REV CODE 250: Performed by: NURSE PRACTITIONER

## 2021-12-02 PROCEDURE — 27000941

## 2021-12-02 PROCEDURE — 97110 THERAPEUTIC EXERCISES: CPT | Mod: CQ

## 2021-12-02 PROCEDURE — 92526 ORAL FUNCTION THERAPY: CPT

## 2021-12-02 PROCEDURE — 11000004 HC SNF PRIVATE

## 2021-12-02 PROCEDURE — 97110 THERAPEUTIC EXERCISES: CPT

## 2021-12-02 PROCEDURE — 97116 GAIT TRAINING THERAPY: CPT | Mod: CQ

## 2021-12-02 PROCEDURE — 82962 GLUCOSE BLOOD TEST: CPT

## 2021-12-02 PROCEDURE — 97530 THERAPEUTIC ACTIVITIES: CPT | Mod: CQ

## 2021-12-02 RX ADMIN — METOCLOPRAMIDE 5 MG: 5 TABLET ORAL at 05:12

## 2021-12-02 RX ADMIN — Medication 1 CAPSULE: at 11:12

## 2021-12-02 RX ADMIN — LACTULOSE 20 G: 10 SOLUTION ORAL at 09:12

## 2021-12-02 RX ADMIN — SULFAMETHOXAZOLE AND TRIMETHOPRIM 1 TABLET: 800; 160 TABLET ORAL at 09:12

## 2021-12-02 RX ADMIN — AMLODIPINE BESYLATE 10 MG: 5 TABLET ORAL at 09:12

## 2021-12-02 RX ADMIN — METOCLOPRAMIDE 5 MG: 5 TABLET ORAL at 11:12

## 2021-12-02 RX ADMIN — QUETIAPINE FUMARATE 50 MG: 25 TABLET ORAL at 09:12

## 2021-12-02 RX ADMIN — Medication 1 CAPSULE: at 09:12

## 2021-12-02 RX ADMIN — RIFAXIMIN 550 MG: 550 TABLET ORAL at 09:12

## 2021-12-02 RX ADMIN — METOCLOPRAMIDE 5 MG: 5 TABLET ORAL at 06:12

## 2021-12-02 RX ADMIN — LOSARTAN POTASSIUM 100 MG: 50 TABLET, FILM COATED ORAL at 09:12

## 2021-12-02 RX ADMIN — MULTIPLE VITAMINS W/ MINERALS TAB 1 TABLET: TAB at 09:12

## 2021-12-02 RX ADMIN — Medication 1 CAPSULE: at 05:12

## 2021-12-03 LAB
GLUCOSE SERPL-MCNC: 135 MG/DL (ref 70–105)
GLUCOSE SERPL-MCNC: 146 MG/DL (ref 70–105)
GLUCOSE SERPL-MCNC: 159 MG/DL (ref 70–105)
GLUCOSE SERPL-MCNC: 173 MG/DL (ref 70–105)

## 2021-12-03 PROCEDURE — 25000003 PHARM REV CODE 250: Performed by: FAMILY MEDICINE

## 2021-12-03 PROCEDURE — 25000003 PHARM REV CODE 250: Performed by: NURSE PRACTITIONER

## 2021-12-03 PROCEDURE — 97116 GAIT TRAINING THERAPY: CPT

## 2021-12-03 PROCEDURE — 92526 ORAL FUNCTION THERAPY: CPT

## 2021-12-03 PROCEDURE — 82962 GLUCOSE BLOOD TEST: CPT

## 2021-12-03 PROCEDURE — 27000941

## 2021-12-03 PROCEDURE — 97110 THERAPEUTIC EXERCISES: CPT

## 2021-12-03 PROCEDURE — 11000004 HC SNF PRIVATE

## 2021-12-03 RX ADMIN — Medication 1 CAPSULE: at 04:12

## 2021-12-03 RX ADMIN — LACTULOSE 20 G: 10 SOLUTION ORAL at 09:12

## 2021-12-03 RX ADMIN — RIFAXIMIN 550 MG: 550 TABLET ORAL at 09:12

## 2021-12-03 RX ADMIN — RIFAXIMIN 550 MG: 550 TABLET ORAL at 08:12

## 2021-12-03 RX ADMIN — QUETIAPINE FUMARATE 50 MG: 25 TABLET ORAL at 09:12

## 2021-12-03 RX ADMIN — Medication 1 CAPSULE: at 08:12

## 2021-12-03 RX ADMIN — SULFAMETHOXAZOLE AND TRIMETHOPRIM 1 TABLET: 800; 160 TABLET ORAL at 08:12

## 2021-12-03 RX ADMIN — SULFAMETHOXAZOLE AND TRIMETHOPRIM 1 TABLET: 800; 160 TABLET ORAL at 09:12

## 2021-12-03 RX ADMIN — METOCLOPRAMIDE 5 MG: 5 TABLET ORAL at 11:12

## 2021-12-03 RX ADMIN — QUETIAPINE FUMARATE 50 MG: 25 TABLET ORAL at 08:12

## 2021-12-03 RX ADMIN — AMLODIPINE BESYLATE 10 MG: 5 TABLET ORAL at 08:12

## 2021-12-03 RX ADMIN — METOCLOPRAMIDE 5 MG: 5 TABLET ORAL at 04:12

## 2021-12-03 RX ADMIN — METOCLOPRAMIDE 5 MG: 5 TABLET ORAL at 05:12

## 2021-12-03 RX ADMIN — Medication 1 CAPSULE: at 11:12

## 2021-12-03 RX ADMIN — MULTIPLE VITAMINS W/ MINERALS TAB 1 TABLET: TAB at 08:12

## 2021-12-04 LAB
GLUCOSE SERPL-MCNC: 111 MG/DL (ref 70–105)
GLUCOSE SERPL-MCNC: 131 MG/DL (ref 70–105)
GLUCOSE SERPL-MCNC: 139 MG/DL (ref 70–105)
GLUCOSE SERPL-MCNC: 180 MG/DL (ref 70–105)

## 2021-12-04 PROCEDURE — 82962 GLUCOSE BLOOD TEST: CPT

## 2021-12-04 PROCEDURE — 11000004 HC SNF PRIVATE

## 2021-12-04 PROCEDURE — 25000003 PHARM REV CODE 250: Performed by: NURSE PRACTITIONER

## 2021-12-04 PROCEDURE — 25000003 PHARM REV CODE 250: Performed by: FAMILY MEDICINE

## 2021-12-04 PROCEDURE — 27000941

## 2021-12-04 RX ADMIN — RIFAXIMIN 550 MG: 550 TABLET ORAL at 08:12

## 2021-12-04 RX ADMIN — SULFAMETHOXAZOLE AND TRIMETHOPRIM 1 TABLET: 800; 160 TABLET ORAL at 09:12

## 2021-12-04 RX ADMIN — METOCLOPRAMIDE 5 MG: 5 TABLET ORAL at 06:12

## 2021-12-04 RX ADMIN — AMLODIPINE BESYLATE 10 MG: 5 TABLET ORAL at 08:12

## 2021-12-04 RX ADMIN — METOCLOPRAMIDE 5 MG: 5 TABLET ORAL at 11:12

## 2021-12-04 RX ADMIN — Medication 1 CAPSULE: at 04:12

## 2021-12-04 RX ADMIN — RIFAXIMIN 550 MG: 550 TABLET ORAL at 09:12

## 2021-12-04 RX ADMIN — SULFAMETHOXAZOLE AND TRIMETHOPRIM 1 TABLET: 800; 160 TABLET ORAL at 08:12

## 2021-12-04 RX ADMIN — MULTIPLE VITAMINS W/ MINERALS TAB 1 TABLET: TAB at 08:12

## 2021-12-04 RX ADMIN — METOCLOPRAMIDE 5 MG: 5 TABLET ORAL at 04:12

## 2021-12-04 RX ADMIN — Medication 1 CAPSULE: at 08:12

## 2021-12-04 RX ADMIN — LACTULOSE 20 G: 10 SOLUTION ORAL at 09:12

## 2021-12-04 RX ADMIN — LOSARTAN POTASSIUM 100 MG: 50 TABLET, FILM COATED ORAL at 08:12

## 2021-12-04 RX ADMIN — Medication 1 CAPSULE: at 11:12

## 2021-12-04 RX ADMIN — QUETIAPINE FUMARATE 50 MG: 25 TABLET ORAL at 08:12

## 2021-12-04 RX ADMIN — QUETIAPINE FUMARATE 50 MG: 25 TABLET ORAL at 09:12

## 2021-12-05 LAB
GLUCOSE SERPL-MCNC: 115 MG/DL (ref 70–105)
GLUCOSE SERPL-MCNC: 146 MG/DL (ref 70–105)
GLUCOSE SERPL-MCNC: 151 MG/DL (ref 70–105)
GLUCOSE SERPL-MCNC: 153 MG/DL (ref 70–105)

## 2021-12-05 PROCEDURE — 11000004 HC SNF PRIVATE

## 2021-12-05 PROCEDURE — 82962 GLUCOSE BLOOD TEST: CPT

## 2021-12-05 PROCEDURE — 25000003 PHARM REV CODE 250: Performed by: FAMILY MEDICINE

## 2021-12-05 PROCEDURE — 25000003 PHARM REV CODE 250: Performed by: NURSE PRACTITIONER

## 2021-12-05 PROCEDURE — 63600175 PHARM REV CODE 636 W HCPCS: Performed by: FAMILY MEDICINE

## 2021-12-05 PROCEDURE — 27000941

## 2021-12-05 RX ORDER — LORAZEPAM 0.5 MG/1
1 TABLET ORAL EVERY 6 HOURS PRN
Status: DISCONTINUED | OUTPATIENT
Start: 2021-12-05 | End: 2021-12-07

## 2021-12-05 RX ORDER — HALOPERIDOL 5 MG/ML
5 INJECTION INTRAMUSCULAR ONCE
Status: COMPLETED | OUTPATIENT
Start: 2021-12-06 | End: 2021-12-05

## 2021-12-05 RX ADMIN — Medication 1 CAPSULE: at 08:12

## 2021-12-05 RX ADMIN — AMLODIPINE BESYLATE 10 MG: 5 TABLET ORAL at 08:12

## 2021-12-05 RX ADMIN — METOCLOPRAMIDE 5 MG: 5 TABLET ORAL at 06:12

## 2021-12-05 RX ADMIN — LORAZEPAM 1 MG: 0.5 TABLET ORAL at 08:12

## 2021-12-05 RX ADMIN — QUETIAPINE FUMARATE 50 MG: 25 TABLET ORAL at 09:12

## 2021-12-05 RX ADMIN — Medication 1 CAPSULE: at 04:12

## 2021-12-05 RX ADMIN — METOCLOPRAMIDE 5 MG: 5 TABLET ORAL at 11:12

## 2021-12-05 RX ADMIN — LACTULOSE 20 G: 10 SOLUTION ORAL at 09:12

## 2021-12-05 RX ADMIN — RIFAXIMIN 550 MG: 550 TABLET ORAL at 09:12

## 2021-12-05 RX ADMIN — HALOPERIDOL LACTATE 5 MG: 5 INJECTION, SOLUTION INTRAMUSCULAR at 11:12

## 2021-12-05 RX ADMIN — RIFAXIMIN 550 MG: 550 TABLET ORAL at 08:12

## 2021-12-05 RX ADMIN — QUETIAPINE FUMARATE 50 MG: 25 TABLET ORAL at 08:12

## 2021-12-05 RX ADMIN — MULTIPLE VITAMINS W/ MINERALS TAB 1 TABLET: TAB at 08:12

## 2021-12-05 RX ADMIN — SULFAMETHOXAZOLE AND TRIMETHOPRIM 1 TABLET: 800; 160 TABLET ORAL at 08:12

## 2021-12-05 RX ADMIN — Medication 1 CAPSULE: at 11:12

## 2021-12-05 RX ADMIN — SULFAMETHOXAZOLE AND TRIMETHOPRIM 1 TABLET: 800; 160 TABLET ORAL at 09:12

## 2021-12-05 RX ADMIN — METOCLOPRAMIDE 5 MG: 5 TABLET ORAL at 04:12

## 2021-12-06 LAB
ALBUMIN SERPL BCP-MCNC: 3.1 G/DL (ref 3.5–5)
ALBUMIN SERPL BCP-MCNC: 3.7 G/DL (ref 3.5–5)
ALBUMIN/GLOB SERPL: 0.7 {RATIO}
ALBUMIN/GLOB SERPL: 0.8 {RATIO}
ALP SERPL-CCNC: 105 U/L (ref 45–115)
ALP SERPL-CCNC: 119 U/L (ref 45–115)
ALT SERPL W P-5'-P-CCNC: 203 U/L (ref 16–61)
ALT SERPL W P-5'-P-CCNC: 213 U/L (ref 16–61)
AMMONIA PLAS-SCNC: 38 ΜMOL/L (ref 11–32)
AMMONIA PLAS-SCNC: 45 ΜMOL/L (ref 11–32)
ANION GAP SERPL CALCULATED.3IONS-SCNC: 14 MMOL/L (ref 7–16)
ANION GAP SERPL CALCULATED.3IONS-SCNC: 15 MMOL/L (ref 7–16)
ANISOCYTOSIS BLD QL SMEAR: ABNORMAL
ANISOCYTOSIS BLD QL SMEAR: ABNORMAL
AST SERPL W P-5'-P-CCNC: 124 U/L (ref 15–37)
AST SERPL W P-5'-P-CCNC: 145 U/L (ref 15–37)
BASOPHILS # BLD AUTO: 0.02 K/UL (ref 0–0.2)
BASOPHILS # BLD AUTO: 0.03 K/UL (ref 0–0.2)
BASOPHILS NFR BLD AUTO: 0.2 % (ref 0–1)
BASOPHILS NFR BLD AUTO: 0.4 % (ref 0–1)
BASOPHILS NFR BLD MANUAL: 1 % (ref 0–1)
BILIRUB SERPL-MCNC: 0.8 MG/DL (ref 0–1.2)
BILIRUB SERPL-MCNC: 0.9 MG/DL (ref 0–1.2)
BUN SERPL-MCNC: 59 MG/DL (ref 7–18)
BUN SERPL-MCNC: 68 MG/DL (ref 7–18)
BUN/CREAT SERPL: 34 (ref 6–20)
BUN/CREAT SERPL: 35 (ref 6–20)
CALCIUM SERPL-MCNC: 8.5 MG/DL (ref 8.5–10.1)
CALCIUM SERPL-MCNC: 9.7 MG/DL (ref 8.5–10.1)
CHLORIDE SERPL-SCNC: 124 MMOL/L (ref 98–107)
CHLORIDE SERPL-SCNC: 127 MMOL/L (ref 98–107)
CO2 SERPL-SCNC: 23 MMOL/L (ref 21–32)
CO2 SERPL-SCNC: 27 MMOL/L (ref 21–32)
CREAT SERPL-MCNC: 1.7 MG/DL (ref 0.7–1.3)
CREAT SERPL-MCNC: 2 MG/DL (ref 0.7–1.3)
DIFFERENTIAL METHOD BLD: ABNORMAL
DIFFERENTIAL METHOD BLD: ABNORMAL
EOSINOPHIL # BLD AUTO: 0.12 K/UL (ref 0–0.5)
EOSINOPHIL # BLD AUTO: 0.26 K/UL (ref 0–0.5)
EOSINOPHIL NFR BLD AUTO: 1.6 % (ref 1–4)
EOSINOPHIL NFR BLD AUTO: 3.2 % (ref 1–4)
ERYTHROCYTE [DISTWIDTH] IN BLOOD BY AUTOMATED COUNT: 17.1 % (ref 11.5–14.5)
ERYTHROCYTE [DISTWIDTH] IN BLOOD BY AUTOMATED COUNT: 17.3 % (ref 11.5–14.5)
GLOBULIN SER-MCNC: 4.2 G/DL (ref 2–4)
GLOBULIN SER-MCNC: 4.9 G/DL (ref 2–4)
GLUCOSE SERPL-MCNC: 105 MG/DL (ref 70–105)
GLUCOSE SERPL-MCNC: 106 MG/DL (ref 70–105)
GLUCOSE SERPL-MCNC: 130 MG/DL (ref 70–105)
GLUCOSE SERPL-MCNC: 139 MG/DL (ref 70–105)
GLUCOSE SERPL-MCNC: 143 MG/DL (ref 74–106)
GLUCOSE SERPL-MCNC: 61 MG/DL (ref 70–105)
GLUCOSE SERPL-MCNC: 70 MG/DL (ref 70–105)
GLUCOSE SERPL-MCNC: 94 MG/DL (ref 70–105)
GLUCOSE SERPL-MCNC: 97 MG/DL (ref 74–106)
HCT VFR BLD AUTO: 37.2 % (ref 40–54)
HCT VFR BLD AUTO: 45.1 % (ref 40–54)
HGB BLD-MCNC: 11.3 G/DL (ref 13.5–18)
HGB BLD-MCNC: 13.7 G/DL (ref 13.5–18)
LYMPHOCYTES # BLD AUTO: 1.18 K/UL (ref 1–4.8)
LYMPHOCYTES # BLD AUTO: 1.35 K/UL (ref 1–4.8)
LYMPHOCYTES NFR BLD AUTO: 15.3 % (ref 27–41)
LYMPHOCYTES NFR BLD AUTO: 16.8 % (ref 27–41)
LYMPHOCYTES NFR BLD MANUAL: 14 % (ref 27–41)
LYMPHOCYTES NFR BLD MANUAL: 16 % (ref 27–41)
MACROCYTES BLD QL SMEAR: ABNORMAL
MCH RBC QN AUTO: 31.9 PG (ref 27–31)
MCH RBC QN AUTO: 32.3 PG (ref 27–31)
MCHC RBC AUTO-ENTMCNC: 30.4 G/DL (ref 32–36)
MCHC RBC AUTO-ENTMCNC: 30.4 G/DL (ref 32–36)
MCV RBC AUTO: 104.9 FL (ref 80–96)
MCV RBC AUTO: 106.3 FL (ref 80–96)
MONOCYTES # BLD AUTO: 0.63 K/UL (ref 0–0.8)
MONOCYTES # BLD AUTO: 0.72 K/UL (ref 0–0.8)
MONOCYTES NFR BLD AUTO: 7.9 % (ref 2–6)
MONOCYTES NFR BLD AUTO: 9.4 % (ref 2–6)
MONOCYTES NFR BLD MANUAL: 5 % (ref 2–6)
MONOCYTES NFR BLD MANUAL: 9 % (ref 2–6)
MPC BLD CALC-MCNC: 11.5 FL (ref 9.4–12.4)
MPC BLD CALC-MCNC: 11.9 FL (ref 9.4–12.4)
NEUTROPHILS # BLD AUTO: 5.65 K/UL (ref 1.8–7.7)
NEUTROPHILS # BLD AUTO: 5.76 K/UL (ref 1.8–7.7)
NEUTROPHILS NFR BLD AUTO: 71.9 % (ref 53–65)
NEUTROPHILS NFR BLD AUTO: 73.3 % (ref 53–65)
NEUTS BAND NFR BLD MANUAL: 3 % (ref 1–5)
NEUTS SEG NFR BLD MANUAL: 75 % (ref 50–62)
NEUTS SEG NFR BLD MANUAL: 77 % (ref 50–62)
NRBC BLD MANUAL-RTO: ABNORMAL %
NRBC BLD MANUAL-RTO: ABNORMAL %
PLATELET # BLD AUTO: 121 K/UL (ref 150–400)
PLATELET # BLD AUTO: 134 K/UL (ref 150–400)
PLATELET MORPHOLOGY: ABNORMAL
PLATELET MORPHOLOGY: ABNORMAL
POIKILOCYTOSIS BLD QL SMEAR: ABNORMAL
POTASSIUM SERPL-SCNC: 4.3 MMOL/L (ref 3.5–5.1)
POTASSIUM SERPL-SCNC: 4.3 MMOL/L (ref 3.5–5.1)
PROT SERPL-MCNC: 7.3 G/DL (ref 6.4–8.2)
PROT SERPL-MCNC: 8.6 G/DL (ref 6.4–8.2)
RBC # BLD AUTO: 3.5 M/UL (ref 4.6–6.2)
RBC # BLD AUTO: 4.3 M/UL (ref 4.6–6.2)
REACTIVE LYMPHOCYTES: ABNORMAL
SODIUM SERPL-SCNC: 158 MMOL/L (ref 136–145)
SODIUM SERPL-SCNC: 164 MMOL/L (ref 136–145)
WBC # BLD AUTO: 7.7 K/UL (ref 4.5–11)
WBC # BLD AUTO: 8.02 K/UL (ref 4.5–11)

## 2021-12-06 PROCEDURE — 80053 COMPREHEN METABOLIC PANEL: CPT | Performed by: FAMILY MEDICINE

## 2021-12-06 PROCEDURE — 80053 COMPREHEN METABOLIC PANEL: CPT | Performed by: NURSE PRACTITIONER

## 2021-12-06 PROCEDURE — 63600175 PHARM REV CODE 636 W HCPCS: Performed by: NURSE PRACTITIONER

## 2021-12-06 PROCEDURE — 36415 COLL VENOUS BLD VENIPUNCTURE: CPT | Performed by: FAMILY MEDICINE

## 2021-12-06 PROCEDURE — 27000941

## 2021-12-06 PROCEDURE — 36415 COLL VENOUS BLD VENIPUNCTURE: CPT | Performed by: NURSE PRACTITIONER

## 2021-12-06 PROCEDURE — 25000003 PHARM REV CODE 250: Performed by: FAMILY MEDICINE

## 2021-12-06 PROCEDURE — 63600175 PHARM REV CODE 636 W HCPCS: Performed by: FAMILY MEDICINE

## 2021-12-06 PROCEDURE — 85025 COMPLETE CBC W/AUTO DIFF WBC: CPT | Performed by: NURSE PRACTITIONER

## 2021-12-06 PROCEDURE — 11000004 HC SNF PRIVATE

## 2021-12-06 PROCEDURE — 82962 GLUCOSE BLOOD TEST: CPT

## 2021-12-06 PROCEDURE — 85025 COMPLETE CBC W/AUTO DIFF WBC: CPT | Performed by: FAMILY MEDICINE

## 2021-12-06 PROCEDURE — 82140 ASSAY OF AMMONIA: CPT | Performed by: NURSE PRACTITIONER

## 2021-12-06 PROCEDURE — S5010 5% DEXTROSE AND 0.45% SALINE: HCPCS | Performed by: FAMILY MEDICINE

## 2021-12-06 PROCEDURE — 82140 ASSAY OF AMMONIA: CPT | Performed by: FAMILY MEDICINE

## 2021-12-06 PROCEDURE — 25000003 PHARM REV CODE 250: Performed by: NURSE PRACTITIONER

## 2021-12-06 RX ORDER — SODIUM CHLORIDE 450 MG/100ML
INJECTION, SOLUTION INTRAVENOUS CONTINUOUS
Status: DISCONTINUED | OUTPATIENT
Start: 2021-12-06 | End: 2021-12-07

## 2021-12-06 RX ORDER — THIAMINE HYDROCHLORIDE 100 MG/ML
100 INJECTION, SOLUTION INTRAMUSCULAR; INTRAVENOUS ONCE
Status: COMPLETED | OUTPATIENT
Start: 2021-12-06 | End: 2021-12-06

## 2021-12-06 RX ORDER — LORAZEPAM 2 MG/ML
1 INJECTION INTRAMUSCULAR ONCE
Status: COMPLETED | OUTPATIENT
Start: 2021-12-06 | End: 2021-12-06

## 2021-12-06 RX ORDER — DEXTROSE MONOHYDRATE AND SODIUM CHLORIDE 5; .45 G/100ML; G/100ML
INJECTION, SOLUTION INTRAVENOUS
Status: COMPLETED | OUTPATIENT
Start: 2021-12-06 | End: 2021-12-06

## 2021-12-06 RX ORDER — SODIUM CHLORIDE 450 MG/100ML
INJECTION, SOLUTION INTRAVENOUS CONTINUOUS
Status: DISCONTINUED | OUTPATIENT
Start: 2021-12-06 | End: 2021-12-06

## 2021-12-06 RX ORDER — SODIUM CHLORIDE 450 MG/100ML
INJECTION, SOLUTION INTRAVENOUS ONCE
Status: COMPLETED | OUTPATIENT
Start: 2021-12-06 | End: 2021-12-06

## 2021-12-06 RX ADMIN — Medication 1 CAPSULE: at 04:12

## 2021-12-06 RX ADMIN — QUETIAPINE FUMARATE 50 MG: 25 TABLET ORAL at 08:12

## 2021-12-06 RX ADMIN — THIAMINE HYDROCHLORIDE 100 MG: 100 INJECTION, SOLUTION INTRAMUSCULAR; INTRAVENOUS at 09:12

## 2021-12-06 RX ADMIN — SODIUM CHLORIDE: 4.5 INJECTION, SOLUTION INTRAVENOUS at 10:12

## 2021-12-06 RX ADMIN — LOSARTAN POTASSIUM 100 MG: 50 TABLET, FILM COATED ORAL at 09:12

## 2021-12-06 RX ADMIN — Medication 1 CAPSULE: at 09:12

## 2021-12-06 RX ADMIN — RIFAXIMIN 550 MG: 550 TABLET ORAL at 08:12

## 2021-12-06 RX ADMIN — SULFAMETHOXAZOLE AND TRIMETHOPRIM 1 TABLET: 800; 160 TABLET ORAL at 09:12

## 2021-12-06 RX ADMIN — AMLODIPINE BESYLATE 10 MG: 5 TABLET ORAL at 09:12

## 2021-12-06 RX ADMIN — SODIUM CHLORIDE: 4.5 INJECTION, SOLUTION INTRAVENOUS at 05:12

## 2021-12-06 RX ADMIN — RIFAXIMIN 550 MG: 550 TABLET ORAL at 09:12

## 2021-12-06 RX ADMIN — SULFAMETHOXAZOLE AND TRIMETHOPRIM 1 TABLET: 800; 160 TABLET ORAL at 08:12

## 2021-12-06 RX ADMIN — DEXTROSE AND SODIUM CHLORIDE: 5; 450 INJECTION, SOLUTION INTRAVENOUS at 08:12

## 2021-12-06 RX ADMIN — LACTULOSE 20 G: 10 SOLUTION ORAL at 02:12

## 2021-12-06 RX ADMIN — METOCLOPRAMIDE 5 MG: 5 TABLET ORAL at 06:12

## 2021-12-06 RX ADMIN — LACTULOSE 20 G: 10 SOLUTION ORAL at 09:12

## 2021-12-06 RX ADMIN — METOCLOPRAMIDE 5 MG: 5 TABLET ORAL at 04:12

## 2021-12-06 RX ADMIN — LORAZEPAM 1 MG: 2 INJECTION INTRAMUSCULAR; INTRAVENOUS at 07:12

## 2021-12-06 RX ADMIN — DEXTROSE MONOHYDRATE 25 G: 25 INJECTION, SOLUTION INTRAVENOUS at 05:12

## 2021-12-06 RX ADMIN — MULTIPLE VITAMINS W/ MINERALS TAB 1 TABLET: TAB at 09:12

## 2021-12-06 RX ADMIN — SODIUM CHLORIDE: 4.5 INJECTION, SOLUTION INTRAVENOUS at 09:12

## 2021-12-06 RX ADMIN — LORAZEPAM 1 MG: 0.5 TABLET ORAL at 10:12

## 2021-12-06 RX ADMIN — Medication 1 CAPSULE: at 12:12

## 2021-12-06 RX ADMIN — LACTULOSE 20 G: 10 SOLUTION ORAL at 08:12

## 2021-12-06 RX ADMIN — METOCLOPRAMIDE 5 MG: 5 TABLET ORAL at 10:12

## 2021-12-06 RX ADMIN — QUETIAPINE FUMARATE 50 MG: 25 TABLET ORAL at 09:12

## 2021-12-07 LAB
ALBUMIN SERPL BCP-MCNC: 3 G/DL (ref 3.5–5)
ALBUMIN/GLOB SERPL: 0.8 {RATIO}
ALP SERPL-CCNC: 112 U/L (ref 45–115)
ALT SERPL W P-5'-P-CCNC: 164 U/L (ref 16–61)
AMMONIA PLAS-SCNC: 54 ΜMOL/L (ref 11–32)
ANION GAP SERPL CALCULATED.3IONS-SCNC: 13 MMOL/L (ref 7–16)
ANISOCYTOSIS BLD QL SMEAR: ABNORMAL
AST SERPL W P-5'-P-CCNC: 97 U/L (ref 15–37)
BASOPHILS # BLD AUTO: 0.03 K/UL (ref 0–0.2)
BASOPHILS NFR BLD AUTO: 0.4 % (ref 0–1)
BILIRUB SERPL-MCNC: 1 MG/DL (ref 0–1.2)
BUN SERPL-MCNC: 47 MG/DL (ref 7–18)
BUN/CREAT SERPL: 35 (ref 6–20)
CALCIUM SERPL-MCNC: 8.6 MG/DL (ref 8.5–10.1)
CHLORIDE SERPL-SCNC: 123 MMOL/L (ref 98–107)
CO2 SERPL-SCNC: 23 MMOL/L (ref 21–32)
CREAT SERPL-MCNC: 1.36 MG/DL (ref 0.7–1.3)
DIFFERENTIAL METHOD BLD: ABNORMAL
EOSINOPHIL # BLD AUTO: 0.37 K/UL (ref 0–0.5)
EOSINOPHIL NFR BLD AUTO: 4.5 % (ref 1–4)
EOSINOPHIL NFR BLD MANUAL: 7 % (ref 1–4)
ERYTHROCYTE [DISTWIDTH] IN BLOOD BY AUTOMATED COUNT: 16.7 % (ref 11.5–14.5)
GLOBULIN SER-MCNC: 4 G/DL (ref 2–4)
GLUCOSE SERPL-MCNC: 123 MG/DL (ref 70–105)
GLUCOSE SERPL-MCNC: 124 MG/DL (ref 74–106)
GLUCOSE SERPL-MCNC: 92 MG/DL (ref 70–105)
HCT VFR BLD AUTO: 39.4 % (ref 40–54)
HGB BLD-MCNC: 11.8 G/DL (ref 13.5–18)
LACTATE SERPL-SCNC: 1.5 MMOL/L (ref 0.4–2)
LYMPHOCYTES # BLD AUTO: 1.24 K/UL (ref 1–4.8)
LYMPHOCYTES NFR BLD AUTO: 14.9 % (ref 27–41)
LYMPHOCYTES NFR BLD MANUAL: 14 % (ref 27–41)
MACROCYTES BLD QL SMEAR: ABNORMAL
MCH RBC QN AUTO: 32.2 PG (ref 27–31)
MCHC RBC AUTO-ENTMCNC: 29.9 G/DL (ref 32–36)
MCV RBC AUTO: 107.7 FL (ref 80–96)
MONOCYTES # BLD AUTO: 0.83 K/UL (ref 0–0.8)
MONOCYTES NFR BLD AUTO: 10 % (ref 2–6)
MONOCYTES NFR BLD MANUAL: 10 % (ref 2–6)
MPC BLD CALC-MCNC: 11.8 FL (ref 9.4–12.4)
NEUTROPHILS # BLD AUTO: 5.84 K/UL (ref 1.8–7.7)
NEUTROPHILS NFR BLD AUTO: 70.2 % (ref 53–65)
NEUTS SEG NFR BLD MANUAL: 69 % (ref 50–62)
NRBC BLD MANUAL-RTO: ABNORMAL %
PLATELET # BLD AUTO: 111 K/UL (ref 150–400)
PLATELET MORPHOLOGY: ABNORMAL
POTASSIUM SERPL-SCNC: 4.8 MMOL/L (ref 3.5–5.1)
PROT SERPL-MCNC: 7 G/DL (ref 6.4–8.2)
RBC # BLD AUTO: 3.66 M/UL (ref 4.6–6.2)
SODIUM SERPL-SCNC: 154 MMOL/L (ref 136–145)
WBC # BLD AUTO: 8.31 K/UL (ref 4.5–11)

## 2021-12-07 PROCEDURE — 82140 ASSAY OF AMMONIA: CPT | Performed by: FAMILY MEDICINE

## 2021-12-07 PROCEDURE — 99308 SBSQ NF CARE LOW MDM 20: CPT | Mod: ,,, | Performed by: FAMILY MEDICINE

## 2021-12-07 PROCEDURE — 25000003 PHARM REV CODE 250: Performed by: FAMILY MEDICINE

## 2021-12-07 PROCEDURE — 36415 COLL VENOUS BLD VENIPUNCTURE: CPT | Performed by: FAMILY MEDICINE

## 2021-12-07 PROCEDURE — 80053 COMPREHEN METABOLIC PANEL: CPT | Performed by: FAMILY MEDICINE

## 2021-12-07 PROCEDURE — 83605 ASSAY OF LACTIC ACID: CPT | Performed by: FAMILY MEDICINE

## 2021-12-07 PROCEDURE — 82962 GLUCOSE BLOOD TEST: CPT

## 2021-12-07 PROCEDURE — 27000941

## 2021-12-07 PROCEDURE — 25000003 PHARM REV CODE 250

## 2021-12-07 PROCEDURE — 85025 COMPLETE CBC W/AUTO DIFF WBC: CPT | Performed by: FAMILY MEDICINE

## 2021-12-07 PROCEDURE — 63600175 PHARM REV CODE 636 W HCPCS: Performed by: FAMILY MEDICINE

## 2021-12-07 PROCEDURE — 11000004 HC SNF PRIVATE

## 2021-12-07 PROCEDURE — 63600175 PHARM REV CODE 636 W HCPCS: Performed by: NURSE PRACTITIONER

## 2021-12-07 PROCEDURE — 25000003 PHARM REV CODE 250: Performed by: NURSE PRACTITIONER

## 2021-12-07 PROCEDURE — 99308 PR NURSING FAC CARE, SUBSEQ, MINOR COMPLIC: ICD-10-PCS | Mod: ,,, | Performed by: FAMILY MEDICINE

## 2021-12-07 RX ORDER — LORAZEPAM 2 MG/ML
2 INJECTION INTRAMUSCULAR EVERY 6 HOURS PRN
Status: DISCONTINUED | OUTPATIENT
Start: 2021-12-07 | End: 2021-12-21 | Stop reason: HOSPADM

## 2021-12-07 RX ORDER — SODIUM CHLORIDE 450 MG/100ML
INJECTION, SOLUTION INTRAVENOUS
Status: COMPLETED
Start: 2021-12-07 | End: 2021-12-07

## 2021-12-07 RX ORDER — HALOPERIDOL 5 MG/ML
5 INJECTION INTRAMUSCULAR ONCE
Status: COMPLETED | OUTPATIENT
Start: 2021-12-07 | End: 2021-12-07

## 2021-12-07 RX ORDER — SODIUM CHLORIDE 450 MG/100ML
INJECTION, SOLUTION INTRAVENOUS CONTINUOUS
Status: DISCONTINUED | OUTPATIENT
Start: 2021-12-07 | End: 2021-12-12

## 2021-12-07 RX ORDER — LORAZEPAM 2 MG/ML
1 INJECTION INTRAMUSCULAR ONCE
Status: COMPLETED | OUTPATIENT
Start: 2021-12-07 | End: 2021-12-07

## 2021-12-07 RX ORDER — HALOPERIDOL 5 MG/ML
5 INJECTION INTRAMUSCULAR EVERY 6 HOURS PRN
Status: DISCONTINUED | OUTPATIENT
Start: 2021-12-07 | End: 2021-12-07

## 2021-12-07 RX ADMIN — Medication 1 CAPSULE: at 11:12

## 2021-12-07 RX ADMIN — QUETIAPINE FUMARATE 50 MG: 25 TABLET ORAL at 09:12

## 2021-12-07 RX ADMIN — Medication 1 CAPSULE: at 09:12

## 2021-12-07 RX ADMIN — SODIUM CHLORIDE 150 ML/HR: 4.5 INJECTION, SOLUTION INTRAVENOUS at 12:12

## 2021-12-07 RX ADMIN — LORAZEPAM 2 MG: 2 INJECTION INTRAMUSCULAR; INTRAVENOUS at 05:12

## 2021-12-07 RX ADMIN — HALOPERIDOL LACTATE 5 MG: 5 INJECTION, SOLUTION INTRAMUSCULAR at 12:12

## 2021-12-07 RX ADMIN — SODIUM CHLORIDE 150 ML/HR: 4.5 INJECTION, SOLUTION INTRAVENOUS at 08:12

## 2021-12-07 RX ADMIN — LACTULOSE 20 G: 10 SOLUTION ORAL at 09:12

## 2021-12-07 RX ADMIN — SODIUM CHLORIDE 150 ML/HR: 4.5 INJECTION, SOLUTION INTRAVENOUS at 06:12

## 2021-12-07 RX ADMIN — LORAZEPAM 1 MG: 0.5 TABLET ORAL at 01:12

## 2021-12-07 RX ADMIN — LORAZEPAM 1 MG: 2 INJECTION INTRAMUSCULAR; INTRAVENOUS at 12:12

## 2021-12-07 RX ADMIN — SODIUM CHLORIDE: 4.5 INJECTION, SOLUTION INTRAVENOUS at 02:12

## 2021-12-07 RX ADMIN — METOCLOPRAMIDE 5 MG: 5 TABLET ORAL at 06:12

## 2021-12-07 RX ADMIN — SODIUM CHLORIDE: 4.5 INJECTION, SOLUTION INTRAVENOUS at 12:12

## 2021-12-07 RX ADMIN — LACTULOSE 20 G: 10 SOLUTION ORAL at 03:12

## 2021-12-07 RX ADMIN — METOCLOPRAMIDE 5 MG: 5 TABLET ORAL at 11:12

## 2021-12-07 RX ADMIN — LACTULOSE 20 G: 10 SOLUTION ORAL at 08:12

## 2021-12-07 RX ADMIN — LOSARTAN POTASSIUM 100 MG: 50 TABLET, FILM COATED ORAL at 09:12

## 2021-12-07 RX ADMIN — QUETIAPINE FUMARATE 50 MG: 25 TABLET ORAL at 08:12

## 2021-12-07 RX ADMIN — METOCLOPRAMIDE 5 MG: 5 TABLET ORAL at 04:12

## 2021-12-07 RX ADMIN — SULFAMETHOXAZOLE AND TRIMETHOPRIM 1 TABLET: 800; 160 TABLET ORAL at 08:12

## 2021-12-07 RX ADMIN — AMLODIPINE BESYLATE 10 MG: 5 TABLET ORAL at 09:12

## 2021-12-07 RX ADMIN — LORAZEPAM 1 MG: 2 INJECTION INTRAMUSCULAR; INTRAVENOUS at 09:12

## 2021-12-07 RX ADMIN — RIFAXIMIN 550 MG: 550 TABLET ORAL at 08:12

## 2021-12-07 RX ADMIN — RIFAXIMIN 550 MG: 550 TABLET ORAL at 09:12

## 2021-12-07 RX ADMIN — SULFAMETHOXAZOLE AND TRIMETHOPRIM 1 TABLET: 800; 160 TABLET ORAL at 09:12

## 2021-12-07 RX ADMIN — MULTIPLE VITAMINS W/ MINERALS TAB 1 TABLET: TAB at 09:12

## 2021-12-08 LAB
ALBUMIN SERPL BCP-MCNC: 2.8 G/DL (ref 3.5–5)
ALBUMIN/GLOB SERPL: 0.7 {RATIO}
ALP SERPL-CCNC: 116 U/L (ref 45–115)
ALT SERPL W P-5'-P-CCNC: 136 U/L (ref 16–61)
AMMONIA PLAS-SCNC: 51 ΜMOL/L (ref 11–32)
ANION GAP SERPL CALCULATED.3IONS-SCNC: 12 MMOL/L (ref 7–16)
ANISOCYTOSIS BLD QL SMEAR: ABNORMAL
AST SERPL W P-5'-P-CCNC: 68 U/L (ref 15–37)
BASOPHILS # BLD AUTO: 0.02 K/UL (ref 0–0.2)
BASOPHILS NFR BLD AUTO: 0.3 % (ref 0–1)
BILIRUB SERPL-MCNC: 1 MG/DL (ref 0–1.2)
BUN SERPL-MCNC: 29 MG/DL (ref 7–18)
BUN/CREAT SERPL: 32 (ref 6–20)
CALCIUM SERPL-MCNC: 8.2 MG/DL (ref 8.5–10.1)
CHLORIDE SERPL-SCNC: 119 MMOL/L (ref 98–107)
CO2 SERPL-SCNC: 21 MMOL/L (ref 21–32)
CREAT SERPL-MCNC: 0.91 MG/DL (ref 0.7–1.3)
DIFFERENTIAL METHOD BLD: ABNORMAL
EOSINOPHIL # BLD AUTO: 0.48 K/UL (ref 0–0.5)
EOSINOPHIL NFR BLD AUTO: 6.3 % (ref 1–4)
EOSINOPHIL NFR BLD MANUAL: 5 % (ref 1–4)
ERYTHROCYTE [DISTWIDTH] IN BLOOD BY AUTOMATED COUNT: 16 % (ref 11.5–14.5)
GLOBULIN SER-MCNC: 4.2 G/DL (ref 2–4)
GLUCOSE SERPL-MCNC: 110 MG/DL (ref 70–105)
GLUCOSE SERPL-MCNC: 111 MG/DL (ref 70–105)
GLUCOSE SERPL-MCNC: 116 MG/DL (ref 74–106)
GLUCOSE SERPL-MCNC: 134 MG/DL (ref 70–105)
GLUCOSE SERPL-MCNC: 176 MG/DL (ref 70–105)
HCT VFR BLD AUTO: 40.7 % (ref 40–54)
HGB BLD-MCNC: 12.4 G/DL (ref 13.5–18)
LYMPHOCYTES # BLD AUTO: 1.19 K/UL (ref 1–4.8)
LYMPHOCYTES NFR BLD AUTO: 15.7 % (ref 27–41)
LYMPHOCYTES NFR BLD MANUAL: 19 % (ref 27–41)
MACROCYTES BLD QL SMEAR: ABNORMAL
MCH RBC QN AUTO: 32.2 PG (ref 27–31)
MCHC RBC AUTO-ENTMCNC: 30.5 G/DL (ref 32–36)
MCV RBC AUTO: 105.7 FL (ref 80–96)
MONOCYTES # BLD AUTO: 0.57 K/UL (ref 0–0.8)
MONOCYTES NFR BLD AUTO: 7.5 % (ref 2–6)
MONOCYTES NFR BLD MANUAL: 8 % (ref 2–6)
MPC BLD CALC-MCNC: 12.7 FL (ref 9.4–12.4)
NEUTROPHILS # BLD AUTO: 5.31 K/UL (ref 1.8–7.7)
NEUTROPHILS NFR BLD AUTO: 70.2 % (ref 53–65)
NEUTS SEG NFR BLD MANUAL: 68 % (ref 50–62)
NRBC BLD MANUAL-RTO: ABNORMAL %
PLATELET # BLD AUTO: 99 K/UL (ref 150–400)
PLATELET MORPHOLOGY: ABNORMAL
POTASSIUM SERPL-SCNC: 4.4 MMOL/L (ref 3.5–5.1)
PROT SERPL-MCNC: 7 G/DL (ref 6.4–8.2)
RBC # BLD AUTO: 3.85 M/UL (ref 4.6–6.2)
SODIUM SERPL-SCNC: 148 MMOL/L (ref 136–145)
WBC # BLD AUTO: 7.57 K/UL (ref 4.5–11)

## 2021-12-08 PROCEDURE — 82140 ASSAY OF AMMONIA: CPT | Performed by: FAMILY MEDICINE

## 2021-12-08 PROCEDURE — 80053 COMPREHEN METABOLIC PANEL: CPT | Performed by: FAMILY MEDICINE

## 2021-12-08 PROCEDURE — 82962 GLUCOSE BLOOD TEST: CPT

## 2021-12-08 PROCEDURE — 27000941

## 2021-12-08 PROCEDURE — 11000004 HC SNF PRIVATE

## 2021-12-08 PROCEDURE — 85025 COMPLETE CBC W/AUTO DIFF WBC: CPT | Performed by: NURSE PRACTITIONER

## 2021-12-08 PROCEDURE — 25000003 PHARM REV CODE 250: Performed by: FAMILY MEDICINE

## 2021-12-08 PROCEDURE — 36415 COLL VENOUS BLD VENIPUNCTURE: CPT | Performed by: NURSE PRACTITIONER

## 2021-12-08 PROCEDURE — 25000003 PHARM REV CODE 250: Performed by: NURSE PRACTITIONER

## 2021-12-08 PROCEDURE — 63600175 PHARM REV CODE 636 W HCPCS: Performed by: NURSE PRACTITIONER

## 2021-12-08 RX ORDER — LACTULOSE 10 G/15ML
20 SOLUTION ORAL 4 TIMES DAILY
Status: DISCONTINUED | OUTPATIENT
Start: 2021-12-08 | End: 2021-12-21 | Stop reason: HOSPADM

## 2021-12-08 RX ADMIN — LACTULOSE 20 G: 10 SOLUTION ORAL at 04:12

## 2021-12-08 RX ADMIN — QUETIAPINE FUMARATE 50 MG: 25 TABLET ORAL at 09:12

## 2021-12-08 RX ADMIN — SULFAMETHOXAZOLE AND TRIMETHOPRIM 1 TABLET: 800; 160 TABLET ORAL at 09:12

## 2021-12-08 RX ADMIN — SULFAMETHOXAZOLE AND TRIMETHOPRIM 1 TABLET: 800; 160 TABLET ORAL at 10:12

## 2021-12-08 RX ADMIN — QUETIAPINE FUMARATE 50 MG: 25 TABLET ORAL at 10:12

## 2021-12-08 RX ADMIN — LACTULOSE 20 G: 10 SOLUTION ORAL at 09:12

## 2021-12-08 RX ADMIN — LORAZEPAM 2 MG: 2 INJECTION INTRAMUSCULAR; INTRAVENOUS at 02:12

## 2021-12-08 RX ADMIN — SODIUM CHLORIDE 150 ML/HR: 4.5 INJECTION, SOLUTION INTRAVENOUS at 04:12

## 2021-12-08 RX ADMIN — LACTULOSE 20 G: 10 SOLUTION ORAL at 10:12

## 2021-12-08 RX ADMIN — SODIUM CHLORIDE: 4.5 INJECTION, SOLUTION INTRAVENOUS at 08:12

## 2021-12-08 RX ADMIN — RIFAXIMIN 550 MG: 550 TABLET ORAL at 10:12

## 2021-12-08 RX ADMIN — METOCLOPRAMIDE 5 MG: 5 TABLET ORAL at 06:12

## 2021-12-08 RX ADMIN — AMLODIPINE BESYLATE 10 MG: 5 TABLET ORAL at 09:12

## 2021-12-08 RX ADMIN — SODIUM CHLORIDE: 4.5 INJECTION, SOLUTION INTRAVENOUS at 01:12

## 2021-12-08 RX ADMIN — MULTIPLE VITAMINS W/ MINERALS TAB 1 TABLET: TAB at 09:12

## 2021-12-08 RX ADMIN — LACTULOSE 20 G: 10 SOLUTION ORAL at 01:12

## 2021-12-08 RX ADMIN — RIFAXIMIN 550 MG: 550 TABLET ORAL at 09:12

## 2021-12-09 LAB
ALBUMIN SERPL BCP-MCNC: 2.7 G/DL (ref 3.5–5)
ALBUMIN/GLOB SERPL: 0.7 {RATIO}
ALP SERPL-CCNC: 110 U/L (ref 45–115)
ALT SERPL W P-5'-P-CCNC: 94 U/L (ref 16–61)
AMMONIA PLAS-SCNC: 35 ΜMOL/L (ref 11–32)
ANION GAP SERPL CALCULATED.3IONS-SCNC: 13 MMOL/L (ref 7–16)
AST SERPL W P-5'-P-CCNC: 40 U/L (ref 15–37)
BILIRUB SERPL-MCNC: 0.7 MG/DL (ref 0–1.2)
BUN SERPL-MCNC: 21 MG/DL (ref 7–18)
BUN/CREAT SERPL: 25 (ref 6–20)
CALCIUM SERPL-MCNC: 8 MG/DL (ref 8.5–10.1)
CHLORIDE SERPL-SCNC: 115 MMOL/L (ref 98–107)
CO2 SERPL-SCNC: 21 MMOL/L (ref 21–32)
CREAT SERPL-MCNC: 0.84 MG/DL (ref 0.7–1.3)
GLOBULIN SER-MCNC: 4 G/DL (ref 2–4)
GLUCOSE SERPL-MCNC: 102 MG/DL (ref 70–105)
GLUCOSE SERPL-MCNC: 113 MG/DL (ref 70–105)
GLUCOSE SERPL-MCNC: 114 MG/DL (ref 70–105)
GLUCOSE SERPL-MCNC: 136 MG/DL (ref 74–106)
GLUCOSE SERPL-MCNC: 143 MG/DL (ref 70–105)
POTASSIUM SERPL-SCNC: 4.4 MMOL/L (ref 3.5–5.1)
PROT SERPL-MCNC: 6.7 G/DL (ref 6.4–8.2)
SODIUM SERPL-SCNC: 145 MMOL/L (ref 136–145)

## 2021-12-09 PROCEDURE — 25000003 PHARM REV CODE 250: Performed by: FAMILY MEDICINE

## 2021-12-09 PROCEDURE — 11000004 HC SNF PRIVATE

## 2021-12-09 PROCEDURE — 25000003 PHARM REV CODE 250

## 2021-12-09 PROCEDURE — 80053 COMPREHEN METABOLIC PANEL: CPT | Performed by: FAMILY MEDICINE

## 2021-12-09 PROCEDURE — 27000941

## 2021-12-09 PROCEDURE — 25000003 PHARM REV CODE 250: Performed by: NURSE PRACTITIONER

## 2021-12-09 PROCEDURE — 82962 GLUCOSE BLOOD TEST: CPT

## 2021-12-09 PROCEDURE — 27000982 HC MATTRESS, MATRIX LAL RENTAL

## 2021-12-09 PROCEDURE — 82140 ASSAY OF AMMONIA: CPT | Performed by: FAMILY MEDICINE

## 2021-12-09 PROCEDURE — 36415 COLL VENOUS BLD VENIPUNCTURE: CPT | Performed by: FAMILY MEDICINE

## 2021-12-09 RX ADMIN — RIFAXIMIN 550 MG: 550 TABLET ORAL at 10:12

## 2021-12-09 RX ADMIN — QUETIAPINE FUMARATE 50 MG: 25 TABLET ORAL at 10:12

## 2021-12-09 RX ADMIN — LOSARTAN POTASSIUM 100 MG: 50 TABLET, FILM COATED ORAL at 08:12

## 2021-12-09 RX ADMIN — SULFAMETHOXAZOLE AND TRIMETHOPRIM 1 TABLET: 800; 160 TABLET ORAL at 10:12

## 2021-12-09 RX ADMIN — LACTULOSE 20 G: 10 SOLUTION ORAL at 12:12

## 2021-12-09 RX ADMIN — QUETIAPINE FUMARATE 50 MG: 25 TABLET ORAL at 08:12

## 2021-12-09 RX ADMIN — LACTULOSE 20 G: 10 SOLUTION ORAL at 10:12

## 2021-12-09 RX ADMIN — SULFAMETHOXAZOLE AND TRIMETHOPRIM 1 TABLET: 800; 160 TABLET ORAL at 08:12

## 2021-12-09 RX ADMIN — AMLODIPINE BESYLATE 10 MG: 5 TABLET ORAL at 08:12

## 2021-12-09 RX ADMIN — RIFAXIMIN 550 MG: 550 TABLET ORAL at 08:12

## 2021-12-09 RX ADMIN — LACTULOSE 20 G: 10 SOLUTION ORAL at 08:12

## 2021-12-09 RX ADMIN — MULTIPLE VITAMINS W/ MINERALS TAB 1 TABLET: TAB at 08:12

## 2021-12-09 RX ADMIN — LACTULOSE 20 G: 10 SOLUTION ORAL at 04:12

## 2021-12-09 RX ADMIN — SODIUM CHLORIDE: 4.5 INJECTION, SOLUTION INTRAVENOUS at 08:12

## 2021-12-09 RX ADMIN — SODIUM CHLORIDE: 4.5 INJECTION, SOLUTION INTRAVENOUS at 12:12

## 2021-12-09 RX ADMIN — SODIUM CHLORIDE: 4.5 INJECTION, SOLUTION INTRAVENOUS at 06:12

## 2021-12-10 LAB
AMMONIA PLAS-SCNC: 18 ΜMOL/L (ref 11–32)
ANION GAP SERPL CALCULATED.3IONS-SCNC: 15 MMOL/L (ref 7–16)
BUN SERPL-MCNC: 18 MG/DL (ref 7–18)
BUN/CREAT SERPL: 23 (ref 6–20)
CALCIUM SERPL-MCNC: 8 MG/DL (ref 8.5–10.1)
CHLORIDE SERPL-SCNC: 109 MMOL/L (ref 98–107)
CO2 SERPL-SCNC: 21 MMOL/L (ref 21–32)
CREAT SERPL-MCNC: 0.77 MG/DL (ref 0.7–1.3)
GLUCOSE SERPL-MCNC: 106 MG/DL (ref 70–105)
GLUCOSE SERPL-MCNC: 121 MG/DL (ref 74–106)
POTASSIUM SERPL-SCNC: 4.3 MMOL/L (ref 3.5–5.1)
SODIUM SERPL-SCNC: 141 MMOL/L (ref 136–145)

## 2021-12-10 PROCEDURE — 11000004 HC SNF PRIVATE

## 2021-12-10 PROCEDURE — 27000987 HC MATTRESS, MATRIX LOW PROFILE

## 2021-12-10 PROCEDURE — 97116 GAIT TRAINING THERAPY: CPT | Mod: CQ

## 2021-12-10 PROCEDURE — 82140 ASSAY OF AMMONIA: CPT | Performed by: FAMILY MEDICINE

## 2021-12-10 PROCEDURE — 25000003 PHARM REV CODE 250: Performed by: FAMILY MEDICINE

## 2021-12-10 PROCEDURE — 82962 GLUCOSE BLOOD TEST: CPT

## 2021-12-10 PROCEDURE — 27000941

## 2021-12-10 PROCEDURE — 80048 BASIC METABOLIC PNL TOTAL CA: CPT | Performed by: FAMILY MEDICINE

## 2021-12-10 PROCEDURE — 97535 SELF CARE MNGMENT TRAINING: CPT

## 2021-12-10 PROCEDURE — 97530 THERAPEUTIC ACTIVITIES: CPT

## 2021-12-10 PROCEDURE — 92526 ORAL FUNCTION THERAPY: CPT

## 2021-12-10 PROCEDURE — 25000003 PHARM REV CODE 250: Performed by: NURSE PRACTITIONER

## 2021-12-10 PROCEDURE — 36415 COLL VENOUS BLD VENIPUNCTURE: CPT | Performed by: FAMILY MEDICINE

## 2021-12-10 PROCEDURE — 97110 THERAPEUTIC EXERCISES: CPT | Mod: CQ

## 2021-12-10 PROCEDURE — 97530 THERAPEUTIC ACTIVITIES: CPT | Mod: CQ

## 2021-12-10 RX ADMIN — RIFAXIMIN 550 MG: 550 TABLET ORAL at 10:12

## 2021-12-10 RX ADMIN — RIFAXIMIN 550 MG: 550 TABLET ORAL at 09:12

## 2021-12-10 RX ADMIN — LACTULOSE 20 G: 10 SOLUTION ORAL at 04:12

## 2021-12-10 RX ADMIN — QUETIAPINE FUMARATE 50 MG: 25 TABLET ORAL at 09:12

## 2021-12-10 RX ADMIN — QUETIAPINE FUMARATE 50 MG: 25 TABLET ORAL at 10:12

## 2021-12-10 RX ADMIN — SODIUM CHLORIDE: 4.5 INJECTION, SOLUTION INTRAVENOUS at 04:12

## 2021-12-10 RX ADMIN — LACTULOSE 20 G: 10 SOLUTION ORAL at 10:12

## 2021-12-10 RX ADMIN — LOSARTAN POTASSIUM 100 MG: 50 TABLET, FILM COATED ORAL at 10:12

## 2021-12-10 RX ADMIN — LACTULOSE 20 G: 10 SOLUTION ORAL at 09:12

## 2021-12-10 RX ADMIN — LACTULOSE 20 G: 10 SOLUTION ORAL at 02:12

## 2021-12-10 RX ADMIN — MULTIPLE VITAMINS W/ MINERALS TAB 1 TABLET: TAB at 10:12

## 2021-12-10 RX ADMIN — SULFAMETHOXAZOLE AND TRIMETHOPRIM 1 TABLET: 800; 160 TABLET ORAL at 10:12

## 2021-12-10 RX ADMIN — SODIUM CHLORIDE: 4.5 INJECTION, SOLUTION INTRAVENOUS at 10:12

## 2021-12-10 RX ADMIN — AMLODIPINE BESYLATE 10 MG: 5 TABLET ORAL at 10:12

## 2021-12-11 LAB — GLUCOSE SERPL-MCNC: 131 MG/DL (ref 70–105)

## 2021-12-11 PROCEDURE — 25000003 PHARM REV CODE 250: Performed by: NURSE PRACTITIONER

## 2021-12-11 PROCEDURE — 11000004 HC SNF PRIVATE

## 2021-12-11 PROCEDURE — 25000003 PHARM REV CODE 250: Performed by: PHYSICIAN ASSISTANT

## 2021-12-11 PROCEDURE — 82962 GLUCOSE BLOOD TEST: CPT

## 2021-12-11 PROCEDURE — 25000003 PHARM REV CODE 250: Performed by: FAMILY MEDICINE

## 2021-12-11 PROCEDURE — 27000941

## 2021-12-11 RX ADMIN — SODIUM CHLORIDE: 4.5 INJECTION, SOLUTION INTRAVENOUS at 05:12

## 2021-12-11 RX ADMIN — MULTIPLE VITAMINS W/ MINERALS TAB 1 TABLET: TAB at 09:12

## 2021-12-11 RX ADMIN — LACTULOSE 20 G: 10 SOLUTION ORAL at 08:12

## 2021-12-11 RX ADMIN — LIDOCAINE HYDROCHLORIDE: 20 SOLUTION ORAL; TOPICAL at 08:12

## 2021-12-11 RX ADMIN — QUETIAPINE FUMARATE 50 MG: 25 TABLET ORAL at 08:12

## 2021-12-11 RX ADMIN — LACTULOSE 20 G: 10 SOLUTION ORAL at 01:12

## 2021-12-11 RX ADMIN — RIFAXIMIN 550 MG: 550 TABLET ORAL at 09:12

## 2021-12-11 RX ADMIN — QUETIAPINE FUMARATE 50 MG: 25 TABLET ORAL at 09:12

## 2021-12-11 RX ADMIN — RIFAXIMIN 550 MG: 550 TABLET ORAL at 08:12

## 2021-12-11 RX ADMIN — LACTULOSE 20 G: 10 SOLUTION ORAL at 09:12

## 2021-12-11 RX ADMIN — LACTULOSE 20 G: 10 SOLUTION ORAL at 05:12

## 2021-12-12 PROCEDURE — 27000941

## 2021-12-12 PROCEDURE — 25000003 PHARM REV CODE 250: Performed by: FAMILY MEDICINE

## 2021-12-12 PROCEDURE — 63600175 PHARM REV CODE 636 W HCPCS: Performed by: FAMILY MEDICINE

## 2021-12-12 PROCEDURE — 25000003 PHARM REV CODE 250: Performed by: NURSE PRACTITIONER

## 2021-12-12 PROCEDURE — 11000004 HC SNF PRIVATE

## 2021-12-12 PROCEDURE — 25000003 PHARM REV CODE 250

## 2021-12-12 RX ORDER — ACETAMINOPHEN 325 MG/1
TABLET ORAL
Status: COMPLETED
Start: 2021-12-12 | End: 2021-12-12

## 2021-12-12 RX ORDER — ACETAMINOPHEN 325 MG/1
650 TABLET ORAL EVERY 6 HOURS PRN
Status: DISCONTINUED | OUTPATIENT
Start: 2021-12-12 | End: 2021-12-21 | Stop reason: HOSPADM

## 2021-12-12 RX ORDER — KETOROLAC TROMETHAMINE 30 MG/ML
30 INJECTION, SOLUTION INTRAMUSCULAR; INTRAVENOUS ONCE
Status: COMPLETED | OUTPATIENT
Start: 2021-12-12 | End: 2021-12-12

## 2021-12-12 RX ADMIN — KETOROLAC TROMETHAMINE 30 MG: 30 INJECTION, SOLUTION INTRAMUSCULAR at 06:12

## 2021-12-12 RX ADMIN — LOSARTAN POTASSIUM 100 MG: 50 TABLET, FILM COATED ORAL at 09:12

## 2021-12-12 RX ADMIN — LACTULOSE 20 G: 10 SOLUTION ORAL at 09:12

## 2021-12-12 RX ADMIN — LACTULOSE 20 G: 10 SOLUTION ORAL at 05:12

## 2021-12-12 RX ADMIN — RIFAXIMIN 550 MG: 550 TABLET ORAL at 09:12

## 2021-12-12 RX ADMIN — LACTULOSE 20 G: 10 SOLUTION ORAL at 01:12

## 2021-12-12 RX ADMIN — MULTIPLE VITAMINS W/ MINERALS TAB 1 TABLET: TAB at 09:12

## 2021-12-12 RX ADMIN — ACETAMINOPHEN 325 MG: 325 TABLET ORAL at 05:12

## 2021-12-12 RX ADMIN — SODIUM CHLORIDE: 4.5 INJECTION, SOLUTION INTRAVENOUS at 04:12

## 2021-12-12 RX ADMIN — LACTULOSE 20 G: 10 SOLUTION ORAL at 08:12

## 2021-12-12 RX ADMIN — AMLODIPINE BESYLATE 10 MG: 5 TABLET ORAL at 09:12

## 2021-12-12 RX ADMIN — QUETIAPINE FUMARATE 50 MG: 25 TABLET ORAL at 08:12

## 2021-12-12 RX ADMIN — RIFAXIMIN 550 MG: 550 TABLET ORAL at 08:12

## 2021-12-12 RX ADMIN — QUETIAPINE FUMARATE 50 MG: 25 TABLET ORAL at 09:12

## 2021-12-13 LAB
ALBUMIN SERPL BCP-MCNC: 2.6 G/DL (ref 3.5–5)
ALBUMIN/GLOB SERPL: 0.7 {RATIO}
ALP SERPL-CCNC: 92 U/L (ref 45–115)
ALT SERPL W P-5'-P-CCNC: 55 U/L (ref 16–61)
AMMONIA PLAS-SCNC: 15 ΜMOL/L (ref 11–32)
ANION GAP SERPL CALCULATED.3IONS-SCNC: 11 MMOL/L (ref 7–16)
AST SERPL W P-5'-P-CCNC: 28 U/L (ref 15–37)
BASOPHILS # BLD AUTO: 0.02 K/UL (ref 0–0.2)
BASOPHILS NFR BLD AUTO: 0.2 % (ref 0–1)
BILIRUB SERPL-MCNC: 0.6 MG/DL (ref 0–1.2)
BUN SERPL-MCNC: 19 MG/DL (ref 7–18)
BUN/CREAT SERPL: 25 (ref 6–20)
CALCIUM SERPL-MCNC: 7.9 MG/DL (ref 8.5–10.1)
CHLORIDE SERPL-SCNC: 107 MMOL/L (ref 98–107)
CO2 SERPL-SCNC: 25 MMOL/L (ref 21–32)
CREAT SERPL-MCNC: 0.76 MG/DL (ref 0.7–1.3)
DIFFERENTIAL METHOD BLD: ABNORMAL
EOSINOPHIL # BLD AUTO: 0.28 K/UL (ref 0–0.5)
EOSINOPHIL NFR BLD AUTO: 2.9 % (ref 1–4)
ERYTHROCYTE [DISTWIDTH] IN BLOOD BY AUTOMATED COUNT: 15.1 % (ref 11.5–14.5)
GLOBULIN SER-MCNC: 3.9 G/DL (ref 2–4)
GLUCOSE SERPL-MCNC: 189 MG/DL (ref 74–106)
HCT VFR BLD AUTO: 34.5 % (ref 40–54)
HGB BLD-MCNC: 11.4 G/DL (ref 13.5–18)
LYMPHOCYTES # BLD AUTO: 0.84 K/UL (ref 1–4.8)
LYMPHOCYTES NFR BLD AUTO: 8.6 % (ref 27–41)
MCH RBC QN AUTO: 32.2 PG (ref 27–31)
MCHC RBC AUTO-ENTMCNC: 33 G/DL (ref 32–36)
MCV RBC AUTO: 97.5 FL (ref 80–96)
MONOCYTES # BLD AUTO: 0.79 K/UL (ref 0–0.8)
MONOCYTES NFR BLD AUTO: 8.1 % (ref 2–6)
MPC BLD CALC-MCNC: 11.2 FL (ref 9.4–12.4)
NEUTROPHILS # BLD AUTO: 7.79 K/UL (ref 1.8–7.7)
NEUTROPHILS NFR BLD AUTO: 80.2 % (ref 53–65)
PLATELET # BLD AUTO: 140 K/UL (ref 150–400)
POTASSIUM SERPL-SCNC: 4.6 MMOL/L (ref 3.5–5.1)
PROT SERPL-MCNC: 6.5 G/DL (ref 6.4–8.2)
RBC # BLD AUTO: 3.54 M/UL (ref 4.6–6.2)
SODIUM SERPL-SCNC: 138 MMOL/L (ref 136–145)
WBC # BLD AUTO: 9.72 K/UL (ref 4.5–11)

## 2021-12-13 PROCEDURE — 25000003 PHARM REV CODE 250: Performed by: NURSE PRACTITIONER

## 2021-12-13 PROCEDURE — 11000004 HC SNF PRIVATE

## 2021-12-13 PROCEDURE — 82140 ASSAY OF AMMONIA: CPT | Performed by: NURSE PRACTITIONER

## 2021-12-13 PROCEDURE — 97110 THERAPEUTIC EXERCISES: CPT | Mod: CQ

## 2021-12-13 PROCEDURE — 25000003 PHARM REV CODE 250: Performed by: FAMILY MEDICINE

## 2021-12-13 PROCEDURE — 36415 COLL VENOUS BLD VENIPUNCTURE: CPT | Performed by: NURSE PRACTITIONER

## 2021-12-13 PROCEDURE — 85025 COMPLETE CBC W/AUTO DIFF WBC: CPT | Performed by: NURSE PRACTITIONER

## 2021-12-13 PROCEDURE — 97116 GAIT TRAINING THERAPY: CPT | Mod: CQ

## 2021-12-13 PROCEDURE — 92526 ORAL FUNCTION THERAPY: CPT

## 2021-12-13 PROCEDURE — 80053 COMPREHEN METABOLIC PANEL: CPT | Performed by: NURSE PRACTITIONER

## 2021-12-13 PROCEDURE — 27000941

## 2021-12-13 RX ADMIN — MULTIPLE VITAMINS W/ MINERALS TAB 1 TABLET: TAB at 08:12

## 2021-12-13 RX ADMIN — LACTULOSE 20 G: 10 SOLUTION ORAL at 08:12

## 2021-12-13 RX ADMIN — LACTULOSE 20 G: 10 SOLUTION ORAL at 04:12

## 2021-12-13 RX ADMIN — ACETAMINOPHEN 650 MG: 325 TABLET ORAL at 01:12

## 2021-12-13 RX ADMIN — AMLODIPINE BESYLATE 10 MG: 5 TABLET ORAL at 08:12

## 2021-12-13 RX ADMIN — LACTULOSE 20 G: 10 SOLUTION ORAL at 01:12

## 2021-12-13 RX ADMIN — RIFAXIMIN 550 MG: 550 TABLET ORAL at 09:12

## 2021-12-13 RX ADMIN — QUETIAPINE FUMARATE 50 MG: 25 TABLET ORAL at 09:12

## 2021-12-13 RX ADMIN — LACTULOSE 20 G: 10 SOLUTION ORAL at 09:12

## 2021-12-13 RX ADMIN — RIFAXIMIN 550 MG: 550 TABLET ORAL at 08:12

## 2021-12-13 RX ADMIN — LOSARTAN POTASSIUM 100 MG: 50 TABLET, FILM COATED ORAL at 08:12

## 2021-12-13 RX ADMIN — QUETIAPINE FUMARATE 50 MG: 25 TABLET ORAL at 08:12

## 2021-12-14 LAB — GLUCOSE SERPL-MCNC: 146 MG/DL (ref 70–105)

## 2021-12-14 PROCEDURE — 82962 GLUCOSE BLOOD TEST: CPT

## 2021-12-14 PROCEDURE — 92526 ORAL FUNCTION THERAPY: CPT

## 2021-12-14 PROCEDURE — 97116 GAIT TRAINING THERAPY: CPT | Mod: CQ

## 2021-12-14 PROCEDURE — 27000987 HC MATTRESS, MATRIX LOW PROFILE

## 2021-12-14 PROCEDURE — 11000004 HC SNF PRIVATE

## 2021-12-14 PROCEDURE — 97110 THERAPEUTIC EXERCISES: CPT | Mod: CQ

## 2021-12-14 PROCEDURE — 97110 THERAPEUTIC EXERCISES: CPT

## 2021-12-14 PROCEDURE — 97530 THERAPEUTIC ACTIVITIES: CPT | Mod: CQ

## 2021-12-14 PROCEDURE — 97535 SELF CARE MNGMENT TRAINING: CPT

## 2021-12-14 PROCEDURE — 99308 PR NURSING FAC CARE, SUBSEQ, MINOR COMPLIC: ICD-10-PCS | Mod: ,,, | Performed by: FAMILY MEDICINE

## 2021-12-14 PROCEDURE — 25000003 PHARM REV CODE 250: Performed by: NURSE PRACTITIONER

## 2021-12-14 PROCEDURE — 27000941

## 2021-12-14 PROCEDURE — 25000003 PHARM REV CODE 250: Performed by: FAMILY MEDICINE

## 2021-12-14 PROCEDURE — 99308 SBSQ NF CARE LOW MDM 20: CPT | Mod: ,,, | Performed by: FAMILY MEDICINE

## 2021-12-14 PROCEDURE — 97530 THERAPEUTIC ACTIVITIES: CPT

## 2021-12-14 RX ADMIN — AMLODIPINE BESYLATE 10 MG: 5 TABLET ORAL at 09:12

## 2021-12-14 RX ADMIN — LOSARTAN POTASSIUM 100 MG: 50 TABLET, FILM COATED ORAL at 09:12

## 2021-12-14 RX ADMIN — LACTULOSE 20 G: 10 SOLUTION ORAL at 12:12

## 2021-12-14 RX ADMIN — LACTULOSE 20 G: 10 SOLUTION ORAL at 09:12

## 2021-12-14 RX ADMIN — RIFAXIMIN 550 MG: 550 TABLET ORAL at 09:12

## 2021-12-14 RX ADMIN — QUETIAPINE FUMARATE 50 MG: 25 TABLET ORAL at 09:12

## 2021-12-14 RX ADMIN — MULTIPLE VITAMINS W/ MINERALS TAB 1 TABLET: TAB at 09:12

## 2021-12-14 RX ADMIN — LACTULOSE 20 G: 10 SOLUTION ORAL at 04:12

## 2021-12-15 LAB
AMMONIA PLAS-SCNC: 18 ΜMOL/L (ref 11–32)
ANION GAP SERPL CALCULATED.3IONS-SCNC: 12 MMOL/L (ref 7–16)
ANISOCYTOSIS BLD QL SMEAR: ABNORMAL
BASOPHILS # BLD AUTO: 0.02 K/UL (ref 0–0.2)
BASOPHILS NFR BLD AUTO: 0.3 % (ref 0–1)
BASOPHILS NFR BLD MANUAL: 2 % (ref 0–1)
BUN SERPL-MCNC: 31 MG/DL (ref 7–18)
BUN/CREAT SERPL: 37 (ref 6–20)
CALCIUM SERPL-MCNC: 7.9 MG/DL (ref 8.5–10.1)
CHLORIDE SERPL-SCNC: 108 MMOL/L (ref 98–107)
CO2 SERPL-SCNC: 25 MMOL/L (ref 21–32)
CREAT SERPL-MCNC: 0.84 MG/DL (ref 0.7–1.3)
DIFFERENTIAL METHOD BLD: ABNORMAL
EOSINOPHIL # BLD AUTO: 0.32 K/UL (ref 0–0.5)
EOSINOPHIL NFR BLD AUTO: 4.9 % (ref 1–4)
EOSINOPHIL NFR BLD MANUAL: 5 % (ref 1–4)
ERYTHROCYTE [DISTWIDTH] IN BLOOD BY AUTOMATED COUNT: 15.8 % (ref 11.5–14.5)
GLUCOSE SERPL-MCNC: 133 MG/DL (ref 70–105)
GLUCOSE SERPL-MCNC: 138 MG/DL (ref 74–106)
HCT VFR BLD AUTO: 33.1 % (ref 40–54)
HGB BLD-MCNC: 10.7 G/DL (ref 13.5–18)
HYPOCHROMIA BLD QL SMEAR: ABNORMAL
LYMPHOCYTES # BLD AUTO: 1.18 K/UL (ref 1–4.8)
LYMPHOCYTES NFR BLD AUTO: 18.2 % (ref 27–41)
LYMPHOCYTES NFR BLD MANUAL: 15 % (ref 27–41)
MCH RBC QN AUTO: 32.2 PG (ref 27–31)
MCHC RBC AUTO-ENTMCNC: 32.3 G/DL (ref 32–36)
MCV RBC AUTO: 99.7 FL (ref 80–96)
MONOCYTES # BLD AUTO: 0.66 K/UL (ref 0–0.8)
MONOCYTES NFR BLD AUTO: 10.2 % (ref 2–6)
MONOCYTES NFR BLD MANUAL: 7 % (ref 2–6)
MPC BLD CALC-MCNC: 10.1 FL (ref 9.4–12.4)
NEUTROPHILS # BLD AUTO: 4.32 K/UL (ref 1.8–7.7)
NEUTROPHILS NFR BLD AUTO: 66.4 % (ref 53–65)
NEUTS BAND NFR BLD MANUAL: 2 % (ref 1–5)
NEUTS SEG NFR BLD MANUAL: 69 % (ref 50–62)
NRBC BLD MANUAL-RTO: ABNORMAL %
PLATELET # BLD AUTO: 157 K/UL (ref 150–400)
PLATELET MORPHOLOGY: ABNORMAL
POIKILOCYTOSIS BLD QL SMEAR: ABNORMAL
POTASSIUM SERPL-SCNC: 5.1 MMOL/L (ref 3.5–5.1)
RBC # BLD AUTO: 3.32 M/UL (ref 4.6–6.2)
REACTIVE LYMPHOCYTES: ABNORMAL
SODIUM SERPL-SCNC: 140 MMOL/L (ref 136–145)
WBC # BLD AUTO: 6.5 K/UL (ref 4.5–11)

## 2021-12-15 PROCEDURE — 11000004 HC SNF PRIVATE

## 2021-12-15 PROCEDURE — 36415 COLL VENOUS BLD VENIPUNCTURE: CPT | Performed by: NURSE PRACTITIONER

## 2021-12-15 PROCEDURE — 27000987 HC MATTRESS, MATRIX LOW PROFILE

## 2021-12-15 PROCEDURE — 97530 THERAPEUTIC ACTIVITIES: CPT

## 2021-12-15 PROCEDURE — 82962 GLUCOSE BLOOD TEST: CPT

## 2021-12-15 PROCEDURE — 36415 COLL VENOUS BLD VENIPUNCTURE: CPT | Performed by: FAMILY MEDICINE

## 2021-12-15 PROCEDURE — 97110 THERAPEUTIC EXERCISES: CPT

## 2021-12-15 PROCEDURE — 80048 BASIC METABOLIC PNL TOTAL CA: CPT | Performed by: NURSE PRACTITIONER

## 2021-12-15 PROCEDURE — 92526 ORAL FUNCTION THERAPY: CPT

## 2021-12-15 PROCEDURE — 25000003 PHARM REV CODE 250: Performed by: NURSE PRACTITIONER

## 2021-12-15 PROCEDURE — 25000003 PHARM REV CODE 250: Performed by: FAMILY MEDICINE

## 2021-12-15 PROCEDURE — 85025 COMPLETE CBC W/AUTO DIFF WBC: CPT | Performed by: NURSE PRACTITIONER

## 2021-12-15 PROCEDURE — 97535 SELF CARE MNGMENT TRAINING: CPT

## 2021-12-15 PROCEDURE — 82140 ASSAY OF AMMONIA: CPT | Performed by: FAMILY MEDICINE

## 2021-12-15 PROCEDURE — 97116 GAIT TRAINING THERAPY: CPT

## 2021-12-15 PROCEDURE — 27000941

## 2021-12-15 RX ADMIN — MULTIPLE VITAMINS W/ MINERALS TAB 1 TABLET: TAB at 09:12

## 2021-12-15 RX ADMIN — LACTULOSE 20 G: 10 SOLUTION ORAL at 09:12

## 2021-12-15 RX ADMIN — AMLODIPINE BESYLATE 10 MG: 5 TABLET ORAL at 09:12

## 2021-12-15 RX ADMIN — ACETAMINOPHEN 650 MG: 325 TABLET ORAL at 09:12

## 2021-12-15 RX ADMIN — LACTULOSE 20 G: 10 SOLUTION ORAL at 02:12

## 2021-12-15 RX ADMIN — LOSARTAN POTASSIUM 100 MG: 50 TABLET, FILM COATED ORAL at 09:12

## 2021-12-15 RX ADMIN — RIFAXIMIN 550 MG: 550 TABLET ORAL at 09:12

## 2021-12-15 RX ADMIN — QUETIAPINE FUMARATE 50 MG: 25 TABLET ORAL at 09:12

## 2021-12-15 RX ADMIN — LACTULOSE 20 G: 10 SOLUTION ORAL at 05:12

## 2021-12-16 LAB — GLUCOSE SERPL-MCNC: 158 MG/DL (ref 70–105)

## 2021-12-16 PROCEDURE — 92526 ORAL FUNCTION THERAPY: CPT

## 2021-12-16 PROCEDURE — 97116 GAIT TRAINING THERAPY: CPT

## 2021-12-16 PROCEDURE — 27000941

## 2021-12-16 PROCEDURE — 25000003 PHARM REV CODE 250: Performed by: FAMILY MEDICINE

## 2021-12-16 PROCEDURE — 25000003 PHARM REV CODE 250: Performed by: NURSE PRACTITIONER

## 2021-12-16 PROCEDURE — 11000004 HC SNF PRIVATE

## 2021-12-16 PROCEDURE — 97110 THERAPEUTIC EXERCISES: CPT

## 2021-12-16 PROCEDURE — 82962 GLUCOSE BLOOD TEST: CPT

## 2021-12-16 RX ADMIN — RIFAXIMIN 550 MG: 550 TABLET ORAL at 09:12

## 2021-12-16 RX ADMIN — LACTULOSE 20 G: 10 SOLUTION ORAL at 09:12

## 2021-12-16 RX ADMIN — LOSARTAN POTASSIUM 100 MG: 50 TABLET, FILM COATED ORAL at 09:12

## 2021-12-16 RX ADMIN — MULTIPLE VITAMINS W/ MINERALS TAB 1 TABLET: TAB at 09:12

## 2021-12-16 RX ADMIN — LACTULOSE 20 G: 10 SOLUTION ORAL at 05:12

## 2021-12-16 RX ADMIN — QUETIAPINE FUMARATE 50 MG: 25 TABLET ORAL at 09:12

## 2021-12-16 RX ADMIN — AMLODIPINE BESYLATE 10 MG: 5 TABLET ORAL at 09:12

## 2021-12-16 RX ADMIN — LACTULOSE 20 G: 10 SOLUTION ORAL at 01:12

## 2021-12-17 LAB — GLUCOSE SERPL-MCNC: 133 MG/DL (ref 70–105)

## 2021-12-17 PROCEDURE — 92526 ORAL FUNCTION THERAPY: CPT

## 2021-12-17 PROCEDURE — 97110 THERAPEUTIC EXERCISES: CPT

## 2021-12-17 PROCEDURE — 25000003 PHARM REV CODE 250: Performed by: FAMILY MEDICINE

## 2021-12-17 PROCEDURE — 27000941

## 2021-12-17 PROCEDURE — 97535 SELF CARE MNGMENT TRAINING: CPT

## 2021-12-17 PROCEDURE — 82962 GLUCOSE BLOOD TEST: CPT

## 2021-12-17 PROCEDURE — 97116 GAIT TRAINING THERAPY: CPT | Mod: CQ

## 2021-12-17 PROCEDURE — 97110 THERAPEUTIC EXERCISES: CPT | Mod: CQ

## 2021-12-17 PROCEDURE — 25000003 PHARM REV CODE 250: Performed by: NURSE PRACTITIONER

## 2021-12-17 PROCEDURE — 97530 THERAPEUTIC ACTIVITIES: CPT | Mod: CQ

## 2021-12-17 PROCEDURE — 11000004 HC SNF PRIVATE

## 2021-12-17 PROCEDURE — 97530 THERAPEUTIC ACTIVITIES: CPT

## 2021-12-17 RX ADMIN — QUETIAPINE FUMARATE 50 MG: 25 TABLET ORAL at 09:12

## 2021-12-17 RX ADMIN — MULTIPLE VITAMINS W/ MINERALS TAB 1 TABLET: TAB at 09:12

## 2021-12-17 RX ADMIN — RIFAXIMIN 550 MG: 550 TABLET ORAL at 09:12

## 2021-12-17 RX ADMIN — AMLODIPINE BESYLATE 10 MG: 5 TABLET ORAL at 09:12

## 2021-12-17 RX ADMIN — LACTULOSE 20 G: 10 SOLUTION ORAL at 09:12

## 2021-12-17 RX ADMIN — LACTULOSE 20 G: 10 SOLUTION ORAL at 01:12

## 2021-12-17 RX ADMIN — LACTULOSE 20 G: 10 SOLUTION ORAL at 04:12

## 2021-12-18 LAB — GLUCOSE SERPL-MCNC: 100 MG/DL (ref 70–105)

## 2021-12-18 PROCEDURE — 25000003 PHARM REV CODE 250: Performed by: FAMILY MEDICINE

## 2021-12-18 PROCEDURE — 82962 GLUCOSE BLOOD TEST: CPT

## 2021-12-18 PROCEDURE — 11000004 HC SNF PRIVATE

## 2021-12-18 PROCEDURE — 25000003 PHARM REV CODE 250: Performed by: NURSE PRACTITIONER

## 2021-12-18 PROCEDURE — 27000941

## 2021-12-18 RX ADMIN — LACTULOSE 20 G: 10 SOLUTION ORAL at 12:12

## 2021-12-18 RX ADMIN — LACTULOSE 20 G: 10 SOLUTION ORAL at 09:12

## 2021-12-18 RX ADMIN — RIFAXIMIN 550 MG: 550 TABLET ORAL at 08:12

## 2021-12-18 RX ADMIN — LACTULOSE 20 G: 10 SOLUTION ORAL at 05:12

## 2021-12-18 RX ADMIN — RIFAXIMIN 550 MG: 550 TABLET ORAL at 09:12

## 2021-12-18 RX ADMIN — QUETIAPINE FUMARATE 50 MG: 25 TABLET ORAL at 08:12

## 2021-12-18 RX ADMIN — MULTIPLE VITAMINS W/ MINERALS TAB 1 TABLET: TAB at 08:12

## 2021-12-18 RX ADMIN — QUETIAPINE FUMARATE 50 MG: 25 TABLET ORAL at 09:12

## 2021-12-18 RX ADMIN — LACTULOSE 20 G: 10 SOLUTION ORAL at 08:12

## 2021-12-19 LAB — GLUCOSE SERPL-MCNC: 103 MG/DL (ref 70–105)

## 2021-12-19 PROCEDURE — 25000003 PHARM REV CODE 250: Performed by: NURSE PRACTITIONER

## 2021-12-19 PROCEDURE — 11000004 HC SNF PRIVATE

## 2021-12-19 PROCEDURE — 27000941

## 2021-12-19 PROCEDURE — 27000987 HC MATTRESS, MATRIX LOW PROFILE

## 2021-12-19 PROCEDURE — 82962 GLUCOSE BLOOD TEST: CPT

## 2021-12-19 PROCEDURE — 25000003 PHARM REV CODE 250: Performed by: FAMILY MEDICINE

## 2021-12-19 PROCEDURE — 92526 ORAL FUNCTION THERAPY: CPT

## 2021-12-19 PROCEDURE — 97530 THERAPEUTIC ACTIVITIES: CPT

## 2021-12-19 RX ADMIN — LACTULOSE 20 G: 10 SOLUTION ORAL at 01:12

## 2021-12-19 RX ADMIN — RIFAXIMIN 550 MG: 550 TABLET ORAL at 09:12

## 2021-12-19 RX ADMIN — QUETIAPINE FUMARATE 50 MG: 25 TABLET ORAL at 08:12

## 2021-12-19 RX ADMIN — LACTULOSE 20 G: 10 SOLUTION ORAL at 08:12

## 2021-12-19 RX ADMIN — QUETIAPINE FUMARATE 50 MG: 25 TABLET ORAL at 09:12

## 2021-12-19 RX ADMIN — LACTULOSE 20 G: 10 SOLUTION ORAL at 04:12

## 2021-12-19 RX ADMIN — LACTULOSE 20 G: 10 SOLUTION ORAL at 09:12

## 2021-12-19 RX ADMIN — MULTIPLE VITAMINS W/ MINERALS TAB 1 TABLET: TAB at 08:12

## 2021-12-19 RX ADMIN — AMLODIPINE BESYLATE 10 MG: 5 TABLET ORAL at 08:12

## 2021-12-19 RX ADMIN — RIFAXIMIN 550 MG: 550 TABLET ORAL at 08:12

## 2021-12-20 LAB — GLUCOSE SERPL-MCNC: 102 MG/DL (ref 70–105)

## 2021-12-20 PROCEDURE — 27000941

## 2021-12-20 PROCEDURE — 97530 THERAPEUTIC ACTIVITIES: CPT

## 2021-12-20 PROCEDURE — 97535 SELF CARE MNGMENT TRAINING: CPT

## 2021-12-20 PROCEDURE — 97110 THERAPEUTIC EXERCISES: CPT

## 2021-12-20 PROCEDURE — 25000003 PHARM REV CODE 250: Performed by: NURSE PRACTITIONER

## 2021-12-20 PROCEDURE — 97530 THERAPEUTIC ACTIVITIES: CPT | Mod: CQ

## 2021-12-20 PROCEDURE — 97110 THERAPEUTIC EXERCISES: CPT | Mod: CQ

## 2021-12-20 PROCEDURE — 92526 ORAL FUNCTION THERAPY: CPT

## 2021-12-20 PROCEDURE — 97116 GAIT TRAINING THERAPY: CPT | Mod: CQ

## 2021-12-20 PROCEDURE — 82962 GLUCOSE BLOOD TEST: CPT

## 2021-12-20 PROCEDURE — 25000003 PHARM REV CODE 250: Performed by: FAMILY MEDICINE

## 2021-12-20 PROCEDURE — 11000004 HC SNF PRIVATE

## 2021-12-20 RX ADMIN — QUETIAPINE FUMARATE 50 MG: 25 TABLET ORAL at 09:12

## 2021-12-20 RX ADMIN — LACTULOSE 20 G: 10 SOLUTION ORAL at 09:12

## 2021-12-20 RX ADMIN — LOSARTAN POTASSIUM 100 MG: 50 TABLET, FILM COATED ORAL at 09:12

## 2021-12-20 RX ADMIN — MULTIPLE VITAMINS W/ MINERALS TAB 1 TABLET: TAB at 09:12

## 2021-12-20 RX ADMIN — RIFAXIMIN 550 MG: 550 TABLET ORAL at 09:12

## 2021-12-20 RX ADMIN — LACTULOSE 20 G: 10 SOLUTION ORAL at 06:12

## 2021-12-20 RX ADMIN — AMLODIPINE BESYLATE 10 MG: 5 TABLET ORAL at 09:12

## 2021-12-20 RX ADMIN — LACTULOSE 20 G: 10 SOLUTION ORAL at 01:12

## 2021-12-21 VITALS
HEIGHT: 70 IN | RESPIRATION RATE: 18 BRPM | OXYGEN SATURATION: 100 % | BODY MASS INDEX: 23.01 KG/M2 | HEART RATE: 72 BPM | TEMPERATURE: 98 F | DIASTOLIC BLOOD PRESSURE: 45 MMHG | SYSTOLIC BLOOD PRESSURE: 102 MMHG | WEIGHT: 160.69 LBS

## 2021-12-21 PROBLEM — M62.81 MUSCLE WEAKNESS: Status: RESOLVED | Noted: 2021-11-26 | Resolved: 2021-12-21

## 2021-12-21 PROBLEM — K92.2 GASTROINTESTINAL HEMORRHAGE: Status: RESOLVED | Noted: 2021-11-05 | Resolved: 2021-12-21

## 2021-12-21 LAB
ALBUMIN SERPL BCP-MCNC: 2.4 G/DL (ref 3.5–5)
ALBUMIN/GLOB SERPL: 0.7 {RATIO}
ALP SERPL-CCNC: 84 U/L (ref 45–115)
ALT SERPL W P-5'-P-CCNC: 109 U/L (ref 16–61)
ANION GAP SERPL CALCULATED.3IONS-SCNC: 11 MMOL/L (ref 7–16)
ANISOCYTOSIS BLD QL SMEAR: ABNORMAL
AST SERPL W P-5'-P-CCNC: 51 U/L (ref 15–37)
BASOPHILS # BLD AUTO: 0.03 K/UL (ref 0–0.2)
BASOPHILS NFR BLD AUTO: 0.6 % (ref 0–1)
BILIRUB SERPL-MCNC: 0.4 MG/DL (ref 0–1.2)
BUN SERPL-MCNC: 18 MG/DL (ref 7–18)
BUN/CREAT SERPL: 25 (ref 6–20)
CALCIUM SERPL-MCNC: 7.7 MG/DL (ref 8.5–10.1)
CHLORIDE SERPL-SCNC: 102 MMOL/L (ref 98–107)
CO2 SERPL-SCNC: 28 MMOL/L (ref 21–32)
CREAT SERPL-MCNC: 0.72 MG/DL (ref 0.7–1.3)
DIFFERENTIAL METHOD BLD: ABNORMAL
EOSINOPHIL # BLD AUTO: 0.27 K/UL (ref 0–0.5)
EOSINOPHIL NFR BLD AUTO: 5.6 % (ref 1–4)
EOSINOPHIL NFR BLD MANUAL: 5 % (ref 1–4)
ERYTHROCYTE [DISTWIDTH] IN BLOOD BY AUTOMATED COUNT: 15 % (ref 11.5–14.5)
GLOBULIN SER-MCNC: 3.5 G/DL (ref 2–4)
GLUCOSE SERPL-MCNC: 178 MG/DL (ref 74–106)
GLUCOSE SERPL-MCNC: 94 MG/DL (ref 70–105)
HCT VFR BLD AUTO: 29.2 % (ref 40–54)
HGB BLD-MCNC: 9.5 G/DL (ref 13.5–18)
LYMPHOCYTES # BLD AUTO: 0.91 K/UL (ref 1–4.8)
LYMPHOCYTES NFR BLD AUTO: 18.9 % (ref 27–41)
LYMPHOCYTES NFR BLD MANUAL: 26 % (ref 27–41)
MACROCYTES BLD QL SMEAR: ABNORMAL
MCH RBC QN AUTO: 32.3 PG (ref 27–31)
MCHC RBC AUTO-ENTMCNC: 32.5 G/DL (ref 32–36)
MCV RBC AUTO: 99.3 FL (ref 80–96)
MONOCYTES # BLD AUTO: 0.38 K/UL (ref 0–0.8)
MONOCYTES NFR BLD AUTO: 7.9 % (ref 2–6)
MONOCYTES NFR BLD MANUAL: 3 % (ref 2–6)
MPC BLD CALC-MCNC: 10.1 FL (ref 9.4–12.4)
NEUTROPHILS # BLD AUTO: 3.22 K/UL (ref 1.8–7.7)
NEUTROPHILS NFR BLD AUTO: 67 % (ref 53–65)
NEUTS SEG NFR BLD MANUAL: 66 % (ref 50–62)
NRBC BLD MANUAL-RTO: ABNORMAL %
PLATELET # BLD AUTO: 144 K/UL (ref 150–400)
PLATELET MORPHOLOGY: ABNORMAL
POTASSIUM SERPL-SCNC: 4.7 MMOL/L (ref 3.5–5.1)
PROT SERPL-MCNC: 5.9 G/DL (ref 6.4–8.2)
RBC # BLD AUTO: 2.94 M/UL (ref 4.6–6.2)
SODIUM SERPL-SCNC: 136 MMOL/L (ref 136–145)
WBC # BLD AUTO: 4.81 K/UL (ref 4.5–11)

## 2021-12-21 PROCEDURE — 36415 COLL VENOUS BLD VENIPUNCTURE: CPT | Performed by: NURSE PRACTITIONER

## 2021-12-21 PROCEDURE — 27000941

## 2021-12-21 PROCEDURE — 25000003 PHARM REV CODE 250: Performed by: FAMILY MEDICINE

## 2021-12-21 PROCEDURE — 92526 ORAL FUNCTION THERAPY: CPT

## 2021-12-21 PROCEDURE — 82962 GLUCOSE BLOOD TEST: CPT

## 2021-12-21 PROCEDURE — 84075 ASSAY ALKALINE PHOSPHATASE: CPT | Performed by: NURSE PRACTITIONER

## 2021-12-21 PROCEDURE — 80053 COMPREHEN METABOLIC PANEL: CPT | Performed by: NURSE PRACTITIONER

## 2021-12-21 PROCEDURE — 85025 COMPLETE CBC W/AUTO DIFF WBC: CPT | Performed by: NURSE PRACTITIONER

## 2021-12-21 PROCEDURE — 25000003 PHARM REV CODE 250: Performed by: NURSE PRACTITIONER

## 2021-12-21 RX ADMIN — QUETIAPINE FUMARATE 50 MG: 25 TABLET ORAL at 08:12

## 2021-12-21 RX ADMIN — AMLODIPINE BESYLATE 10 MG: 5 TABLET ORAL at 08:12

## 2021-12-21 RX ADMIN — LACTULOSE 20 G: 10 SOLUTION ORAL at 08:12

## 2021-12-21 RX ADMIN — MULTIPLE VITAMINS W/ MINERALS TAB 1 TABLET: TAB at 08:12

## 2021-12-21 RX ADMIN — RIFAXIMIN 550 MG: 550 TABLET ORAL at 08:12

## 2021-12-21 RX ADMIN — LOSARTAN POTASSIUM 100 MG: 50 TABLET, FILM COATED ORAL at 08:12

## 2021-12-21 RX ADMIN — LACTULOSE 20 G: 10 SOLUTION ORAL at 01:12

## 2021-12-29 ENCOUNTER — TELEPHONE (OUTPATIENT)
Dept: GASTROENTEROLOGY | Facility: CLINIC | Age: 76
End: 2021-12-29
Payer: MEDICARE

## 2022-01-03 ENCOUNTER — OFFICE VISIT (OUTPATIENT)
Dept: GASTROENTEROLOGY | Facility: CLINIC | Age: 77
End: 2022-01-03
Payer: MEDICARE

## 2022-01-03 VITALS
HEART RATE: 67 BPM | BODY MASS INDEX: 23.1 KG/M2 | DIASTOLIC BLOOD PRESSURE: 55 MMHG | SYSTOLIC BLOOD PRESSURE: 107 MMHG | HEIGHT: 71 IN | OXYGEN SATURATION: 100 % | WEIGHT: 165 LBS | RESPIRATION RATE: 14 BRPM

## 2022-01-03 DIAGNOSIS — K26.9 DUODENAL ULCER: Primary | ICD-10-CM

## 2022-01-03 DIAGNOSIS — K22.10 ULCER OF ESOPHAGUS WITHOUT BLEEDING: ICD-10-CM

## 2022-01-03 PROCEDURE — 3074F PR MOST RECENT SYSTOLIC BLOOD PRESSURE < 130 MM HG: ICD-10-PCS | Mod: CPTII,,, | Performed by: NURSE PRACTITIONER

## 2022-01-03 PROCEDURE — 3074F SYST BP LT 130 MM HG: CPT | Mod: CPTII,,, | Performed by: NURSE PRACTITIONER

## 2022-01-03 PROCEDURE — 1101F PR PT FALLS ASSESS DOC 0-1 FALLS W/OUT INJ PAST YR: ICD-10-PCS | Mod: CPTII,,, | Performed by: NURSE PRACTITIONER

## 2022-01-03 PROCEDURE — 3288F PR FALLS RISK ASSESSMENT DOCUMENTED: ICD-10-PCS | Mod: CPTII,,, | Performed by: NURSE PRACTITIONER

## 2022-01-03 PROCEDURE — 99204 PR OFFICE/OUTPT VISIT, NEW, LEVL IV, 45-59 MIN: ICD-10-PCS | Mod: ,,, | Performed by: NURSE PRACTITIONER

## 2022-01-03 PROCEDURE — 3078F DIAST BP <80 MM HG: CPT | Mod: CPTII,,, | Performed by: NURSE PRACTITIONER

## 2022-01-03 PROCEDURE — 1101F PT FALLS ASSESS-DOCD LE1/YR: CPT | Mod: CPTII,,, | Performed by: NURSE PRACTITIONER

## 2022-01-03 PROCEDURE — 3078F PR MOST RECENT DIASTOLIC BLOOD PRESSURE < 80 MM HG: ICD-10-PCS | Mod: CPTII,,, | Performed by: NURSE PRACTITIONER

## 2022-01-03 PROCEDURE — 1111F DSCHRG MED/CURRENT MED MERGE: CPT | Mod: CPTII,,, | Performed by: NURSE PRACTITIONER

## 2022-01-03 PROCEDURE — 3288F FALL RISK ASSESSMENT DOCD: CPT | Mod: CPTII,,, | Performed by: NURSE PRACTITIONER

## 2022-01-03 PROCEDURE — 99204 OFFICE O/P NEW MOD 45 MIN: CPT | Mod: ,,, | Performed by: NURSE PRACTITIONER

## 2022-01-03 PROCEDURE — 1111F PR DISCHARGE MEDS RECONCILED W/ CURRENT OUTPATIENT MED LIST: ICD-10-PCS | Mod: CPTII,,, | Performed by: NURSE PRACTITIONER

## 2022-01-03 RX ORDER — PANTOPRAZOLE SODIUM 40 MG/1
40 TABLET, DELAYED RELEASE ORAL 2 TIMES DAILY
Qty: 180 TABLET | Refills: 3 | Status: SHIPPED | OUTPATIENT
Start: 2022-01-03 | End: 2023-10-25

## 2022-01-03 RX ORDER — FOLIC ACID-PYRIDOXINE-CYANOCOBALAMIN TAB 2.5-25-2 MG 2.5-25-2 MG
1 TAB ORAL DAILY
Status: ON HOLD | COMMUNITY
Start: 2021-12-21 | End: 2023-08-23 | Stop reason: HOSPADM

## 2022-01-03 NOTE — PROGRESS NOTES
Pt here for f/u. Admitted 11/23/21 for GIB, AMS. Ammonia initially elevated in 300s, quickly came down.   Of note, on Seroquel for dementia.   Was on rifaximin and lactulose in hospital. Rifaximin got rejected by insurance when d/c'ed.  No imaging supportive of cirrhosis that I see, just chronic thrombus of portal vein.  Initial BP here was 78/37 - asymptomatic. Rechecked lying down and was 107/55.   Pt has no symptoms today and no confusion in 3-4 weeks since being back home. He lives w/ wife at home. His son accompanies him today.    Colonoscopy from 6/21 with 1 polyp, diverticulosis, int hemorrhoids and rec repeat in 4 years. EGD 11/2020 for dysphagia showed gastritis and duodenitis. Dilation to 48Fr.    EGD 11/6/21 showed:  Multiple ulcers seen in the duodenal sweep. The largest was 15mm, with evidence of nonbleeding visible vessel. This was treated with bipolar coagulation. Random gastric biopsies obtained. Long, linear clean based ulcer seen in the esophagus. Biopsied to rule out HSV/CMV. Suspect this esophageal ulceration secondary to NGT pressure.  A. Stomach ulcer, biopsy:  - Antral mucosa with mild chronic gastritis and superficial erosion  - Helicobacter pylori immunohistochemistry is negative  - CMV immunohistochemistry is negative     B. Esophagus, biopsy:  - Squamous mucosa with mild reflux-type changes  - No intraepithelial eosinophils are identified     CT 11/10/21:  No focal liver lesion.  Cavernous transformation of the portal vein indicative of chronic thrombus.     Consolidation right lower lobe may represent developing pneumonia or aspiration given altered mental status    Went to [facility] after hospital stay. Has home health following now.  Failed 1st swallowing eval, 2nd set up for this week.    Past Medical History:   Diagnosis Date    Diabetes mellitus     Diverticulitis     Hyperlipemia     Hypertension     Hypokalemia 11/9/2021       Review of Systems   Constitutional: Negative for  chills and fever.   Respiratory: Negative for shortness of breath.    Cardiovascular: Negative for chest pain.   Gastrointestinal: Negative for abdominal pain, blood in stool, constipation, diarrhea, melena, nausea and vomiting.   Skin: Negative for rash.   Neurological: Negative for weakness.     Physical Exam  Vitals reviewed. Exam conducted with a chaperone present.   Constitutional:       Appearance: Normal appearance.   HENT:      Head: Normocephalic.   Eyes:      General: No scleral icterus.     Pupils: Pupils are equal, round, and reactive to light.   Cardiovascular:      Rate and Rhythm: Normal rate.      Pulses: Normal pulses.   Pulmonary:      Effort: Pulmonary effort is normal.   Abdominal:      General: Abdomen is flat. There is no distension.      Palpations: Abdomen is soft.      Tenderness: There is no abdominal tenderness. There is no guarding or rebound.   Musculoskeletal:         General: No swelling.   Skin:     General: Skin is warm and dry.      Coloration: Skin is not jaundiced.   Neurological:      General: No focal deficit present.      Mental Status: He is alert and oriented to person, place, and time.   Psychiatric:         Mood and Affect: Mood normal.         Behavior: Behavior normal.         Thought Content: Thought content normal.         Judgment: Judgment normal.       Plan  -avoid NSAIDs  -Protonix 40 mg BID x8-12 weeks for duodenal and esophageal ulcers  -may stop rifaximin - no evidence of HE  -may cut down/stop lactulose - may use for constipation but pt is not encephalopathic & reports some diarrhea at home  -F/u PCP for BP med regulation - pt hypotensive  -RTC 3 months, sooner PRN - will recheck labs, abd imaging at that time

## 2022-01-03 NOTE — PATIENT INSTRUCTIONS
Plan  -avoid NSAIDs  -Protonix 40 mg BID x8-12 weeks for duodenal and esophageal ulcers  -may stop rifaximin - no evidence of HE  -may cut down/stop lactulose - may use for constipation but pt is not encephalopathic  -F/u PCP for BP med regulation - pt hypotensive  -RTC 3 months, sooner PRN - will recheck labs, abd imaging at that time

## 2022-01-24 PROBLEM — E66.9 OBESITY: Chronic | Status: RESOLVED | Noted: 2022-01-24 | Resolved: 2022-01-24

## 2022-01-24 PROBLEM — U07.1 COVID-19: Status: ACTIVE | Noted: 2022-01-24

## 2022-01-24 PROBLEM — C92.01 ACUTE MYELOID LEUKEMIA IN REMISSION: Chronic | Status: ACTIVE | Noted: 2022-01-24

## 2022-01-24 PROBLEM — C92.01 ACUTE MYELOID LEUKEMIA IN REMISSION: Chronic | Status: RESOLVED | Noted: 2022-01-24 | Resolved: 2022-01-24

## 2022-01-24 PROBLEM — I25.10 CORONARY ARTERY DISEASE: Chronic | Status: ACTIVE | Noted: 2022-01-24

## 2022-01-24 PROBLEM — E66.9 OBESITY: Chronic | Status: ACTIVE | Noted: 2022-01-24

## 2022-01-25 ENCOUNTER — OFFICE VISIT (OUTPATIENT)
Dept: SURGERY | Facility: CLINIC | Age: 77
End: 2022-01-25
Attending: SURGERY
Payer: MEDICARE

## 2022-01-25 DIAGNOSIS — K94.23 MALFUNCTION OF PERCUTANEOUS ENDOSCOPIC GASTROSTOMY (PEG) TUBE: Primary | ICD-10-CM

## 2022-01-25 PROCEDURE — 17250 CHEM CAUT OF GRANLTJ TISSUE: CPT | Mod: PBBFAC | Performed by: SURGERY

## 2022-01-25 PROCEDURE — 1160F RVW MEDS BY RX/DR IN RCRD: CPT | Mod: CPTII,,, | Performed by: SURGERY

## 2022-01-25 PROCEDURE — 17250 PR CHEM CAUTERY GRANULATN TISSUE: ICD-10-PCS | Mod: S$PBB,,, | Performed by: SURGERY

## 2022-01-25 PROCEDURE — 99213 OFFICE O/P EST LOW 20 MIN: CPT | Mod: S$PBB,25,, | Performed by: SURGERY

## 2022-01-25 PROCEDURE — 99213 PR OFFICE/OUTPT VISIT, EST, LEVL III, 20-29 MIN: ICD-10-PCS | Mod: S$PBB,25,, | Performed by: SURGERY

## 2022-01-25 PROCEDURE — 1159F PR MEDICATION LIST DOCUMENTED IN MEDICAL RECORD: ICD-10-PCS | Mod: CPTII,,, | Performed by: SURGERY

## 2022-01-25 PROCEDURE — 1159F MED LIST DOCD IN RCRD: CPT | Mod: CPTII,,, | Performed by: SURGERY

## 2022-01-25 PROCEDURE — 1160F PR REVIEW ALL MEDS BY PRESCRIBER/CLIN PHARMACIST DOCUMENTED: ICD-10-PCS | Mod: CPTII,,, | Performed by: SURGERY

## 2022-01-25 PROCEDURE — 17250 CHEM CAUT OF GRANLTJ TISSUE: CPT | Mod: S$PBB,,, | Performed by: SURGERY

## 2022-01-25 PROCEDURE — 99213 OFFICE O/P EST LOW 20 MIN: CPT | Mod: PBBFAC | Performed by: SURGERY

## 2022-01-25 NOTE — PROGRESS NOTES
General Surgery Progress Note    Subjective:      Patient ID: Danny Flood is a 76 y.o. male.    Chief Complaint: Wound Check      HPI:  76-year-old male status post PEG tube placement few months ago.  Comes in currently for some leakage in some bleeding and some red tissue around the insertion site.  Otherwise he has been slowly eating more by mouth.  Is still using the PEG tube for supplemental feeds however they are slowly weaning him from the PEG tube use.  Currently no fever chills.  Drainage is minimal and is been putting a dressing around it.    Past Medical History:   Diagnosis Date    Diabetes mellitus     Diverticulitis     Hyperlipemia     Hypertension     Hypokalemia 11/9/2021     Past Surgical History:   Procedure Laterality Date    COLON SURGERY      partial colon resection    HERNIA REPAIR       Social History     Socioeconomic History    Marital status:    Tobacco Use    Smoking status: Never Smoker    Smokeless tobacco: Never Used   Substance and Sexual Activity    Alcohol use: Never    Drug use: Never    Sexual activity: Not Currently         Current Outpatient Medications:     amLODIPine (NORVASC) 10 MG tablet, Take 1 tablet (10 mg total) by mouth once daily., Disp: 30 tablet, Rfl: 11    FOLBIC 2.5-25-2 mg Tab, Take 1 tablet by mouth once daily., Disp: , Rfl:     FOLIC ACID-VITAMIN B6-VIT B12 ORAL, Take 1 tablet by mouth once daily., Disp: , Rfl:     Lactobacillus acidophilus (PROBIOTIC) 10 billion cell Cap, Take by mouth., Disp: , Rfl:     lactulose (CHRONULAC) 10 gram/15 mL solution, Take 20 g by mouth 3 (three) times daily., Disp: , Rfl:     losartan (COZAAR) 100 MG tablet, Take 1 tablet (100 mg total) by mouth once daily., Disp: 90 tablet, Rfl: 3    niacin (SLO-NIACIN) 500 mg tablet, Take 500 mg by mouth once daily. With meal, Disp: , Rfl:     omega-3 fatty acids/fish oil (FISH OIL-OMEGA-3 FATTY ACIDS)  300-1,000 mg capsule, Take 1 capsule by mouth once daily., Disp: , Rfl:     pantoprazole (PROTONIX) 40 MG tablet, Take 1 tablet (40 mg total) by mouth 2 (two) times daily., Disp: 180 tablet, Rfl: 3    QUEtiapine (SEROQUEL) 50 MG tablet, 1 tablet (50 mg total) by Per NG tube route 2 (two) times daily., Disp: 60 tablet, Rfl: 11    rifAXIMin (XIFAXAN) 550 mg Tab, 1 tablet (550 mg total) by Per G Tube route 2 (two) times daily., Disp: 60 tablet, Rfl: 0  Review of patient's allergies indicates:  No Known Allergies    There were no vitals taken for this visit.    Review of Systems   Constitutional: Positive for weight gain. Negative for chills, fever and weight loss.   Eyes: Negative for blurred vision.   Cardiovascular: Negative for chest pain, claudication and palpitations.   Respiratory: Negative for cough and shortness of breath.    Musculoskeletal: Negative for muscle weakness.   Gastrointestinal: Negative for abdominal pain, diarrhea, nausea and vomiting.   Neurological: Negative for numbness and paresthesias.         Objective:     Constitutional: Well, No apparent distress  Mental Status: Alert and oriented  x 3  Eyes: Normal sclera, normal eyelid  Neck: Trachea midline, no masses on neck exam  Respiratory: Clear to auscultation bilaterally with no wheezes/crackles/cough  Cardiac: Regular rate and rhythm, no murmur, rub, or gallops  Abdominal: Soft, non-tender, non-distented  Hernia: No appreciated hernias  Musculoskeletal: 5/5 strength no weakess no decreased range of montion  Neurologic: Cranial nerves II - XII intact  PEG tube site with some granulation tissue in the inferior aspect    Labs/ Imaging:   BMP:   Lab Results   Component Value Date/Time     (H) 12/21/2021 07:05 AM    CO2 28 12/21/2021 07:05 AM    BUN 18 12/21/2021 07:05 AM    CREATININE 0.72 12/21/2021 07:05 AM    CALCIUM 7.7 (L) 12/21/2021 07:05 AM    MG 2.4 (H) 11/13/2021 04:55 AM     CMP:   Lab Results   Component Value Date/Time    GLU  178 (H) 12/21/2021 07:05 AM    CALCIUM 7.7 (L) 12/21/2021 07:05 AM    ALBUMIN 2.4 (L) 12/21/2021 07:05 AM    PROT 5.9 (L) 12/21/2021 07:05 AM     12/21/2021 07:05 AM    K 4.7 12/21/2021 07:05 AM    CO2 28 12/21/2021 07:05 AM     12/21/2021 07:05 AM    BUN 18 12/21/2021 07:05 AM    CREATININE 0.72 12/21/2021 07:05 AM    ALKPHOS 84 12/21/2021 07:05 AM     (H) 12/21/2021 07:05 AM    AST 51 (H) 12/21/2021 07:05 AM    BILITOT 0.4 12/21/2021 07:05 AM           Assessment:             1. Malfunction of percutaneous endoscopic gastrostomy (PEG) tube          Plan:          No follow-ups on file.      silver nitrate applied to the granulation tissue in the inferior aspect.  Patient told to come back and see me once they weaned from the PEG tube completely for at least a week or 2 and then will remove the PEG tube in clinic.  Told to place no dressing underneath the flange to prevent continued leakage

## 2022-02-10 ENCOUNTER — OFFICE VISIT (OUTPATIENT)
Dept: SURGERY | Facility: CLINIC | Age: 77
End: 2022-02-10
Attending: SURGERY
Payer: MEDICARE

## 2022-02-10 VITALS — WEIGHT: 200 LBS | HEIGHT: 71 IN | BODY MASS INDEX: 28 KG/M2

## 2022-02-10 DIAGNOSIS — K94.23 MALFUNCTION OF PERCUTANEOUS ENDOSCOPIC GASTROSTOMY (PEG) TUBE: Primary | ICD-10-CM

## 2022-02-10 PROCEDURE — 1159F MED LIST DOCD IN RCRD: CPT | Mod: CPTII,,, | Performed by: SURGERY

## 2022-02-10 PROCEDURE — 99213 PR OFFICE/OUTPT VISIT, EST, LEVL III, 20-29 MIN: ICD-10-PCS | Mod: S$PBB,,, | Performed by: SURGERY

## 2022-02-10 PROCEDURE — 1159F PR MEDICATION LIST DOCUMENTED IN MEDICAL RECORD: ICD-10-PCS | Mod: CPTII,,, | Performed by: SURGERY

## 2022-02-10 PROCEDURE — 99213 OFFICE O/P EST LOW 20 MIN: CPT | Mod: PBBFAC | Performed by: SURGERY

## 2022-02-10 PROCEDURE — 99213 OFFICE O/P EST LOW 20 MIN: CPT | Mod: S$PBB,,, | Performed by: SURGERY

## 2022-02-10 NOTE — PROGRESS NOTES
General Surgery Progress Note    Subjective:      Patient ID: Danny Flood is a 76 y.o. male.    Chief Complaint: Pre-op Exam (Peg tube removal)      HPI:  Patient here for PEG tube removal consultation.  Was here for some granulation tissue few weeks ago but since then really has not used his PEG tube in over a week.  Tolerating everything by mouth.  No abdominal pain.  No leakage around the PEG tube.  He would like to have it out removed.  PEG has been in for a few months now.    Past Medical History:   Diagnosis Date    Diabetes mellitus     Diverticulitis     Hyperlipemia     Hypertension     Hypokalemia 11/9/2021     Past Surgical History:   Procedure Laterality Date    COLON SURGERY      partial colon resection    HERNIA REPAIR       Social History     Socioeconomic History    Marital status:    Tobacco Use    Smoking status: Never Smoker    Smokeless tobacco: Never Used   Substance and Sexual Activity    Alcohol use: Never    Drug use: Never    Sexual activity: Not Currently         Current Outpatient Medications:     amLODIPine (NORVASC) 10 MG tablet, Take 1 tablet (10 mg total) by mouth once daily., Disp: 30 tablet, Rfl: 11    FOLBIC 2.5-25-2 mg Tab, Take 1 tablet by mouth once daily., Disp: , Rfl:     FOLIC ACID-VITAMIN B6-VIT B12 ORAL, Take 1 tablet by mouth once daily., Disp: , Rfl:     Lactobacillus acidophilus (PROBIOTIC) 10 billion cell Cap, Take by mouth., Disp: , Rfl:     lactulose (CHRONULAC) 10 gram/15 mL solution, Take 20 g by mouth 3 (three) times daily., Disp: , Rfl:     losartan (COZAAR) 100 MG tablet, Take 1 tablet (100 mg total) by mouth once daily., Disp: 90 tablet, Rfl: 3    niacin (SLO-NIACIN) 500 mg tablet, Take 500 mg by mouth once daily. With meal, Disp: , Rfl:     omega-3 fatty acids/fish oil (FISH OIL-OMEGA-3 FATTY ACIDS) 300-1,000 mg capsule, Take 1 capsule by mouth once daily., Disp: , Rfl:  "    pantoprazole (PROTONIX) 40 MG tablet, Take 1 tablet (40 mg total) by mouth 2 (two) times daily., Disp: 180 tablet, Rfl: 3    QUEtiapine (SEROQUEL) 50 MG tablet, 1 tablet (50 mg total) by Per NG tube route 2 (two) times daily., Disp: 60 tablet, Rfl: 11    rifAXIMin (XIFAXAN) 550 mg Tab, 1 tablet (550 mg total) by Per G Tube route 2 (two) times daily., Disp: 60 tablet, Rfl: 0  Review of patient's allergies indicates:  No Known Allergies    Ht 5' 11" (1.803 m)   Wt 90.7 kg (200 lb)   BMI 27.89 kg/m²     Review of Systems   Constitutional: Negative for chills, fever, weight gain and weight loss.   Eyes: Negative for blurred vision.   Cardiovascular: Negative for chest pain, claudication and palpitations.   Respiratory: Negative for cough and shortness of breath.    Musculoskeletal: Negative for muscle weakness.   Gastrointestinal: Negative for abdominal pain, diarrhea, nausea and vomiting.   Neurological: Negative for numbness and paresthesias.         Objective:     Constitutional: Well, No apparent distress  Mental Status: Alert and oriented  x 3  Eyes: Normal sclera, normal eyelid  Neck: Trachea midline, no masses on neck exam  Respiratory: Clear to auscultation bilaterally with no wheezes/crackles/cough  Cardiac: Regular rate and rhythm, no murmur, rub, or gallops  Abdominal: Soft, non-tender, non-distented, PEG tube in place.  Mild 2nd degree skin erosions from the PEG tube being a little tight.  Little better granulation tissue around the tract as well.  Hernia: No appreciated hernias  Musculoskeletal: 5/5 strength no weakess no decreased range of montion  Neurologic: Cranial nerves II - XII intact    Labs/ Imaging:   CBC:   Lab Results   Component Value Date/Time    WBC 4.81 12/21/2021 07:05 AM    RBC 2.94 (L) 12/21/2021 07:05 AM    HGB 9.5 (L) 12/21/2021 07:05 AM    HCT 29.2 (L) 12/21/2021 07:05 AM     (L) 12/21/2021 07:05 AM    MCV 99.3 (H) 12/21/2021 07:05 AM    MCH 32.3 (H) 12/21/2021 07:05 AM "    MCHC 32.5 12/21/2021 07:05 AM     BMP:   Lab Results   Component Value Date/Time     (H) 12/21/2021 07:05 AM    CO2 28 12/21/2021 07:05 AM    BUN 18 12/21/2021 07:05 AM    CREATININE 0.72 12/21/2021 07:05 AM    CALCIUM 7.7 (L) 12/21/2021 07:05 AM    MG 2.4 (H) 11/13/2021 04:55 AM         Assessment:             1. Malfunction of percutaneous endoscopic gastrostomy (PEG) tube          Plan:          No follow-ups on file.      will pull the PEG tube today.  Bandages applied.  Patient tolerated the procedure well.

## 2022-07-11 ENCOUNTER — OFFICE VISIT (OUTPATIENT)
Dept: UROLOGY | Facility: CLINIC | Age: 77
End: 2022-07-11
Payer: MEDICARE

## 2022-07-11 VITALS
DIASTOLIC BLOOD PRESSURE: 77 MMHG | BODY MASS INDEX: 24.65 KG/M2 | HEIGHT: 72 IN | SYSTOLIC BLOOD PRESSURE: 132 MMHG | WEIGHT: 182 LBS | HEART RATE: 79 BPM

## 2022-07-11 DIAGNOSIS — N41.1 CHRONIC PROSTATITIS: ICD-10-CM

## 2022-07-11 DIAGNOSIS — N40.1 BENIGN PROSTATIC HYPERPLASIA WITH URINARY OBSTRUCTION: ICD-10-CM

## 2022-07-11 DIAGNOSIS — R97.20 ELEVATED PROSTATE SPECIFIC ANTIGEN (PSA): Primary | ICD-10-CM

## 2022-07-11 DIAGNOSIS — N32.0 BLADDER OUTLET OBSTRUCTION: ICD-10-CM

## 2022-07-11 DIAGNOSIS — N13.8 BENIGN PROSTATIC HYPERPLASIA WITH URINARY OBSTRUCTION: ICD-10-CM

## 2022-07-11 PROCEDURE — 99213 PR OFFICE/OUTPT VISIT, EST, LEVL III, 20-29 MIN: ICD-10-PCS | Mod: S$PBB,,, | Performed by: UROLOGY

## 2022-07-11 PROCEDURE — 3075F SYST BP GE 130 - 139MM HG: CPT | Mod: CPTII,,, | Performed by: UROLOGY

## 2022-07-11 PROCEDURE — 1126F AMNT PAIN NOTED NONE PRSNT: CPT | Mod: CPTII,,, | Performed by: UROLOGY

## 2022-07-11 PROCEDURE — 99213 OFFICE O/P EST LOW 20 MIN: CPT | Mod: S$PBB,,, | Performed by: UROLOGY

## 2022-07-11 PROCEDURE — 3078F DIAST BP <80 MM HG: CPT | Mod: CPTII,,, | Performed by: UROLOGY

## 2022-07-11 PROCEDURE — 3075F PR MOST RECENT SYSTOLIC BLOOD PRESS GE 130-139MM HG: ICD-10-PCS | Mod: CPTII,,, | Performed by: UROLOGY

## 2022-07-11 PROCEDURE — 3288F PR FALLS RISK ASSESSMENT DOCUMENTED: ICD-10-PCS | Mod: CPTII,,, | Performed by: UROLOGY

## 2022-07-11 PROCEDURE — 1101F PR PT FALLS ASSESS DOC 0-1 FALLS W/OUT INJ PAST YR: ICD-10-PCS | Mod: CPTII,,, | Performed by: UROLOGY

## 2022-07-11 PROCEDURE — 1126F PR PAIN SEVERITY QUANTIFIED, NO PAIN PRESENT: ICD-10-PCS | Mod: CPTII,,, | Performed by: UROLOGY

## 2022-07-11 PROCEDURE — 3288F FALL RISK ASSESSMENT DOCD: CPT | Mod: CPTII,,, | Performed by: UROLOGY

## 2022-07-11 PROCEDURE — 1101F PT FALLS ASSESS-DOCD LE1/YR: CPT | Mod: CPTII,,, | Performed by: UROLOGY

## 2022-07-11 PROCEDURE — 99213 OFFICE O/P EST LOW 20 MIN: CPT | Mod: PBBFAC | Performed by: UROLOGY

## 2022-07-11 PROCEDURE — 3078F PR MOST RECENT DIASTOLIC BLOOD PRESSURE < 80 MM HG: ICD-10-PCS | Mod: CPTII,,, | Performed by: UROLOGY

## 2022-07-11 NOTE — PROGRESS NOTES
Subjective:       Patient ID: Danny Flood is a 77 y.o. male.    Chief Complaint: Follow-up (One year PSA and office visit. )    History of Present Illness  General  PSA elevation  Elevated PSA:  This is the first vist for elevated PSA, PSA date and result that orginated the visit 4.87 on February 6 2013, Prior PSA Results 3.96 July 25, 2012, Family history of prostate cancer: none, AUA SI results and date 0 on April 18, 2013, No dysuria, No hematuria, Erectile dysfunction (ED), No weight loss, No anorexia, No bone pain, No history of prostatitis, No history of stones, No history of infections, No history of bladder cancer  Mr. Flood is 68 years old. Sent by Dr. Watson for PSA elevation of 4.87. No real voiding problems and I PSS score totals zero. 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 The old chart is pending but cannot be found yet. The  surgery by Dr. Lindquist, probably contributed to by me.    The [facility] group chart was eventually found. The patient was previously seen in 1983 when we did a vasectomy on him and 1992 when he presented with pneumaturia. He also had recurrent urinary tract infections at that time. Seen at that time when Dr. Dandre Medrano was in [place] and referred the patient. Patient was found to have the fistula as mentioned and indeed did work with Dr. lindquist for care of the problem. He had a segmental cystectomy and colon resection on October 21, 1992. Findings were compatible with diverticulitis with fistula. His had no further problem with that and no other urinary tract infections.  I PSS score totals zero and the patient says he has no trouble with urination. He does have erectile dysfunction problems and would like to try PDE 5 inhibitors. This is most likely related to complications of diabetes and peripheral vascular disease.    Prostate biopsies were performed on the above visit of April 18, 2013. These showed that the patient did have some acute and  chronic prostatitis plus high-grade PIN. In now for followup. PSA in August was 10.590. Stable with voiding without any new problems.  (October 28, 2013)    Mr. Flood is in for six-month follow-up for PSA elevation. Previous biopsy above.  PSA done last week was 4.480 which is down from what it had been running. He is voiding as well as he has in recent past and had no increased trouble. He was also found to have a low white count of 2900 along with a slightly low hemoglobin and hematocrit and low platelet count of 132,000 during the same lab testing. He is doing okay clinically without worsening obstructive symptoms.  (April 29, 2014)    Mr. Flood is in for a 6 month checkup. Dr. Watson checked his PSA last month and it is 5.22. He still has a low white count and a low platelet count for unknown reason. Hemoglobin and hematocrit are 13.5 g/39.9%. Platelet count was only 116,000 when this was done October 31. Feeling okay and voiding okay. He needs careful follow-up because of high-grade PIN.  (November 11, 2014)    Mr. Flood is in for six-month follow-up with PSA that continues to be slightly elevated. He is generally doing well and voiding is stable. PSA is actually down from the value of 6 months ago. No new health issues and he is stable with the way he voids.  (May 13 2015)    Mr. Flood is in for six-month follow-up for chronically elevated PSA. He had blood drawn in [place] last week and was under the impression he was having his PSA done. This was not ordered and he only had a complete metabolic profile done and is too late for them to use the blood that was drawn that day to be a valid study. He is doing okay with voiding with nocturia one or 2 times nightly usually. No new health issues. (November 18, 2015)    Mr. Flood is in for six-month follow-up for PSA elevation. PSA down slightly to 6.15 compared what it was 6 months ago. He had biopsies of his prostate in 2013. These were negative. He has  been voiding better than he did for a while and had nocturia zero times nightly recently. No new health problems otherwise.  (May 23, 2016)    Mr. Flood is here for Six-month follow-up for PSA elevation. PSA down slightly to 5.08. Basically stable with voiding with nocturia 0-2 times nightly. Feels he is doing about as well as he has in recent years  (November 29, 2016)    Mr. Flood is in for first visit in one year. Voiding okay with no new problems. He feels he needs help with his sex life but does not need any extra help with the way he is voiding. Had his PSA done on November 16 and it was elevated still at 6.140. Overall reasonably good year. (November 28, 2017)    Mr. Flood is in for his one-year follow-up visit for chronic PSA elevation and prostatic hyperplasia. He had his PSA done a month ago at Dr. Fraser's office and it was 5.620 which is lower than last year. Overall has had a satisfactory year and feels he is voiding as well as he has in recent years. No new medical issues.  (December 3, 2018)    Mr. Flood is in for his first checkup in slightly over one year. He is stable with voiding with nocturia 2-3 times nightly. He does not feel like things are any worse than they were last time he was here. He had his PSA done in November and it is relatively stable at 6.07. Overall satisfactory year without any new health problems,.  (January 20, 2020)    Mr. Flood is in for yearly checkup with PSA. He has had PSA elevation for a long time. Clinically doing okay with no worsening bladder outlet obstructive symptoms. Typically nocturia 1-2 times nightly. He has had significant weight loss of 19 pounds since last year and says he is not eating as much as he used to. Feels well in general without any new major complaints. No hospitalizations or other new problems.  (February 2, 2021)      PSA Results:  Past PSA Results   PSA was 6.930 February 2, 2021  PSA was 6.07 on November 15, 2019  PSA was 5.620 on  November 2, 2018  PSA was 6.140 on November 16, 2017  PSA was 5.080 on 11/18/2016, Past PSA Results   PSA was 6.150 on 5/17/2016  PSA was 6.420 on 11/18/2015  PSA was 4.780 on 5/5/2015  PSA was 5.220 on 10/31/2014  PSA was 4.480 on April 24, 2014  PSA was 10.590 on August 6, 2013  PSA was 4.87 on February 6, 2013  PSA was 3.96 on July 25, 2012  -----------------------------------------------------------------------------------------------------------------------------------------------------------------------------------------------------------------------------------------------------------------------------------------------------------------------------------  The notes above are from the old electronic medical record system.      PSA was 7.76 on July 5, 2022    Mr. Flood has had a very eventful year because he had GI bleed in November and was hospitalized most of November and December.  Had a PEG tube for a while.  Apparently was felt to be bleeding from a peptic ulcer.  He has recovered from that now and is doing reasonably well.  Seems to be voiding as well as he has in recent years.  No visible blood in the urine etc..  PSA was done by Dr. Valencia last week and was 7.76.  That is in range that he normally runs.  No biopsy since 2013.  Voiding as well as he has in recent years, usually with nocturia only x1.  (July 11, 2022)        Review of Systems      Objective:      Physical Exam  Constitutional:       Appearance: Normal appearance. He is normal weight.   Genitourinary:     Prostate: Normal.      Rectum: Normal.      Comments: Prostate gland is estimated 30-40 g smooth fairly symmetrical and probably benign.  There is adhesion of the foreskin to the glans on the ventral side of the penis going down to the frenulum.  I tried to bluntly retract that with partial success.    He will work on that problem himself with Q tips  Neurological:      Mental Status: He is alert.   Psychiatric:         Mood and Affect:  Mood normal.         Behavior: Behavior normal.       urinalysis revealed 1-2 pus cells per high-powered field and 2-4 red cells.  The dipstick had 1+ leukocytes, moderate amount of blood, pH of 5.0 and specific gravity 1.015.  Assessment:       Problem List Items Addressed This Visit    None     Visit Diagnoses     Elevated prostate specific antigen (PSA)    -  Primary    Relevant Orders    PSA, Total (Diagnostic)    Chronic prostatitis        Benign prostatic hyperplasia with urinary obstruction        Bladder outlet obstruction              Plan:     Discussed PSA with patient.  I think it is stable enough that he does not need to have another biopsy at this time.  Seems to be doing better considering his recent health problems.  He does not want medication yet help with bladder outlet obstruction.  One year appointment with PSA or sooner if needed.  *

## 2022-07-12 NOTE — PATIENT INSTRUCTIONS
Discussed PSA with patient.  I think it is stable enough that he does not need to have another biopsy at this time.  Seems to be doing better considering his recent health problems.  He does not want medication yet help with bladder outlet obstruction.  One year appointment with PSA or sooner if needed.

## 2023-03-27 ENCOUNTER — OFFICE VISIT (OUTPATIENT)
Dept: SURGERY | Facility: CLINIC | Age: 78
End: 2023-03-27
Attending: SURGERY
Payer: MEDICARE

## 2023-03-27 DIAGNOSIS — T85.79XA INFECTED HERNIOPLASTY MESH, INITIAL ENCOUNTER: Primary | ICD-10-CM

## 2023-03-27 PROCEDURE — 99214 PR OFFICE/OUTPT VISIT, EST, LEVL IV, 30-39 MIN: ICD-10-PCS | Mod: S$PBB,,, | Performed by: SURGERY

## 2023-03-27 PROCEDURE — 1159F MED LIST DOCD IN RCRD: CPT | Mod: CPTII,,, | Performed by: SURGERY

## 2023-03-27 PROCEDURE — 99214 OFFICE O/P EST MOD 30 MIN: CPT | Mod: S$PBB,,, | Performed by: SURGERY

## 2023-03-27 PROCEDURE — 1159F PR MEDICATION LIST DOCUMENTED IN MEDICAL RECORD: ICD-10-PCS | Mod: CPTII,,, | Performed by: SURGERY

## 2023-03-27 PROCEDURE — 99213 OFFICE O/P EST LOW 20 MIN: CPT | Mod: PBBFAC | Performed by: SURGERY

## 2023-03-27 NOTE — PROGRESS NOTES
General Surgery History and Physical      Patient ID: Danny Flood is a 78 y.o. male.    Chief Complaint: Hernia      HPI:  78-year-old male referred for history of mesh infection.  He had apparently ventral hernia repair with mesh approximately 15-20 years ago with Dr. Lindquist.  He is doing pretty well until a couple of months ago back in January where he had some swelling pain and redness after he fell.  He went to [facility] where they diagnosed him with a mesh infection they had to open and drain the area out and since then he has been doing pretty well he has been packing it once a day after the go really no symptoms right now no abdominal pain no redness no drainage no soreness no fevers no chills currently they have been packing it and there was a very superficial 1 x 1 cm opening.    Review of Systems   Constitutional:  Negative for activity change, appetite change, fatigue and fever.   HENT:  Negative for trouble swallowing.    Respiratory:  Negative for cough and shortness of breath.    Cardiovascular:  Negative for chest pain and palpitations.   Gastrointestinal:  Negative for abdominal distention, abdominal pain, blood in stool, constipation and diarrhea.   Genitourinary:  Negative for flank pain.   Musculoskeletal:  Negative for neck pain and neck stiffness.   Neurological:  Negative for weakness.     Current Outpatient Medications   Medication Sig Dispense Refill    amLODIPine (NORVASC) 10 MG tablet Take 1 tablet (10 mg total) by mouth once daily. 30 tablet 11    FOLBIC 2.5-25-2 mg Tab Take 1 tablet by mouth once daily.      FOLIC ACID-VITAMIN B6-VIT B12 ORAL Take 1 tablet by mouth once daily.      Lactobacillus acidophilus (PROBIOTIC) 10 billion cell Cap Take by mouth.      lactulose (CHRONULAC) 10 gram/15 mL solution Take 20 g by mouth 3 (three) times daily.      losartan (COZAAR) 100 MG tablet Take 1  tablet (100 mg total) by mouth once daily. 90 tablet 3    niacin (SLO-NIACIN) 500 mg tablet Take 500 mg by mouth once daily. With meal      omega-3 fatty acids/fish oil (FISH OIL-OMEGA-3 FATTY ACIDS) 300-1,000 mg capsule Take 1 capsule by mouth once daily.      pantoprazole (PROTONIX) 40 MG tablet Take 1 tablet (40 mg total) by mouth 2 (two) times daily. 180 tablet 3    QUEtiapine (SEROQUEL) 50 MG tablet 1 tablet (50 mg total) by Per NG tube route 2 (two) times daily. 60 tablet 11    rifAXIMin (XIFAXAN) 550 mg Tab 1 tablet (550 mg total) by Per G Tube route 2 (two) times daily. 60 tablet 0     No current facility-administered medications for this visit.       Review of patient's allergies indicates:  No Known Allergies    Past Medical History:   Diagnosis Date    Diabetes mellitus     Diverticulitis     Hyperlipemia     Hypertension     Hypokalemia 11/9/2021       Past Surgical History:   Procedure Laterality Date    COLON SURGERY      partial colon resection    HERNIA REPAIR         Family History   Problem Relation Age of Onset    Diabetes Mother     Diabetes Father        Social History     Socioeconomic History    Marital status:    Tobacco Use    Smoking status: Never    Smokeless tobacco: Never   Substance and Sexual Activity    Alcohol use: Never    Drug use: Never    Sexual activity: Not Currently       There were no vitals filed for this visit.    Physical Exam  Constitutional:       General: He is not in acute distress.  HENT:      Head: Normocephalic.   Cardiovascular:      Rate and Rhythm: Normal rate and regular rhythm.      Pulses: Normal pulses.   Pulmonary:      Effort: Pulmonary effort is normal. No respiratory distress.      Breath sounds: Normal breath sounds.   Abdominal:      General: Abdomen is flat. There is no distension.      Palpations: Abdomen is soft. There is mass (In the mid part of his abdomen above the umbilical region has about a 1 x 1 x 0.5 cm opening with good granulation  tissue).      Tenderness: There is no abdominal tenderness.   Musculoskeletal:         General: Normal range of motion.   Skin:     General: Skin is warm.   Neurological:      General: No focal deficit present.      Mental Status: He is oriented to person, place, and time.       Assessment & Plan:    Infected hernioplasty mesh, initial encounter        No exposed mesh currently it appears that the infected area had been incorporated and is no residual pocket or fluctuance.  Patient is asymptomatic in his wound is almost completely healed up.  Would recommend conservative treatments.  If it comes back recurs or happens again might need to have operative removal of the mesh.  Will obtain the CT scan from [place] for documentation records for how bad it was initially.  Otherwise he is no symptoms now when he is okay with the plan.

## 2023-06-27 ENCOUNTER — EXTERNAL HOME HEALTH (OUTPATIENT)
Dept: HOME HEALTH SERVICES | Facility: HOSPITAL | Age: 78
End: 2023-06-27
Payer: MEDICARE

## 2023-07-18 ENCOUNTER — OFFICE VISIT (OUTPATIENT)
Dept: SURGERY | Facility: CLINIC | Age: 78
End: 2023-07-18
Attending: SURGERY
Payer: MEDICARE

## 2023-07-18 DIAGNOSIS — R10.9 ABDOMINAL PAIN, UNSPECIFIED ABDOMINAL LOCATION: ICD-10-CM

## 2023-07-18 PROCEDURE — 99213 OFFICE O/P EST LOW 20 MIN: CPT | Mod: PBBFAC | Performed by: SURGERY

## 2023-07-18 PROCEDURE — 1159F PR MEDICATION LIST DOCUMENTED IN MEDICAL RECORD: ICD-10-PCS | Mod: CPTII,,, | Performed by: SURGERY

## 2023-07-18 PROCEDURE — 99213 OFFICE O/P EST LOW 20 MIN: CPT | Mod: S$PBB,,, | Performed by: SURGERY

## 2023-07-18 PROCEDURE — 1159F MED LIST DOCD IN RCRD: CPT | Mod: CPTII,,, | Performed by: SURGERY

## 2023-07-18 PROCEDURE — 99213 PR OFFICE/OUTPT VISIT, EST, LEVL III, 20-29 MIN: ICD-10-PCS | Mod: S$PBB,,, | Performed by: SURGERY

## 2023-07-18 NOTE — H&P (VIEW-ONLY)
General Surgery Progress Note    Subjective:      Patient ID: Danny Flood is a 78 y.o. male.    Chief Complaint: Wound Check      HPI:  78-year-old male saw me a couple of months ago for infected mesh.  He would a little spot that was chronically draining and been healing up with the antibiotics.  He is status post a open ventral hernia pair with mesh many years ago with my retired partner.  The area been healing up until recently where it opened back up and a large open draining area with some redness and tenderness.  He has been trying multiple rounds of antibiotics with assess.  Would like something done about it.  No fever no chills.  No nausea or vomiting just some discomfort.  No recent CT scan performed.    Past Medical History:   Diagnosis Date    Diabetes mellitus     Diverticulitis     Hyperlipemia     Hypertension     Hypokalemia 11/9/2021     Past Surgical History:   Procedure Laterality Date    COLON SURGERY      partial colon resection    HERNIA REPAIR       Social History     Socioeconomic History    Marital status:    Tobacco Use    Smoking status: Never    Smokeless tobacco: Never   Substance and Sexual Activity    Alcohol use: Never    Drug use: Never    Sexual activity: Not Currently         Current Outpatient Medications:     amLODIPine (NORVASC) 10 MG tablet, Take 1 tablet (10 mg total) by mouth once daily., Disp: 30 tablet, Rfl: 11    FOLBIC 2.5-25-2 mg Tab, Take 1 tablet by mouth once daily., Disp: , Rfl:     FOLIC ACID-VITAMIN B6-VIT B12 ORAL, Take 1 tablet by mouth once daily., Disp: , Rfl:     Lactobacillus acidophilus (PROBIOTIC) 10 billion cell Cap, Take by mouth., Disp: , Rfl:     lactulose (CHRONULAC) 10 gram/15 mL solution, Take 20 g by mouth 3 (three) times daily., Disp: , Rfl:     losartan (COZAAR) 100 MG tablet, Take 1 tablet (100 mg total) by mouth once daily., Disp: 90 tablet, Rfl: 3    niacin (SLO-NIACIN) 500  mg tablet, Take 500 mg by mouth once daily. With meal, Disp: , Rfl:     omega-3 fatty acids/fish oil (FISH OIL-OMEGA-3 FATTY ACIDS) 300-1,000 mg capsule, Take 1 capsule by mouth once daily., Disp: , Rfl:     pantoprazole (PROTONIX) 40 MG tablet, Take 1 tablet (40 mg total) by mouth 2 (two) times daily., Disp: 180 tablet, Rfl: 3    QUEtiapine (SEROQUEL) 50 MG tablet, 1 tablet (50 mg total) by Per NG tube route 2 (two) times daily., Disp: 60 tablet, Rfl: 11    rifAXIMin (XIFAXAN) 550 mg Tab, 1 tablet (550 mg total) by Per G Tube route 2 (two) times daily., Disp: 60 tablet, Rfl: 0  Review of patient's allergies indicates:  No Known Allergies    There were no vitals taken for this visit.    Review of Systems   Constitutional: Negative for chills, fever, weight gain and weight loss.   Eyes:  Negative for blurred vision.   Cardiovascular:  Negative for chest pain, claudication and palpitations.   Respiratory:  Negative for cough and shortness of breath.    Skin:  Positive for poor wound healing.   Musculoskeletal:  Negative for muscle weakness.   Gastrointestinal:  Negative for abdominal pain, diarrhea, nausea and vomiting.   Neurological:  Negative for numbness and paresthesias.       Objective:     Constitutional: Well, No apparent distress  Mental Status: Alert and oriented  x 3  Eyes: Normal sclera, normal eyelid  Neck: Trachea midline, no masses on neck exam  Respiratory: Clear to auscultation bilaterally with no wheezes/crackles/cough  Cardiac: Regular rate and rhythm, no murmur, rub, or gallops  Abdominal: Soft, non-tender, non-distented, right lower quadrant of his abdomen has some drainage from an open wound about a cm in size.  Around the area.  Hernia: No appreciated hernias  Musculoskeletal: 5/5 strength no weakess no decreased range of montion  Neurologic: Cranial nerves II - XII intact    Labs/ Imaging: CBC:   Lab Results   Component Value Date/Time    WBC 4.81 12/21/2021 07:05 AM    RBC 2.94 (L) 12/21/2021  07:05 AM    HGB 9.5 (L) 12/21/2021 07:05 AM    HCT 29.2 (L) 12/21/2021 07:05 AM     (L) 12/21/2021 07:05 AM    MCV 99.3 (H) 12/21/2021 07:05 AM    MCH 32.3 (H) 12/21/2021 07:05 AM    MCHC 32.5 12/21/2021 07:05 AM     BMP:   Lab Results   Component Value Date/Time     (H) 12/21/2021 07:05 AM    CO2 28 12/21/2021 07:05 AM    BUN 18 12/21/2021 07:05 AM    CREATININE 0.72 12/21/2021 07:05 AM    CALCIUM 7.7 (L) 12/21/2021 07:05 AM    MG 2.4 (H) 11/13/2021 04:55 AM           Assessment:             1. Abdominal pain, unspecified abdominal location          Plan:       Orders Placed This Encounter    CT Abdomen Pelvis With Contrast     No follow-ups on file.      Likely infected mesh with some sort of fistulous tract.  Patient to go to the CT scanner for an IV and oral abdominal CT scan.  Will come back afterwards for surgical planning.  All questions were answered.

## 2023-07-18 NOTE — PATIENT INSTRUCTIONS
CT ABDOMEN/PELVIS  PREP INSTRUCTIONS    Your CT is schedule for 07/28/2023 at 0830am. This will be done at the [facility].    Drink first bottle of Readi-Cat on 07/27/2023 @ 0900 PM    Drink second bottle of Readi-Cat on 07/28/2023 @ 0730 AM  (Drink the second bottle 1 hour before your scheduled appointment)      Please do not eat or drink anything after the first dose is given except for your necessary medications.    If you have had a previous allergic reation to CT or IVP dye, please notify the physician who ordered the CT so that a   pre-intravenous prep can be started.

## 2023-07-28 ENCOUNTER — HOSPITAL ENCOUNTER (OUTPATIENT)
Dept: RADIOLOGY | Facility: HOSPITAL | Age: 78
Discharge: HOME | End: 2023-07-28
Attending: SURGERY
Payer: MEDICARE

## 2023-07-28 DIAGNOSIS — R10.9 ABDOMINAL PAIN, UNSPECIFIED ABDOMINAL LOCATION: ICD-10-CM

## 2023-07-28 LAB — CREAT SERPL-MCNC: 0.7 MG/DL (ref 0.6–1.3)

## 2023-07-28 PROCEDURE — 74177 CT ABD & PELVIS W/CONTRAST: CPT | Mod: TC

## 2023-07-28 PROCEDURE — 82565 ASSAY OF CREATININE: CPT

## 2023-07-28 PROCEDURE — 74177 CT ABDOMEN PELVIS WITH CONTRAST: ICD-10-PCS | Mod: 26,,, | Performed by: RADIOLOGY

## 2023-07-28 PROCEDURE — 25500020 PHARM REV CODE 255: Performed by: SURGERY

## 2023-07-28 PROCEDURE — 74177 CT ABD & PELVIS W/CONTRAST: CPT | Mod: 26,,, | Performed by: RADIOLOGY

## 2023-07-28 RX ADMIN — IOPAMIDOL 100 ML: 755 INJECTION, SOLUTION INTRAVENOUS at 08:07

## 2023-08-02 DIAGNOSIS — T85.79XA INFECTED HERNIOPLASTY MESH, INITIAL ENCOUNTER: Primary | ICD-10-CM

## 2023-08-02 DIAGNOSIS — Z12.11 ENCOUNTER FOR SCREENING COLONOSCOPY: ICD-10-CM

## 2023-08-02 RX ORDER — CEFAZOLIN SODIUM 2 G/50ML
2 SOLUTION INTRAVENOUS
Status: CANCELLED | OUTPATIENT
Start: 2023-08-02

## 2023-08-02 RX ORDER — SODIUM CHLORIDE 9 MG/ML
INJECTION, SOLUTION INTRAVENOUS CONTINUOUS
Status: CANCELLED | OUTPATIENT
Start: 2023-08-02

## 2023-08-02 NOTE — PROGRESS NOTES
CT scans on the patient showed this chronic inflammatory kind of a pocket where the opening he is on his abdomen.  No obvious recurrent hernias but the mesh is quite large.  Called patient's family who said that the areas still draining and they would like something done despite knowing the risks.  They understand is going to be a possibly 2 3-0 long operation with a big cut prolonged mesh extraction possible bowel injuries, bleeding, infection, need for multiple operations, there where the possibility of the hernia may come back.  But there was a concern that the hernias chronically infected the that could progress and get worse in the future.  He will come in and a week on 08/09/2023 for an exploratory laparotomy possible mesh removal and possible hernia repair.  All questions were answered.

## 2023-08-09 ENCOUNTER — HOSPITAL ENCOUNTER (INPATIENT)
Facility: HOSPITAL | Age: 78
LOS: 8 days | Discharge: HOME HEALTH | DRG: 907 | End: 2023-08-17
Attending: SURGERY | Admitting: SURGERY
Payer: MEDICARE

## 2023-08-09 ENCOUNTER — ANESTHESIA (OUTPATIENT)
Dept: SURGERY | Facility: HOSPITAL | Age: 78
DRG: 907 | End: 2023-08-09
Payer: MEDICARE

## 2023-08-09 ENCOUNTER — ANESTHESIA EVENT (OUTPATIENT)
Dept: SURGERY | Facility: HOSPITAL | Age: 78
DRG: 907 | End: 2023-08-09
Payer: MEDICARE

## 2023-08-09 DIAGNOSIS — T85.79XA INFECTED HERNIOPLASTY MESH, INITIAL ENCOUNTER: Primary | ICD-10-CM

## 2023-08-09 DIAGNOSIS — I10 PRIMARY HYPERTENSION: ICD-10-CM

## 2023-08-09 DIAGNOSIS — I48.91 A-FIB: ICD-10-CM

## 2023-08-09 DIAGNOSIS — Z01.818 PRE-OP EVALUATION: ICD-10-CM

## 2023-08-09 DIAGNOSIS — F03.A0 MILD DEMENTIA WITHOUT BEHAVIORAL DISTURBANCE, PSYCHOTIC DISTURBANCE, MOOD DISTURBANCE, OR ANXIETY, UNSPECIFIED DEMENTIA TYPE: ICD-10-CM

## 2023-08-09 DIAGNOSIS — I48.91 NEW ONSET A-FIB: ICD-10-CM

## 2023-08-09 DIAGNOSIS — E72.20 HYPERAMMONEMIA: ICD-10-CM

## 2023-08-09 DIAGNOSIS — I21.4 NSTEMI (NON-ST ELEVATED MYOCARDIAL INFARCTION): ICD-10-CM

## 2023-08-09 DIAGNOSIS — E11.9 TYPE 2 DIABETES MELLITUS WITHOUT COMPLICATION, WITHOUT LONG-TERM CURRENT USE OF INSULIN: ICD-10-CM

## 2023-08-09 DIAGNOSIS — R79.89 ELEVATED TROPONIN LEVEL: ICD-10-CM

## 2023-08-09 LAB
ANION GAP SERPL CALCULATED.3IONS-SCNC: 7 MMOL/L (ref 7–16)
BASOPHILS # BLD AUTO: 0.05 K/UL (ref 0–0.2)
BASOPHILS NFR BLD AUTO: 1.1 % (ref 0–1)
BUN SERPL-MCNC: 14 MG/DL (ref 7–18)
BUN/CREAT SERPL: 23 (ref 6–20)
CALCIUM SERPL-MCNC: 8.5 MG/DL (ref 8.5–10.1)
CHLORIDE SERPL-SCNC: 110 MMOL/L (ref 98–107)
CO2 SERPL-SCNC: 29 MMOL/L (ref 21–32)
CREAT SERPL-MCNC: 0.61 MG/DL (ref 0.7–1.3)
DIFFERENTIAL METHOD BLD: ABNORMAL
EGFR (NO RACE VARIABLE) (RUSH/TITUS): 99 ML/MIN/1.73M2
EOSINOPHIL # BLD AUTO: 0.27 K/UL (ref 0–0.5)
EOSINOPHIL NFR BLD AUTO: 6.1 % (ref 1–4)
ERYTHROCYTE [DISTWIDTH] IN BLOOD BY AUTOMATED COUNT: 12.8 % (ref 11.5–14.5)
GLUCOSE SERPL-MCNC: 102 MG/DL (ref 74–106)
HCT VFR BLD AUTO: 34.6 % (ref 40–54)
HGB BLD-MCNC: 11.2 G/DL (ref 13.5–18)
IMM GRANULOCYTES # BLD AUTO: 0.01 K/UL (ref 0–0.04)
IMM GRANULOCYTES NFR BLD: 0.2 % (ref 0–0.4)
INDIRECT COOMBS: NORMAL
LYMPHOCYTES # BLD AUTO: 1.09 K/UL (ref 1–4.8)
LYMPHOCYTES NFR BLD AUTO: 24.5 % (ref 27–41)
MCH RBC QN AUTO: 31.7 PG (ref 27–31)
MCHC RBC AUTO-ENTMCNC: 32.4 G/DL (ref 32–36)
MCV RBC AUTO: 98 FL (ref 80–96)
MONOCYTES # BLD AUTO: 0.49 K/UL (ref 0–0.8)
MONOCYTES NFR BLD AUTO: 11 % (ref 2–6)
MPC BLD CALC-MCNC: 9.4 FL (ref 9.4–12.4)
NEUTROPHILS # BLD AUTO: 2.54 K/UL (ref 1.8–7.7)
NEUTROPHILS NFR BLD AUTO: 57.1 % (ref 53–65)
NRBC # BLD AUTO: 0 X10E3/UL
NRBC, AUTO (.00): 0 %
PLATELET # BLD AUTO: 177 K/UL (ref 150–400)
POTASSIUM SERPL-SCNC: 4.2 MMOL/L (ref 3.5–5.1)
RBC # BLD AUTO: 3.53 M/UL (ref 4.6–6.2)
RH BLD: NORMAL
SODIUM SERPL-SCNC: 142 MMOL/L (ref 136–145)
SPECIMEN OUTDATE: NORMAL
WBC # BLD AUTO: 4.45 K/UL (ref 4.5–11)

## 2023-08-09 PROCEDURE — 87077 CULTURE AEROBIC IDENTIFY: CPT | Performed by: SURGERY

## 2023-08-09 PROCEDURE — 27201423 OPTIME MED/SURG SUP & DEVICES STERILE SUPPLY: Performed by: SURGERY

## 2023-08-09 PROCEDURE — 37000008 HC ANESTHESIA 1ST 15 MINUTES: Performed by: SURGERY

## 2023-08-09 PROCEDURE — D9220A PRA ANESTHESIA: ICD-10-PCS | Mod: CRNA,,,

## 2023-08-09 PROCEDURE — 63600175 PHARM REV CODE 636 W HCPCS

## 2023-08-09 PROCEDURE — D9220A PRA ANESTHESIA: Mod: ANES,,, | Performed by: ANESTHESIOLOGY

## 2023-08-09 PROCEDURE — 22999 PR EXCISION, ABDOMINAL WALL MESH: ICD-10-PCS | Mod: ,,, | Performed by: SURGERY

## 2023-08-09 PROCEDURE — 85025 COMPLETE CBC W/AUTO DIFF WBC: CPT | Performed by: SURGERY

## 2023-08-09 PROCEDURE — 27000165 HC TUBE, ETT CUFFED: Performed by: ANESTHESIOLOGY

## 2023-08-09 PROCEDURE — 36000708 HC OR TIME LEV III 1ST 15 MIN: Performed by: SURGERY

## 2023-08-09 PROCEDURE — 63600175 PHARM REV CODE 636 W HCPCS: Performed by: NURSE ANESTHETIST, CERTIFIED REGISTERED

## 2023-08-09 PROCEDURE — 25000003 PHARM REV CODE 250

## 2023-08-09 PROCEDURE — 25000003 PHARM REV CODE 250: Performed by: NURSE ANESTHETIST, CERTIFIED REGISTERED

## 2023-08-09 PROCEDURE — D9220A PRA ANESTHESIA: ICD-10-PCS | Mod: ANES,,, | Performed by: ANESTHESIOLOGY

## 2023-08-09 PROCEDURE — 27000716 HC OXISENSOR PROBE, ANY SIZE: Performed by: ANESTHESIOLOGY

## 2023-08-09 PROCEDURE — 27000689 HC BLADE LARYNGOSCOPE ANY SIZE: Performed by: ANESTHESIOLOGY

## 2023-08-09 PROCEDURE — 87075 CULTR BACTERIA EXCEPT BLOOD: CPT | Performed by: SURGERY

## 2023-08-09 PROCEDURE — 44120 PR RESECT SMALL INTEST,SINGL RESEC/ANAS: ICD-10-PCS | Mod: 22,,, | Performed by: SURGERY

## 2023-08-09 PROCEDURE — 88307 TISSUE EXAM BY PATHOLOGIST: CPT | Mod: 26,,, | Performed by: PATHOLOGY

## 2023-08-09 PROCEDURE — 80048 BASIC METABOLIC PNL TOTAL CA: CPT | Performed by: SURGERY

## 2023-08-09 PROCEDURE — 87070 CULTURE OTHR SPECIMN AEROBIC: CPT | Performed by: SURGERY

## 2023-08-09 PROCEDURE — 27000509 HC TUBE SALEM SUMP ANY SIZE: Performed by: ANESTHESIOLOGY

## 2023-08-09 PROCEDURE — 93005 ELECTROCARDIOGRAM TRACING: CPT

## 2023-08-09 PROCEDURE — 25000003 PHARM REV CODE 250: Performed by: SURGERY

## 2023-08-09 PROCEDURE — 27000221 HC OXYGEN, UP TO 24 HOURS

## 2023-08-09 PROCEDURE — 88307 SURGICAL PATHOLOGY: ICD-10-PCS | Mod: 26,,, | Performed by: PATHOLOGY

## 2023-08-09 PROCEDURE — 11000001 HC ACUTE MED/SURG PRIVATE ROOM

## 2023-08-09 PROCEDURE — 63600175 PHARM REV CODE 636 W HCPCS: Performed by: SURGERY

## 2023-08-09 PROCEDURE — 37000009 HC ANESTHESIA EA ADD 15 MINS: Performed by: SURGERY

## 2023-08-09 PROCEDURE — D9220A PRA ANESTHESIA: Mod: CRNA,,,

## 2023-08-09 PROCEDURE — 94761 N-INVAS EAR/PLS OXIMETRY MLT: CPT

## 2023-08-09 PROCEDURE — 99900035 HC TECH TIME PER 15 MIN (STAT)

## 2023-08-09 PROCEDURE — 93010 EKG 12-LEAD: ICD-10-PCS | Mod: ,,, | Performed by: STUDENT IN AN ORGANIZED HEALTH CARE EDUCATION/TRAINING PROGRAM

## 2023-08-09 PROCEDURE — 27000655: Performed by: ANESTHESIOLOGY

## 2023-08-09 PROCEDURE — 71000033 HC RECOVERY, INTIAL HOUR: Performed by: SURGERY

## 2023-08-09 PROCEDURE — 22999 UNLISTED PX ABDOMEN MUSCSKEL: CPT | Mod: ,,, | Performed by: SURGERY

## 2023-08-09 PROCEDURE — 86900 BLOOD TYPING SEROLOGIC ABO: CPT | Performed by: SURGERY

## 2023-08-09 PROCEDURE — 44120 REMOVAL OF SMALL INTESTINE: CPT | Mod: 22,,, | Performed by: SURGERY

## 2023-08-09 PROCEDURE — 36000709 HC OR TIME LEV III EA ADD 15 MIN: Performed by: SURGERY

## 2023-08-09 PROCEDURE — 93010 ELECTROCARDIOGRAM REPORT: CPT | Mod: ,,, | Performed by: STUDENT IN AN ORGANIZED HEALTH CARE EDUCATION/TRAINING PROGRAM

## 2023-08-09 PROCEDURE — 88307 TISSUE EXAM BY PATHOLOGIST: CPT | Mod: TC,SUR | Performed by: SURGERY

## 2023-08-09 RX ORDER — OXYCODONE HYDROCHLORIDE 5 MG/1
5 TABLET ORAL
Status: DISCONTINUED | OUTPATIENT
Start: 2023-08-09 | End: 2023-08-09 | Stop reason: HOSPADM

## 2023-08-09 RX ORDER — EPHEDRINE SULFATE 50 MG/ML
INJECTION, SOLUTION INTRAVENOUS
Status: DISCONTINUED | OUTPATIENT
Start: 2023-08-09 | End: 2023-08-09

## 2023-08-09 RX ORDER — PROPOFOL 10 MG/ML
VIAL (ML) INTRAVENOUS
Status: DISCONTINUED | OUTPATIENT
Start: 2023-08-09 | End: 2023-08-09

## 2023-08-09 RX ORDER — MORPHINE SULFATE 10 MG/ML
4 INJECTION INTRAMUSCULAR; INTRAVENOUS; SUBCUTANEOUS EVERY 5 MIN PRN
Status: DISCONTINUED | OUTPATIENT
Start: 2023-08-09 | End: 2023-08-09 | Stop reason: HOSPADM

## 2023-08-09 RX ORDER — SODIUM CHLORIDE, SODIUM LACTATE, POTASSIUM CHLORIDE, CALCIUM CHLORIDE 600; 310; 30; 20 MG/100ML; MG/100ML; MG/100ML; MG/100ML
125 INJECTION, SOLUTION INTRAVENOUS CONTINUOUS
Status: DISCONTINUED | OUTPATIENT
Start: 2023-08-09 | End: 2023-08-09

## 2023-08-09 RX ORDER — HYDROMORPHONE HYDROCHLORIDE 2 MG/ML
0.5 INJECTION, SOLUTION INTRAMUSCULAR; INTRAVENOUS; SUBCUTANEOUS EVERY 5 MIN PRN
Status: DISCONTINUED | OUTPATIENT
Start: 2023-08-09 | End: 2023-08-09 | Stop reason: HOSPADM

## 2023-08-09 RX ORDER — MEPERIDINE HYDROCHLORIDE 25 MG/ML
25 INJECTION INTRAMUSCULAR; INTRAVENOUS; SUBCUTANEOUS EVERY 10 MIN PRN
Status: DISCONTINUED | OUTPATIENT
Start: 2023-08-09 | End: 2023-08-09 | Stop reason: HOSPADM

## 2023-08-09 RX ORDER — MUPIROCIN 20 MG/G
1 OINTMENT TOPICAL 2 TIMES DAILY
Status: DISCONTINUED | OUTPATIENT
Start: 2023-08-09 | End: 2023-08-10

## 2023-08-09 RX ORDER — ONDANSETRON 2 MG/ML
INJECTION INTRAMUSCULAR; INTRAVENOUS
Status: DISCONTINUED | OUTPATIENT
Start: 2023-08-09 | End: 2023-08-09

## 2023-08-09 RX ORDER — ENOXAPARIN SODIUM 100 MG/ML
40 INJECTION SUBCUTANEOUS EVERY 24 HOURS
Status: DISCONTINUED | OUTPATIENT
Start: 2023-08-10 | End: 2023-08-14

## 2023-08-09 RX ORDER — SODIUM CHLORIDE 9 MG/ML
INJECTION, SOLUTION INTRAVENOUS CONTINUOUS
Status: DISCONTINUED | OUTPATIENT
Start: 2023-08-09 | End: 2023-08-13

## 2023-08-09 RX ORDER — MUPIROCIN 20 MG/G
OINTMENT TOPICAL 2 TIMES DAILY
Status: COMPLETED | OUTPATIENT
Start: 2023-08-09 | End: 2023-08-14

## 2023-08-09 RX ORDER — NALOXONE HCL 0.4 MG/ML
0.4 VIAL (ML) INJECTION 4 TIMES DAILY PRN
Status: DISCONTINUED | OUTPATIENT
Start: 2023-08-09 | End: 2023-08-17 | Stop reason: HOSPADM

## 2023-08-09 RX ORDER — HYDROMORPHONE HYDROCHLORIDE 2 MG/ML
INJECTION, SOLUTION INTRAMUSCULAR; INTRAVENOUS; SUBCUTANEOUS
Status: DISCONTINUED | OUTPATIENT
Start: 2023-08-09 | End: 2023-08-09

## 2023-08-09 RX ORDER — SODIUM CHLORIDE 9 MG/ML
INJECTION, SOLUTION INTRAVENOUS CONTINUOUS
Status: DISCONTINUED | OUTPATIENT
Start: 2023-08-09 | End: 2023-08-09

## 2023-08-09 RX ORDER — CEFAZOLIN SODIUM 1 G/3ML
INJECTION, POWDER, FOR SOLUTION INTRAMUSCULAR; INTRAVENOUS
Status: DISCONTINUED | OUTPATIENT
Start: 2023-08-09 | End: 2023-08-09

## 2023-08-09 RX ORDER — DIPHENHYDRAMINE HYDROCHLORIDE 50 MG/ML
25 INJECTION INTRAMUSCULAR; INTRAVENOUS EVERY 6 HOURS PRN
Status: DISCONTINUED | OUTPATIENT
Start: 2023-08-09 | End: 2023-08-09 | Stop reason: HOSPADM

## 2023-08-09 RX ORDER — DEXAMETHASONE SODIUM PHOSPHATE 4 MG/ML
INJECTION, SOLUTION INTRA-ARTICULAR; INTRALESIONAL; INTRAMUSCULAR; INTRAVENOUS; SOFT TISSUE
Status: DISCONTINUED | OUTPATIENT
Start: 2023-08-09 | End: 2023-08-09

## 2023-08-09 RX ORDER — ROCURONIUM BROMIDE 10 MG/ML
INJECTION, SOLUTION INTRAVENOUS
Status: DISCONTINUED | OUTPATIENT
Start: 2023-08-09 | End: 2023-08-09

## 2023-08-09 RX ORDER — MORPHINE SULFATE 1 MG/ML
INJECTION INTRAVENOUS CONTINUOUS
Status: DISCONTINUED | OUTPATIENT
Start: 2023-08-09 | End: 2023-08-14

## 2023-08-09 RX ORDER — LIDOCAINE HYDROCHLORIDE 20 MG/ML
INJECTION, SOLUTION EPIDURAL; INFILTRATION; INTRACAUDAL; PERINEURAL
Status: DISCONTINUED | OUTPATIENT
Start: 2023-08-09 | End: 2023-08-09

## 2023-08-09 RX ORDER — FENTANYL CITRATE 50 UG/ML
INJECTION, SOLUTION INTRAMUSCULAR; INTRAVENOUS
Status: DISCONTINUED | OUTPATIENT
Start: 2023-08-09 | End: 2023-08-09

## 2023-08-09 RX ORDER — ONDANSETRON 2 MG/ML
4 INJECTION INTRAMUSCULAR; INTRAVENOUS EVERY 12 HOURS PRN
Status: DISCONTINUED | OUTPATIENT
Start: 2023-08-09 | End: 2023-08-15

## 2023-08-09 RX ORDER — ONDANSETRON 2 MG/ML
4 INJECTION INTRAMUSCULAR; INTRAVENOUS DAILY PRN
Status: DISCONTINUED | OUTPATIENT
Start: 2023-08-09 | End: 2023-08-09 | Stop reason: HOSPADM

## 2023-08-09 RX ADMIN — ONDANSETRON 4 MG: 2 INJECTION INTRAMUSCULAR; INTRAVENOUS at 11:08

## 2023-08-09 RX ADMIN — HYDROMORPHONE HYDROCHLORIDE 0.5 MG: 2 INJECTION, SOLUTION INTRAMUSCULAR; INTRAVENOUS; SUBCUTANEOUS at 12:08

## 2023-08-09 RX ADMIN — MORPHINE SULFATE: 1 INJECTION INTRAVENOUS at 08:08

## 2023-08-09 RX ADMIN — SUGAMMADEX 200 MG: 100 INJECTION, SOLUTION INTRAVENOUS at 02:08

## 2023-08-09 RX ADMIN — MUPIROCIN: 20 OINTMENT TOPICAL at 09:08

## 2023-08-09 RX ADMIN — PROPOFOL 110 MG: 10 INJECTION, EMULSION INTRAVENOUS at 11:08

## 2023-08-09 RX ADMIN — FENTANYL CITRATE 50 MCG: 50 INJECTION INTRAMUSCULAR; INTRAVENOUS at 11:08

## 2023-08-09 RX ADMIN — EPHEDRINE SULFATE 15 MG: 50 INJECTION INTRAVENOUS at 12:08

## 2023-08-09 RX ADMIN — CALCIUM CHLORIDE 0.5 G: 100 INJECTION, SOLUTION INTRAVENOUS at 12:08

## 2023-08-09 RX ADMIN — ROCURONIUM BROMIDE 20 MG: 10 INJECTION, SOLUTION INTRAVENOUS at 12:08

## 2023-08-09 RX ADMIN — PIPERACILLIN AND TAZOBACTAM 4.5 G: 4; .5 INJECTION, POWDER, FOR SOLUTION INTRAVENOUS; PARENTERAL at 06:08

## 2023-08-09 RX ADMIN — EPHEDRINE SULFATE 10 MG: 50 INJECTION INTRAVENOUS at 01:08

## 2023-08-09 RX ADMIN — ROCURONIUM BROMIDE 20 MG: 10 INJECTION, SOLUTION INTRAVENOUS at 01:08

## 2023-08-09 RX ADMIN — SODIUM CHLORIDE: 9 INJECTION, SOLUTION INTRAVENOUS at 08:08

## 2023-08-09 RX ADMIN — EPHEDRINE SULFATE 20 MG: 50 INJECTION INTRAVENOUS at 12:08

## 2023-08-09 RX ADMIN — CEFAZOLIN 2 G: 1 INJECTION, POWDER, FOR SOLUTION INTRAMUSCULAR; INTRAVENOUS; PARENTERAL at 11:08

## 2023-08-09 RX ADMIN — PIPERACILLIN AND TAZOBACTAM 4.5 G: 4; .5 INJECTION, POWDER, FOR SOLUTION INTRAVENOUS; PARENTERAL at 09:08

## 2023-08-09 RX ADMIN — ROCURONIUM BROMIDE 40 MG: 10 INJECTION, SOLUTION INTRAVENOUS at 11:08

## 2023-08-09 RX ADMIN — EPHEDRINE SULFATE 5 MG: 50 INJECTION INTRAVENOUS at 01:08

## 2023-08-09 RX ADMIN — DEXAMETHASONE SODIUM PHOSPHATE 4 MG: 4 INJECTION, SOLUTION INTRA-ARTICULAR; INTRALESIONAL; INTRAMUSCULAR; INTRAVENOUS; SOFT TISSUE at 11:08

## 2023-08-09 RX ADMIN — SODIUM CHLORIDE: 9 INJECTION, SOLUTION INTRAVENOUS at 03:08

## 2023-08-09 RX ADMIN — LIDOCAINE HYDROCHLORIDE 100 MG: 20 INJECTION, SOLUTION INTRAVENOUS at 11:08

## 2023-08-09 RX ADMIN — SODIUM CHLORIDE, POTASSIUM CHLORIDE, SODIUM LACTATE AND CALCIUM CHLORIDE: 600; 310; 30; 20 INJECTION, SOLUTION INTRAVENOUS at 12:08

## 2023-08-09 NOTE — Clinical Note
The right groin was prepped. The site was prepped with ChloraPrep. The site was clipped. The patient was draped.

## 2023-08-09 NOTE — ANESTHESIA POSTPROCEDURE EVALUATION
Anesthesia Post Evaluation    Patient: Danny Flood    Procedure(s) Performed: Procedure(s) (LRB):  LAPAROTOMY, EXPLORATORY (N/A)  REMOVAL, IMPLANT (N/A)  EXCISION, SMALL INTESTINE (N/A)    Final Anesthesia Type: general      Patient location during evaluation: PACU  Patient participation: Yes- Able to Participate  Level of consciousness: awake and sedated  Post-procedure vital signs: reviewed and stable  Pain management: adequate  Airway patency: patent    PONV status at discharge: No PONV  Anesthetic complications: no      Cardiovascular status: blood pressure returned to baseline  Respiratory status: unassisted  Hydration status: euvolemic  Follow-up not needed.          Vitals Value Taken Time   /77 08/09/23 1720   Temp 36.4 °C (97.6 °F) 08/09/23 1720   Pulse 102 08/09/23 1720   Resp 14 08/09/23 1720   SpO2 98 % 08/09/23 1720         No case tracking events are documented in the log.      Pain/Ignacio Score: Ignacio Score: 10 (8/9/2023  5:05 PM)

## 2023-08-09 NOTE — INTERVAL H&P NOTE
The patient has been examined and the H&P has been reviewed:    I concur with the findings and no changes have occurred since H&P was written.    Surgery risks, benefits and alternative options discussed and understood by patient/family.    CT scan showed no hernia just a pocket of purulence under skin. OR for exploration.  All questions answered      There are no hospital problems to display for this patient.

## 2023-08-09 NOTE — TRANSFER OF CARE
Anesthesia Transfer of Care Note    Patient: Danny Flood    Procedure(s) Performed: Procedure(s) (LRB):  LAPAROTOMY, EXPLORATORY (N/A)  REMOVAL, IMPLANT (N/A)  EXCISION, SMALL INTESTINE (N/A)    Patient location: PACU    Anesthesia Type: general    Transport from OR: Transported from OR on 100% O2 by closed face mask with adequate spontaneous ventilation    Post pain: adequate analgesia    Post assessment: no apparent anesthetic complications    Post vital signs: stable    Level of consciousness: responds to stimulation    Nausea/Vomiting: no nausea/vomiting    Complications: none    Transfer of care protocol was followed      Last vitals:   Visit Vitals  BP (!) 155/69   Pulse 78   Temp 36.6 °C (97.8 °F) (Axillary)   Resp 12   Ht 6' (1.829 m)   Wt 81.6 kg (180 lb)   SpO2 100%   BMI 24.41 kg/m²

## 2023-08-09 NOTE — ANESTHESIA PREPROCEDURE EVALUATION
08/09/2023  Danny Flood is a 78 y.o., male.      Pre-op Assessment    I have reviewed the Patient Summary Reports.     I have reviewed the Nursing Notes. I have reviewed the NPO Status.   I have reviewed the Medications.     Review of Systems  Anesthesia Hx:  No problems with previous Anesthesia    Social:  Non-Smoker, No Alcohol Use    Hematology/Oncology:  Hematology Normal   Oncology Normal     EENT/Dental:EENT/Dental Normal   Cardiovascular:   Hypertension    Pulmonary:  Pulmonary Normal    Renal/:  Renal/ Normal     Hepatic/GI:  Hepatic/GI Normal dysphagia   Musculoskeletal:  Musculoskeletal Normal    Neurological:  Neurology Normal    Endocrine:  Endocrine Normal    Dermatological:  Skin Normal    Psych:  Psychiatric Normal           Physical Exam  General: Well nourished    Airway:  Mallampati: II / II  Mouth Opening: Normal  TM Distance: > 6 cm  Tongue: Normal  Neck ROM: Normal ROM    Chest/Lungs:  Clear to auscultation, Normal Respiratory Rate    Heart:  Rate: Normal  Rhythm: Regular Rhythm        Anesthesia Plan  Type of Anesthesia, risks & benefits discussed:    Anesthesia Type: Gen ETT  Intra-op Monitoring Plan: Standard ASA Monitors  Post Op Pain Control Plan: multimodal analgesia  Induction:  IV  Informed Consent: Informed consent signed with the Patient and all parties understand the risks and agree with anesthesia plan.  All questions answered. Patient consented to blood products? Yes  ASA Score: 2  Day of Surgery Review of History & Physical: H&P Update referred to the surgeon/provider.I have interviewed and examined the patient. I have reviewed the patient's H&P dated: There are no significant changes. H&P completed by Anesthesiologist.    Ready For Surgery From Anesthesia Perspective.     .

## 2023-08-09 NOTE — OR NURSING
1425-Pt rec'd to RR sedated, unresponsive to stimulation, on 6L O2 via simple fm, will titrate O2 PRN, NADN, SaO2-100%, resp unlabored, IV fluids infusing, left NGT @ 67cm, dressing to ABD C/D/I with ABD binder on, ribera catheter observed, bilateral SCD hoses on with device attached, will con't to monitor, safety measures in effect.    1440-Pt slightly drowsy, responds to stimulation, placed on room air, left NGT hooked to LIWS, orange drainage noted, SaO2-100%, NADN, no c/o pain voiced at this time.    1455-Sat's at 89%, NADN, placed on 2L O2 via n/c, will con't to monitor.    1500-Sat's increased into the high 90's, will c/o to monitor, denies any c/o pain at present.    1525-Pt asleep, easily aroused, holding in PACU, waiting on a room to be assigned, family was notified and given update on pt status.    1535-0.9% sodium chloride IV fluids @ 125ml/hr started per MD order.    1600-Pt asleep, NADN, small amount of sanguineous drainage noted to ABD dressing area marked, will con't to monitor.    1630-Pt awake and alert, NADN, dressing to ABD was reinforced with ABD binder in place, denies any c/o pain, will con't to monitor.    1705-Pt awake and resting quietly, NADN, SaO2-99%, no c/o pain, dressing to ABD intact with ABD binder on, orders to transfer to room 568.    1720-Pt care released to Regina(RN), pt awake and alert, vs temp 97.6 oral, hr-102, resp-14, SaO2-98% on 2L O2 via n/c, b/p 157/77.

## 2023-08-09 NOTE — OP NOTE
[facility] - Periop Services  Surgery Department  Operative Note    SUMMARY     Date of Procedure: 8/9/2023     Procedure: Procedure(s) (LRB):  LAPAROTOMY, EXPLORATORY (N/A)  REMOVAL, IMPLANT (N/A)  EXCISION, SMALL INTESTINE (N/A)     Surgeon(s) and Role:     * Tesfaye Natarajan MD - Primary    Assisting Surgeon: None    Pre-Operative Diagnosis: Infected hernioplasty mesh, initial encounter [T85.79XA]    Post-Operative Diagnosis: Post-Op Diagnosis Codes:     * Infected hernioplasty mesh, initial encounter [T85.79XA]    Anesthesia: General    Procedures Performed: exploratory laparotomy with infected mesh removal.  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 Tracked towards an opening in the skin.    Procedure in Detail:  After informed consent patient brought to the OR prepped and draped in the usual sterile fashion.  He had a small 1 x 1 cm opening with reddish granulation tissue coming from the right paramedian area.  We started probing this area went down towards the fascial level.  We excise this opening and followed the tract down.  This went all the way in through the fascia below it towards this pocket of infected mesh on the upper surface of it.  We extended our incision horizontally initially to see if we get around the piece of mesh but due to his extremely large size we ended up doing a midline incision to see the undersurface and to get a better grasp what is going on.  Midline incision made abdominal cavity entered.  Lot of adhesions from previous surgeries and mesh.  On the undersurface of the mesh was a contained fistula and green staining of the undersurface of this mesh.  The mesh appeared to be the duomesh.  Green bile staining on the undersurface from a chronic contained perforation of the small bowel there.  Obvious old  perforated loop of small bowel stuck to the area.  Tax and stitches were appreciated throughout the circular piece of mesh and we spent at least an hour carefully removing the old mesh.  Once we completely removed it we were able to appreciate that patient had a very adherent small bowel in the midportion.  There was about a 10 cm length of holes that we elected to resect and we took out about a foot of small intestines.  A 55 stapler was used to divide both ends and the LigaSure was used to take the mesentery.  We then sent this off as a specimen.  We did a side-to-side affected end-to-end anastomosis lining it up and using a reload stapler to make her enterotomy and then over-sewing this with a 3-0 PDS suture to close the defect.  This was liberated a 2nd layer with some 3-0 silk sutures.  Mesenteric defect was also closed with silk suture.  We then tested the anastomosis no leak appreciated.  We then placed everything back in the intra-abdominal cavity.  We irrigated out the abdomen.  We then closed the horizontal portion with a 0 PDS suture and then the midline with a 1. Looped PDS x2.  Staples were loosely used in the skin we packed the wound.  Patient tolerated the procedure well.    I am adding a 22 modifier to this case due to the extra time spent difficulty with the adhesions large piece of mesh and extra time required than a normal in case she take.    Complications: No    Estimated Blood Loss (EBL): 150 cc           Implants: * No implants in log *    Specimens:   Specimen (24h ago, onward)       Start     Ordered    08/09/23 1344  Surgical Pathology  RELEASE UPON ORDERING         08/09/23 1344                            Condition: Good    Disposition: PACU - hemodynamically stable.    Attestation: I was present and scrubbed for the entire procedure.

## 2023-08-09 NOTE — ANESTHESIA PROCEDURE NOTES
Intubation    Date/Time: 8/9/2023 11:18 AM    Performed by: Perla Young CRNA  Authorized by: Gatito White MD    Intubation:     Induction:  Intravenous    Intubated:  Postinduction    Mask Ventilation:  Easy mask    Attempts:  1    Attempted By:  CRNA    Method of Intubation:  Direct    Blade:  Lauryn 4    Laryngeal View Grade: Grade I - full view of cords      Difficult Airway Encountered?: No      Complications:  None    Airway Device:  Oral endotracheal tube    Airway Device Size:  7.0    Style/Cuff Inflation:  Cuffed (inflated to minimal occlusive pressure)    Inflation Amount (mL):  8    Tube secured:  22    Secured at:  The lips    Placement Verified By:  Capnometry    Complicating Factors:  None    Findings Post-Intubation:  BS equal bilateral and atraumatic/condition of teeth unchanged

## 2023-08-10 LAB
ANION GAP SERPL CALCULATED.3IONS-SCNC: 9 MMOL/L (ref 7–16)
BASOPHILS # BLD AUTO: 0.02 K/UL (ref 0–0.2)
BASOPHILS NFR BLD AUTO: 0.1 % (ref 0–1)
BUN SERPL-MCNC: 15 MG/DL (ref 7–18)
BUN/CREAT SERPL: 23 (ref 6–20)
CALCIUM SERPL-MCNC: 8.4 MG/DL (ref 8.5–10.1)
CHLORIDE SERPL-SCNC: 110 MMOL/L (ref 98–107)
CO2 SERPL-SCNC: 25 MMOL/L (ref 21–32)
CREAT SERPL-MCNC: 0.64 MG/DL (ref 0.7–1.3)
DIFFERENTIAL METHOD BLD: ABNORMAL
EGFR (NO RACE VARIABLE) (RUSH/TITUS): 98 ML/MIN/1.73M2
EOSINOPHIL # BLD AUTO: 0 K/UL (ref 0–0.5)
EOSINOPHIL NFR BLD AUTO: 0 % (ref 1–4)
ERYTHROCYTE [DISTWIDTH] IN BLOOD BY AUTOMATED COUNT: 13.1 % (ref 11.5–14.5)
GLUCOSE SERPL-MCNC: 160 MG/DL (ref 74–106)
HCT VFR BLD AUTO: 38.8 % (ref 40–54)
HGB BLD-MCNC: 12.2 G/DL (ref 13.5–18)
IMM GRANULOCYTES # BLD AUTO: 0.04 K/UL (ref 0–0.04)
IMM GRANULOCYTES NFR BLD: 0.3 % (ref 0–0.4)
LYMPHOCYTES # BLD AUTO: 0.81 K/UL (ref 1–4.8)
LYMPHOCYTES NFR BLD AUTO: 5.9 % (ref 27–41)
MCH RBC QN AUTO: 31.9 PG (ref 27–31)
MCHC RBC AUTO-ENTMCNC: 31.4 G/DL (ref 32–36)
MCV RBC AUTO: 101.3 FL (ref 80–96)
MONOCYTES # BLD AUTO: 1.07 K/UL (ref 0–0.8)
MONOCYTES NFR BLD AUTO: 7.8 % (ref 2–6)
MPC BLD CALC-MCNC: 9.4 FL (ref 9.4–12.4)
NEUTROPHILS # BLD AUTO: 11.79 K/UL (ref 1.8–7.7)
NEUTROPHILS NFR BLD AUTO: 85.9 % (ref 53–65)
NRBC # BLD AUTO: 0 X10E3/UL
NRBC, AUTO (.00): 0 %
PLATELET # BLD AUTO: 193 K/UL (ref 150–400)
POTASSIUM SERPL-SCNC: 3.4 MMOL/L (ref 3.5–5.1)
RBC # BLD AUTO: 3.83 M/UL (ref 4.6–6.2)
SODIUM SERPL-SCNC: 141 MMOL/L (ref 136–145)
WBC # BLD AUTO: 13.73 K/UL (ref 4.5–11)

## 2023-08-10 PROCEDURE — 63600175 PHARM REV CODE 636 W HCPCS: Performed by: SURGERY

## 2023-08-10 PROCEDURE — 11000001 HC ACUTE MED/SURG PRIVATE ROOM

## 2023-08-10 PROCEDURE — 25000003 PHARM REV CODE 250: Performed by: SURGERY

## 2023-08-10 PROCEDURE — 27000221 HC OXYGEN, UP TO 24 HOURS

## 2023-08-10 PROCEDURE — 99900035 HC TECH TIME PER 15 MIN (STAT)

## 2023-08-10 PROCEDURE — 85025 COMPLETE CBC W/AUTO DIFF WBC: CPT | Performed by: SURGERY

## 2023-08-10 PROCEDURE — 94761 N-INVAS EAR/PLS OXIMETRY MLT: CPT

## 2023-08-10 PROCEDURE — 80048 BASIC METABOLIC PNL TOTAL CA: CPT | Performed by: SURGERY

## 2023-08-10 RX ADMIN — MUPIROCIN: 20 OINTMENT TOPICAL at 09:08

## 2023-08-10 RX ADMIN — ENOXAPARIN SODIUM 40 MG: 100 INJECTION SUBCUTANEOUS at 05:08

## 2023-08-10 RX ADMIN — SODIUM CHLORIDE: 9 INJECTION, SOLUTION INTRAVENOUS at 03:08

## 2023-08-10 RX ADMIN — PIPERACILLIN AND TAZOBACTAM 4.5 G: 4; .5 INJECTION, POWDER, FOR SOLUTION INTRAVENOUS; PARENTERAL at 09:08

## 2023-08-10 RX ADMIN — SODIUM CHLORIDE: 9 INJECTION, SOLUTION INTRAVENOUS at 12:08

## 2023-08-10 RX ADMIN — PIPERACILLIN AND TAZOBACTAM 4.5 G: 4; .5 INJECTION, POWDER, FOR SOLUTION INTRAVENOUS; PARENTERAL at 03:08

## 2023-08-10 RX ADMIN — SODIUM CHLORIDE: 9 INJECTION, SOLUTION INTRAVENOUS at 09:08

## 2023-08-10 RX ADMIN — PIPERACILLIN AND TAZOBACTAM 4.5 G: 4; .5 INJECTION, POWDER, FOR SOLUTION INTRAVENOUS; PARENTERAL at 05:08

## 2023-08-10 NOTE — PLAN OF CARE
[facility]  Initial Discharge Assessment       Primary Care Provider: Cameron Valencia MD    Admission Diagnosis: Infected hernioplasty mesh, initial encounter [T85.79XA]  Pre-op evaluation [Z01.818]    Admission Date: 8/9/2023  Expected Discharge Date:     Transition of Care Barriers: None    Payor: HU[employer] MANAGED MEDICARE / Plan: HU[employer]NA MEDICARE PPO / Product Type: Medicare Advantage /     Extended Emergency Contact Information  Primary Emergency Contact: Nikolay Flood  Mobile Phone: [phone]  Relation: Son  Preferred language: English   needed? No  Secondary Emergency Contact: Nubia Flood  Address: [address] [place] [address]  Home Phone: [phone]  Mobile Phone: [phone]  Relation: Spouse  Preferred language: English   needed? No    Discharge Plan A: Home with family, Home Health  Discharge Plan B: Home Health      [facility] [address] [place], [place] [address]  Phone: [phone] Fax: [phone]    [facility] - [place] [id number] [facility]  [address] [id number]  Phone: [phone] Fax: [phone]    [facility] [address] [place] [address]  Phone: [phone] Fax: [phone]      Initial Assessment (most recent)       Adult Discharge Assessment - 08/10/23 1204          Discharge Assessment    Assessment Type Discharge Planning Assessment     Source of Information patient;family     If unable to respond/provide information was family/caregiver contacted? Yes     Contact Name/Number Nikolay Flood (son) [phone]     People in Home spouse     Do you expect to return to your current living situation? Other (see comments)   home with [facility]    Do you have help at home or someone to help you manage your care at home?  Yes     Who are your caregiver(s) and their phone number(s)? Nikolay Flood (son) [phone]     Prior to hospitilization cognitive status: Unable to Assess     Current cognitive status: Unable to Assess     Home Accessibility not wheelchair accessible     Home Layout Able to live on 1st floor     Equipment Currently Used at Home cane, straight     Readmission within 30 days? No     Patient currently being followed by outpatient case management? No     Do you currently have service(s) that help you manage your care at home? No     Do you take prescription medications? Yes     Do you have prescription coverage? Yes     Do you have any problems affording any of your prescribed medications? No     Is the patient taking medications as prescribed? yes     Who is going to help you get home at discharge? Nikolay Flood (son) [phone]     How do you get to doctors appointments? family or friend will provide;car, drives self     Are you on dialysis? No     Do you take coumadin? No     Discharge Plan A Home with family;Home Health     Discharge Plan B Home Health     Discharge Plan discussed with: Patient;Adult children     Transition of Care Barriers None        Physical Activity    On average, how many days per week do you engage in moderate to strenuous exercise (like a brisk walk)? 0 days     On average, how many minutes do you engage in exercise at this level? 0 min        Financial Resource Strain    How hard is it for you to pay for the very basics like food, housing, medical care, and heating? Not hard at all        Housing Stability    In the last 12 months, was there a time when you were not able to pay the mortgage or rent on time? No     In the last 12 months, how many places have you lived? 1     In the last 12 months, was there a time when you did not have a steady place to sleep or slept in a shelter (including now)? No        Transportation Needs    In the past 12 months, has lack of transportation kept you from  medical appointments or from getting medications? No     In the past 12 months, has lack of transportation kept you from meetings, work, or from getting things needed for daily living? No        Food Insecurity    Within the past 12 months, you worried that your food would run out before you got the money to buy more. Never true     Within the past 12 months, the food you bought just didn't last and you didn't have money to get more. Never true        Stress    Do you feel stress - tense, restless, nervous, or anxious, or unable to sleep at night because your mind is troubled all the time - these days? Not at all        Social Connections    In a typical week, how many times do you talk on the phone with family, friends, or neighbors? More than three times a week     How often do you get together with friends or relatives? More than three times a week     How often do you attend Anglican or Rastafari services? More than 4 times per year     Do you belong to any clubs or organizations such as Anglican groups, unions, fraternal or athletic groups, or school groups? No     How often do you attend meetings of the clubs or organizations you belong to? Never     Are you , , , , never , or living with a partner?         Alcohol Use    Q1: How often do you have a drink containing alcohol? Never     Q2: How many drinks containing alcohol do you have on a typical day when you are drinking? Patient does not drink     Q3: How often do you have six or more drinks on one occasion? Never        OTHER    Name(s) of People in Home Nubia Flood (spouse) [phone]                   Sw spoke with pt and Nikolay Flood (son) [phone] at bedside. Pta, pt lived home with spouse Nubia Flood [phone]. No hh, has cane. Son anticipates that pt will need home health.choice obtained for [facility] home health. Ss following for dc needs and recommendations.

## 2023-08-10 NOTE — PROGRESS NOTES
[facility] - [address] [place] [address]  General Surgery  Progress Note    Subjective:     Interval History:  Patient doing well overall.  No acute events.  No nausea or vomiting but no flatus or bowel movements yet.  About 600 out the NG tube.    Post-Op Info:  Procedure(s) (LRB):  LAPAROTOMY, EXPLORATORY (N/A)  REMOVAL, IMPLANT (N/A)  EXCISION, SMALL INTESTINE (N/A)   1 Day Post-Op      Medications:  Continuous Infusions:   sodium chloride 0.9% 125 mL/hr at 08/10/23 1206    morphine       Scheduled Meds:   enoxparin  40 mg Subcutaneous Q24H (prophylaxis, 1700)    mupirocin   Nasal BID    piperacillin-tazobactam (Zosyn) IV (PEDS and ADULTS) (extended infusion is not appropriate)  4.5 g Intravenous Q8H     PRN Meds:naloxone, ondansetron     Objective:     Vital Signs (Most Recent):  Temp: 97 °F (36.1 °C) (08/10/23 1116)  Pulse: 92 (08/10/23 1116)  Resp: 19 (08/10/23 1116)  BP: (!) 154/67 (08/10/23 1116)  SpO2: 100 % (08/10/23 1116) Vital Signs (24h Range):  Temp:  [97 °F (36.1 °C)-98.9 °F (37.2 °C)] 97 °F (36.1 °C)  Pulse:  [] 92  Resp:  [12-22] 19  SpO2:  [89 %-100 %] 100 %  BP: (124-159)/(64-77) 154/67       Intake/Output Summary (Last 24 hours) at 8/10/2023 1250  Last data filed at 8/10/2023 1100  Gross per 24 hour   Intake 308 ml   Output 850 ml   Net -542 ml       Physical Exam  Constitutional:       General: He is not in acute distress.  HENT:      Head: Normocephalic.   Cardiovascular:      Rate and Rhythm: Normal rate and regular rhythm.      Pulses: Normal pulses.   Pulmonary:      Effort: Pulmonary effort is normal. No respiratory distress.      Breath sounds: Normal breath sounds.   Abdominal:      General: Abdomen is flat. There is no distension.      Palpations: Abdomen is soft.      Tenderness: There is abdominal tenderness.      Comments: Incision clean dry intact   Musculoskeletal:         General: Normal range of motion.   Skin:     General: Skin is warm.   Neurological:      General:  No focal deficit present.      Mental Status: He is oriented to person, place, and time.         Significant Labs:  CBC:   Recent Labs   Lab 08/10/23  0402   WBC 13.73*   RBC 3.83*   HGB 12.2*   HCT 38.8*      .3*   MCH 31.9*   MCHC 31.4*     CMP:   Recent Labs   Lab 08/10/23  0402   *   CALCIUM 8.4*      K 3.4*   CO2 25   *   BUN 15   CREATININE 0.64*       Significant Diagnostics:  None    Assessment/Plan:     There are no hospital problems to display for this patient.  PT to evaluate and treat.  Dressing changes in the midline.  Hopefully can start diet soon.  [place] out.      Tesfaye Natarajan MD  General Surgery  [facility] - [address] [place] [address]

## 2023-08-11 LAB
ANION GAP SERPL CALCULATED.3IONS-SCNC: 6 MMOL/L (ref 7–16)
BASOPHILS # BLD AUTO: 0.03 K/UL (ref 0–0.2)
BASOPHILS NFR BLD AUTO: 0.3 % (ref 0–1)
BUN SERPL-MCNC: 18 MG/DL (ref 7–18)
BUN/CREAT SERPL: 31 (ref 6–20)
CALCIUM SERPL-MCNC: 8.2 MG/DL (ref 8.5–10.1)
CHLORIDE SERPL-SCNC: 111 MMOL/L (ref 98–107)
CO2 SERPL-SCNC: 28 MMOL/L (ref 21–32)
CREAT SERPL-MCNC: 0.59 MG/DL (ref 0.7–1.3)
DHEA SERPL-MCNC: NORMAL
DIFFERENTIAL METHOD BLD: ABNORMAL
EGFR (NO RACE VARIABLE) (RUSH/TITUS): 100 ML/MIN/1.73M2
EOSINOPHIL # BLD AUTO: 0.4 K/UL (ref 0–0.5)
EOSINOPHIL NFR BLD AUTO: 3.3 % (ref 1–4)
ERYTHROCYTE [DISTWIDTH] IN BLOOD BY AUTOMATED COUNT: 13.2 % (ref 11.5–14.5)
ESTROGEN SERPL-MCNC: NORMAL PG/ML
GLUCOSE SERPL-MCNC: 138 MG/DL (ref 74–106)
HCT VFR BLD AUTO: 35.9 % (ref 40–54)
HGB BLD-MCNC: 11.8 G/DL (ref 13.5–18)
IMM GRANULOCYTES # BLD AUTO: 0.06 K/UL (ref 0–0.04)
IMM GRANULOCYTES NFR BLD: 0.5 % (ref 0–0.4)
INSULIN SERPL-ACNC: NORMAL U[IU]/ML
LAB AP GROSS DESCRIPTION: NORMAL
LAB AP LABORATORY NOTES: NORMAL
LYMPHOCYTES # BLD AUTO: 0.88 K/UL (ref 1–4.8)
LYMPHOCYTES NFR BLD AUTO: 7.3 % (ref 27–41)
MCH RBC QN AUTO: 32.2 PG (ref 27–31)
MCHC RBC AUTO-ENTMCNC: 32.9 G/DL (ref 32–36)
MCV RBC AUTO: 98.1 FL (ref 80–96)
MONOCYTES # BLD AUTO: 0.66 K/UL (ref 0–0.8)
MONOCYTES NFR BLD AUTO: 5.5 % (ref 2–6)
MPC BLD CALC-MCNC: 9.4 FL (ref 9.4–12.4)
NEUTROPHILS # BLD AUTO: 9.95 K/UL (ref 1.8–7.7)
NEUTROPHILS NFR BLD AUTO: 83.1 % (ref 53–65)
NRBC # BLD AUTO: 0 X10E3/UL
NRBC, AUTO (.00): 0 %
PLATELET # BLD AUTO: 182 K/UL (ref 150–400)
POTASSIUM SERPL-SCNC: 3.3 MMOL/L (ref 3.5–5.1)
RBC # BLD AUTO: 3.66 M/UL (ref 4.6–6.2)
SODIUM SERPL-SCNC: 142 MMOL/L (ref 136–145)
T3RU NFR SERPL: NORMAL %
WBC # BLD AUTO: 11.98 K/UL (ref 4.5–11)

## 2023-08-11 PROCEDURE — 11000001 HC ACUTE MED/SURG PRIVATE ROOM

## 2023-08-11 PROCEDURE — 80048 BASIC METABOLIC PNL TOTAL CA: CPT | Performed by: SURGERY

## 2023-08-11 PROCEDURE — 97161 PT EVAL LOW COMPLEX 20 MIN: CPT

## 2023-08-11 PROCEDURE — 63600175 PHARM REV CODE 636 W HCPCS: Performed by: SURGERY

## 2023-08-11 PROCEDURE — 94761 N-INVAS EAR/PLS OXIMETRY MLT: CPT

## 2023-08-11 PROCEDURE — 99900035 HC TECH TIME PER 15 MIN (STAT)

## 2023-08-11 PROCEDURE — 27000221 HC OXYGEN, UP TO 24 HOURS

## 2023-08-11 PROCEDURE — 85025 COMPLETE CBC W/AUTO DIFF WBC: CPT | Performed by: SURGERY

## 2023-08-11 PROCEDURE — 25000003 PHARM REV CODE 250: Performed by: SURGERY

## 2023-08-11 RX ORDER — POTASSIUM CHLORIDE 7.45 MG/ML
20 INJECTION INTRAVENOUS ONCE
Status: COMPLETED | OUTPATIENT
Start: 2023-08-11 | End: 2023-08-11

## 2023-08-11 RX ADMIN — SODIUM CHLORIDE: 9 INJECTION, SOLUTION INTRAVENOUS at 05:08

## 2023-08-11 RX ADMIN — MUPIROCIN: 20 OINTMENT TOPICAL at 09:08

## 2023-08-11 RX ADMIN — PIPERACILLIN AND TAZOBACTAM 4.5 G: 4; .5 INJECTION, POWDER, FOR SOLUTION INTRAVENOUS; PARENTERAL at 03:08

## 2023-08-11 RX ADMIN — PIPERACILLIN AND TAZOBACTAM 4.5 G: 4; .5 INJECTION, POWDER, FOR SOLUTION INTRAVENOUS; PARENTERAL at 09:08

## 2023-08-11 RX ADMIN — SODIUM CHLORIDE: 9 INJECTION, SOLUTION INTRAVENOUS at 03:08

## 2023-08-11 RX ADMIN — POTASSIUM CHLORIDE 20 MEQ: 7.46 INJECTION, SOLUTION INTRAVENOUS at 10:08

## 2023-08-11 RX ADMIN — PIPERACILLIN AND TAZOBACTAM 4.5 G: 4; .5 INJECTION, POWDER, FOR SOLUTION INTRAVENOUS; PARENTERAL at 05:08

## 2023-08-11 RX ADMIN — ENOXAPARIN SODIUM 40 MG: 100 INJECTION SUBCUTANEOUS at 05:08

## 2023-08-11 RX ADMIN — MUPIROCIN: 20 OINTMENT TOPICAL at 10:08

## 2023-08-11 RX ADMIN — MORPHINE SULFATE: 1 INJECTION INTRAVENOUS at 12:08

## 2023-08-11 NOTE — PT/OT/SLP EVAL
Physical Therapy Evaluation     Patient Name: Danny Flood   MRN: [id number]  Recent Surgery: Procedure(s) (LRB):  LAPAROTOMY, EXPLORATORY (N/A)  REMOVAL, IMPLANT (N/A)  EXCISION, SMALL INTESTINE (N/A) 2 Days Post-Op    Recommendations:     Discharge Recommendations: home with home health   Discharge Equipment Recommendations: none   Barriers to discharge: Increased level of assist and Ongoing medical treatment    Assessment:     Danny Flood is a 78 y.o. male admitted with a medical diagnosis of infected hernioplasty mesh. He presents with the following impairments/functional limitations: weakness, impaired self care skills, impaired functional mobility, gait instability, impaired balance, pain. Pt presents with limited mobility post op. Still has NG with a lot of output. Pt required only minimal assistance to reach sitting at edge of bed and was able to transfer to chair with assistance. Pt encouraged to sit up in chair over weekend    Rehab Prognosis: Good; patient would benefit from acute PT services to address these deficits and reach maximum level of function.    Plan:     During this hospitalization, patient to be seen 5 x/week to address the above listed problems via gait training, therapeutic activities, therapeutic exercises    Plan of Care Expires: 09/11/23    Subjective     Chief Complaint: abdominal pain  Patient Comments/Goals: Pt is agreeable to PT   Pain/Comfort:  Pain Rating 1: 0/10  Location 1: abdomen  Pain Addressed 1: Pre-medicate for activity  Pain Rating Post-Intervention 1: 3/10    Social History:  Living Environment: Patient lives with their spouse in a single story home with number of outside stair(s): 0  Prior Level of Function: Prior to admission, patient was modified independent with ADLs using straight cane for mobility  Equipment Used at Home: cane, straight  DME owned (not currently used): rolling walker, bedside commode, and wheelchair  Assistance Upon Discharge:  family    Objective:     Communicated with BRIANNE Richards RN prior to session. Patient found HOB elevated with NG tube, peripheral IV, PCA, blood pressure cuff, pulse ox (continuous), SCD upon PT entry to room.    General Precautions: Standard, fall   Orthopedic Precautions: N/A   Braces: N/A    Respiratory Status: Room air    Exams:  Cognition: Patient is oriented to Person, Place, Time, Situation  RLE ROM: WFL  RLE Strength: WFL  LLE ROM: WFL  LLE Strength: WFL  Gross Motor Coordination: WFL    Functional Mobility:  Gait belt applied - Yes  Bed Mobility  Rolling Right: minimum assistance  Scooting: contact guard assistance  Supine to Sit: minimum assistance for trunk management  Transfers  Sit to Stand: minimum assistance with straight cane  Bed to Chair: minimum assistance with straight cane using Step Transfer  Gait  Patient ambulated 3 ft with straight cane and minimum assistance. Patient demonstrates wide base of support. . All lines remained intact throughout ambulation trail.  Balance  Sitting: supervision  Standing: contact guard assistance      Therapeutic Activities and Exercises:   Patient educated on role of acute care PT and PT POC, safety while in hospital including calling nurse for mobility, and call light usage  Patient educated about importance of OOB mobility and remaining up in chair most of the day.      AM-PAC 6 CLICK MOBILITY  Total Score:17    Patient left up in chair with all lines intact and call button in reach.    GOALS:   Multidisciplinary Problems       Physical Therapy Goals          Problem: Physical Therapy    Goal Priority Disciplines Outcome Goal Variances Interventions   Physical Therapy Goal     PT, PT/OT Ongoing, Progressing     Description: Short term goals:  1. Supine to sit with Contact Guard Assistance  2. Rolling to Left and Right with Contact Guard Assistance.  3. Sit to stand transfer with Contact Guard Assistance  4. Bed to chair transfer with Contact Guard Assistance using  Single-point Cane   5. Gait  x 100 feet with Contact Guard Assistance using Single-point Cane .     Long term goals:  1. Supine to sit with Modified [place]  2. Rolling to Left and Right with [place].  3. Sit to stand transfer with Modified [place]  4. Bed to chair transfer with Modified [place] using Single-point Cane   5. Gait  x 300 feet with Modified [place] using Single-point Cane .                         History:     Past Medical History:   Diagnosis Date    Diverticulitis     Hyperlipemia     Hypokalemia 11/09/2021       Past Surgical History:   Procedure Laterality Date    COLON SURGERY      partial colon resection    EXCISION, SMALL INTESTINE N/A 8/9/2023    Procedure: EXCISION, SMALL INTESTINE;  Surgeon: Tesfaye Natarajan MD;  Location: [facility];  Service: General;  Laterality: N/A;    HERNIA REPAIR      LAPAROTOMY, EXPLORATORY N/A 8/9/2023    Procedure: LAPAROTOMY, EXPLORATORY;  Surgeon: Tesfaye Natarajan MD;  Location: [facility];  Service: General;  Laterality: N/A;    REMOVAL OF IMPLANT N/A 8/9/2023    Procedure: REMOVAL, IMPLANT;  Surgeon: Tesfaye Natarajan MD;  Location: [facility];  Service: General;  Laterality: N/A;  removal mesh       Time Tracking:     PT Received On: 08/11/23  PT Start Time: 0918  PT Stop Time: 0937  PT Total Time (min): 19 min     Billable Minutes: Evaluation low complexity    8/11/2023

## 2023-08-11 NOTE — PLAN OF CARE
1033 Chart reviewed. Moderate amount of drainage noted from NGT. Continue with plan of care. SS following.

## 2023-08-11 NOTE — PROGRESS NOTES
[facility] - [address] [place] [address]  General Surgery  Progress Note    Subjective:     Interval History:  Patient not having much flatus yet.  He would about 500 out the NG tube in his placed a suction.  No abdominal pain.    Post-Op Info:  Procedure(s) (LRB):  LAPAROTOMY, EXPLORATORY (N/A)  REMOVAL, IMPLANT (N/A)  EXCISION, SMALL INTESTINE (N/A)   2 Days Post-Op      Medications:  Continuous Infusions:   sodium chloride 0.9% 125 mL/hr at 08/11/23 0504    morphine       Scheduled Meds:   enoxparin  40 mg Subcutaneous Q24H (prophylaxis, 1700)    mupirocin   Nasal BID    piperacillin-tazobactam (Zosyn) IV (PEDS and ADULTS) (extended infusion is not appropriate)  4.5 g Intravenous Q8H     PRN Meds:naloxone, ondansetron     Objective:     Vital Signs (Most Recent):  Temp: 98.2 °F (36.8 °C) (08/11/23 0700)  Pulse: 85 (08/11/23 0700)  Resp: 20 (08/11/23 0700)  BP: 133/69 (08/11/23 0700)  SpO2: 95 % (08/11/23 0700) Vital Signs (24h Range):  Temp:  [97 °F (36.1 °C)-98.4 °F (36.9 °C)] 98.2 °F (36.8 °C)  Pulse:  [85-92] 85  Resp:  [18-20] 20  SpO2:  [95 %-100 %] 95 %  BP: (133-167)/(48-78) 133/69       Intake/Output Summary (Last 24 hours) at 8/11/2023 0937  Last data filed at 8/11/2023 0029  Gross per 24 hour   Intake 8 ml   Output 1675 ml   Net -1667 ml       Physical Exam  Constitutional:       General: He is not in acute distress.  HENT:      Head: Normocephalic.   Cardiovascular:      Rate and Rhythm: Normal rate and regular rhythm.      Pulses: Normal pulses.   Pulmonary:      Effort: Pulmonary effort is normal. No respiratory distress.      Breath sounds: Normal breath sounds.   Abdominal:      General: Abdomen is flat. There is no distension.      Palpations: Abdomen is soft.      Tenderness: There is no abdominal tenderness.      Comments: Dressing clean dry intact   Musculoskeletal:         General: Normal range of motion.   Skin:     General: Skin is warm.   Neurological:      General: No focal deficit  "present.      Mental Status: He is oriented to person, place, and time.         Significant Labs:  CBC:   Recent Labs   Lab 08/10/23  0402   WBC 13.73*   RBC 3.83*   HGB 12.2*   HCT 38.8*      .3*   MCH 31.9*   MCHC 31.4*     CMP:   Recent Labs   Lab 08/10/23  0402   *   CALCIUM 8.4*      K 3.4*   CO2 25   *   BUN 15   CREATININE 0.64*     Coagulation: No results for input(s): "PT", "INR", "APTT" in the last 48 hours.    Significant Diagnostics:  None    Assessment/Plan:     There are no hospital problems to display for this patient.  Infected mesh status post resection and small-bowel resection.    Hypokalemia will replace potassium.  Check labs.    Tesfaye Natarajan MD  General Surgery  [facility] - [address] [place], [place] [address]  "

## 2023-08-11 NOTE — PLAN OF CARE
Problem: Physical Therapy  Goal: Physical Therapy Goal  Description: Short term goals:  1. Supine to sit with Contact Guard Assistance  2. Rolling to Left and Right with Contact Guard Assistance.  3. Sit to stand transfer with Contact Guard Assistance  4. Bed to chair transfer with Contact Guard Assistance using Single-point Cane   5. Gait  x 100 feet with Contact Guard Assistance using Single-point Cane .     Long term goals:  1. Supine to sit with Modified [place]  2. Rolling to Left and Right with [place].  3. Sit to stand transfer with Modified [place]  4. Bed to chair transfer with Modified [place] using Single-point Cane   5. Gait  x 300 feet with Modified [place] using Single-point Cane .    Outcome: Ongoing, Progressing

## 2023-08-11 NOTE — PLAN OF CARE
Problem: Adult Inpatient Plan of Care  Goal: Plan of Care Review  Outcome: Ongoing, Progressing  Goal: Absence of Hospital-Acquired Illness or Injury  Outcome: Ongoing, Progressing  Goal: Optimal Comfort and Wellbeing  Outcome: Ongoing, Progressing     Problem: Fall Injury Risk  Goal: Absence of Fall and Fall-Related Injury  Outcome: Ongoing, Progressing     Problem: Infection  Goal: Absence of Infection Signs and Symptoms  Outcome: Ongoing, Progressing

## 2023-08-12 LAB
ANION GAP SERPL CALCULATED.3IONS-SCNC: 7 MMOL/L (ref 7–16)
BUN SERPL-MCNC: 20 MG/DL (ref 7–18)
BUN/CREAT SERPL: 36 (ref 6–20)
CALCIUM SERPL-MCNC: 8.4 MG/DL (ref 8.5–10.1)
CHLORIDE SERPL-SCNC: 109 MMOL/L (ref 98–107)
CO2 SERPL-SCNC: 30 MMOL/L (ref 21–32)
CREAT SERPL-MCNC: 0.55 MG/DL (ref 0.7–1.3)
EGFR (NO RACE VARIABLE) (RUSH/TITUS): 102 ML/MIN/1.73M2
GLUCOSE SERPL-MCNC: 101 MG/DL (ref 74–106)
MICROORGANISM SPEC CULT: ABNORMAL
POTASSIUM SERPL-SCNC: 3.4 MMOL/L (ref 3.5–5.1)
SODIUM SERPL-SCNC: 143 MMOL/L (ref 136–145)

## 2023-08-12 PROCEDURE — 63600175 PHARM REV CODE 636 W HCPCS: Performed by: SURGERY

## 2023-08-12 PROCEDURE — 25000003 PHARM REV CODE 250: Performed by: SURGERY

## 2023-08-12 PROCEDURE — 11000001 HC ACUTE MED/SURG PRIVATE ROOM

## 2023-08-12 PROCEDURE — 80048 BASIC METABOLIC PNL TOTAL CA: CPT | Performed by: SURGERY

## 2023-08-12 RX ORDER — BISACODYL 10 MG
10 SUPPOSITORY, RECTAL RECTAL DAILY
Status: DISCONTINUED | OUTPATIENT
Start: 2023-08-12 | End: 2023-08-14

## 2023-08-12 RX ADMIN — SODIUM CHLORIDE: 9 INJECTION, SOLUTION INTRAVENOUS at 12:08

## 2023-08-12 RX ADMIN — ENOXAPARIN SODIUM 40 MG: 100 INJECTION SUBCUTANEOUS at 06:08

## 2023-08-12 RX ADMIN — PIPERACILLIN AND TAZOBACTAM 4.5 G: 4; .5 INJECTION, POWDER, FOR SOLUTION INTRAVENOUS; PARENTERAL at 05:08

## 2023-08-12 RX ADMIN — MUPIROCIN: 20 OINTMENT TOPICAL at 09:08

## 2023-08-12 RX ADMIN — MORPHINE SULFATE: 1 INJECTION INTRAVENOUS at 02:08

## 2023-08-12 RX ADMIN — SODIUM CHLORIDE: 9 INJECTION, SOLUTION INTRAVENOUS at 10:08

## 2023-08-12 RX ADMIN — PIPERACILLIN AND TAZOBACTAM 4.5 G: 4; .5 INJECTION, POWDER, FOR SOLUTION INTRAVENOUS; PARENTERAL at 01:08

## 2023-08-12 RX ADMIN — PIPERACILLIN AND TAZOBACTAM 4.5 G: 4; .5 INJECTION, POWDER, FOR SOLUTION INTRAVENOUS; PARENTERAL at 09:08

## 2023-08-12 RX ADMIN — SODIUM CHLORIDE: 9 INJECTION, SOLUTION INTRAVENOUS at 09:08

## 2023-08-12 RX ADMIN — BISACODYL 10 MG: 10 SUPPOSITORY RECTAL at 01:08

## 2023-08-12 NOTE — NURSING
I called Dr. Arcos at ([phone]) to notify him that Mr. Flood  pulled out his ng tube and that he only had an output of 200ml for  a 12hour shift. Dr. Arcos said it was okay to leave his ng tube out.

## 2023-08-12 NOTE — PROGRESS NOTES
Surgery     No bowel movement     Abdomen soft, midline wound change repacked open areas educated patient nurse    Give Dulcolax await return of bowel function

## 2023-08-12 NOTE — PLAN OF CARE
Problem: Adult Inpatient Plan of Care  Goal: Plan of Care Review  Outcome: Ongoing, Progressing  Goal: Patient-Specific Goal (Individualized)  Outcome: Ongoing, Progressing  Goal: Absence of Hospital-Acquired Illness or Injury  Outcome: Ongoing, Progressing  Goal: Optimal Comfort and Wellbeing  Outcome: Ongoing, Progressing  Goal: Readiness for Transition of Care  Outcome: Ongoing, Progressing     Problem: Fall Injury Risk  Goal: Absence of Fall and Fall-Related Injury  Outcome: Ongoing, Progressing     Problem: Infection  Goal: Absence of Infection Signs and Symptoms  Outcome: Ongoing, Progressing     Problem: Gas Exchange Impaired  Goal: Optimal Gas Exchange  Outcome: Ongoing, Progressing     Problem: Skin Injury Risk Increased  Goal: Skin Health and Integrity  Outcome: Ongoing, Progressing

## 2023-08-13 PROBLEM — E87.0 HYPERNATREMIA: Status: ACTIVE | Noted: 2023-08-13

## 2023-08-13 PROBLEM — E11.9 DIABETES MELLITUS: Status: ACTIVE | Noted: 2023-08-13

## 2023-08-13 PROBLEM — F03.90 DEMENTIA WITHOUT BEHAVIORAL DISTURBANCE, PSYCHOTIC DISTURBANCE, MOOD DISTURBANCE, OR ANXIETY: Status: ACTIVE | Noted: 2023-08-13

## 2023-08-13 PROBLEM — E78.5 HYPERLIPIDEMIA: Status: ACTIVE | Noted: 2023-08-13

## 2023-08-13 PROBLEM — T85.79XA INFECTED HERNIOPLASTY MESH: Status: ACTIVE | Noted: 2023-08-13

## 2023-08-13 LAB
ANION GAP SERPL CALCULATED.3IONS-SCNC: 12 MMOL/L (ref 7–16)
BACTERIA SPEC ANAEROBE CULT: ABNORMAL
BACTERIA SPEC ANAEROBE CULT: ABNORMAL
BUN SERPL-MCNC: 17 MG/DL (ref 7–18)
BUN/CREAT SERPL: 31 (ref 6–20)
CALCIUM SERPL-MCNC: 7.9 MG/DL (ref 8.5–10.1)
CHLORIDE SERPL-SCNC: 113 MMOL/L (ref 98–107)
CO2 SERPL-SCNC: 24 MMOL/L (ref 21–32)
CREAT SERPL-MCNC: 0.55 MG/DL (ref 0.7–1.3)
EGFR (NO RACE VARIABLE) (RUSH/TITUS): 102 ML/MIN/1.73M2
GLUCOSE SERPL-MCNC: 127 MG/DL (ref 70–105)
GLUCOSE SERPL-MCNC: 172 MG/DL (ref 70–105)
GLUCOSE SERPL-MCNC: 67 MG/DL (ref 74–106)
GLUCOSE SERPL-MCNC: 69 MG/DL (ref 70–105)
MAGNESIUM SERPL-MCNC: 1.8 MG/DL (ref 1.7–2.3)
POTASSIUM SERPL-SCNC: 2.8 MMOL/L (ref 3.5–5.1)
SODIUM SERPL-SCNC: 146 MMOL/L (ref 136–145)
TROPONIN I SERPL DL<=0.01 NG/ML-MCNC: 1296.9 PG/ML
TSH SERPL DL<=0.005 MIU/L-ACNC: 0.85 UIU/ML (ref 0.36–3.74)

## 2023-08-13 PROCEDURE — 94761 N-INVAS EAR/PLS OXIMETRY MLT: CPT

## 2023-08-13 PROCEDURE — 63600175 PHARM REV CODE 636 W HCPCS: Performed by: SURGERY

## 2023-08-13 PROCEDURE — 83735 ASSAY OF MAGNESIUM: CPT | Performed by: HOSPITALIST

## 2023-08-13 PROCEDURE — 80048 BASIC METABOLIC PNL TOTAL CA: CPT | Performed by: SURGERY

## 2023-08-13 PROCEDURE — C9113 INJ PANTOPRAZOLE SODIUM, VIA: HCPCS | Performed by: FAMILY MEDICINE

## 2023-08-13 PROCEDURE — 84443 ASSAY THYROID STIM HORMONE: CPT | Performed by: HOSPITALIST

## 2023-08-13 PROCEDURE — 25000003 PHARM REV CODE 250: Performed by: HOSPITALIST

## 2023-08-13 PROCEDURE — 84484 ASSAY OF TROPONIN QUANT: CPT | Performed by: HOSPITALIST

## 2023-08-13 PROCEDURE — 25000003 PHARM REV CODE 250: Performed by: SURGERY

## 2023-08-13 PROCEDURE — 93010 EKG 12-LEAD: ICD-10-PCS | Mod: ,,, | Performed by: HOSPITALIST

## 2023-08-13 PROCEDURE — 27000221 HC OXYGEN, UP TO 24 HOURS

## 2023-08-13 PROCEDURE — 25000003 PHARM REV CODE 250: Performed by: FAMILY MEDICINE

## 2023-08-13 PROCEDURE — 11000001 HC ACUTE MED/SURG PRIVATE ROOM

## 2023-08-13 PROCEDURE — 93010 ELECTROCARDIOGRAM REPORT: CPT | Mod: ,,, | Performed by: HOSPITALIST

## 2023-08-13 PROCEDURE — 99222 PR INITIAL HOSPITAL CARE,LEVL II: ICD-10-PCS | Mod: ,,, | Performed by: FAMILY MEDICINE

## 2023-08-13 PROCEDURE — 99900035 HC TECH TIME PER 15 MIN (STAT)

## 2023-08-13 PROCEDURE — 82962 GLUCOSE BLOOD TEST: CPT

## 2023-08-13 PROCEDURE — 93005 ELECTROCARDIOGRAM TRACING: CPT

## 2023-08-13 PROCEDURE — 99222 1ST HOSP IP/OBS MODERATE 55: CPT | Mod: ,,, | Performed by: FAMILY MEDICINE

## 2023-08-13 PROCEDURE — 63600175 PHARM REV CODE 636 W HCPCS: Performed by: FAMILY MEDICINE

## 2023-08-13 RX ORDER — TIMOLOL MALEATE 5 MG/ML
1 SOLUTION/ DROPS OPHTHALMIC DAILY
Status: DISCONTINUED | OUTPATIENT
Start: 2023-08-13 | End: 2023-08-17 | Stop reason: HOSPADM

## 2023-08-13 RX ORDER — AMLODIPINE BESYLATE 5 MG/1
5 TABLET ORAL DAILY
Status: DISCONTINUED | OUTPATIENT
Start: 2023-08-14 | End: 2023-08-13

## 2023-08-13 RX ORDER — POTASSIUM CHLORIDE 7.45 MG/ML
20 INJECTION INTRAVENOUS ONCE
Status: COMPLETED | OUTPATIENT
Start: 2023-08-13 | End: 2023-08-13

## 2023-08-13 RX ORDER — QUETIAPINE FUMARATE 25 MG/1
50 TABLET, FILM COATED ORAL 2 TIMES DAILY
Status: DISCONTINUED | OUTPATIENT
Start: 2023-08-13 | End: 2023-08-17 | Stop reason: HOSPADM

## 2023-08-13 RX ORDER — TIMOLOL MALEATE 5 MG/ML
1 SOLUTION/ DROPS OPHTHALMIC DAILY
COMMUNITY
Start: 2023-06-20

## 2023-08-13 RX ORDER — GLUCAGON 1 MG
1 KIT INJECTION
Status: DISCONTINUED | OUTPATIENT
Start: 2023-08-13 | End: 2023-08-17 | Stop reason: HOSPADM

## 2023-08-13 RX ORDER — PANTOPRAZOLE SODIUM 40 MG/10ML
40 INJECTION, POWDER, LYOPHILIZED, FOR SOLUTION INTRAVENOUS 2 TIMES DAILY
Status: DISCONTINUED | OUTPATIENT
Start: 2023-08-13 | End: 2023-08-17 | Stop reason: HOSPADM

## 2023-08-13 RX ORDER — SODIUM CHLORIDE, SODIUM LACTATE, POTASSIUM CHLORIDE, CALCIUM CHLORIDE 600; 310; 30; 20 MG/100ML; MG/100ML; MG/100ML; MG/100ML
INJECTION, SOLUTION INTRAVENOUS CONTINUOUS
Status: DISCONTINUED | OUTPATIENT
Start: 2023-08-13 | End: 2023-08-17 | Stop reason: HOSPADM

## 2023-08-13 RX ORDER — LACTULOSE 10 G/15ML
20 SOLUTION ORAL 3 TIMES DAILY
Status: DISCONTINUED | OUTPATIENT
Start: 2023-08-13 | End: 2023-08-13

## 2023-08-13 RX ORDER — NAPROXEN SODIUM 220 MG/1
81 TABLET, FILM COATED ORAL ONCE
Status: COMPLETED | OUTPATIENT
Start: 2023-08-13 | End: 2023-08-13

## 2023-08-13 RX ORDER — IBUPROFEN 200 MG
16 TABLET ORAL
Status: DISCONTINUED | OUTPATIENT
Start: 2023-08-13 | End: 2023-08-17 | Stop reason: HOSPADM

## 2023-08-13 RX ORDER — PIOGLITAZONEHYDROCHLORIDE 15 MG/1
15 TABLET ORAL
Status: ON HOLD | COMMUNITY
Start: 2023-08-07 | End: 2023-08-23 | Stop reason: HOSPADM

## 2023-08-13 RX ORDER — IBUPROFEN 200 MG
24 TABLET ORAL
Status: DISCONTINUED | OUTPATIENT
Start: 2023-08-13 | End: 2023-08-17 | Stop reason: HOSPADM

## 2023-08-13 RX ORDER — PANTOPRAZOLE SODIUM 40 MG/1
40 TABLET, DELAYED RELEASE ORAL 2 TIMES DAILY
Status: DISCONTINUED | OUTPATIENT
Start: 2023-08-13 | End: 2023-08-13

## 2023-08-13 RX ORDER — INSULIN ASPART 100 [IU]/ML
0-5 INJECTION, SOLUTION INTRAVENOUS; SUBCUTANEOUS
Status: DISCONTINUED | OUTPATIENT
Start: 2023-08-13 | End: 2023-08-17 | Stop reason: HOSPADM

## 2023-08-13 RX ORDER — QUETIAPINE FUMARATE 25 MG/1
50 TABLET, FILM COATED ORAL 2 TIMES DAILY
Status: DISCONTINUED | OUTPATIENT
Start: 2023-08-13 | End: 2023-08-13

## 2023-08-13 RX ADMIN — SODIUM CHLORIDE, POTASSIUM CHLORIDE, SODIUM LACTATE AND CALCIUM CHLORIDE: 600; 310; 30; 20 INJECTION, SOLUTION INTRAVENOUS at 01:08

## 2023-08-13 RX ADMIN — ENOXAPARIN SODIUM 40 MG: 100 INJECTION SUBCUTANEOUS at 06:08

## 2023-08-13 RX ADMIN — RIFAXIMIN 550 MG: 550 TABLET ORAL at 01:08

## 2023-08-13 RX ADMIN — TIMOLOL MALEATE 1 DROP: 5 SOLUTION/ DROPS OPHTHALMIC at 03:08

## 2023-08-13 RX ADMIN — SODIUM CHLORIDE: 9 INJECTION, SOLUTION INTRAVENOUS at 08:08

## 2023-08-13 RX ADMIN — PANTOPRAZOLE SODIUM 40 MG: 40 INJECTION, POWDER, FOR SOLUTION INTRAVENOUS at 01:08

## 2023-08-13 RX ADMIN — PIPERACILLIN AND TAZOBACTAM 4.5 G: 4; .5 INJECTION, POWDER, FOR SOLUTION INTRAVENOUS; PARENTERAL at 05:08

## 2023-08-13 RX ADMIN — Medication 16 G: at 03:08

## 2023-08-13 RX ADMIN — PIPERACILLIN AND TAZOBACTAM 4.5 G: 4; .5 INJECTION, POWDER, FOR SOLUTION INTRAVENOUS; PARENTERAL at 01:08

## 2023-08-13 RX ADMIN — PANTOPRAZOLE SODIUM 40 MG: 40 INJECTION, POWDER, FOR SOLUTION INTRAVENOUS at 09:08

## 2023-08-13 RX ADMIN — POTASSIUM CHLORIDE 20 MEQ: 7.46 INJECTION, SOLUTION INTRAVENOUS at 01:08

## 2023-08-13 RX ADMIN — BISACODYL 10 MG: 10 SUPPOSITORY RECTAL at 08:08

## 2023-08-13 RX ADMIN — QUETIAPINE FUMARATE 50 MG: 25 TABLET ORAL at 01:08

## 2023-08-13 RX ADMIN — SODIUM CHLORIDE, POTASSIUM CHLORIDE, SODIUM LACTATE AND CALCIUM CHLORIDE: 600; 310; 30; 20 INJECTION, SOLUTION INTRAVENOUS at 11:08

## 2023-08-13 RX ADMIN — ASPIRIN 81 MG 81 MG: 81 TABLET ORAL at 11:08

## 2023-08-13 RX ADMIN — MUPIROCIN: 20 OINTMENT TOPICAL at 09:08

## 2023-08-13 RX ADMIN — PIPERACILLIN AND TAZOBACTAM 4.5 G: 4; .5 INJECTION, POWDER, FOR SOLUTION INTRAVENOUS; PARENTERAL at 09:08

## 2023-08-13 RX ADMIN — MUPIROCIN: 20 OINTMENT TOPICAL at 08:08

## 2023-08-13 RX ADMIN — QUETIAPINE FUMARATE 50 MG: 25 TABLET ORAL at 09:08

## 2023-08-13 RX ADMIN — RIFAXIMIN 550 MG: 550 TABLET ORAL at 09:08

## 2023-08-13 NOTE — HPI
Danny Flood is a 78-year-old male with history of HTN, DM, GERD, dementia, and hyperammonemia. He is post-op #4 exploratory laparotomy with infected mesh removal, and small bowel resection with anastomosis. Patient tolerated procedure well with no immediate post-operative complications. Medicine consulted for management of chronic conditions.     Patient with no current complaints. Has not yet regained normal bowel function, however NGT was pulled yesterday evening with no resulting nausea, vomiting, or increased abdominal pain. Labs do show some hypernatremia and hypokalemia, mildly increased from yesterday. Vital signs unremarkable aside from some mild hypertension overnight. Pain well controlled.

## 2023-08-13 NOTE — ASSESSMENT & PLAN NOTE
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 On lactulose at home, holding as bowel function has not yet returned. Will start rifaximin in the meantime. No mental status changes currently.

## 2023-08-13 NOTE — CONSULTS
[facility] - [facility] Medicine  Consult Note    Patient Name: Danny Flood  MRN: [id number]  Admission Date: 8/9/2023  Hospital Length of Stay: 4 days  Attending Physician: Tesfaye Natarajan MD   Primary Care Provider: Cameron Valencia MD           Patient information was obtained from patient, past medical records and ER records.     Consults  Subjective:     Principal Problem: Infected hernioplasty mesh    Chief Complaint: No chief complaint on file.       HPI: Danny Flood is a 78-year-old male with history of HTN, DM, GERD, dementia, and hyperammonemia. He is post-op #4 exploratory laparotomy with infected mesh removal, and small bowel resection with anastomosis. Patient tolerated procedure well with no immediate post-operative complications. Medicine consulted for management of chronic conditions.     Patient with no current complaints. Has not yet regained normal bowel function, however NGT was pulled yesterday evening with no resulting nausea, vomiting, or increased abdominal pain. Labs do show some hypernatremia and hypokalemia, mildly increased from yesterday. Vital signs unremarkable aside from some mild hypertension overnight. Pain well controlled.      Past Medical History:   Diagnosis Date    Diverticulitis     Hyperlipemia     Hypokalemia 11/09/2021       Past Surgical History:   Procedure Laterality Date    COLON SURGERY      partial colon resection    EXCISION, SMALL INTESTINE N/A 8/9/2023    Procedure: EXCISION, SMALL INTESTINE;  Surgeon: Tesfaye Natarajan MD;  Location: [facility] OR;  Service: General;  Laterality: N/A;    HERNIA REPAIR      LAPAROTOMY, EXPLORATORY N/A 8/9/2023    Procedure: LAPAROTOMY, EXPLORATORY;  Surgeon: Tesfaye Natarajan MD;  Location: [facility] OR;  Service: General;  Laterality: N/A;    REMOVAL OF IMPLANT N/A 8/9/2023    Procedure: REMOVAL, IMPLANT;  Surgeon: Tesfaye Natarajan MD;  Location: [facility] OR;  Service: General;   Laterality: N/A;  removal mesh       Review of patient's allergies indicates:  No Known Allergies    No current facility-administered medications on file prior to encounter.     Current Outpatient Medications on File Prior to Encounter   Medication Sig    amLODIPine (NORVASC) 10 MG tablet Take 1 tablet (10 mg total) by mouth once daily.    FOLBIC 2.5-25-2 mg Tab Take 1 tablet by mouth once daily.    FOLIC ACID-VITAMIN B6-VIT B12 ORAL Take 1 tablet by mouth once daily.    Lactobacillus acidophilus (PROBIOTIC) 10 billion cell Cap Take by mouth.    lactulose (CHRONULAC) 10 gram/15 mL solution Take 20 g by mouth 3 (three) times daily.    losartan (COZAAR) 100 MG tablet Take 1 tablet (100 mg total) by mouth once daily.    niacin (SLO-NIACIN) 500 mg tablet Take 500 mg by mouth once daily. With meal    omega-3 fatty acids/fish oil (FISH OIL-OMEGA-3 FATTY ACIDS) 300-1,000 mg capsule Take 1 capsule by mouth once daily.    pantoprazole (PROTONIX) 40 MG tablet Take 1 tablet (40 mg total) by mouth 2 (two) times daily.    pioglitazone (ACTOS) 15 MG tablet Take 15 mg by mouth.    QUEtiapine (SEROQUEL) 50 MG tablet 1 tablet (50 mg total) by Per NG tube route 2 (two) times daily.    rifAXIMin (XIFAXAN) 550 mg Tab 1 tablet (550 mg total) by Per G Tube route 2 (two) times daily.    timolol maleate 0.5% (TIMOPTIC) 0.5 % Drop 1 drop once daily.     Family History       Problem Relation (Age of Onset)    Diabetes Mother, Father          Tobacco Use    Smoking status: Never    Smokeless tobacco: Never   Substance and Sexual Activity    Alcohol use: Never    Drug use: Never    Sexual activity: Not Currently     Review of Systems   Constitutional:  Positive for appetite change. Negative for fever.   Respiratory:  Negative for shortness of breath.    Cardiovascular:  Negative for chest pain and palpitations.   Gastrointestinal:  Positive for abdominal pain (post-surgery) and constipation. Negative for abdominal distention,  nausea and vomiting.   Neurological:  Negative for dizziness, syncope and weakness.   Psychiatric/Behavioral:  Negative for confusion.    All other systems reviewed and are negative.    Objective:     Vital Signs (Most Recent):  Temp: 97.5 °F (36.4 °C) (08/13/23 0701)  Pulse: 100 (08/13/23 0701)  Resp: 18 (08/13/23 0701)  BP: 125/69 (08/13/23 0701)  SpO2: (!) 93 % (08/13/23 0701) Vital Signs (24h Range):  Temp:  [97.5 °F (36.4 °C)-99 °F (37.2 °C)] 97.5 °F (36.4 °C)  Pulse:  [] 100  Resp:  [18-20] 18  SpO2:  [93 %-98 %] 93 %  BP: (125-157)/(65-72) 125/69     Weight: 81.6 kg (180 lb)  Body mass index is 24.41 kg/m².     Physical Exam  Constitutional:       General: He is not in acute distress.     Appearance: Normal appearance. He is not ill-appearing.   HENT:      Head: Normocephalic and atraumatic.      Nose: Nose normal.   Eyes:      Conjunctiva/sclera: Conjunctivae normal.      Pupils: Pupils are equal, round, and reactive to light.   Cardiovascular:      Rate and Rhythm: Normal rate and regular rhythm.   Pulmonary:      Effort: Pulmonary effort is normal. No respiratory distress.      Breath sounds: Normal breath sounds.   Abdominal:      Palpations: Abdomen is soft.      Comments: Surgical changes, healing appropriately   Musculoskeletal:      Cervical back: No rigidity.   Skin:     Coloration: Skin is not jaundiced or pale.      Findings: No rash.   Neurological:      Mental Status: He is alert. Mental status is at baseline.   Psychiatric:         Behavior: Behavior normal.         Thought Content: Thought content normal.          Significant Labs: All pertinent labs within the past 24 hours have been reviewed.    Significant Imaging: I have reviewed all pertinent imaging results/findings within the past 24 hours.    Assessment/Plan:     * Infected hernioplasty mesh  Patient s/p exploratory laparotomy with infected mesh removal and mall bowel resection with anastomosis. Continue IV antibiotics and pain  "control, further management per primary.      Hypernatremia  Continue IV fluids @ 125cc/hr, d/c NS in favor of LR as some hyperchloremia as well.       Dementia without behavioral disturbance, psychotic disturbance, mood disturbance, or anxiety  Seroquel BID      Diabetes mellitus  Patient's FSGs are controlled on current medication regimen.  Last A1c reviewed-   Lab Results   Component Value Date    HGBA1C 5.6 11/06/2021     Most recent fingerstick glucose reviewed- No results for input(s): "POCTGLUCOSE" in the last 24 hours.  Current correctional scale  Low  Maintain anti-hyperglycemic dose as follows-   Antihyperglycemics (From admission, onward)    Start     Stop Route Frequency Ordered    08/13/23 1314  insulin aspart U-100 injection 0-5 Units         -- SubQ Before meals & nightly PRN 08/13/23 1215        Hold Oral hypoglycemics while patient is in the hospital.    Hyperlipidemia        Hypokalemia  Ordered 20 mEq K+ IV, transition to oral replacement as appropriate. Mg+ pending.       Hypertension  Resume home medications, monitor.       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 On lactulose at home, holding as bowel function has not yet returned. Will start rifaximin in the meantime. No mental status changes currently.        VTE Risk Mitigation (From admission, onward)         Ordered     enoxaparin injection 40 mg  Every 24 hours         08/09/23 5668                    Thank you for your consult. I will follow-up with patient. Please contact us if you have any additional questions.    Maura Castellano, DO  Department of Hospital Medicine   [facility] - [address] [place], [place] [address]etry    "

## 2023-08-13 NOTE — ASSESSMENT & PLAN NOTE
"Patient's FSGs are controlled on current medication regimen.  Last A1c reviewed-   Lab Results   Component Value Date    HGBA1C 5.6 11/06/2021     Most recent fingerstick glucose reviewed- No results for input(s): "POCTGLUCOSE" in the last 24 hours.  Current correctional scale  Low  Maintain anti-hyperglycemic dose as follows-   Antihyperglycemics (From admission, onward)    Start     Stop Route Frequency Ordered    08/13/23 1314  insulin aspart U-100 injection 0-5 Units         -- SubQ Before meals & nightly PRN 08/13/23 1215        Hold Oral hypoglycemics while patient is in the hospital.  "

## 2023-08-13 NOTE — SUBJECTIVE & OBJECTIVE
Past Medical History:   Diagnosis Date    Diverticulitis     Hyperlipemia     Hypokalemia 11/09/2021       Past Surgical History:   Procedure Laterality Date    COLON SURGERY      partial colon resection    EXCISION, SMALL INTESTINE N/A 8/9/2023    Procedure: EXCISION, SMALL INTESTINE;  Surgeon: Tesfaye Natarajan MD;  Location: [facility];  Service: General;  Laterality: N/A;    HERNIA REPAIR      LAPAROTOMY, EXPLORATORY N/A 8/9/2023    Procedure: LAPAROTOMY, EXPLORATORY;  Surgeon: Tesfaye Natarajan MD;  Location: [facility] OR;  Service: General;  Laterality: N/A;    REMOVAL OF IMPLANT N/A 8/9/2023    Procedure: REMOVAL, IMPLANT;  Surgeon: Tesfaye Natarajan MD;  Location: [facility] OR;  Service: General;  Laterality: N/A;  removal mesh       Review of patient's allergies indicates:  No Known Allergies    No current facility-administered medications on file prior to encounter.     Current Outpatient Medications on File Prior to Encounter   Medication Sig    amLODIPine (NORVASC) 10 MG tablet Take 1 tablet (10 mg total) by mouth once daily.    FOLBIC 2.5-25-2 mg Tab Take 1 tablet by mouth once daily.    FOLIC ACID-VITAMIN B6-VIT B12 ORAL Take 1 tablet by mouth once daily.    Lactobacillus acidophilus (PROBIOTIC) 10 billion cell Cap Take by mouth.    lactulose (CHRONULAC) 10 gram/15 mL solution Take 20 g by mouth 3 (three) times daily.    losartan (COZAAR) 100 MG tablet Take 1 tablet (100 mg total) by mouth once daily.    niacin (SLO-NIACIN) 500 mg tablet Take 500 mg by mouth once daily. With meal    omega-3 fatty acids/fish oil (FISH OIL-OMEGA-3 FATTY ACIDS) 300-1,000 mg capsule Take 1 capsule by mouth once daily.    pantoprazole (PROTONIX) 40 MG tablet Take 1 tablet (40 mg total) by mouth 2 (two) times daily.    pioglitazone (ACTOS) 15 MG tablet Take 15 mg by mouth.    QUEtiapine (SEROQUEL) 50 MG tablet 1 tablet (50 mg total) by Per NG tube route 2 (two) times daily.    rifAXIMin (XIFAXAN) 550 mg Tab 1 tablet (550  mg total) by Per G Tube route 2 (two) times daily.    timolol maleate 0.5% (TIMOPTIC) 0.5 % Drop 1 drop once daily.     Family History       Problem Relation (Age of Onset)    Diabetes Mother, Father          Tobacco Use    Smoking status: Never    Smokeless tobacco: Never   Substance and Sexual Activity    Alcohol use: Never    Drug use: Never    Sexual activity: Not Currently     Review of Systems   Constitutional:  Positive for appetite change. Negative for fever.   Respiratory:  Negative for shortness of breath.    Cardiovascular:  Negative for chest pain and palpitations.   Gastrointestinal:  Positive for abdominal pain (post-surgery) and constipation. Negative for abdominal distention, nausea and vomiting.   Neurological:  Negative for dizziness, syncope and weakness.   Psychiatric/Behavioral:  Negative for confusion.    All other systems reviewed and are negative.    Objective:     Vital Signs (Most Recent):  Temp: 97.5 °F (36.4 °C) (08/13/23 0701)  Pulse: 100 (08/13/23 0701)  Resp: 18 (08/13/23 0701)  BP: 125/69 (08/13/23 0701)  SpO2: (!) 93 % (08/13/23 0701) Vital Signs (24h Range):  Temp:  [97.5 °F (36.4 °C)-99 °F (37.2 °C)] 97.5 °F (36.4 °C)  Pulse:  [] 100  Resp:  [18-20] 18  SpO2:  [93 %-98 %] 93 %  BP: (125-157)/(65-72) 125/69     Weight: 81.6 kg (180 lb)  Body mass index is 24.41 kg/m².     Physical Exam  Constitutional:       General: He is not in acute distress.     Appearance: Normal appearance. He is not ill-appearing.   HENT:      Head: Normocephalic and atraumatic.      Nose: Nose normal.   Eyes:      Conjunctiva/sclera: Conjunctivae normal.      Pupils: Pupils are equal, round, and reactive to light.   Cardiovascular:      Rate and Rhythm: Normal rate and regular rhythm.   Pulmonary:      Effort: Pulmonary effort is normal. No respiratory distress.      Breath sounds: Normal breath sounds.   Abdominal:      Palpations: Abdomen is soft.      Comments: Surgical changes, healing  appropriately   Musculoskeletal:      Cervical back: No rigidity.   Skin:     Coloration: Skin is not jaundiced or pale.      Findings: No rash.   Neurological:      Mental Status: He is alert. Mental status is at baseline.   Psychiatric:         Behavior: Behavior normal.         Thought Content: Thought content normal.          Significant Labs: All pertinent labs within the past 24 hours have been reviewed.    Significant Imaging: I have reviewed all pertinent imaging results/findings within the past 24 hours.

## 2023-08-13 NOTE — ASSESSMENT & PLAN NOTE
Patient s/p exploratory laparotomy with infected mesh removal and mall bowel resection with anastomosis. Continue IV antibiotics and pain control, further management per primary.

## 2023-08-13 NOTE — PLAN OF CARE
Problem: Adult Inpatient Plan of Care  Goal: Plan of Care Review  Outcome: Ongoing, Progressing  Goal: Patient-Specific Goal (Individualized)  Outcome: Ongoing, Progressing  Goal: Absence of Hospital-Acquired Illness or Injury  Outcome: Ongoing, Progressing  Goal: Optimal Comfort and Wellbeing  Outcome: Ongoing, Progressing  Goal: Readiness for Transition of Care  Outcome: Ongoing, Progressing     Problem: Fall Injury Risk  Goal: Absence of Fall and Fall-Related Injury  Outcome: Ongoing, Progressing     Problem: Infection  Goal: Absence of Infection Signs and Symptoms  Outcome: Ongoing, Progressing     Problem: Gas Exchange Impaired  Goal: Optimal Gas Exchange  Outcome: Ongoing, Progressing     Problem: Skin Injury Risk Increased  Goal: Skin Health and Integrity  Outcome: Ongoing, Progressing     Problem: Diabetes Comorbidity  Goal: Blood Glucose Level Within Targeted Range  Outcome: Ongoing, Progressing

## 2023-08-14 PROBLEM — I21.4 NSTEMI (NON-ST ELEVATED MYOCARDIAL INFARCTION): Status: ACTIVE | Noted: 2023-08-14

## 2023-08-14 PROBLEM — R79.89 ELEVATED TROPONIN LEVEL: Status: ACTIVE | Noted: 2023-08-14

## 2023-08-14 LAB
ALBUMIN SERPL BCP-MCNC: 2 G/DL (ref 3.5–5)
ALBUMIN/GLOB SERPL: 0.5 {RATIO}
ALP SERPL-CCNC: 56 U/L (ref 45–115)
ALT SERPL W P-5'-P-CCNC: 14 U/L (ref 16–61)
AMMONIA PLAS-SCNC: 64 ΜMOL/L (ref 11–32)
ANION GAP SERPL CALCULATED.3IONS-SCNC: 7 MMOL/L (ref 7–16)
AORTIC ROOT ANNULUS: 3.11 CM
AORTIC VALVE CUSP SEPERATION: 2.08 CM
APTT PPP: 36.3 SECONDS (ref 25.2–37.3)
AST SERPL W P-5'-P-CCNC: 32 U/L (ref 15–37)
AV INDEX (PROSTH): 0.99
AV MEAN GRADIENT: 3 MMHG
AV PEAK GRADIENT: 6 MMHG
AV VALVE AREA BY VELOCITY RATIO: 2.69 CM²
AV VALVE AREA: 3.21 CM²
AV VELOCITY RATIO: 0.83
BASOPHILS # BLD AUTO: 0.04 K/UL (ref 0–0.2)
BASOPHILS NFR BLD AUTO: 0.6 % (ref 0–1)
BILIRUB SERPL-MCNC: 1.4 MG/DL (ref ?–1.2)
BSA FOR ECHO PROCEDURE: 2.04 M2
BUN SERPL-MCNC: 19 MG/DL (ref 7–18)
BUN/CREAT SERPL: 30 (ref 6–20)
CALCIUM SERPL-MCNC: 8.2 MG/DL (ref 8.5–10.1)
CHLORIDE SERPL-SCNC: 113 MMOL/L (ref 98–107)
CHOLEST SERPL-MCNC: 104 MG/DL (ref 0–200)
CHOLEST/HDLC SERPL: 2.9 {RATIO}
CO2 SERPL-SCNC: 27 MMOL/L (ref 21–32)
CREAT SERPL-MCNC: 0.63 MG/DL (ref 0.7–1.3)
CV ECHO LV RWT: 0.47 CM
D DIMER PPP FEU-MCNC: 8.3 ΜG/ML (ref 0–0.47)
DIFFERENTIAL METHOD BLD: ABNORMAL
DOP CALC AO PEAK VEL: 1.24 M/S
DOP CALC AO VTI: 23.2 CM
DOP CALC LVOT AREA: 3.2 CM2
DOP CALC LVOT DIAMETER: 2.03 CM
DOP CALC LVOT PEAK VEL: 1.03 M/S
DOP CALC LVOT STROKE VOLUME: 74.4 CM3
DOP CALC MV VTI: 34 CM
DOP CALCLVOT PEAK VEL VTI: 23 CM
E WAVE DECELERATION TIME: 217.82 MSEC
ECHO LV POSTERIOR WALL: 0.99 CM (ref 0.6–1.1)
EGFR (NO RACE VARIABLE) (RUSH/TITUS): 98 ML/MIN/1.73M2
EOSINOPHIL # BLD AUTO: 0.16 K/UL (ref 0–0.5)
EOSINOPHIL NFR BLD AUTO: 2.3 % (ref 1–4)
ERYTHROCYTE [DISTWIDTH] IN BLOOD BY AUTOMATED COUNT: 13.1 % (ref 11.5–14.5)
FRACTIONAL SHORTENING: 47 % (ref 28–44)
GLOBAL LONGITUIDAL STRAIN: 21.7 %
GLOBULIN SER-MCNC: 3.8 G/DL (ref 2–4)
GLUCOSE SERPL-MCNC: 147 MG/DL (ref 70–105)
GLUCOSE SERPL-MCNC: 160 MG/DL (ref 70–105)
GLUCOSE SERPL-MCNC: 160 MG/DL (ref 70–105)
GLUCOSE SERPL-MCNC: 167 MG/DL (ref 74–106)
GLUCOSE SERPL-MCNC: 179 MG/DL (ref 70–105)
GLUCOSE SERPL-MCNC: 226 MG/DL (ref 70–105)
HCT VFR BLD AUTO: 34.9 % (ref 40–54)
HDLC SERPL-MCNC: 36 MG/DL (ref 40–60)
HGB BLD-MCNC: 11.1 G/DL (ref 13.5–18)
IMM GRANULOCYTES # BLD AUTO: 0.05 K/UL (ref 0–0.04)
IMM GRANULOCYTES NFR BLD: 0.7 % (ref 0–0.4)
INR BLD: 1.29
INTERVENTRICULAR SEPTUM: 0.99 CM (ref 0.6–1.1)
IVC DIAMETER: 2.57 CM
LDLC SERPL CALC-MCNC: 52 MG/DL
LDLC/HDLC SERPL: 1.4 {RATIO}
LEFT ATRIUM SIZE: 4.05 CM
LEFT INTERNAL DIMENSION IN SYSTOLE: 2.24 CM (ref 2.1–4)
LEFT VENTRICLE DIASTOLIC VOLUME INDEX: 36.8 ML/M2
LEFT VENTRICLE DIASTOLIC VOLUME: 79.48 ML
LEFT VENTRICLE MASS INDEX: 63 G/M2
LEFT VENTRICLE SYSTOLIC VOLUME INDEX: 7.9 ML/M2
LEFT VENTRICLE SYSTOLIC VOLUME: 16.97 ML
LEFT VENTRICULAR INTERNAL DIMENSION IN DIASTOLE: 4.22 CM (ref 3.5–6)
LEFT VENTRICULAR MASS: 136.36 G
LVOT MG: 2.56 MMHG
LVOT MV: 0.77 CM/S
LYMPHOCYTES # BLD AUTO: 1.03 K/UL (ref 1–4.8)
LYMPHOCYTES NFR BLD AUTO: 14.5 % (ref 27–41)
MAGNESIUM SERPL-MCNC: 1.9 MG/DL (ref 1.7–2.3)
MCH RBC QN AUTO: 32.6 PG (ref 27–31)
MCHC RBC AUTO-ENTMCNC: 31.8 G/DL (ref 32–36)
MCV RBC AUTO: 102.3 FL (ref 80–96)
MONOCYTES # BLD AUTO: 0.59 K/UL (ref 0–0.8)
MONOCYTES NFR BLD AUTO: 8.3 % (ref 2–6)
MPC BLD CALC-MCNC: 10 FL (ref 9.4–12.4)
MV MEAN GRADIENT: 2 MMHG
MV PEAK GRADIENT: 5 MMHG
MV STENOSIS PRESSURE HALF TIME: 63.17 MS
MV VALVE AREA BY CONTINUITY EQUATION: 2.19 CM2
MV VALVE AREA P 1/2 METHOD: 3.48 CM2
NEUTROPHILS # BLD AUTO: 5.22 K/UL (ref 1.8–7.7)
NEUTROPHILS NFR BLD AUTO: 73.6 % (ref 53–65)
NONHDLC SERPL-MCNC: 68 MG/DL
NRBC # BLD AUTO: 0 X10E3/UL
NRBC, AUTO (.00): 0 %
NT-PROBNP SERPL-MCNC: 1406 PG/ML (ref 1–450)
OHS LV EJECTION FRACTION SIMPSONS BIPLANE MOD: 60 %
PISA TR MAX VEL: 2.97 M/S
PLATELET # BLD AUTO: 201 K/UL (ref 150–400)
POTASSIUM SERPL-SCNC: 3.1 MMOL/L (ref 3.5–5.1)
PROT SERPL-MCNC: 5.8 G/DL (ref 6.4–8.2)
PROTHROMBIN TIME: 15.9 SECONDS (ref 11.7–14.7)
PV PEAK GRADIENT: 6 MMHG
PV PEAK VELOCITY: 1.24 M/S
RA MAJOR: 4.18 CM
RBC # BLD AUTO: 3.41 M/UL (ref 4.6–6.2)
RIGHT VENTRICULAR END-DIASTOLIC DIMENSION: 4.23 CM
SODIUM SERPL-SCNC: 144 MMOL/L (ref 136–145)
TDI LATERAL: 0.12 M/S
TDI SEPTAL: 0.09 M/S
TDI: 0.11 M/S
TR MAX PG: 35 MMHG
TRICUSPID ANNULAR PLANE SYSTOLIC EXCURSION: 1.82 CM
TRIGL SERPL-MCNC: 82 MG/DL (ref 35–150)
TROPONIN I SERPL DL<=0.01 NG/ML-MCNC: 1528.7 PG/ML
TROPONIN I SERPL DL<=0.01 NG/ML-MCNC: 1820.7 PG/ML
VLDLC SERPL-MCNC: 16 MG/DL
WBC # BLD AUTO: 7.09 K/UL (ref 4.5–11)
Z-SCORE OF LEFT VENTRICULAR DIMENSION IN END DIASTOLE: -5.17
Z-SCORE OF LEFT VENTRICULAR DIMENSION IN END SYSTOLE: -5.17

## 2023-08-14 PROCEDURE — 92610 EVALUATE SWALLOWING FUNCTION: CPT

## 2023-08-14 PROCEDURE — 83735 ASSAY OF MAGNESIUM: CPT | Performed by: NURSE PRACTITIONER

## 2023-08-14 PROCEDURE — 63600175 PHARM REV CODE 636 W HCPCS: Performed by: FAMILY MEDICINE

## 2023-08-14 PROCEDURE — 97110 THERAPEUTIC EXERCISES: CPT

## 2023-08-14 PROCEDURE — 63600175 PHARM REV CODE 636 W HCPCS: Performed by: HOSPITALIST

## 2023-08-14 PROCEDURE — 25000003 PHARM REV CODE 250: Performed by: NURSE PRACTITIONER

## 2023-08-14 PROCEDURE — 85379 FIBRIN DEGRADATION QUANT: CPT

## 2023-08-14 PROCEDURE — 94761 N-INVAS EAR/PLS OXIMETRY MLT: CPT

## 2023-08-14 PROCEDURE — 99900035 HC TECH TIME PER 15 MIN (STAT)

## 2023-08-14 PROCEDURE — 25000003 PHARM REV CODE 250: Performed by: SURGERY

## 2023-08-14 PROCEDURE — 25000003 PHARM REV CODE 250: Performed by: FAMILY MEDICINE

## 2023-08-14 PROCEDURE — 63600175 PHARM REV CODE 636 W HCPCS: Performed by: SURGERY

## 2023-08-14 PROCEDURE — 99900031 HC PATIENT EDUCATION (STAT)

## 2023-08-14 PROCEDURE — 25000003 PHARM REV CODE 250: Performed by: GENERAL PRACTICE

## 2023-08-14 PROCEDURE — 82140 ASSAY OF AMMONIA: CPT | Performed by: NURSE PRACTITIONER

## 2023-08-14 PROCEDURE — 11000001 HC ACUTE MED/SURG PRIVATE ROOM

## 2023-08-14 PROCEDURE — 82962 GLUCOSE BLOOD TEST: CPT

## 2023-08-14 PROCEDURE — 85025 COMPLETE CBC W/AUTO DIFF WBC: CPT | Performed by: STUDENT IN AN ORGANIZED HEALTH CARE EDUCATION/TRAINING PROGRAM

## 2023-08-14 PROCEDURE — 25500020 PHARM REV CODE 255: Performed by: SURGERY

## 2023-08-14 PROCEDURE — 84484 ASSAY OF TROPONIN QUANT: CPT | Performed by: HOSPITALIST

## 2023-08-14 PROCEDURE — 97116 GAIT TRAINING THERAPY: CPT

## 2023-08-14 PROCEDURE — 63600175 PHARM REV CODE 636 W HCPCS: Performed by: NURSE PRACTITIONER

## 2023-08-14 PROCEDURE — 99223 1ST HOSP IP/OBS HIGH 75: CPT | Mod: ,,, | Performed by: STUDENT IN AN ORGANIZED HEALTH CARE EDUCATION/TRAINING PROGRAM

## 2023-08-14 PROCEDURE — 83880 ASSAY OF NATRIURETIC PEPTIDE: CPT | Performed by: NURSE PRACTITIONER

## 2023-08-14 PROCEDURE — 80053 COMPREHEN METABOLIC PANEL: CPT | Performed by: STUDENT IN AN ORGANIZED HEALTH CARE EDUCATION/TRAINING PROGRAM

## 2023-08-14 PROCEDURE — 99233 SBSQ HOSP IP/OBS HIGH 50: CPT | Mod: ,,, | Performed by: HOSPITALIST

## 2023-08-14 PROCEDURE — 25000003 PHARM REV CODE 250: Performed by: STUDENT IN AN ORGANIZED HEALTH CARE EDUCATION/TRAINING PROGRAM

## 2023-08-14 PROCEDURE — 85730 THROMBOPLASTIN TIME PARTIAL: CPT | Performed by: HOSPITALIST

## 2023-08-14 PROCEDURE — 99233 PR SUBSEQUENT HOSPITAL CARE,LEVL III: ICD-10-PCS | Mod: ,,, | Performed by: HOSPITALIST

## 2023-08-14 PROCEDURE — 99223 PR INITIAL HOSPITAL CARE,LEVL III: ICD-10-PCS | Mod: ,,, | Performed by: STUDENT IN AN ORGANIZED HEALTH CARE EDUCATION/TRAINING PROGRAM

## 2023-08-14 PROCEDURE — C9113 INJ PANTOPRAZOLE SODIUM, VIA: HCPCS | Performed by: FAMILY MEDICINE

## 2023-08-14 PROCEDURE — 25000003 PHARM REV CODE 250: Performed by: HOSPITALIST

## 2023-08-14 PROCEDURE — 80061 LIPID PANEL: CPT | Performed by: HOSPITALIST

## 2023-08-14 RX ORDER — ENOXAPARIN SODIUM 100 MG/ML
40 INJECTION SUBCUTANEOUS ONCE
Status: COMPLETED | OUTPATIENT
Start: 2023-08-14 | End: 2023-08-14

## 2023-08-14 RX ORDER — LOSARTAN POTASSIUM 50 MG/1
50 TABLET ORAL DAILY
Status: DISCONTINUED | OUTPATIENT
Start: 2023-08-14 | End: 2023-08-14

## 2023-08-14 RX ORDER — ENOXAPARIN SODIUM 100 MG/ML
1 INJECTION SUBCUTANEOUS ONCE
Status: DISCONTINUED | OUTPATIENT
Start: 2023-08-14 | End: 2023-08-14

## 2023-08-14 RX ORDER — LACTULOSE 10 G/15ML
20 SOLUTION ORAL 3 TIMES DAILY
Status: DISCONTINUED | OUTPATIENT
Start: 2023-08-14 | End: 2023-08-17 | Stop reason: HOSPADM

## 2023-08-14 RX ORDER — ASPIRIN 81 MG/1
81 TABLET ORAL DAILY
Status: DISCONTINUED | OUTPATIENT
Start: 2023-08-14 | End: 2023-08-17 | Stop reason: HOSPADM

## 2023-08-14 RX ORDER — ATORVASTATIN CALCIUM 40 MG/1
40 TABLET, FILM COATED ORAL NIGHTLY
Status: DISCONTINUED | OUTPATIENT
Start: 2023-08-14 | End: 2023-08-17 | Stop reason: HOSPADM

## 2023-08-14 RX ORDER — ENOXAPARIN SODIUM 100 MG/ML
1 INJECTION SUBCUTANEOUS ONCE
Status: COMPLETED | OUTPATIENT
Start: 2023-08-14 | End: 2023-08-14

## 2023-08-14 RX ORDER — HYDRALAZINE HYDROCHLORIDE 20 MG/ML
10 INJECTION INTRAMUSCULAR; INTRAVENOUS EVERY 6 HOURS PRN
Status: DISCONTINUED | OUTPATIENT
Start: 2023-08-14 | End: 2023-08-17 | Stop reason: HOSPADM

## 2023-08-14 RX ORDER — ATORVASTATIN CALCIUM 80 MG/1
80 TABLET, FILM COATED ORAL DAILY
Status: DISCONTINUED | OUTPATIENT
Start: 2023-08-14 | End: 2023-08-14

## 2023-08-14 RX ORDER — ATORVASTATIN CALCIUM 80 MG/1
80 TABLET, FILM COATED ORAL ONCE
Status: COMPLETED | OUTPATIENT
Start: 2023-08-14 | End: 2023-08-14

## 2023-08-14 RX ORDER — POTASSIUM CHLORIDE 20 MEQ/1
40 TABLET, EXTENDED RELEASE ORAL ONCE
Status: COMPLETED | OUTPATIENT
Start: 2023-08-15 | End: 2023-08-15

## 2023-08-14 RX ADMIN — PIPERACILLIN AND TAZOBACTAM 4.5 G: 4; .5 INJECTION, POWDER, FOR SOLUTION INTRAVENOUS; PARENTERAL at 05:08

## 2023-08-14 RX ADMIN — ATORVASTATIN CALCIUM 80 MG: 80 TABLET, FILM COATED ORAL at 03:08

## 2023-08-14 RX ADMIN — LACTULOSE 20 G: 20 SOLUTION ORAL at 10:08

## 2023-08-14 RX ADMIN — ENOXAPARIN SODIUM 40 MG: 100 INJECTION SUBCUTANEOUS at 03:08

## 2023-08-14 RX ADMIN — SODIUM CHLORIDE, POTASSIUM CHLORIDE, SODIUM LACTATE AND CALCIUM CHLORIDE: 600; 310; 30; 20 INJECTION, SOLUTION INTRAVENOUS at 05:08

## 2023-08-14 RX ADMIN — MUPIROCIN: 20 OINTMENT TOPICAL at 09:08

## 2023-08-14 RX ADMIN — ASPIRIN 81 MG: 81 TABLET, COATED ORAL at 02:08

## 2023-08-14 RX ADMIN — PANTOPRAZOLE SODIUM 40 MG: 40 INJECTION, POWDER, FOR SOLUTION INTRAVENOUS at 09:08

## 2023-08-14 RX ADMIN — PIPERACILLIN AND TAZOBACTAM 4.5 G: 4; .5 INJECTION, POWDER, FOR SOLUTION INTRAVENOUS; PARENTERAL at 02:08

## 2023-08-14 RX ADMIN — QUETIAPINE FUMARATE 50 MG: 25 TABLET ORAL at 09:08

## 2023-08-14 RX ADMIN — ENOXAPARIN SODIUM 90 MG: 100 INJECTION SUBCUTANEOUS at 05:08

## 2023-08-14 RX ADMIN — RIFAXIMIN 550 MG: 550 TABLET ORAL at 09:08

## 2023-08-14 RX ADMIN — POTASSIUM BICARBONATE 40 MEQ: 782 TABLET, EFFERVESCENT ORAL at 02:08

## 2023-08-14 RX ADMIN — ATORVASTATIN CALCIUM 40 MG: 40 TABLET, FILM COATED ORAL at 09:08

## 2023-08-14 RX ADMIN — BISACODYL 10 MG: 10 SUPPOSITORY RECTAL at 09:08

## 2023-08-14 RX ADMIN — SODIUM CHLORIDE, POTASSIUM CHLORIDE, SODIUM LACTATE AND CALCIUM CHLORIDE: 600; 310; 30; 20 INJECTION, SOLUTION INTRAVENOUS at 09:08

## 2023-08-14 RX ADMIN — IOPAMIDOL 100 ML: 755 INJECTION, SOLUTION INTRAVENOUS at 11:08

## 2023-08-14 RX ADMIN — TIMOLOL MALEATE 1 DROP: 5 SOLUTION/ DROPS OPHTHALMIC at 09:08

## 2023-08-14 RX ADMIN — PIPERACILLIN AND TAZOBACTAM 4.5 G: 4; .5 INJECTION, POWDER, FOR SOLUTION INTRAVENOUS; PARENTERAL at 09:08

## 2023-08-14 NOTE — CONSULTS
[facility] - [facility] Telemetry  Cardiology  Consult Note    Patient Name: Danny Flood  MRN: [id number]  Admission Date: 8/9/2023  Hospital Length of Stay: 5 days  Code Status: Full Code   Attending Provider: Tesfaye Natarajan MD   Consulting Provider: PHIL Medina  Primary Care Physician: Cameron Valencia MD  Principal Problem:Infected hernioplasty mesh    Patient information was obtained from patient, relative(s), ER records and primary team.     Inpatient consult to Cardiology  Consult performed by: Sachi Sharma FNP  Consult ordered by: Jeannine Weiss MD  Reason for consult: NSTEMI        Subjective:     Chief Complaint:  Infected hernioplasty mesh     HPI:    77 y/o male with PMH of HTN, DM, GERD, dementia, and hyperammonemia. He is post-op #5 exploratory laparotomy with infected mesh removal, and small bowel resection with anastomosis. Patient tolerated procedure well with no immediate post-operative complications. Medicine consulted for management of chronic conditions. Patient had episodes of tachycardia yesterday and overnight, cardiology consulted for NSTEMI.    Patient seen today, denies chest pain or sob. Difficult to obtain a full history due to dementia, brother (POA) at bedside. CT PE performed today was significantly limited secondary to respiratory motion without convincing segmental or larger pulmonary embolism. Small segmental and subsegmental emboli could not excluded on the basis of this exam. US negative for DVT. Troponin 1296, 1820, 1528. EKG with ST depression in anterior leads. Echo pending.      Past Medical History:   Diagnosis Date    Diverticulitis     Hyperlipemia     Hypokalemia 11/09/2021       Past Surgical History:   Procedure Laterality Date    COLON SURGERY      partial colon resection    EXCISION, SMALL INTESTINE N/A 8/9/2023    Procedure: EXCISION, SMALL INTESTINE;  Surgeon: Tesfaye Natarajan MD;  Location: [facility] OR;  Service:  General;  Laterality: N/A;    HERNIA REPAIR      LAPAROTOMY, EXPLORATORY N/A 8/9/2023    Procedure: LAPAROTOMY, EXPLORATORY;  Surgeon: Tesfaye Natarajan MD;  Location: [facility] OR;  Service: General;  Laterality: N/A;    REMOVAL OF IMPLANT N/A 8/9/2023    Procedure: REMOVAL, IMPLANT;  Surgeon: Tesfaye Natarajan MD;  Location: [facility] OR;  Service: General;  Laterality: N/A;  removal mesh       Review of patient's allergies indicates:  No Known Allergies    No current facility-administered medications on file prior to encounter.     Current Outpatient Medications on File Prior to Encounter   Medication Sig    amLODIPine (NORVASC) 10 MG tablet Take 1 tablet (10 mg total) by mouth once daily.    FOLBIC 2.5-25-2 mg Tab Take 1 tablet by mouth once daily.    FOLIC ACID-VITAMIN B6-VIT B12 ORAL Take 1 tablet by mouth once daily.    Lactobacillus acidophilus (PROBIOTIC) 10 billion cell Cap Take by mouth.    lactulose (CHRONULAC) 10 gram/15 mL solution Take 20 g by mouth 3 (three) times daily.    losartan (COZAAR) 100 MG tablet Take 1 tablet (100 mg total) by mouth once daily.    niacin (SLO-NIACIN) 500 mg tablet Take 500 mg by mouth once daily. With meal    omega-3 fatty acids/fish oil (FISH OIL-OMEGA-3 FATTY ACIDS) 300-1,000 mg capsule Take 1 capsule by mouth once daily.    pantoprazole (PROTONIX) 40 MG tablet Take 1 tablet (40 mg total) by mouth 2 (two) times daily.    pioglitazone (ACTOS) 15 MG tablet Take 15 mg by mouth.    QUEtiapine (SEROQUEL) 50 MG tablet 1 tablet (50 mg total) by Per NG tube route 2 (two) times daily.    rifAXIMin (XIFAXAN) 550 mg Tab 1 tablet (550 mg total) by Per G Tube route 2 (two) times daily.    timolol maleate 0.5% (TIMOPTIC) 0.5 % Drop 1 drop once daily.     Family History       Problem Relation (Age of Onset)    Diabetes Mother, Father          Tobacco Use    Smoking status: Never    Smokeless tobacco: Never   Substance and Sexual Activity    Alcohol use: Never    Drug  use: Never    Sexual activity: Not Currently     Review of Systems   Constitutional: Negative for fever.   Cardiovascular:  Negative for chest pain, leg swelling, orthopnea and palpitations.   Respiratory:  Negative for shortness of breath.    Gastrointestinal:  Positive for abdominal pain (post surgical). Negative for nausea and vomiting.   Psychiatric/Behavioral:          Dementia     Objective:     Vital Signs (Most Recent):  Temp: 97.7 °F (36.5 °C) (08/14/23 1100)  Pulse: 77 (08/14/23 1100)  Resp: 20 (08/14/23 1410)  BP: (!) 165/80 (08/14/23 1100)  SpO2: 96 % (08/14/23 1100) Vital Signs (24h Range):  Temp:  [97.4 °F (36.3 °C)-99.3 °F (37.4 °C)] 97.7 °F (36.5 °C)  Pulse:  [] 77  Resp:  [19-20] 20  SpO2:  [93 %-96 %] 96 %  BP: ()/(42-80) 165/80     Weight: 93.3 kg (205 lb 11 oz)  Body mass index is 27.9 kg/m².    SpO2: 96 %       No intake or output data in the 24 hours ending 08/14/23 1549    Lines/Drains/Airways       Peripheral Intravenous Line  Duration                  Peripheral IV - Single Lumen 22 G Posterior;Right Forearm -- days         Peripheral IV - Single Lumen 08/09/23 0806 20 G Left Antecubital 5 days                     Physical Exam  Vitals reviewed.   Constitutional:       General: He is not in acute distress.  HENT:      Mouth/Throat:      Mouth: Mucous membranes are moist.      Pharynx: Oropharynx is clear.   Eyes:      General: No scleral icterus.     Pupils: Pupils are equal, round, and reactive to light.   Cardiovascular:      Rate and Rhythm: Normal rate and regular rhythm.      Pulses: Normal pulses.      Heart sounds: Normal heart sounds.   Pulmonary:      Effort: Pulmonary effort is normal.      Breath sounds: Normal breath sounds.   Musculoskeletal:      Right lower leg: No edema.      Left lower leg: No edema.   Skin:     General: Skin is warm and dry.   Neurological:      Mental Status: He is alert. Mental status is at baseline.          Significant Labs: ABG: No results  "for input(s): "PH", "PCO2", "HCO3", "POCSATURATED", "BE" in the last 48 hours., Blood Culture: No results for input(s): "LABBLOO" in the last 48 hours., BMP:   Recent Labs   Lab 08/13/23  0650 08/13/23  2103 08/14/23  0944   GLU 67*  --  167*   *  --  144   K 2.8*  --  3.1*   *  --  113*   CO2 24  --  27   BUN 17  --  19*   CREATININE 0.55*  --  0.63*   CALCIUM 7.9*  --  8.2*   MG  --  1.8  --    , CMP   Recent Labs   Lab 08/13/23  0650 08/14/23  0944   * 144   K 2.8* 3.1*   * 113*   CO2 24 27   GLU 67* 167*   BUN 17 19*   CREATININE 0.55* 0.63*   CALCIUM 7.9* 8.2*   PROT  --  5.8*   ALBUMIN  --  2.0*   BILITOT  --  1.4*   ALKPHOS  --  56   AST  --  32   ALT  --  14*   ANIONGAP 12 7   , CBC   Recent Labs   Lab 08/14/23  0944   WBC 7.09   HGB 11.1*   HCT 34.9*      , INR   Recent Labs   Lab 08/14/23  0010   INR 1.29   , Lipid Panel   Recent Labs   Lab 08/14/23  0427   CHOL 104   HDL 36*   LDLCALC 52   TRIG 82   CHOLHDL 2.9   , and Troponin No results for input(s): "TROPONINI" in the last 48 hours.    Significant Imaging: Cardiac Cath: No results found for this or any previous visit. ,     Echocardiogram: Transthoracic echo (TTE) complete (Cupid Only):   Results for orders placed or performed during the hospital encounter of 08/09/23   Echo   Result Value Ref Range    BSA 2.04 m2    LVOT stroke volume 74.40 cm3    LVIDd 4.22 3.5 - 6.0 cm    LV Systolic Volume 16.97 mL    LV Systolic Volume Index 7.9 mL/m2    LVIDs 2.24 2.1 - 4.0 cm    LV Diastolic Volume 79.48 mL    LV Diastolic Volume Index 36.80 mL/m2    IVS 0.99 0.6 - 1.1 cm    LVOT diameter 2.03 cm    LVOT area 3.2 cm2    FS 47 (A) 28 - 44 %    Left Ventricle Relative Wall Thickness 0.47 cm    Posterior Wall 0.99 0.6 - 1.1 cm    LV mass 136.36 g    LV Mass Index 63 g/m2    TDI LATERAL 0.12 m/s    TDI SEPTAL 0.09 m/s    TR Max Jacek 2.97 m/s    E wave deceleration time 217.82 msec    LVOT peak jacek 1.03 m/s    Left Ventricular Outflow " Tract Mean Velocity 0.77 cm/s    Left Ventricular Outflow Tract Mean Gradient 2.56 mmHg    LA size 4.05 cm    RVDD 4.23 cm    TAPSE 1.82 cm    RA Major Axis 4.18 cm    AV mean gradient 3 mmHg    AV peak gradient 6 mmHg    Ao peak kerri 1.24 m/s    Ao VTI 23.20 cm    LVOT peak VTI 23.00 cm    AV valve area 3.21 cm²    AV Velocity Ratio 0.83     AV index (prosthetic) 0.99     NICHOLE by Velocity Ratio 2.69 cm²    MV mean gradient 2 mmHg    MV peak gradient 5 mmHg    MV stenosis pressure 1/2 time 63.17 ms    MV valve area p 1/2 method 3.48 cm2    MV valve area by continuity eq 2.19 cm2    MV VTI 34.0 cm    Triscuspid Valve Regurgitation Peak Gradient 35 mmHg    PV PEAK VELOCITY 1.24 m/s    PV peak gradient 6 mmHg    Ao root annulus 3.11 cm    IVC diameter 2.57 cm    Mean e' 0.11 m/s    ZLVIDS -5.17     ZLVIDD -5.17     AORTIC VALVE CUSP SEPERATION 2.08 cm    TV resting pulmonary artery pressure 50 mmHg    RV TB RVSP 18 mmHg    Est. RA pres 15 mmHg     , EKG:   Results for orders placed or performed during the hospital encounter of 11/05/21   EKG 12-lead    Collection Time: 11/07/21  9:25 AM    Narrative    Test Reason : K92.2,    Vent. Rate : 096 BPM     Atrial Rate : 096 BPM     P-R Int : 156 ms          QRS Dur : 076 ms      QT Int : 346 ms       P-R-T Axes : 055 073 073 degrees     QTc Int : 437 ms    Program found technically poor ECG  Normal sinus rhythm  Nonspecific ST abnormality  Abnormal ECG  When compared with ECG of 07-NOV-2021 09:24,  Previous ECG has undetermined rhythm, needs review  ST now depressed in Anterior-lateral leads  Confirmed by Edinson Torres DO ([id number]) on 11/7/2021 11:07:47 PM    Referred By: [email or web address]   SELF           Confirmed By:Edinson Torres DO      , and X-Ray: CXR: X-Ray Chest 1 View (CXR): No results found for this visit on 08/09/23.    Assessment and Plan:     * Infected hernioplasty mesh  - Post op day 5, being followed by surgery    NSTEMI (non-ST elevated myocardial infarction)  -  Patient seen and evaluated by Dr. Batres  - Troponin 1296, 1820, 1528; EKG SR with ST depression in anterior leads; Echo pending  - CT PE significantly limited due to motion, no segmental or larger pulmonary embolism. Small segmental and subsegmental emboli could not excluded on the basis of this exam.  - Plan for [facility] tomorrow. Procedure, risk and benefits discussed in detail with patient's son who has POA. He verbalized understanding and wishes to proceed. Consent obtained.  - Lovenox 1mg/kg x 1 dose  - LDL 52, not on home statin; will start atorvastatin 40 qhs (if no CAD, will discontinue)  - Further recommendations to follow.        VTE Risk Mitigation (From admission, onward)         Ordered     enoxaparin injection 90 mg  Once         08/14/23 2243                Thank you for your consult. I will follow-up with patient. Please contact us if you have any additional questions.    Sachi Sharma, MIHIRP  Cardiology   [facility] - [facility]

## 2023-08-14 NOTE — SUBJECTIVE & OBJECTIVE
Past Medical History:   Diagnosis Date    Diverticulitis     Hyperlipemia     Hypokalemia 11/09/2021       Past Surgical History:   Procedure Laterality Date    COLON SURGERY      partial colon resection    EXCISION, SMALL INTESTINE N/A 8/9/2023    Procedure: EXCISION, SMALL INTESTINE;  Surgeon: Tesfaye Natarajan MD;  Location: [facility];  Service: General;  Laterality: N/A;    HERNIA REPAIR      LAPAROTOMY, EXPLORATORY N/A 8/9/2023    Procedure: LAPAROTOMY, EXPLORATORY;  Surgeon: Tesfaye Natarajan MD;  Location: [facility] OR;  Service: General;  Laterality: N/A;    REMOVAL OF IMPLANT N/A 8/9/2023    Procedure: REMOVAL, IMPLANT;  Surgeon: Tesfaye Natarajan MD;  Location: [facility] OR;  Service: General;  Laterality: N/A;  removal mesh       Review of patient's allergies indicates:  No Known Allergies    No current facility-administered medications on file prior to encounter.     Current Outpatient Medications on File Prior to Encounter   Medication Sig    amLODIPine (NORVASC) 10 MG tablet Take 1 tablet (10 mg total) by mouth once daily.    FOLBIC 2.5-25-2 mg Tab Take 1 tablet by mouth once daily.    FOLIC ACID-VITAMIN B6-VIT B12 ORAL Take 1 tablet by mouth once daily.    Lactobacillus acidophilus (PROBIOTIC) 10 billion cell Cap Take by mouth.    lactulose (CHRONULAC) 10 gram/15 mL solution Take 20 g by mouth 3 (three) times daily.    losartan (COZAAR) 100 MG tablet Take 1 tablet (100 mg total) by mouth once daily.    niacin (SLO-NIACIN) 500 mg tablet Take 500 mg by mouth once daily. With meal    omega-3 fatty acids/fish oil (FISH OIL-OMEGA-3 FATTY ACIDS) 300-1,000 mg capsule Take 1 capsule by mouth once daily.    pantoprazole (PROTONIX) 40 MG tablet Take 1 tablet (40 mg total) by mouth 2 (two) times daily.    pioglitazone (ACTOS) 15 MG tablet Take 15 mg by mouth.    QUEtiapine (SEROQUEL) 50 MG tablet 1 tablet (50 mg total) by Per NG tube route 2 (two) times daily.    rifAXIMin (XIFAXAN) 550 mg Tab 1 tablet (550  Pt took the 10/8/19 at 11:00.   mg total) by Per G Tube route 2 (two) times daily.    timolol maleate 0.5% (TIMOPTIC) 0.5 % Drop 1 drop once daily.     Family History       Problem Relation (Age of Onset)    Diabetes Mother, Father          Tobacco Use    Smoking status: Never    Smokeless tobacco: Never   Substance and Sexual Activity    Alcohol use: Never    Drug use: Never    Sexual activity: Not Currently     Review of Systems   Constitutional: Negative for fever.   Cardiovascular:  Negative for chest pain, leg swelling, orthopnea and palpitations.   Respiratory:  Negative for shortness of breath.    Gastrointestinal:  Positive for abdominal pain (post surgical). Negative for nausea and vomiting.   Psychiatric/Behavioral:          Dementia     Objective:     Vital Signs (Most Recent):  Temp: 97.7 °F (36.5 °C) (08/14/23 1100)  Pulse: 77 (08/14/23 1100)  Resp: 20 (08/14/23 1410)  BP: (!) 165/80 (08/14/23 1100)  SpO2: 96 % (08/14/23 1100) Vital Signs (24h Range):  Temp:  [97.4 °F (36.3 °C)-99.3 °F (37.4 °C)] 97.7 °F (36.5 °C)  Pulse:  [] 77  Resp:  [19-20] 20  SpO2:  [93 %-96 %] 96 %  BP: ()/(42-80) 165/80     Weight: 93.3 kg (205 lb 11 oz)  Body mass index is 27.9 kg/m².    SpO2: 96 %       No intake or output data in the 24 hours ending 08/14/23 1549    Lines/Drains/Airways       Peripheral Intravenous Line  Duration                  Peripheral IV - Single Lumen 22 G Posterior;Right Forearm -- days         Peripheral IV - Single Lumen 08/09/23 0806 20 G Left Antecubital 5 days                     Physical Exam  Vitals reviewed.   Constitutional:       General: He is not in acute distress.  HENT:      Mouth/Throat:      Mouth: Mucous membranes are moist.      Pharynx: Oropharynx is clear.   Eyes:      General: No scleral icterus.     Pupils: Pupils are equal, round, and reactive to light.   Cardiovascular:      Rate and Rhythm: Normal rate and regular rhythm.      Pulses: Normal pulses.      Heart sounds: Normal heart sounds.  "  Pulmonary:      Effort: Pulmonary effort is normal.      Breath sounds: Normal breath sounds.   Musculoskeletal:      Right lower leg: No edema.      Left lower leg: No edema.   Skin:     General: Skin is warm and dry.   Neurological:      Mental Status: He is alert. Mental status is at baseline.          Significant Labs: ABG: No results for input(s): "PH", "PCO2", "HCO3", "POCSATURATED", "BE" in the last 48 hours., Blood Culture: No results for input(s): "LABBLOO" in the last 48 hours., BMP:   Recent Labs   Lab 08/13/23  0650 08/13/23  2103 08/14/23  0944   GLU 67*  --  167*   *  --  144   K 2.8*  --  3.1*   *  --  113*   CO2 24  --  27   BUN 17  --  19*   CREATININE 0.55*  --  0.63*   CALCIUM 7.9*  --  8.2*   MG  --  1.8  --    , CMP   Recent Labs   Lab 08/13/23  0650 08/14/23  0944   * 144   K 2.8* 3.1*   * 113*   CO2 24 27   GLU 67* 167*   BUN 17 19*   CREATININE 0.55* 0.63*   CALCIUM 7.9* 8.2*   PROT  --  5.8*   ALBUMIN  --  2.0*   BILITOT  --  1.4*   ALKPHOS  --  56   AST  --  32   ALT  --  14*   ANIONGAP 12 7   , CBC   Recent Labs   Lab 08/14/23  0944   WBC 7.09   HGB 11.1*   HCT 34.9*      , INR   Recent Labs   Lab 08/14/23  0010   INR 1.29   , Lipid Panel   Recent Labs   Lab 08/14/23  0427   CHOL 104   HDL 36*   LDLCALC 52   TRIG 82   CHOLHDL 2.9   , and Troponin No results for input(s): "TROPONINI" in the last 48 hours.    Significant Imaging: Cardiac Cath: No results found for this or any previous visit. ,     Echocardiogram: Transthoracic echo (TTE) complete (Cupid Only):   Results for orders placed or performed during the hospital encounter of 08/09/23   Echo   Result Value Ref Range    BSA 2.04 m2    LVOT stroke volume 74.40 cm3    LVIDd 4.22 3.5 - 6.0 cm    LV Systolic Volume 16.97 mL    LV Systolic Volume Index 7.9 mL/m2    LVIDs 2.24 2.1 - 4.0 cm    LV Diastolic Volume 79.48 mL    LV Diastolic Volume Index 36.80 mL/m2    IVS 0.99 0.6 - 1.1 cm    LVOT diameter 2.03 " cm    LVOT area 3.2 cm2    FS 47 (A) 28 - 44 %    Left Ventricle Relative Wall Thickness 0.47 cm    Posterior Wall 0.99 0.6 - 1.1 cm    LV mass 136.36 g    LV Mass Index 63 g/m2    TDI LATERAL 0.12 m/s    TDI SEPTAL 0.09 m/s    TR Max Jacek 2.97 m/s    E wave deceleration time 217.82 msec    LVOT peak jacek 1.03 m/s    Left Ventricular Outflow Tract Mean Velocity 0.77 cm/s    Left Ventricular Outflow Tract Mean Gradient 2.56 mmHg    LA size 4.05 cm    RVDD 4.23 cm    TAPSE 1.82 cm    RA Major Axis 4.18 cm    AV mean gradient 3 mmHg    AV peak gradient 6 mmHg    Ao peak jacek 1.24 m/s    Ao VTI 23.20 cm    LVOT peak VTI 23.00 cm    AV valve area 3.21 cm²    AV Velocity Ratio 0.83     AV index (prosthetic) 0.99     NICHOLE by Velocity Ratio 2.69 cm²    MV mean gradient 2 mmHg    MV peak gradient 5 mmHg    MV stenosis pressure 1/2 time 63.17 ms    MV valve area p 1/2 method 3.48 cm2    MV valve area by continuity eq 2.19 cm2    MV VTI 34.0 cm    Triscuspid Valve Regurgitation Peak Gradient 35 mmHg    PV PEAK VELOCITY 1.24 m/s    PV peak gradient 6 mmHg    Ao root annulus 3.11 cm    IVC diameter 2.57 cm    Mean e' 0.11 m/s    ZLVIDS -5.17     ZLVIDD -5.17     AORTIC VALVE CUSP SEPERATION 2.08 cm    TV resting pulmonary artery pressure 50 mmHg    RV TB RVSP 18 mmHg    Est. RA pres 15 mmHg     , EKG:   Results for orders placed or performed during the hospital encounter of 11/05/21   EKG 12-lead    Collection Time: 11/07/21  9:25 AM    Narrative    Test Reason : K92.2,    Vent. Rate : 096 BPM     Atrial Rate : 096 BPM     P-R Int : 156 ms          QRS Dur : 076 ms      QT Int : 346 ms       P-R-T Axes : 055 073 073 degrees     QTc Int : 437 ms    Program found technically poor ECG  Normal sinus rhythm  Nonspecific ST abnormality  Abnormal ECG  When compared with ECG of 07-NOV-2021 09:24,  Previous ECG has undetermined rhythm, needs review  ST now depressed in Anterior-lateral leads  Confirmed by Edinson Torres DO ([id number]) on  11/7/2021 11:07:47 PM    Referred By: DAVID   SELF           Confirmed By:Edinson Torres DO      , and X-Ray: CXR: X-Ray Chest 1 View (CXR): No results found for this visit on 08/09/23.

## 2023-08-14 NOTE — ASSESSMENT & PLAN NOTE
Resume home medications, monitor.     8/14: hydralazine prn with parameters, considering restarting home losartan, waiting until tomorrow to observe pressure response after stopping IV fluid

## 2023-08-14 NOTE — ASSESSMENT & PLAN NOTE
Continue IV fluids @ 125cc/hr, d/c NS in favor of LR as some hyperchloremia as well.     8/14: LR at KVO, sodium within normal   ERROR. MADE APPT

## 2023-08-14 NOTE — SUBJECTIVE & OBJECTIVE
Interval History: pt was assessed at bedside today (8/14) awake and alert. Pt is status post hernioplasty mesh removal. Tachycardic episodes occurred the day prior, and overnight. Cardiology consulted. Pt received CTA today for PE workup. No definitive clots seen, low suspicion for PE. U/S for DVT also negative.    Review of Systems   Constitutional:  Positive for appetite change. Negative for fever.   Respiratory:  Negative for shortness of breath.    Cardiovascular:  Negative for chest pain and palpitations.   Gastrointestinal:  Positive for abdominal pain (post-surgery) and constipation. Negative for abdominal distention, nausea and vomiting.   Musculoskeletal:  Negative for arthralgias and myalgias.   Neurological:  Negative for dizziness, syncope, weakness and headaches.   Psychiatric/Behavioral:  Negative for confusion.    All other systems reviewed and are negative.    Objective:     Vital Signs (Most Recent):  Temp: 97.7 °F (36.5 °C) (08/14/23 1100)  Pulse: 77 (08/14/23 1100)  Resp: 19 (08/14/23 1100)  BP: (!) 165/80 (08/14/23 1100)  SpO2: 96 % (08/14/23 1100) Vital Signs (24h Range):  Temp:  [97.4 °F (36.3 °C)-99.3 °F (37.4 °C)] 97.7 °F (36.5 °C)  Pulse:  [] 77  Resp:  [19-20] 19  SpO2:  [93 %-96 %] 96 %  BP: ()/(42-80) 165/80     Weight: 93.3 kg (205 lb 11 oz)  Body mass index is 27.9 kg/m².  No intake or output data in the 24 hours ending 08/14/23 1353      Physical Exam  Constitutional:       General: He is not in acute distress.     Appearance: Normal appearance. He is not ill-appearing.   HENT:      Head: Normocephalic and atraumatic.      Nose: Nose normal.   Eyes:      Conjunctiva/sclera: Conjunctivae normal.      Pupils: Pupils are equal, round, and reactive to light.   Cardiovascular:      Rate and Rhythm: Normal rate and regular rhythm.   Pulmonary:      Effort: Pulmonary effort is normal. No respiratory distress.      Breath sounds: Normal breath sounds.   Abdominal:      Palpations:  Abdomen is soft.      Tenderness: There is no abdominal tenderness.      Comments: Surgical changes, healing appropriately   Musculoskeletal:      Cervical back: No rigidity.   Skin:     Coloration: Skin is not jaundiced or pale.      Findings: No rash.   Neurological:      Mental Status: He is alert. Mental status is at baseline.   Psychiatric:         Behavior: Behavior normal.         Thought Content: Thought content normal.             Significant Labs: All pertinent labs within the past 24 hours have been reviewed.  Recent Lab Results  (Last 5 results in the past 24 hours)        08/14/23  1101   08/14/23  1041   08/14/23  0944   08/14/23  0519   08/14/23  0427        Albumin/Globulin Ratio     0.5           Albumin     2.0           Alkaline Phosphatase     56           ALT     14           Anion Gap     7           AST     32           Baso #     0.04           Basophil %     0.6           BILIRUBIN TOTAL     1.4           BUN     19           BUN/CREAT RATIO     30           Calcium     8.2           Chloride     113           CHOL/HDLC Ratio         2.9       Cholesterol         104  Comment:   <200 mg/dL:  Desirable  200-240 mg/dL: Borderline High  >240 mg/dL:  High       CO2     27           Creatinine     0.63           D-Dimer 8.30               Differential Type     Auto           eGFR     98           Eos #     0.16           Eosinophil %     2.3           Globulin, Total     3.8           Glucose     167           HDL         36  Comment:   <40 mg/dL: Low HDL  40-60 mg/dL: Normal  >60 mg/dL: Desirable       Hematocrit     34.9           Hemoglobin     11.1           Immature Grans (Abs)     0.05           Immature Granulocytes     0.7           LDL Calculated         52       LDL/HDL Ratio         1.4       Lymph #     1.03           Lymph %     14.5           MCH     32.6           MCHC     31.8           MCV     102.3           Mono #     0.59           Mono %     8.3           MPV     10.0            Neutrophils, Abs     5.22           Neutrophils Relative     73.6           Non-HDL Cholesterol         68       nRBC     0.0           NUCLEATED RBC ABSOLUTE     0.00           Platelets     201           POC Glucose   179     147         Potassium     3.1           PROTEIN TOTAL     5.8           RBC     3.41           RDW     13.1           Sodium     144           Triglycerides         82  Comment:   Normal:  <150 mg/dL  Borderline High: 150-199 mg/dL  High:   200-499 mg/dL  Very High:  >=500       Troponin I High Sensitivity         1,528.7       VLDL Cholesterol Haseeb         16       WBC     7.09                                  Significant Imaging: I have reviewed all pertinent imaging results/findings within the past 24 hours.

## 2023-08-14 NOTE — PLAN OF CARE
Problem: Adult Inpatient Plan of Care  Goal: Plan of Care Review  Outcome: Ongoing, Progressing  Flowsheets (Taken 8/14/2023 0249)  Plan of Care Reviewed With: patient  Goal: Patient-Specific Goal (Individualized)  Outcome: Ongoing, Progressing  Goal: Absence of Hospital-Acquired Illness or Injury  Outcome: Ongoing, Progressing  Goal: Optimal Comfort and Wellbeing  Outcome: Ongoing, Progressing  Goal: Readiness for Transition of Care  Outcome: Ongoing, Progressing     Problem: Fall Injury Risk  Goal: Absence of Fall and Fall-Related Injury  Outcome: Ongoing, Progressing     Problem: Infection  Goal: Absence of Infection Signs and Symptoms  Outcome: Ongoing, Progressing     Problem: Gas Exchange Impaired  Goal: Optimal Gas Exchange  Outcome: Ongoing, Progressing     Problem: Skin Injury Risk Increased  Goal: Skin Health and Integrity  Outcome: Ongoing, Progressing     Problem: Diabetes Comorbidity  Goal: Blood Glucose Level Within Targeted Range  Outcome: Ongoing, Progressing

## 2023-08-14 NOTE — ASSESSMENT & PLAN NOTE
Ordered 20 mEq K+ IV, transition to oral replacement as appropriate. Mg+ pending.     8/14: Mg ordered, pending results. Effer K 40 meq.

## 2023-08-14 NOTE — PT/OT/SLP PROGRESS
Physical Therapy Treatment    Patient Name:  Danny Flood   MRN:  [id number]    Recommendations:     Discharge Recommendations: home with home health  Discharge Equipment Recommendations: none  Barriers to discharge: Inaccessible home    Assessment:     Danny Flood is a 78 y.o. male admitted with a medical diagnosis of Infected hernioplasty mesh.  He presents with the following impairments/functional limitations: weakness, impaired self care skills, impaired functional mobility, gait instability, impaired balance, pain Pt is somewhat weak and required more assistance with mobility than baseline. He was able to ambulate with rolling walker but needed assistance for safety. PT to continue to promote mobility as able    Rehab Prognosis: Good; patient would benefit from acute skilled PT services to address these deficits and reach maximum level of function.    Recent Surgery: Procedure(s) (LRB):  LAPAROTOMY, EXPLORATORY (N/A)  REMOVAL, IMPLANT (N/A)  EXCISION, SMALL INTESTINE (N/A) 5 Days Post-Op    Plan:     During this hospitalization, patient to be seen 5 x/week to address the identified rehab impairments via gait training, therapeutic activities, therapeutic exercises and progress toward the following goals:    Plan of Care Expires:  09/11/23    Subjective     Chief Complaint: weakness  Patient/Family Comments/goals: Pt is agreeable to PT   Pain/Comfort:  Pain Rating 1: 0/10  Pain Rating Post-Intervention 1: 0/10      Objective:     Communicated with ASHISH Lion RN prior to session.  Patient found HOB elevated with peripheral IV, SCD, telemetry upon PT entry to room.     General Precautions: Standard, fall  Orthopedic Precautions: N/A  Braces: N/A  Respiratory Status: Room air     Functional Mobility:  Bed Mobility:     Rolling Left:  minimum assistance  Rolling Right: minimum assistance  Scooting: minimum assistance  Supine to Sit: minimum assistance  Transfers:     Sit to Stand:  minimum assistance with rolling  walker  Bed to Chair: minimum assistance with  rolling walker  using  Step Transfer  Gait: 80 ft, then 30 ft contact guard assistance with rolling walker, short step, flexed posture  Balance: fair      AM-PAC 6 CLICK MOBILITY  Turning over in bed (including adjusting bedclothes, sheets and blankets)?: 3  Sitting down on and standing up from a chair with arms (e.g., wheelchair, bedside commode, etc.): 3  Moving from lying on back to sitting on the side of the bed?: 3  Moving to and from a bed to a chair (including a wheelchair)?: 3  Need to walk in hospital room?: 3  Climbing 3-5 steps with a railing?: 2  Basic Mobility Total Score: 17       Treatment & Education:  Pt performed bilateral LE: seated exercises: ankle pumps, long arc quads, and marches x 30 each  Sit to stand x 3  Assisted with mobility for hygiene following BM    Patient left up in chair with all lines intact and call button in reach..    GOALS:   Multidisciplinary Problems       Physical Therapy Goals          Problem: Physical Therapy    Goal Priority Disciplines Outcome Goal Variances Interventions   Physical Therapy Goal     PT, PT/OT Ongoing, Progressing     Description: Short term goals:  1. Supine to sit with Contact Guard Assistance  2. Rolling to Left and Right with Contact Guard Assistance.  3. Sit to stand transfer with Contact Guard Assistance  4. Bed to chair transfer with Contact Guard Assistance using Single-point Cane   5. Gait  x 100 feet with Contact Guard Assistance using Single-point Cane .     Long term goals:  1. Supine to sit with Modified [place]  2. Rolling to Left and Right with [place].  3. Sit to stand transfer with Modified [place]  4. Bed to chair transfer with Modified [place] using Single-point Cane   5. Gait  x 300 feet with Modified [place] using Single-point Cane .                         Time Tracking:     PT Received On: 08/14/23  PT Start Time: 1355     PT Stop Time: 1435  PT Total Time  (min): 40 min     Billable Minutes: Gait Training 15 and Therapeutic Exercise 15    Treatment Type: Treatment  PT/PTA: PT     Number of PTA visits since last PT visit: 0     08/14/2023

## 2023-08-14 NOTE — PROGRESS NOTES
Tolerated regular diet yesterday.  Abdominal pain controlled.  Passing flatus and having bowel movements.  Dressing change.  Midline incision healing well with granulated tissue.  Ready for discharge from a General surgery standpoint.  However, the patient had elevated troponin overnight.  Being evaluated by Hospital Medicine and Cardiology.

## 2023-08-14 NOTE — ASSESSMENT & PLAN NOTE
- Patient seen and evaluated by Dr. Batres  - Troponin 1296, 1820, 1528; EKG SR with ST depression in anterior leads; Echo pending  - CT PE significantly limited due to motion, no segmental or larger pulmonary embolism. Small segmental and subsegmental emboli could not excluded on the basis of this exam.  - Plan for [facility] tomorrow. Procedure, risk and benefits discussed in detail with patient's son who has POA. He verbalized understanding and wishes to proceed. Consent obtained.  - Lovenox 1mg/kg x 1 dose  - LDL 52, not on home statin; will start atorvastatin 40 qhs (if no CAD, will discontinue)  - Further recommendations to follow.

## 2023-08-14 NOTE — CONSULTS
[facility] - [facility] Telemetry Hospital Medicine  Consult Note    Patient Name: Danny Flood  MRN: [id number]  Admission Date: 8/9/2023  Hospital Length of Stay: 5 days  Attending Physician: Tesfaye Natarajan MD   Primary Care Provider: Cameron Valencia MD           Patient information was obtained from past medical records and ER records.     Consults  Subjective:     Principal Problem: Infected hernioplasty mesh    Chief Complaint: No chief complaint on file.       HPI: Danny Flood is a 78-year-old male with history of HTN, DM, GERD, dementia, and hyperammonemia. He is post-op #4 exploratory laparotomy with infected mesh removal, and small bowel resection with anastomosis. Patient tolerated procedure well with no immediate post-operative complications. Medicine consulted for management of chronic conditions.     Patient with no current complaints. Has not yet regained normal bowel function, however NGT was pulled yesterday evening with no resulting nausea, vomiting, or increased abdominal pain. Labs do show some hypernatremia and hypokalemia, mildly increased from yesterday. Vital signs unremarkable aside from some mild hypertension overnight. Pain well controlled.      Interval History: pt was assessed at bedside today (8/14) awake and alert. Pt is status post hernioplasty mesh removal. Tachycardic episodes occurred the day prior, and overnight. Cardiology consulted. Pt received CTA today for PE workup. No definitive clots seen, low suspicion for PE. U/S for DVT also negative.    Review of Systems   Constitutional:  Positive for appetite change. Negative for fever.   Respiratory:  Negative for shortness of breath.    Cardiovascular:  Negative for chest pain and palpitations.   Gastrointestinal:  Positive for abdominal pain (post-surgery) and constipation. Negative for abdominal distention, nausea and vomiting.   Musculoskeletal:  Negative for arthralgias and myalgias.   Neurological:  Negative  for dizziness, syncope, weakness and headaches.   Psychiatric/Behavioral:  Negative for confusion.    All other systems reviewed and are negative.    Objective:     Vital Signs (Most Recent):  Temp: 97.7 °F (36.5 °C) (08/14/23 1100)  Pulse: 77 (08/14/23 1100)  Resp: 19 (08/14/23 1100)  BP: (!) 165/80 (08/14/23 1100)  SpO2: 96 % (08/14/23 1100) Vital Signs (24h Range):  Temp:  [97.4 °F (36.3 °C)-99.3 °F (37.4 °C)] 97.7 °F (36.5 °C)  Pulse:  [] 77  Resp:  [19-20] 19  SpO2:  [93 %-96 %] 96 %  BP: ()/(42-80) 165/80     Weight: 93.3 kg (205 lb 11 oz)  Body mass index is 27.9 kg/m².  No intake or output data in the 24 hours ending 08/14/23 1353      Physical Exam  Constitutional:       General: He is not in acute distress.     Appearance: Normal appearance. He is not ill-appearing.   HENT:      Head: Normocephalic and atraumatic.      Nose: Nose normal.   Eyes:      Conjunctiva/sclera: Conjunctivae normal.      Pupils: Pupils are equal, round, and reactive to light.   Cardiovascular:      Rate and Rhythm: Normal rate and regular rhythm.   Pulmonary:      Effort: Pulmonary effort is normal. No respiratory distress.      Breath sounds: Normal breath sounds.   Abdominal:      Palpations: Abdomen is soft.      Tenderness: There is no abdominal tenderness.      Comments: Surgical changes, healing appropriately   Musculoskeletal:      Cervical back: No rigidity.   Skin:     Coloration: Skin is not jaundiced or pale.      Findings: No rash.   Neurological:      Mental Status: He is alert. Mental status is at baseline.   Psychiatric:         Behavior: Behavior normal.         Thought Content: Thought content normal.             Significant Labs: All pertinent labs within the past 24 hours have been reviewed.  Recent Lab Results  (Last 5 results in the past 24 hours)        08/14/23  1101   08/14/23  1041   08/14/23  0944   08/14/23  0519   08/14/23  0427        Albumin/Globulin Ratio     0.5           Albumin      2.0           Alkaline Phosphatase     56           ALT     14           Anion Gap     7           AST     32           Baso #     0.04           Basophil %     0.6           BILIRUBIN TOTAL     1.4           BUN     19           BUN/CREAT RATIO     30           Calcium     8.2           Chloride     113           CHOL/HDLC Ratio         2.9       Cholesterol         104  Comment:   <200 mg/dL:  Desirable  200-240 mg/dL: Borderline High  >240 mg/dL:  High       CO2     27           Creatinine     0.63           D-Dimer 8.30               Differential Type     Auto           eGFR     98           Eos #     0.16           Eosinophil %     2.3           Globulin, Total     3.8           Glucose     167           HDL         36  Comment:   <40 mg/dL: Low HDL  40-60 mg/dL: Normal  >60 mg/dL: Desirable       Hematocrit     34.9           Hemoglobin     11.1           Immature Grans (Abs)     0.05           Immature Granulocytes     0.7           LDL Calculated         52       LDL/HDL Ratio         1.4       Lymph #     1.03           Lymph %     14.5           MCH     32.6           MCHC     31.8           MCV     102.3           Mono #     0.59           Mono %     8.3           MPV     10.0           Neutrophils, Abs     5.22           Neutrophils Relative     73.6           Non-HDL Cholesterol         68       nRBC     0.0           NUCLEATED RBC ABSOLUTE     0.00           Platelets     201           POC Glucose   179     147         Potassium     3.1           PROTEIN TOTAL     5.8           RBC     3.41           RDW     13.1           Sodium     144           Triglycerides         82  Comment:   Normal:  <150 mg/dL  Borderline High: 150-199 mg/dL  High:   200-499 mg/dL  Very High:  >=500       Troponin I High Sensitivity         1,528.7       VLDL Cholesterol Hase[place]         16       WBC     7.09                                  Significant Imaging: I have reviewed all pertinent imaging results/findings within the  "past 24 hours.    Assessment/Plan:     * Infected hernioplasty mesh  Patient s/p exploratory laparotomy with infected mesh removal and mall bowel resection with anastomosis. Continue IV antibiotics and pain control, further management per primary.      Hypernatremia  Continue IV fluids @ 125cc/hr, d/c NS in favor of LR as some hyperchloremia as well.     8/14: LR at KVO, sodium within normal    Dementia without behavioral disturbance, psychotic disturbance, mood disturbance, or anxiety  Seroquel BID      Diabetes mellitus  Patient's FSGs are controlled on current medication regimen.  Last A1c reviewed-   Lab Results   Component Value Date    HGBA1C 5.6 11/06/2021     Most recent fingerstick glucose reviewed- No results for input(s): "POCTGLUCOSE" in the last 24 hours.  Current correctional scale  Low  Maintain anti-hyperglycemic dose as follows-   Antihyperglycemics (From admission, onward)    Start     Stop Route Frequency Ordered    08/13/23 1314  insulin aspart U-100 injection 0-5 Units         -- SubQ Before meals & nightly PRN 08/13/23 1215        Hold Oral hypoglycemics while patient is in the hospital.    Hyperlipidemia        Hypokalemia  Ordered 20 mEq K+ IV, transition to oral replacement as appropriate. Mg+ pending.     8/14: Mg ordered, pending results. Effer K 40 meq.    Hypertension  Resume home medications, monitor.     8/14: hydralazine prn with parameters, considering restarting home losartan, waiting until tomorrow to observe pressure response after stopping IV fluid    Hyperammonemia  History of hyperammonemia without cirrhosis. On lactulose at home, holding as bowel function has not yet returned. Will start rifaximin in the meantime. No mental status changes currently.        VTE Risk Mitigation (From admission, onward)    None              Thank you for your consult. I will follow-up with patient. Please contact us if you have any additional questions.    Debbie Correa MD  Department of Hospital " Medicine   [facility] - [address] [place] [address]

## 2023-08-14 NOTE — PLAN OF CARE
Chart reviewed. Cont poc. Pt tolerating regular diet. + flatus and having BM. Continued dressing changes. Pt with elevated troponin last night. Cardiology and hospital medicine are following. Pt evaluated by SLP. SS following for dc needs and recommendation.

## 2023-08-14 NOTE — PT/OT/SLP EVAL
Speech Language Pathology Evaluation  Bedside Swallow    Patient Name:  Danny Flood   MRN:  [id number]  Admitting Diagnosis: Infected hernioplasty mesh    Recommendations:                 General Recommendations:  Follow-up not indicated  Diet recommendations:  Mechanical soft, Chopped meat, Thin   Aspiration Precautions: 1 bite/sip at a time, Alternating bites/sips, Feed only when awake/alert, HOB to 90 degrees, Remain upright 30 minutes post meal, Small bites/sips, and Standard aspiration precautions   General Precautions: Standard,    Communication strategies:  none    Assessment:     Danny Flood is a 78 y.o. male with an SLP diagnosis of  possible dysphagia .  He presents with passing BEDSIDE SWALLOW EVALUATION with small bites and sips.    History:     Past Medical History:   Diagnosis Date    Diverticulitis     Hyperlipemia     Hypokalemia 11/09/2021       Past Surgical History:   Procedure Laterality Date    COLON SURGERY      partial colon resection    EXCISION, SMALL INTESTINE N/A 8/9/2023    Procedure: EXCISION, SMALL INTESTINE;  Surgeon: Tesfaye Natarajan MD;  Location: [facility];  Service: General;  Laterality: N/A;    HERNIA REPAIR      LAPAROTOMY, EXPLORATORY N/A 8/9/2023    Procedure: LAPAROTOMY, EXPLORATORY;  Surgeon: Tesfaye Natarajan MD;  Location: [facility];  Service: General;  Laterality: N/A;    REMOVAL OF IMPLANT N/A 8/9/2023    Procedure: REMOVAL, IMPLANT;  Surgeon: Tesfaye Natarajan MD;  Location: [facility];  Service: General;  Laterality: N/A;  removal mesh       Social History: Patient lives at home with his wife.    Prior Intubation HX:  n/a    Modified Barium Swallow: n/a    Chest X-Rays: see chart    Prior diet: Patient eats regular foods and drinks thin liquids at home.    Occupation/hobbies/homemaking: retired    Subjective     Patient lying in bed awake and alert. Patient agreeable to BEDSIDE SWALLOW EVALUATION and saying he was ready to go home.   Patient goals: to go  home     Pain/Comfort:  Pain Rating 1: 0/10    Respiratory Status: Room air    Objective:     Oral Musculature Evaluation  Oral Musculature: WFL  Dentition: present and adequate  Secretion Management: adequate  Mucosal Quality: adequate  Oral Labial Strength and Mobility: WFL  Lingual Strength and Mobility: WF    Bedside Swallow Eval:   Consistencies Assessed:  Thin liquids Patient was given small trials of water via a spoon and a straw. Patient did fine with small sips. No overt s/s of aspiration noted. Reminded patient that he always needed to take small sips.   Puree Patient was given trials of pudding via a spoon. No difficulties noted. No overt s/s of aspiration noted with any trials. Patient reported that he sometimes has difficulty chewing meat. SLP and patient feel he would do better with chopped meats at this time.       Oral Phase:   WFL    Pharyngeal Phase:   no overt clinical signs/symptoms of aspiration  no overt clinical signs/symptoms of pharyngeal dysphagia    Compensatory Strategies  None    Treatment: Rec a mechanical soft consistency diet with chopped meats and thin liquids as tolerated. Remind patient to take small bites/sips.      Goals:   Multidisciplinary Problems       SLP Goals       Not on file                    Plan:     Patient to be seen:      Plan of Care expires:     Plan of Care reviewed with:      SLP Follow-Up:  No       Discharge recommendations:      Barriers to Discharge:  Decreased Care Giver Support    Time Tracking:     SLP Treatment Date:      Speech Start Time:  1020  Speech Stop Time:  1040     Speech Total Time (min):  20 min    Billable Minutes: Eval Swallow and Oral Function 20 08/14/2023

## 2023-08-14 NOTE — HPI
77 y/o male with PMH of HTN, DM, GERD, dementia, and hyperammonemia. He is post-op #5 exploratory laparotomy with infected mesh removal, and small bowel resection with anastomosis. Patient tolerated procedure well with no immediate post-operative complications. Medicine consulted for management of chronic conditions. Patient had episodes of tachycardia yesterday and overnight, cardiology consulted for NSTEMI.    Patient seen today, denies chest pain or sob. Difficult to obtain a full history due to dementia, brother (POA) at bedside. CT PE performed today was significantly limited secondary to respiratory motion without convincing segmental or larger pulmonary embolism. Small segmental and subsegmental emboli could not excluded on the basis of this exam. US negative for DVT. Troponin 1296, 1820, 1528. EKG with ST depression in anterior leads. Echo pending.

## 2023-08-15 LAB
25(OH)D3 SERPL-MCNC: 27.6 NG/ML
ALBUMIN SERPL BCP-MCNC: 2.3 G/DL (ref 3.5–5)
ALBUMIN/GLOB SERPL: 0.6 {RATIO}
ALP SERPL-CCNC: 65 U/L (ref 45–115)
ALT SERPL W P-5'-P-CCNC: 20 U/L (ref 16–61)
ANION GAP SERPL CALCULATED.3IONS-SCNC: 9 MMOL/L (ref 7–16)
AST SERPL W P-5'-P-CCNC: 31 U/L (ref 15–37)
BASOPHILS # BLD AUTO: 0.03 K/UL (ref 0–0.2)
BASOPHILS NFR BLD AUTO: 0.4 % (ref 0–1)
BILIRUB SERPL-MCNC: 1.5 MG/DL (ref ?–1.2)
BUN SERPL-MCNC: 13 MG/DL (ref 7–18)
BUN/CREAT SERPL: 23 (ref 6–20)
CALCIUM SERPL-MCNC: 8 MG/DL (ref 8.5–10.1)
CATH EF QUANTITATIVE: 55 %
CHLORIDE SERPL-SCNC: 112 MMOL/L (ref 98–107)
CO2 SERPL-SCNC: 26 MMOL/L (ref 21–32)
CREAT SERPL-MCNC: 0.57 MG/DL (ref 0.7–1.3)
DIFFERENTIAL METHOD BLD: ABNORMAL
EGFR (NO RACE VARIABLE) (RUSH/TITUS): 101 ML/MIN/1.73M2
EOSINOPHIL # BLD AUTO: 0.14 K/UL (ref 0–0.5)
EOSINOPHIL NFR BLD AUTO: 1.8 % (ref 1–4)
ERYTHROCYTE [DISTWIDTH] IN BLOOD BY AUTOMATED COUNT: 12.7 % (ref 11.5–14.5)
FOLATE SERPL-MCNC: >20 NG/ML (ref 3.1–17.5)
GLOBULIN SER-MCNC: 4 G/DL (ref 2–4)
GLUCOSE SERPL-MCNC: 139 MG/DL (ref 70–105)
GLUCOSE SERPL-MCNC: 145 MG/DL (ref 74–106)
GLUCOSE SERPL-MCNC: 152 MG/DL (ref 70–105)
GLUCOSE SERPL-MCNC: 193 MG/DL (ref 70–105)
HCT VFR BLD AUTO: 35.3 % (ref 40–54)
HGB BLD-MCNC: 11.8 G/DL (ref 13.5–18)
IMM GRANULOCYTES # BLD AUTO: 0.05 K/UL (ref 0–0.04)
IMM GRANULOCYTES NFR BLD: 0.7 % (ref 0–0.4)
LYMPHOCYTES # BLD AUTO: 0.79 K/UL (ref 1–4.8)
LYMPHOCYTES NFR BLD AUTO: 10.3 % (ref 27–41)
MAGNESIUM SERPL-MCNC: 1.9 MG/DL (ref 1.7–2.3)
MCH RBC QN AUTO: 31.9 PG (ref 27–31)
MCHC RBC AUTO-ENTMCNC: 33.4 G/DL (ref 32–36)
MCV RBC AUTO: 95.4 FL (ref 80–96)
MONOCYTES # BLD AUTO: 0.45 K/UL (ref 0–0.8)
MONOCYTES NFR BLD AUTO: 5.9 % (ref 2–6)
MPC BLD CALC-MCNC: 9.7 FL (ref 9.4–12.4)
NEUTROPHILS # BLD AUTO: 6.21 K/UL (ref 1.8–7.7)
NEUTROPHILS NFR BLD AUTO: 80.9 % (ref 53–65)
NRBC # BLD AUTO: 0 X10E3/UL
NRBC, AUTO (.00): 0 %
PLATELET # BLD AUTO: 152 K/UL (ref 150–400)
POTASSIUM SERPL-SCNC: 2.8 MMOL/L (ref 3.5–5.1)
PROT SERPL-MCNC: 6.3 G/DL (ref 6.4–8.2)
RBC # BLD AUTO: 3.7 M/UL (ref 4.6–6.2)
SODIUM SERPL-SCNC: 144 MMOL/L (ref 136–145)
VIT B12 SERPL-MCNC: 1170 PG/ML (ref 193–986)
WBC # BLD AUTO: 7.67 K/UL (ref 4.5–11)

## 2023-08-15 PROCEDURE — 82746 ASSAY OF FOLIC ACID SERUM: CPT | Performed by: HOSPITALIST

## 2023-08-15 PROCEDURE — 25000003 PHARM REV CODE 250

## 2023-08-15 PROCEDURE — 97530 THERAPEUTIC ACTIVITIES: CPT

## 2023-08-15 PROCEDURE — 25500020 PHARM REV CODE 255: Performed by: INTERNAL MEDICINE

## 2023-08-15 PROCEDURE — 11000001 HC ACUTE MED/SURG PRIVATE ROOM

## 2023-08-15 PROCEDURE — C1760 CLOSURE DEV, VASC: HCPCS | Performed by: INTERNAL MEDICINE

## 2023-08-15 PROCEDURE — 93458 L HRT ARTERY/VENTRICLE ANGIO: CPT | Performed by: INTERNAL MEDICINE

## 2023-08-15 PROCEDURE — 99900035 HC TECH TIME PER 15 MIN (STAT)

## 2023-08-15 PROCEDURE — 63600175 PHARM REV CODE 636 W HCPCS: Performed by: INTERNAL MEDICINE

## 2023-08-15 PROCEDURE — 25000003 PHARM REV CODE 250: Performed by: GENERAL PRACTICE

## 2023-08-15 PROCEDURE — 80053 COMPREHEN METABOLIC PANEL: CPT

## 2023-08-15 PROCEDURE — 27201423 OPTIME MED/SURG SUP & DEVICES STERILE SUPPLY: Performed by: INTERNAL MEDICINE

## 2023-08-15 PROCEDURE — C1894 INTRO/SHEATH, NON-LASER: HCPCS | Performed by: INTERNAL MEDICINE

## 2023-08-15 PROCEDURE — 93458 L HRT ARTERY/VENTRICLE ANGIO: CPT | Mod: 26,,, | Performed by: INTERNAL MEDICINE

## 2023-08-15 PROCEDURE — 82306 VITAMIN D 25 HYDROXY: CPT | Performed by: HOSPITALIST

## 2023-08-15 PROCEDURE — 25000003 PHARM REV CODE 250: Performed by: INTERNAL MEDICINE

## 2023-08-15 PROCEDURE — 63600175 PHARM REV CODE 636 W HCPCS: Performed by: SURGERY

## 2023-08-15 PROCEDURE — C9113 INJ PANTOPRAZOLE SODIUM, VIA: HCPCS | Performed by: FAMILY MEDICINE

## 2023-08-15 PROCEDURE — 99232 PR SUBSEQUENT HOSPITAL CARE,LEVL II: ICD-10-PCS | Mod: GC,,, | Performed by: FAMILY MEDICINE

## 2023-08-15 PROCEDURE — 82962 GLUCOSE BLOOD TEST: CPT

## 2023-08-15 PROCEDURE — 25000003 PHARM REV CODE 250: Performed by: STUDENT IN AN ORGANIZED HEALTH CARE EDUCATION/TRAINING PROGRAM

## 2023-08-15 PROCEDURE — 63600175 PHARM REV CODE 636 W HCPCS: Performed by: FAMILY MEDICINE

## 2023-08-15 PROCEDURE — 83735 ASSAY OF MAGNESIUM: CPT

## 2023-08-15 PROCEDURE — 85025 COMPLETE CBC W/AUTO DIFF WBC: CPT

## 2023-08-15 PROCEDURE — 25000003 PHARM REV CODE 250: Performed by: HOSPITALIST

## 2023-08-15 PROCEDURE — 99232 SBSQ HOSP IP/OBS MODERATE 35: CPT | Mod: GC,,, | Performed by: FAMILY MEDICINE

## 2023-08-15 PROCEDURE — 94761 N-INVAS EAR/PLS OXIMETRY MLT: CPT

## 2023-08-15 PROCEDURE — 25000003 PHARM REV CODE 250: Performed by: SURGERY

## 2023-08-15 PROCEDURE — 25000003 PHARM REV CODE 250: Performed by: FAMILY MEDICINE

## 2023-08-15 PROCEDURE — 25000003 PHARM REV CODE 250: Performed by: NURSE PRACTITIONER

## 2023-08-15 PROCEDURE — 93458 PR CATH PLACE/CORON ANGIO, IMG SUPER/INTERP,W LEFT HEART VENTRICULOGRAPHY: ICD-10-PCS | Mod: 26,,, | Performed by: INTERNAL MEDICINE

## 2023-08-15 DEVICE — ANGIO-SEAL VIP VASCULAR CLOSURE DEVICE
Type: IMPLANTABLE DEVICE | Site: ARTERIAL | Status: FUNCTIONAL
Brand: ANGIO-SEAL

## 2023-08-15 RX ORDER — SODIUM CHLORIDE 450 MG/100ML
INJECTION, SOLUTION INTRAVENOUS CONTINUOUS
Status: ACTIVE | OUTPATIENT
Start: 2023-08-15 | End: 2023-08-15

## 2023-08-15 RX ORDER — ACETAMINOPHEN 325 MG/1
650 TABLET ORAL EVERY 4 HOURS PRN
Status: DISCONTINUED | OUTPATIENT
Start: 2023-08-15 | End: 2023-08-17 | Stop reason: HOSPADM

## 2023-08-15 RX ORDER — ONDANSETRON 4 MG/1
8 TABLET, ORALLY DISINTEGRATING ORAL EVERY 8 HOURS PRN
Status: DISCONTINUED | OUTPATIENT
Start: 2023-08-15 | End: 2023-08-17 | Stop reason: HOSPADM

## 2023-08-15 RX ORDER — MIDAZOLAM HYDROCHLORIDE 1 MG/ML
INJECTION INTRAMUSCULAR; INTRAVENOUS
Status: DISCONTINUED | OUTPATIENT
Start: 2023-08-15 | End: 2023-08-15 | Stop reason: HOSPADM

## 2023-08-15 RX ORDER — LIDOCAINE HYDROCHLORIDE 10 MG/ML
INJECTION, SOLUTION EPIDURAL; INFILTRATION; INTRACAUDAL; PERINEURAL
Status: DISCONTINUED | OUTPATIENT
Start: 2023-08-15 | End: 2023-08-15 | Stop reason: HOSPADM

## 2023-08-15 RX ORDER — SODIUM CHLORIDE 450 MG/100ML
INJECTION, SOLUTION INTRAVENOUS
Status: DISCONTINUED | OUTPATIENT
Start: 2023-08-15 | End: 2023-08-17 | Stop reason: HOSPADM

## 2023-08-15 RX ORDER — FENTANYL CITRATE 50 UG/ML
INJECTION, SOLUTION INTRAMUSCULAR; INTRAVENOUS
Status: DISCONTINUED | OUTPATIENT
Start: 2023-08-15 | End: 2023-08-15 | Stop reason: HOSPADM

## 2023-08-15 RX ADMIN — POTASSIUM BICARBONATE 40 MEQ: 782 TABLET, EFFERVESCENT ORAL at 01:08

## 2023-08-15 RX ADMIN — ASPIRIN 81 MG: 81 TABLET, COATED ORAL at 01:08

## 2023-08-15 RX ADMIN — LACTULOSE 20 G: 20 SOLUTION ORAL at 03:08

## 2023-08-15 RX ADMIN — PIPERACILLIN AND TAZOBACTAM 4.5 G: 4; .5 INJECTION, POWDER, FOR SOLUTION INTRAVENOUS; PARENTERAL at 09:08

## 2023-08-15 RX ADMIN — PANTOPRAZOLE SODIUM 40 MG: 40 INJECTION, POWDER, FOR SOLUTION INTRAVENOUS at 01:08

## 2023-08-15 RX ADMIN — QUETIAPINE FUMARATE 50 MG: 25 TABLET ORAL at 01:08

## 2023-08-15 RX ADMIN — POTASSIUM CHLORIDE 40 MEQ: 1500 TABLET, EXTENDED RELEASE ORAL at 12:08

## 2023-08-15 RX ADMIN — POTASSIUM BICARBONATE 40 MEQ: 782 TABLET, EFFERVESCENT ORAL at 09:08

## 2023-08-15 RX ADMIN — PIPERACILLIN AND TAZOBACTAM 4.5 G: 4; .5 INJECTION, POWDER, FOR SOLUTION INTRAVENOUS; PARENTERAL at 01:08

## 2023-08-15 RX ADMIN — LACTULOSE 20 G: 20 SOLUTION ORAL at 01:08

## 2023-08-15 RX ADMIN — RIFAXIMIN 550 MG: 550 TABLET ORAL at 01:08

## 2023-08-15 RX ADMIN — RIFAXIMIN 550 MG: 550 TABLET ORAL at 09:08

## 2023-08-15 RX ADMIN — ATORVASTATIN CALCIUM 40 MG: 40 TABLET, FILM COATED ORAL at 09:08

## 2023-08-15 RX ADMIN — SODIUM CHLORIDE: 4.5 INJECTION, SOLUTION INTRAVENOUS at 01:08

## 2023-08-15 RX ADMIN — QUETIAPINE FUMARATE 50 MG: 25 TABLET ORAL at 09:08

## 2023-08-15 RX ADMIN — PIPERACILLIN AND TAZOBACTAM 4.5 G: 4; .5 INJECTION, POWDER, FOR SOLUTION INTRAVENOUS; PARENTERAL at 06:08

## 2023-08-15 RX ADMIN — PANTOPRAZOLE SODIUM 40 MG: 40 INJECTION, POWDER, FOR SOLUTION INTRAVENOUS at 09:08

## 2023-08-15 NOTE — PROGRESS NOTES
[facility] Tele[facility] Medicine  Progress Note    Patient Name: Danny Flood  MRN: [id number]  Patient Class: IP- Inpatient   Admission Date: 8/9/2023  Length of Stay: 6 days  Attending Physician: Tesfaye Natarajan MD  Primary Care Provider: Cameron Valencia MD        Subjective:     Principal Problem:Infected hernioplasty mesh        HPI:  Danny Flood is a 78-year-old male with history of HTN, DM, GERD, dementia, and hyperammonemia. He is post-op #4 exploratory laparotomy with infected mesh removal, and small bowel resection with anastomosis. Patient tolerated procedure well with no immediate post-operative complications. Medicine consulted for management of chronic conditions.     Patient with no current complaints. Has not yet regained normal bowel function, however NGT was pulled yesterday evening with no resulting nausea, vomiting, or increased abdominal pain. Labs do show some hypernatremia and hypokalemia, mildly increased from yesterday. Vital signs unremarkable aside from some mild hypertension overnight. Pain well controlled.      Overview/Hospital Course:  No notes on file    Interval History: pt assessed at bedside today (8/15) awake and alert. Slurring is audibly worse over days prior, endorsed by son. Ammonia was 64 today. Lactulose, which had been held post surgery, was restarted today. Continuing rifaximin. Continue to monitor ammonia for improvement. Pt has received CT head and heart cath, both of which were unremarkable for acute disease process.    Review of Systems   Constitutional:  Positive for appetite change and fatigue. Negative for fever.   Respiratory:  Positive for cough. Negative for shortness of breath.    Cardiovascular:  Negative for chest pain and palpitations.   Gastrointestinal:  Positive for abdominal pain (post-surgery) and constipation. Negative for abdominal distention, nausea and vomiting.   Musculoskeletal:  Negative for arthralgias and  myalgias.   Neurological:  Positive for speech difficulty. Negative for dizziness, syncope, weakness and headaches.        Son reports that current slurring is worse than pts baseline   Psychiatric/Behavioral:  Negative for confusion.    All other systems reviewed and are negative.    Objective:     Vital Signs (Most Recent):  Temp: 97.8 °F (36.6 °C) (08/15/23 0705)  Pulse: 97 (08/15/23 0705)  Resp: 20 (08/15/23 0705)  BP: (!) 162/68 (08/15/23 0705)  SpO2: 96 % (08/15/23 0705) Vital Signs (24h Range):  Temp:  [97.6 °F (36.4 °C)-98.2 °F (36.8 °C)] 97.8 °F (36.6 °C)  Pulse:  [78-99] 97  Resp:  [18-20] 20  SpO2:  [95 %-98 %] 96 %  BP: (115-162)/(61-70) 162/68     Weight: 93.3 kg (205 lb 11 oz)  Body mass index is 27.9 kg/m².  No intake or output data in the 24 hours ending 08/15/23 1452      Physical Exam  Constitutional:       General: He is not in acute distress.     Appearance: Normal appearance. He is not ill-appearing.   HENT:      Head: Normocephalic and atraumatic.      Nose: Nose normal.      Mouth/Throat:      Mouth: Mucous membranes are dry.      Pharynx: No posterior oropharyngeal erythema.   Eyes:      Conjunctiva/sclera: Conjunctivae normal.      Pupils: Pupils are equal, round, and reactive to light.   Cardiovascular:      Rate and Rhythm: Normal rate and regular rhythm.   Pulmonary:      Effort: Pulmonary effort is normal. No respiratory distress.      Breath sounds: Normal breath sounds.   Abdominal:      Palpations: Abdomen is soft.      Tenderness: There is no abdominal tenderness.      Comments: Surgical changes, healing appropriately   Musculoskeletal:      Cervical back: No rigidity.   Skin:     Coloration: Skin is not jaundiced or pale.      Findings: No rash.   Neurological:      Mental Status: He is alert. Mental status is at baseline.   Psychiatric:         Behavior: Behavior normal.         Thought Content: Thought content normal.             Significant Labs: All pertinent labs within the  past 24 hours have been reviewed.  Recent Lab Results  (Last 5 results in the past 24 hours)        08/15/23  1126   08/15/23  0704   08/15/23  0440   08/15/23  0423   08/14/23  2044        Albumin/Globulin Ratio   0.6             Albumin   2.3             Alkaline Phosphatase   65             ALT   20             Anion Gap   9             AST   31             Baso #   0.03             Basophil %   0.4             BILIRUBIN TOTAL   1.5             BUN   13             BUN/CREAT RATIO   23             Calcium   8.0             Cath EF Quantitative 55               Chloride   112             CO2   26             Creatinine   0.57             Differential Type   Auto             eGFR   101             Eos #   0.14             Eosinophil %   1.8             Folate     >20.0           Globulin, Total   4.0             Glucose   145             Hematocrit   35.3             Hemoglobin   11.8             Immature Grans (Abs)   0.05             Immature Granulocytes   0.7             Lymph #   0.79             Lymph %   10.3             MCH   31.9             MCHC   33.4             MCV   95.4             Mono #   0.45             Mono %   5.9             MPV   9.7             Neutrophils, Abs   6.21             Neutrophils Relative   80.9             nRBC   0.0             NUCLEATED RBC ABSOLUTE   0.00             Platelets   152             POC Glucose       193   160       Potassium   2.8             PROTEIN TOTAL   6.3             RBC   3.70             RDW   12.7             Sodium   144             Vit D, 25-Hydroxy     27.6  Comment: Vitamin D 25-OH Adult Reference Values:  Deficiency: <20 ng/mL  Insufficiency: 20 - <30 ng/mL  Sufficiency: 30 -100 ng/mL    Vitamin D 25-OH Pediatric Reference Values:  Deficiency: <15 ng/mL  Insufficiency: 15 - <20 ng/mL  Sufficiency: 20 - 100 ng/mL           Vitamin B-12     1,170           WBC   7.67                                    Significant Imaging: I have reviewed all  "pertinent imaging results/findings within the past 24 hours.      Assessment/Plan:      * Infected hernioplasty mesh  Patient s/p exploratory laparotomy with infected mesh removal and mall bowel resection with anastomosis. Continue IV antibiotics and pain control, further management per primary.      NSTEMI (non-ST elevated myocardial infarction)  8/15: [facility]   Normal LV systolic function, ER 55%, trivial non-obstructive CAD.       Hypernatremia  8/15--Na-WNL    Dementia without behavioral disturbance, psychotic disturbance, mood disturbance, or anxiety  Seroquel BID      Diabetes mellitus  Patient's FSGs are controlled on current medication regimen.  Last A1c reviewed-   Lab Results   Component Value Date    HGBA1C 5.6 11/06/2021     Most recent fingerstick glucose reviewed- No results for input(s): "POCTGLUCOSE" in the last 24 hours.  Current correctional scale  Low  Maintain anti-hyperglycemic dose as follows-   Antihyperglycemics (From admission, onward)    Start     Stop Route Frequency Ordered    08/13/23 1314  insulin aspart U-100 injection 0-5 Units         -- SubQ Before meals & nightly PRN 08/13/23 1215        Hold Oral hypoglycemics while patient is in the hospital.    Hyperlipidemia        Hypokalemia  Ordered 20 mEq K+ IV, transition to oral replacement as appropriate. Mg+ pending.     8/15:  Effer K 40 meq BID (K-2.8). Mg 1.9    Hypertension  Resume home medications, monitor.     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     Continue lactulose and rifaximin        VTE Risk Mitigation (From admission, onward)    None          Discharge Planning   AMBAR:      Code Status: Full Code   Is the patient medically ready for discharge?:     Reason for patient still in hospital (select all that apply): Treatment  Discharge Plan A: Home with " family, Home Health                  Mackenzie Khan MD  Department of Hospital Medicine   [facility] - [address] [place] [address]

## 2023-08-15 NOTE — PLAN OF CARE
Problem: Adult Inpatient Plan of Care  Goal: Plan of Care Review  Outcome: Ongoing, Progressing  Goal: Absence of Hospital-Acquired Illness or Injury  Outcome: Ongoing, Progressing  Goal: Optimal Comfort and Wellbeing  Outcome: Ongoing, Progressing     Problem: Fall Injury Risk  Goal: Absence of Fall and Fall-Related Injury  Outcome: Ongoing, Progressing     Problem: Skin Injury Risk Increased  Goal: Skin Health and Integrity  Outcome: Ongoing, Progressing     Problem: Diabetes Comorbidity  Goal: Blood Glucose Level Within Targeted Range  Outcome: Ongoing, Progressing

## 2023-08-15 NOTE — ASSESSMENT & PLAN NOTE
Ordered 20 mEq K+ IV, transition to oral replacement as appropriate. Mg+ pending.     8/15:  Effer K 40 meq BID (K-2.8). Mg 1.9

## 2023-08-15 NOTE — PROGRESS NOTES
LHC today revealed trivial non-obstructive CAD and normal LV systolic function, EF 55%. Cardiology will sign off. Please call if needed.

## 2023-08-15 NOTE — SUBJECTIVE & OBJECTIVE
Interval History: pt assessed at bedside today (8/15) awake and alert. Slurring is audibly worse over days prior, endorsed by son. Ammonia was 64 today. Lactulose, which had been held post surgery, was restarted today. Continuing rifaximin. Continue to monitor ammonia for improvement. Pt has received CT head and heart cath, both of which were unremarkable for acute disease process.    Review of Systems   Constitutional:  Positive for appetite change and fatigue. Negative for fever.   Respiratory:  Positive for cough. Negative for shortness of breath.    Cardiovascular:  Negative for chest pain and palpitations.   Gastrointestinal:  Positive for abdominal pain (post-surgery) and constipation. Negative for abdominal distention, nausea and vomiting.   Musculoskeletal:  Negative for arthralgias and myalgias.   Neurological:  Positive for speech difficulty. Negative for dizziness, syncope, weakness and headaches.        Son reports that current slurring is worse than pts baseline   Psychiatric/Behavioral:  Negative for confusion.    All other systems reviewed and are negative.    Objective:     Vital Signs (Most Recent):  Temp: 97.8 °F (36.6 °C) (08/15/23 0705)  Pulse: 97 (08/15/23 0705)  Resp: 20 (08/15/23 0705)  BP: (!) 162/68 (08/15/23 0705)  SpO2: 96 % (08/15/23 0705) Vital Signs (24h Range):  Temp:  [97.6 °F (36.4 °C)-98.2 °F (36.8 °C)] 97.8 °F (36.6 °C)  Pulse:  [78-99] 97  Resp:  [18-20] 20  SpO2:  [95 %-98 %] 96 %  BP: (115-162)/(61-70) 162/68     Weight: 93.3 kg (205 lb 11 oz)  Body mass index is 27.9 kg/m².  No intake or output data in the 24 hours ending 08/15/23 1452      Physical Exam  Constitutional:       General: He is not in acute distress.     Appearance: Normal appearance. He is not ill-appearing.   HENT:      Head: Normocephalic and atraumatic.      Nose: Nose normal.      Mouth/Throat:      Mouth: Mucous membranes are dry.      Pharynx: No posterior oropharyngeal erythema.   Eyes:       Conjunctiva/sclera: Conjunctivae normal.      Pupils: Pupils are equal, round, and reactive to light.   Cardiovascular:      Rate and Rhythm: Normal rate and regular rhythm.   Pulmonary:      Effort: Pulmonary effort is normal. No respiratory distress.      Breath sounds: Normal breath sounds.   Abdominal:      Palpations: Abdomen is soft.      Tenderness: There is no abdominal tenderness.      Comments: Surgical changes, healing appropriately   Musculoskeletal:      Cervical back: No rigidity.   Skin:     Coloration: Skin is not jaundiced or pale.      Findings: No rash.   Neurological:      Mental Status: He is alert. Mental status is at baseline.   Psychiatric:         Behavior: Behavior normal.         Thought Content: Thought content normal.             Significant Labs: All pertinent labs within the past 24 hours have been reviewed.  Recent Lab Results  (Last 5 results in the past 24 hours)        08/15/23  1126   08/15/23  0704   08/15/23  0440   08/15/23  0423   08/14/23  2044        Albumin/Globulin Ratio   0.6             Albumin   2.3             Alkaline Phosphatase   65             ALT   20             Anion Gap   9             AST   31             Baso #   0.03             Basophil %   0.4             BILIRUBIN TOTAL   1.5             BUN   13             BUN/CREAT RATIO   23             Calcium   8.0             Cath EF Quantitative 55               Chloride   112             CO2   26             Creatinine   0.57             Differential Type   Auto             eGFR   101             Eos #   0.14             Eosinophil %   1.8             Folate     >20.0           Globulin, Total   4.0             Glucose   145             Hematocrit   35.3             Hemoglobin   11.8             Immature Grans (Abs)   0.05             Immature Granulocytes   0.7             Lymph #   0.79             Lymph %   10.3             MCH   31.9             MCHC   33.4             MCV   95.4             Mono #   0.45              Mono %   5.9             MPV   9.7             Neutrophils, Abs   6.21             Neutrophils Relative   80.9             nRBC   0.0             NUCLEATED RBC ABSOLUTE   0.00             Platelets   152             POC Glucose       193   160       Potassium   2.8             PROTEIN TOTAL   6.3             RBC   3.70             RDW   12.7             Sodium   144             Vit D, 25-Hydroxy     27.6  Comment: Vitamin D 25-OH Adult Reference Values:  Deficiency: <20 ng/mL  Insufficiency: 20 - <30 ng/mL  Sufficiency: 30 -100 ng/mL    Vitamin D 25-OH Pediatric Reference Values:  Deficiency: <15 ng/mL  Insufficiency: 15 - <20 ng/mL  Sufficiency: 20 - 100 ng/mL           Vitamin B-12     1,170           WBC   7.67                                    Significant Imaging: I have reviewed all pertinent imaging results/findings within the past 24 hours.

## 2023-08-15 NOTE — CONSULTS
Sw went to bedside to acknowledge cardiac rehab consult. Pt was being wheeled to cath lab. Son was in room but was in meeting. Sw to f/u later this day. Ss following.

## 2023-08-15 NOTE — PLAN OF CARE
SW went back to bedside to discuss cardiac rehab consult. Pta, pt was current with [facility]. Pt and son request that cardiac rehab be done via [facility]. Referral faxing to [facility] at this time. Ss following for dc needs and recommendations.

## 2023-08-15 NOTE — PT/OT/SLP PROGRESS
Physical Therapy Treatment    Patient Name:  Danny Flood   MRN:  [id number]    Recommendations:     Discharge Recommendations: nursing facility, skilled, home health PT  Discharge Equipment Recommendations: none  Barriers to discharge: Decreased caregiver support    Assessment:     Danny Flood is a 78 y.o. male admitted with a medical diagnosis of Infected hernioplasty mesh.  He presents with the following impairments/functional limitations: weakness, impaired self care skills, impaired functional mobility, gait instability, impaired balance, pain Pt had heart cath today with no intervention. He has mild confusion due to increase in ammonia. However, pt was given lactulose and had multiple BM during treatment. Was able to stand with rolling walker and assist with mobility for hygiene. Will continue to address mobility as able.    Rehab Prognosis: Good; patient would benefit from acute skilled PT services to address these deficits and reach maximum level of function.    Recent Surgery: Procedure(s) (LRB):  Left heart cath (Left) Day of Surgery    Plan:     During this hospitalization, patient to be seen 5 x/week to address the identified rehab impairments via gait training, therapeutic activities, therapeutic exercises and progress toward the following goals:    Plan of Care Expires:  09/11/23    Subjective     Chief Complaint: confusion  Patient/Family Comments/goals: Pt is agreeable to PT   Pain/Comfort:  Pain Rating 1: 0/10  Pain Rating Post-Intervention 1: 0/10      Objective:     Communicated with HELLEN Longoria RN prior to session.  Patient found HOB elevated with peripheral IV, SCD, telemetry, blood pressure cuff upon PT entry to room.     General Precautions: Standard, fall  Orthopedic Precautions: N/A  Braces: N/A  Respiratory Status: Room air     Functional Mobility:  Bed Mobility:     Rolling Left:  minimum assistance  Rolling Right: minimum assistance  Scooting: minimum assistance  Supine to Sit: moderate  assistance  Sit to Supine: moderate assistance  Transfers:     Sit to Stand:  minimum assistance with rolling walker  Balance: fair      AM-PAC 6 CLICK MOBILITY  Turning over in bed (including adjusting bedclothes, sheets and blankets)?: 2  Sitting down on and standing up from a chair with arms (e.g., wheelchair, bedside commode, etc.): 3  Moving from lying on back to sitting on the side of the bed?: 2  Moving to and from a bed to a chair (including a wheelchair)?: 3  Need to walk in hospital room?: 3  Climbing 3-5 steps with a railing?: 2  Basic Mobility Total Score: 15       Treatment & Education:  Pt assisted with Rolling L/R multiple times for hygiene  Sat at edge of bed x 10 minutes  Performed sit to stand x2 with rolling walker. Static standing x2 minutes each attempt  Pt performed bilateral LE: seated exercises: ankle pumps and long arc quads x 30 each      Patient left HOB elevated with all lines intact and call button in reach..    GOALS:   Multidisciplinary Problems       Physical Therapy Goals          Problem: Physical Therapy    Goal Priority Disciplines Outcome Goal Variances Interventions   Physical Therapy Goal     PT, PT/OT Ongoing, Progressing     Description: Short term goals:  1. Supine to sit with Contact Guard Assistance  2. Rolling to Left and Right with Contact Guard Assistance.  3. Sit to stand transfer with Contact Guard Assistance  4. Bed to chair transfer with Contact Guard Assistance using Single-point Cane   5. Gait  x 100 feet with Contact Guard Assistance using Single-point Cane .     Long term goals:  1. Supine to sit with Modified [place]  2. Rolling to Left and Right with [place].  3. Sit to stand transfer with Modified [place]  4. Bed to chair transfer with Modified [place] using Single-point Cane   5. Gait  x 300 feet with Modified [place] using Single-point Cane .                         Time Tracking:     PT Received On: 08/15/23  PT Start Time: 1421      PT Stop Time: 1625  PT Total Time (min): 38 min     Billable Minutes: Therapeutic Activity 30    Treatment Type: Treatment  PT/PTA: PT     Number of PTA visits since last PT visit: 0     08/15/2023

## 2023-08-15 NOTE — PLAN OF CARE
Problem: Adult Inpatient Plan of Care  Goal: Plan of Care Review  Outcome: Ongoing, Progressing  Flowsheets (Taken 8/15/2023 1646)  Plan of Care Reviewed With:   patient   family  Goal: Patient-Specific Goal (Individualized)  Outcome: Ongoing, Progressing  Goal: Absence of Hospital-Acquired Illness or Injury  Outcome: Ongoing, Progressing  Intervention: Identify and Manage Fall Risk  Flowsheets (Taken 8/15/2023 1646)  Safety Promotion/Fall Prevention:   assistive device/personal item within reach   diversional activities provided   family to remain at bedside   nonskid shoes/socks when out of bed   side rails raised x 2  Intervention: Prevent Skin Injury  Flowsheets (Taken 8/15/2023 1646)  Body Position: position maintained  Skin Protection:   adhesive use limited   incontinence pads utilized  Intervention: Prevent and Manage VTE (Venous Thromboembolism) Risk  Flowsheets (Taken 8/15/2023 1646)  Activity Management:   Arm raise - L1   Rolling - L1  VTE Prevention/Management:   remove, assess skin, and reapply sequential compression device   ambulation promoted   bleeding precations maintained  Range of Motion: active ROM (range of motion) encouraged  Intervention: Prevent Infection  Flowsheets (Taken 8/15/2023 1646)  Infection Prevention:   equipment surfaces disinfected   hand hygiene promoted   rest/sleep promoted   single patient room provided  Goal: Optimal Comfort and Wellbeing  Outcome: Ongoing, Progressing  Intervention: Monitor Pain and Promote Comfort  Flowsheets (Taken 8/15/2023 1646)  Pain Management Interventions: medication offered  Intervention: Provide Person-Centered Care  Flowsheets (Taken 8/15/2023 1646)  Trust Relationship/Rapport:   care explained   questions answered   reassurance provided  Goal: Readiness for Transition of Care  Outcome: Ongoing, Progressing     Problem: Fall Injury Risk  Goal: Absence of Fall and Fall-Related Injury  Outcome: Ongoing, Progressing  Intervention: Identify and  Manage Contributors  Flowsheets (Taken 8/15/2023 1646)  Self-Care Promotion:   independence encouraged   BADL personal objects within reach  Medication Review/Management: medications reviewed  Intervention: Promote Injury-Free Environment  Flowsheets (Taken 8/15/2023 1646)  Safety Promotion/Fall Prevention:   assistive device/personal item within reach   diversional activities provided   family to remain at bedside   nonskid shoes/socks when out of bed   side rails raised x 2     Problem: Infection  Goal: Absence of Infection Signs and Symptoms  Outcome: Ongoing, Progressing  Intervention: Prevent or Manage Infection  Flowsheets (Taken 8/15/2023 1646)  Fever Reduction/Comfort Measures: lightweight bedding  Infection Management: aseptic technique maintained  Isolation Precautions: precautions maintained     Problem: Skin Injury Risk Increased  Goal: Skin Health and Integrity  Outcome: Ongoing, Progressing  Intervention: Optimize Skin Protection  Flowsheets (Taken 8/15/2023 1646)  Pressure Reduction Techniques: frequent weight shift encouraged  Pressure Reduction Devices:   positioning supports utilized   pressure-redistributing mattress utilized  Skin Protection:   adhesive use limited   incontinence pads utilized  Head of Bed (HOB) Positioning: HOB at 30 degrees  Intervention: Promote and Optimize Oral Intake  Flowsheets (Taken 8/15/2023 1646)  Oral Nutrition Promotion:   calorie-dense foods provided   calorie-dense liquids provided     Problem: Diabetes Comorbidity  Goal: Blood Glucose Level Within Targeted Range  Outcome: Ongoing, Progressing  Intervention: Monitor and Manage Glycemia  Flowsheets (Taken 8/15/2023 1646)  Glycemic Management: blood glucose monitored

## 2023-08-15 NOTE — ASSESSMENT & PLAN NOTE
8/15: [facility]   Normal LV systolic function, ER 55%, trivial non-obstructive CAD.

## 2023-08-15 NOTE — NURSING
Patient leaving floor for cath lab. Morning meds to be given on his return. No distress noted on departure

## 2023-08-16 PROBLEM — I48.91 ATRIAL FIBRILLATION: Status: ACTIVE | Noted: 2023-08-16

## 2023-08-16 LAB
ALBUMIN SERPL BCP-MCNC: 1.9 G/DL (ref 3.5–5)
ALBUMIN/GLOB SERPL: 0.5 {RATIO}
ALP SERPL-CCNC: 57 U/L (ref 45–115)
ALT SERPL W P-5'-P-CCNC: 17 U/L (ref 16–61)
AMMONIA PLAS-SCNC: 35 ΜMOL/L (ref 11–32)
ANION GAP SERPL CALCULATED.3IONS-SCNC: 8 MMOL/L (ref 7–16)
AST SERPL W P-5'-P-CCNC: 28 U/L (ref 15–37)
BASOPHILS # BLD AUTO: 0.02 K/UL (ref 0–0.2)
BASOPHILS NFR BLD AUTO: 0.4 % (ref 0–1)
BILIRUB SERPL-MCNC: 1.2 MG/DL (ref ?–1.2)
BUN SERPL-MCNC: 8 MG/DL (ref 7–18)
BUN/CREAT SERPL: 15 (ref 6–20)
CALCIUM SERPL-MCNC: 7.7 MG/DL (ref 8.5–10.1)
CHLORIDE SERPL-SCNC: 111 MMOL/L (ref 98–107)
CO2 SERPL-SCNC: 28 MMOL/L (ref 21–32)
CREAT SERPL-MCNC: 0.52 MG/DL (ref 0.7–1.3)
DIFFERENTIAL METHOD BLD: ABNORMAL
EGFR (NO RACE VARIABLE) (RUSH/TITUS): 104 ML/MIN/1.73M2
EOSINOPHIL # BLD AUTO: 0.27 K/UL (ref 0–0.5)
EOSINOPHIL NFR BLD AUTO: 5.9 % (ref 1–4)
ERYTHROCYTE [DISTWIDTH] IN BLOOD BY AUTOMATED COUNT: 12.7 % (ref 11.5–14.5)
GLOBULIN SER-MCNC: 3.5 G/DL (ref 2–4)
GLUCOSE SERPL-MCNC: 101 MG/DL (ref 70–105)
GLUCOSE SERPL-MCNC: 106 MG/DL (ref 70–105)
GLUCOSE SERPL-MCNC: 107 MG/DL (ref 74–106)
GLUCOSE SERPL-MCNC: 126 MG/DL (ref 70–105)
GLUCOSE SERPL-MCNC: 129 MG/DL (ref 70–105)
HCT VFR BLD AUTO: 28.6 % (ref 40–54)
HGB BLD-MCNC: 9.6 G/DL (ref 13.5–18)
IMM GRANULOCYTES # BLD AUTO: 0.03 K/UL (ref 0–0.04)
IMM GRANULOCYTES NFR BLD: 0.7 % (ref 0–0.4)
LYMPHOCYTES # BLD AUTO: 0.85 K/UL (ref 1–4.8)
LYMPHOCYTES NFR BLD AUTO: 18.7 % (ref 27–41)
MAGNESIUM SERPL-MCNC: 1.6 MG/DL (ref 1.7–2.3)
MCH RBC QN AUTO: 32 PG (ref 27–31)
MCHC RBC AUTO-ENTMCNC: 33.6 G/DL (ref 32–36)
MCV RBC AUTO: 95.3 FL (ref 80–96)
MONOCYTES # BLD AUTO: 0.28 K/UL (ref 0–0.8)
MONOCYTES NFR BLD AUTO: 6.2 % (ref 2–6)
MPC BLD CALC-MCNC: 9.7 FL (ref 9.4–12.4)
NEUTROPHILS # BLD AUTO: 3.1 K/UL (ref 1.8–7.7)
NEUTROPHILS NFR BLD AUTO: 68.1 % (ref 53–65)
NRBC # BLD AUTO: 0 X10E3/UL
NRBC, AUTO (.00): 0 %
PLATELET # BLD AUTO: 156 K/UL (ref 150–400)
POTASSIUM SERPL-SCNC: 2.9 MMOL/L (ref 3.5–5.1)
PROT SERPL-MCNC: 5.4 G/DL (ref 6.4–8.2)
RBC # BLD AUTO: 3 M/UL (ref 4.6–6.2)
SODIUM SERPL-SCNC: 144 MMOL/L (ref 136–145)
WBC # BLD AUTO: 4.55 K/UL (ref 4.5–11)

## 2023-08-16 PROCEDURE — 82140 ASSAY OF AMMONIA: CPT

## 2023-08-16 PROCEDURE — 80053 COMPREHEN METABOLIC PANEL: CPT | Performed by: INTERNAL MEDICINE

## 2023-08-16 PROCEDURE — 25000003 PHARM REV CODE 250: Performed by: GENERAL PRACTICE

## 2023-08-16 PROCEDURE — 63600175 PHARM REV CODE 636 W HCPCS

## 2023-08-16 PROCEDURE — 25000003 PHARM REV CODE 250: Performed by: FAMILY MEDICINE

## 2023-08-16 PROCEDURE — 63600175 PHARM REV CODE 636 W HCPCS: Performed by: FAMILY MEDICINE

## 2023-08-16 PROCEDURE — 99232 SBSQ HOSP IP/OBS MODERATE 35: CPT | Mod: GC,,, | Performed by: FAMILY MEDICINE

## 2023-08-16 PROCEDURE — 25000003 PHARM REV CODE 250

## 2023-08-16 PROCEDURE — C9113 INJ PANTOPRAZOLE SODIUM, VIA: HCPCS | Performed by: FAMILY MEDICINE

## 2023-08-16 PROCEDURE — 25000003 PHARM REV CODE 250: Performed by: NURSE PRACTITIONER

## 2023-08-16 PROCEDURE — 93010 ELECTROCARDIOGRAM REPORT: CPT | Mod: ,,, | Performed by: STUDENT IN AN ORGANIZED HEALTH CARE EDUCATION/TRAINING PROGRAM

## 2023-08-16 PROCEDURE — 83735 ASSAY OF MAGNESIUM: CPT

## 2023-08-16 PROCEDURE — 63600175 PHARM REV CODE 636 W HCPCS: Performed by: SURGERY

## 2023-08-16 PROCEDURE — 97530 THERAPEUTIC ACTIVITIES: CPT | Mod: CQ

## 2023-08-16 PROCEDURE — 11000001 HC ACUTE MED/SURG PRIVATE ROOM

## 2023-08-16 PROCEDURE — 82962 GLUCOSE BLOOD TEST: CPT

## 2023-08-16 PROCEDURE — 99900035 HC TECH TIME PER 15 MIN (STAT)

## 2023-08-16 PROCEDURE — 93005 ELECTROCARDIOGRAM TRACING: CPT

## 2023-08-16 PROCEDURE — 97110 THERAPEUTIC EXERCISES: CPT | Mod: CQ

## 2023-08-16 PROCEDURE — 94761 N-INVAS EAR/PLS OXIMETRY MLT: CPT

## 2023-08-16 PROCEDURE — 25000003 PHARM REV CODE 250: Performed by: STUDENT IN AN ORGANIZED HEALTH CARE EDUCATION/TRAINING PROGRAM

## 2023-08-16 PROCEDURE — 85025 COMPLETE CBC W/AUTO DIFF WBC: CPT | Performed by: INTERNAL MEDICINE

## 2023-08-16 PROCEDURE — 25000003 PHARM REV CODE 250: Performed by: SURGERY

## 2023-08-16 PROCEDURE — 99232 PR SUBSEQUENT HOSPITAL CARE,LEVL II: ICD-10-PCS | Mod: GC,,, | Performed by: FAMILY MEDICINE

## 2023-08-16 PROCEDURE — 93010 EKG 12-LEAD: ICD-10-PCS | Mod: ,,, | Performed by: STUDENT IN AN ORGANIZED HEALTH CARE EDUCATION/TRAINING PROGRAM

## 2023-08-16 RX ORDER — MAGNESIUM SULFATE HEPTAHYDRATE 40 MG/ML
4 INJECTION, SOLUTION INTRAVENOUS ONCE
Status: COMPLETED | OUTPATIENT
Start: 2023-08-16 | End: 2023-08-16

## 2023-08-16 RX ORDER — METOPROLOL TARTRATE 25 MG/1
12.5 TABLET ORAL 2 TIMES DAILY
Status: DISCONTINUED | OUTPATIENT
Start: 2023-08-16 | End: 2023-08-17 | Stop reason: HOSPADM

## 2023-08-16 RX ORDER — HYDROCODONE BITARTRATE AND ACETAMINOPHEN 5; 325 MG/1; MG/1
1 TABLET ORAL EVERY 6 HOURS PRN
Qty: 15 TABLET | Refills: 0 | Status: ON HOLD | OUTPATIENT
Start: 2023-08-16 | End: 2023-08-23 | Stop reason: HOSPADM

## 2023-08-16 RX ADMIN — QUETIAPINE FUMARATE 50 MG: 25 TABLET ORAL at 08:08

## 2023-08-16 RX ADMIN — RIFAXIMIN 550 MG: 550 TABLET ORAL at 09:08

## 2023-08-16 RX ADMIN — LACTULOSE 20 G: 20 SOLUTION ORAL at 10:08

## 2023-08-16 RX ADMIN — PIPERACILLIN AND TAZOBACTAM 4.5 G: 4; .5 INJECTION, POWDER, FOR SOLUTION INTRAVENOUS; PARENTERAL at 09:08

## 2023-08-16 RX ADMIN — METOPROLOL TARTRATE 12.5 MG: 25 TABLET, FILM COATED ORAL at 09:08

## 2023-08-16 RX ADMIN — ATORVASTATIN CALCIUM 40 MG: 40 TABLET, FILM COATED ORAL at 08:08

## 2023-08-16 RX ADMIN — TIMOLOL MALEATE 1 DROP: 5 SOLUTION/ DROPS OPHTHALMIC at 10:08

## 2023-08-16 RX ADMIN — APIXABAN 5 MG: 5 TABLET, FILM COATED ORAL at 08:08

## 2023-08-16 RX ADMIN — PIPERACILLIN AND TAZOBACTAM 4.5 G: 4; .5 INJECTION, POWDER, FOR SOLUTION INTRAVENOUS; PARENTERAL at 02:08

## 2023-08-16 RX ADMIN — RIFAXIMIN 550 MG: 550 TABLET ORAL at 08:08

## 2023-08-16 RX ADMIN — POTASSIUM BICARBONATE 40 MEQ: 782 TABLET, EFFERVESCENT ORAL at 08:08

## 2023-08-16 RX ADMIN — LACTULOSE 20 G: 20 SOLUTION ORAL at 08:08

## 2023-08-16 RX ADMIN — ASPIRIN 81 MG: 81 TABLET, COATED ORAL at 10:08

## 2023-08-16 RX ADMIN — MAGNESIUM SULFATE HEPTAHYDRATE 4 G: 40 INJECTION, SOLUTION INTRAVENOUS at 10:08

## 2023-08-16 RX ADMIN — QUETIAPINE FUMARATE 50 MG: 25 TABLET ORAL at 10:08

## 2023-08-16 RX ADMIN — PANTOPRAZOLE SODIUM 40 MG: 40 INJECTION, POWDER, FOR SOLUTION INTRAVENOUS at 09:08

## 2023-08-16 RX ADMIN — LACTULOSE 20 G: 20 SOLUTION ORAL at 02:08

## 2023-08-16 RX ADMIN — POTASSIUM BICARBONATE 40 MEQ: 782 TABLET, EFFERVESCENT ORAL at 10:08

## 2023-08-16 RX ADMIN — PIPERACILLIN AND TAZOBACTAM 4.5 G: 4; .5 INJECTION, POWDER, FOR SOLUTION INTRAVENOUS; PARENTERAL at 06:08

## 2023-08-16 RX ADMIN — PANTOPRAZOLE SODIUM 40 MG: 40 INJECTION, POWDER, FOR SOLUTION INTRAVENOUS at 10:08

## 2023-08-16 NOTE — PROGRESS NOTES
[facility] - [address] [place] [address]  Adult Nutrition  First Assessment Note         Reason for Assessment  Reason For Assessment: length of stay   Nutrition Risk Screen: no indicators present    Assessment and Plan  Patient seen for length of stay. He was admitted 8/9 for infected hernioplasty mesh.     Patient is 205 pounds with a BMI of 27.90. He is ordered a cardiac diet. Recommend continue current diet as able/appropriate and tolerated. Encourage good po intakes.     Last BM 8/16 per flowsheet.    Medications/labs reviewed. RD following.         Skin Integrity  Evans Risk Assessment  Sensory Perception: 3-->slightly limited  Moisture: 3-->occasionally moist  Activity: 1-->bedfast  Mobility: 3-->slightly limited  Nutrition: 3-->adequate  Friction and Shear: 2-->potential problem  Evans Score: 15      Malnutrition  Is patient malnourished? No      Nutrition Diagnosis    No Nutritional Diagnosis at this time    Interventions/Recommendations (treatment strategy):  Recommend continue current diet as able/appropriate and tolerated. Encourage good po intakes.    Nutrition Diagnosis Status:   New     Nutrition Risk  Level of Risk/Frequency of Follow-up: low    Recent Labs   Lab 08/16/23  0337 08/16/23  0444 08/16/23  1033   *  --   --    POCGLU  --    < > 106*    < > = values in this interval not displayed.     Comments on Glucose: Glucose elevated. PMH DM.    Nutrition Prescription / Recommendations  Recommendation/Intervention: Recommend continue current diet as able/appropriate and tolerated. Encourage good po intakes.  Goals: Weight maintenance, intake % of meals  Nutrition Goal Status: new  Current Diet Order: cardiac  Chewing or Swallowing Difficulty?: No Chewing or swallowing difficulty  Recommended Diet: Heart Healthy  Recommended Oral Supplement: No Oral Supplements  Is Nutrition Support Recommended: No   Is Education Recommended: No  Monitor and Evaluation  % current Intake:  Unknown/ No P.O. intake documented  % intake to meet estimated needs: 75 - 100 %  Food and Nutrient Adminstration: diet order  Anthropometric Measurements: weight, weight change  Biochemical Data, Medical Tests and Procedures: electrolyte and renal panel, gastrointestinal profile, glucose/endocrine profile, inflammatory profile, lipid profile     Current Medical Diagnosis and Past Medical History  Diagnosis: infection/sepsis  Past Medical History:   Diagnosis Date    Diverticulitis     Hyperlipemia     Hypokalemia 11/09/2021     Nutrition/Diet History  Spiritual, Cultural Beliefs, Orthodoxy Practices, Values that Affect Care: no  Lab/Procedures/Meds  Recent Labs   Lab 08/16/23  0337      K 2.9*   BUN 8   CREATININE 0.52*   CALCIUM 7.7*   ALBUMIN 1.9*   *   ALT 17   AST 28   Note: K+, Ca++, Alb low. Cl- elevated. BUN low. Will monitor.     Last A1c:   Lab Results   Component Value Date    HGBA1C 5.6 11/06/2021     Lab Results   Component Value Date    RBC 3.00 (L) 08/16/2023    HGB 9.6 (L) 08/16/2023    HCT 28.6 (L) 08/16/2023    MCV 95.3 08/16/2023    MCH 32.0 (H) 08/16/2023    MCHC 33.6 08/16/2023    TIBC 216 (L) 11/08/2021     Pertinent Labs Reviewed: reviewed  Pertinent Labs Comments: Sodium: 144  Potassium: 2.9 (L)  Chloride: 111 (H)  CO2: 28  Anion Gap: 8  BUN: 8  Creatinine: 0.52 (L)  BUN/CREAT RATIO: 15  eGFR: 104  Glucose: 107 (H)  Calcium: 7.7 (L)  Magnesium: 1.6 (L)  Alkaline Phosphatase: 57  PROTEIN TOTAL: 5.4 (L)  Albumin: 1.9 (L)  Albumin/Globulin Ratio: 0.5  BILIRUBIN TOTAL: 1.2  AST: 28  ALT: 17  Globulin, Total: 3.5  Pertinent Medications Reviewed: reviewed  Pertinent Medications Comments: ASA, atorvastatin, lactulose, MgSO4, pantoprazole, Zosyn, KHCO3, quetiapine, rifamixin  Anthropometrics  Temp: 98.2 °F (36.8 °C)  Height Method: Stated  Height: 6' (182.9 cm)  Height (inches): 72 in  Weight Method: Standard Scale  Weight: 93.3 kg (205 lb 11 oz)  Weight (lb): 205.69 lb  Ideal Body Weight  (IBW), Male: 178 lb  % Ideal Body Weight, Male (lb): 101.12 %  BMI (Calculated): 27.9     Estimated/Assessed Needs  RMR ([place]-St. Jeor Equation): 1696     Temp: 98.2 °F (36.8 °C)Oral  Weight Used For Calorie Calculations: 93 kg (205 lb)     Energy Calorie Requirements (kcal): 2324-2789kcal (25-30kcal/kg)  Weight Used For Protein Calculations: 93 kg (205 lb)  Protein Requirements: 93-112g (1.0-1.2g/kg)       RDA Method (mL): 2324     Nutrition by Nursing  Diet/Nutrition Received: NPO, sips of water           Last Bowel Movement: 08/16/23                Nutrition Follow-Up  RD Follow-up?: Yes      Savana Solitario MS, RD, LD  Available through Secure Chat

## 2023-08-16 NOTE — ASSESSMENT & PLAN NOTE
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     Continue lactulose and rifaximin  8/16: Ammonia -35

## 2023-08-16 NOTE — ASSESSMENT & PLAN NOTE
8/15: [facility]   Normal LV systolic function, ER 55%, trivial non-obstructive CAD.

## 2023-08-16 NOTE — PROGRESS NOTES
[facility] - [facility] Tele[facility] Hospital Medicine  Progress Note    Patient Name: Danny Flood  MRN: [id number]  Patient Class: IP- Inpatient   Admission Date: 8/9/2023  Length of Stay: 7 days  Attending Physician: No att. providers found  Primary Care Provider: Cameron Valencia MD        Subjective:     Principal Problem:Infected hernioplasty mesh        HPI:  Danny Flood is a 78-year-old male with history of HTN, DM, GERD, dementia, and hyperammonemia. He is post-op #4 exploratory laparotomy with infected mesh removal, and small bowel resection with anastomosis. Patient tolerated procedure well with no immediate post-operative complications. Medicine consulted for management of chronic conditions.     Patient with no current complaints. Has not yet regained normal bowel function, however NGT was pulled yesterday evening with no resulting nausea, vomiting, or increased abdominal pain. Labs do show some hypernatremia and hypokalemia, mildly increased from yesterday. Vital signs unremarkable aside from some mild hypertension overnight. Pain well controlled.      Overview/Hospital Course:  No notes on file    Interval History: pt assessed at bedside today (8/16) awake and alert. Pt's dry mouth is markedly worse today. Coupled with ongoing slurring, pt is nearly unintelligible. Pt was being kept NPO, now on heart healthy diet with oral care. Continue to monitor dry mouth. Continue to monitor changes in H/H, plan to transfuse if Hb drops below 9. Ammonia improved to 35 after restarting lactulose, no need to continue trending ammonia. Surgery has agreed to sign off. Pt will remain admitted due to ongoing bouts of atrial fibrillation. Cardiology will be consulted. Plan to start metoprolol for rate control and get trial of eliquis.     Review of Systems   Constitutional:  Positive for fatigue. Negative for fever.   Respiratory:  Positive for cough. Negative for shortness of breath.    Cardiovascular:   Negative for chest pain and palpitations.   Gastrointestinal:  Positive for abdominal pain (post-surgery). Negative for abdominal distention, nausea and vomiting.   Musculoskeletal:  Negative for arthralgias and myalgias.   Neurological:  Positive for speech difficulty. Negative for dizziness, syncope, weakness and headaches.        Son reports that current slurring is worse than pts baseline   Psychiatric/Behavioral:  Negative for confusion.    All other systems reviewed and are negative.    Objective:     Vital Signs (Most Recent):  Temp: 98.2 °F (36.8 °C) (08/16/23 1100)  Pulse: 83 (08/16/23 1100)  Resp: 17 (08/16/23 1100)  BP: (!) 144/68 (08/16/23 1100)  SpO2: 98 % (08/16/23 1100) Vital Signs (24h Range):  Temp:  [97.8 °F (36.6 °C)-98.9 °F (37.2 °C)] 98.2 °F (36.8 °C)  Pulse:  [83-99] 83  Resp:  [17-22] 17  SpO2:  [93 %-99 %] 98 %  BP: (121-155)/(58-76) 144/68     Weight: 93.3 kg (205 lb 11 oz)  Body mass index is 27.9 kg/m².  No intake or output data in the 24 hours ending 08/16/23 1320      Physical Exam  Constitutional:       General: He is not in acute distress.     Appearance: Normal appearance. He is not ill-appearing.   HENT:      Head: Normocephalic and atraumatic.      Nose: Nose normal.      Mouth/Throat:      Mouth: Mucous membranes are dry.      Pharynx: No posterior oropharyngeal erythema.   Eyes:      Conjunctiva/sclera: Conjunctivae normal.      Pupils: Pupils are equal, round, and reactive to light.   Cardiovascular:      Rate and Rhythm: Normal rate and regular rhythm.      Heart sounds: Murmur heard.      Comments: Rhythm and rate currently regular. Pt has had episodes of irregular rhythm.  Grade 1/6 late systolic murmur head at the apex  Pulmonary:      Effort: Pulmonary effort is normal. No respiratory distress.      Breath sounds: Normal breath sounds.   Abdominal:      Palpations: Abdomen is soft.      Tenderness: There is no abdominal tenderness.      Comments: Surgical changes, healing  appropriately   Musculoskeletal:      Cervical back: No rigidity.   Skin:     Coloration: Skin is not jaundiced or pale.      Findings: No rash.   Neurological:      Mental Status: He is alert. Mental status is at baseline.   Psychiatric:         Behavior: Behavior normal.         Thought Content: Thought content normal.             Significant Labs: All pertinent labs within the past 24 hours have been reviewed.  Recent Lab Results  (Last 5 results in the past 24 hours)        08/16/23  1033   08/16/23  0656   08/16/23  0444   08/16/23  0337   08/15/23  1923        Albumin/Globulin Ratio       0.5         Albumin       1.9         Alkaline Phosphatase       57         ALT       17         Ammonia   35             Anion Gap       8         AST       28         Baso #       0.02         Basophil %       0.4         BILIRUBIN TOTAL       1.2         BUN       8         BUN/CREAT RATIO       15         Calcium       7.7         Chloride       111         CO2       28         Creatinine       0.52         Differential Type       Auto         eGFR       104         Eos #       0.27         Eosinophil %       5.9         Globulin, Total       3.5         Glucose       107         Hematocrit       28.6         Hemoglobin       9.6         Immature Grans (Abs)       0.03         Immature Granulocytes       0.7         Lymph #       0.85         Lymph %       18.7         Magnesium       1.6         MCH       32.0         MCHC       33.6         MCV       95.3         Mono #       0.28         Mono %       6.2         MPV       9.7         Neutrophils, Abs       3.10         Neutrophils Relative       68.1         nRBC       0.0         NUCLEATED RBC ABSOLUTE       0.00         Platelets       156         POC Glucose 106     126     152       Potassium       2.9         PROTEIN TOTAL       5.4         RBC       3.00         RDW       12.7         Sodium       144         WBC       4.55                           "      Significant Imaging: I have reviewed all pertinent imaging results/findings within the past 24 hours.      Assessment/Plan:      * Infected hernioplasty mesh  Discharged from surgical service. Medical management transferred to medical team      NSTEMI (non-ST elevated myocardial infarction)  8/15: [facility]   Normal LV systolic function, ER 55%, trivial non-obstructive CAD.       Hypernatremia  8/15--Na-WNL    Dementia without behavioral disturbance, psychotic disturbance, mood disturbance, or anxiety  Seroquel BID      Diabetes mellitus  Patient's FSGs are controlled on current medication regimen.  Last A1c reviewed-   Lab Results   Component Value Date    HGBA1C 5.6 11/06/2021     Most recent fingerstick glucose reviewed- No results for input(s): "POCTGLUCOSE" in the last 24 hours.  Current correctional scale  Low  Maintain anti-hyperglycemic dose as follows-   Antihyperglycemics (From admission, onward)    Start     Stop Route Frequency Ordered    08/13/23 1314  insulin aspart U-100 injection 0-5 Units         -- SubQ Before meals & nightly PRN 08/13/23 1215        Hold Oral hypoglycemics while patient is in the hospital.    Hyperlipidemia        Hypokalemia    8/16:   Effer K 40 meq BID  And Magnesium 4 g. Will monitor BMP for improvement    Hypertension  Resume home medications, monitor.     Continue current medication and monitoring of BP.       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     Continue lactulose and rifaximin  8/16: Ammonia -35      VTE Risk Mitigation (From admission, onward)         Ordered     apixaban tablet 5 mg  2 times daily         08/16/23 3201                Discharge Planning   AMBAR: 8/16/2023     Code Status: Full Code   Is the patient medically ready for discharge?:     Reason for patient still in hospital (select all that apply): Treatment  Discharge Plan A: Home with family, Home Health   Discharge Delays: None known at this time              Mackenzie Khan " MD  Department of Hospital Medicine   [facility] - [address] [place] [address]

## 2023-08-16 NOTE — PLAN OF CARE
Problem: Adult Inpatient Plan of Care  Goal: Plan of Care Review  Outcome: Ongoing, Progressing  Goal: Patient-Specific Goal (Individualized)  Outcome: Ongoing, Progressing  Goal: Absence of Hospital-Acquired Illness or Injury  Outcome: Ongoing, Progressing  Goal: Optimal Comfort and Wellbeing  Outcome: Ongoing, Progressing  Goal: Readiness for Transition of Care  Outcome: Ongoing, Progressing     Problem: Fall Injury Risk  Goal: Absence of Fall and Fall-Related Injury  Outcome: Ongoing, Progressing     Problem: Infection  Goal: Absence of Infection Signs and Symptoms  Outcome: Ongoing, Progressing     Problem: Skin Injury Risk Increased  Goal: Skin Health and Integrity  Outcome: Ongoing, Progressing     Problem: Diabetes Comorbidity  Goal: Blood Glucose Level Within Targeted Range  Outcome: Ongoing, Progressing

## 2023-08-16 NOTE — PLAN OF CARE
[facility] - [facility] Telemetry  Discharge Final Note    Primary Care Provider: Cameron Valencia MD    Expected Discharge Date: 8/16/2023    Final Discharge Note (most recent)       Final Note - 08/16/23 1418          Final Note    Assessment Type Final Discharge Note     Anticipated Discharge Disposition Home-Health Care Sv     What phone number can be called within the next 1-3 days to see how you are doing after discharge? [phone]        Post-Acute Status    Post-Acute Authorization Home Health     Home Health Status Set-up Complete/Auth obtained     Patient choice form signed by patient/caregiver List with quality metrics by geographic area provided;List from CMS Compare;List from System Post-Acute Care     Discharge Delays None known at this time                     Important Message from Medicare  Important Message from Medicare regarding Discharge Appeal Rights: Given to patient/caregiver, Explained to patient/caregiver, Signed/date by patient/caregiver     Date IMM was signed: 08/16/23  Time IMM was signed: 0926     Follow-up providers       Sachi Sharma FNP   Specialty: Family Medicine, Cardiology    [address] [place] [address] [facility] [place] [address]   Phone: [phone]       Next Steps: Follow up in 2 week(s)    Instructions: Hospital discharge    Bang Mata FNP   Specialty: General Surgery    [address] [place] [id number]   Phone: [phone]       Next Steps: Schedule an appointment as soon as possible for a visit on 8/31/2023    Instructions: 1:45 pm              After-discharge care                Home Medical Care       [facility]   Service: Home Health Services    [address] [place] [id number]   Phone: [phone]                             Pt to dc home with [facility] on this day. 0 dc needs noted.

## 2023-08-16 NOTE — PT/OT/SLP PROGRESS
Physical Therapy Treatment    Patient Name:  Danny Flood   MRN:  [id number]    Recommendations:     Discharge Recommendations: nursing facility, skilled, home health PT  Discharge Equipment Recommendations: none  Barriers to discharge: None    Assessment:     Danny Flood is a 78 y.o. male admitted with a medical diagnosis of Infected hernioplasty mesh.  He presents with the following impairments/functional limitations: weakness, impaired self care skills, impaired functional mobility, gait instability, impaired balance, pain. Pt. Willing to participate, required frequent cues for safety with tranfers and exercises.    Rehab Prognosis: Good and Fair; patient would benefit from acute skilled PT services to address these deficits and reach maximum level of function.    Recent Surgery: Procedure(s) (LRB):  Left heart cath (Left) 1 Day Post-Op    Plan:     During this hospitalization, patient to be seen 5 x/week to address the identified rehab impairments via gait training, therapeutic activities, therapeutic exercises and progress toward the following goals:    Plan of Care Expires:  09/11/23    Subjective     Chief Complaint: needs to use bathroom   Patient/Family Comments/goals: Pt. States he would like to go to the bathroom, was unable to reach bathroom due to loose BM with standing.   Pain/Comfort:  Pain Rating 1: 0/10      Objective:     Communicated with Ashlie Rodarte RN and Camacho Moffett PT for POC prior to session.  Patient found HOB elevated with peripheral IV, SCD, telemetry upon PT entry to room.     General Precautions: Standard, fall  Orthopedic Precautions: N/A  Braces: N/A  Respiratory Status: Room air     Functional Mobility:  Bed Mobility:     Rolling Right: contact guard assistance and minimum assistance  Scooting: contact guard assistance and minimum assistance  Supine to Sit: minimum assistance  Sit to Supine: minimum assistance  Transfers:     Sit to Stand:  minimum assistance with  rolling walker      AM-PAC 6 CLICK MOBILITY  Turning over in bed (including adjusting bedclothes, sheets and blankets)?: 2  Sitting down on and standing up from a chair with arms (e.g., wheelchair, bedside commode, etc.): 3  Moving from lying on back to sitting on the side of the bed?: 2  Moving to and from a bed to a chair (including a wheelchair)?: 3  Need to walk in hospital room?: 3  Climbing 3-5 steps with a railing?: 2  Basic Mobility Total Score: 15       Treatment & Education:  Pt. Participated in mobility per above, sat EOB x 5 mins, performed seate LAQS x 5 bilateral and Supince AP and Hip ABD/ ADD with knee ext and Hip ER/ IR.     Patient left right sidelying with all lines intact, call button in reach, and nursing notified..    GOALS:   Multidisciplinary Problems       Physical Therapy Goals          Problem: Physical Therapy    Goal Priority Disciplines Outcome Goal Variances Interventions   Physical Therapy Goal     PT, PT/OT Ongoing, Progressing     Description: Short term goals:  1. Supine to sit with Contact Guard Assistance  2. Rolling to Left and Right with Contact Guard Assistance.  3. Sit to stand transfer with Contact Guard Assistance  4. Bed to chair transfer with Contact Guard Assistance using Single-point Cane   5. Gait  x 100 feet with Contact Guard Assistance using Single-point Cane .     Long term goals:  1. Supine to sit with Modified [place]  2. Rolling to Left and Right with [place].  3. Sit to stand transfer with Modified [place]  4. Bed to chair transfer with Modified [place] using Single-point Cane   5. Gait  x 300 feet with Modified [place] using Single-point Cane .                         Time Tracking:     PT Received On: 08/16/23  PT Start Time: 1056     PT Stop Time: 1119  PT Total Time (min): 23 min     Billable Minutes: Therapeutic Activity 15 and Therapeutic Exercise 8    Treatment Type: Treatment  PT/PTA: PTA     Number of PTA visits since last PT  visit: 1     08/16/2023

## 2023-08-16 NOTE — SUBJECTIVE & OBJECTIVE
Interval History: pt assessed at bedside today (8/16) awake and alert. Pt's dry mouth is markedly worse today. Coupled with ongoing slurring, pt is nearly unintelligible. Pt was being kept NPO, now on heart healthy diet with oral care. Continue to monitor dry mouth. Continue to monitor changes in H/H, plan to transfuse if Hb drops below 9. Ammonia improved to 35 after restarting lactulose, no need to continue trending ammonia. Surgery has agreed to sign off. Pt will remain admitted due to ongoing bouts of atrial fibrillation. Cardiology will be consulted. Plan to start metoprolol for rate control and get trial of eliquis.     Review of Systems   Constitutional:  Positive for fatigue. Negative for fever.   Respiratory:  Positive for cough. Negative for shortness of breath.    Cardiovascular:  Negative for chest pain and palpitations.   Gastrointestinal:  Positive for abdominal pain (post-surgery). Negative for abdominal distention, nausea and vomiting.   Musculoskeletal:  Negative for arthralgias and myalgias.   Neurological:  Positive for speech difficulty. Negative for dizziness, syncope, weakness and headaches.        Son reports that current slurring is worse than pts baseline   Psychiatric/Behavioral:  Negative for confusion.    All other systems reviewed and are negative.    Objective:     Vital Signs (Most Recent):  Temp: 98.2 °F (36.8 °C) (08/16/23 1100)  Pulse: 83 (08/16/23 1100)  Resp: 17 (08/16/23 1100)  BP: (!) 144/68 (08/16/23 1100)  SpO2: 98 % (08/16/23 1100) Vital Signs (24h Range):  Temp:  [97.8 °F (36.6 °C)-98.9 °F (37.2 °C)] 98.2 °F (36.8 °C)  Pulse:  [83-99] 83  Resp:  [17-22] 17  SpO2:  [93 %-99 %] 98 %  BP: (121-155)/(58-76) 144/68     Weight: 93.3 kg (205 lb 11 oz)  Body mass index is 27.9 kg/m².  No intake or output data in the 24 hours ending 08/16/23 1320      Physical Exam  Constitutional:       General: He is not in acute distress.     Appearance: Normal appearance. He is not ill-appearing.    HENT:      Head: Normocephalic and atraumatic.      Nose: Nose normal.      Mouth/Throat:      Mouth: Mucous membranes are dry.      Pharynx: No posterior oropharyngeal erythema.   Eyes:      Conjunctiva/sclera: Conjunctivae normal.      Pupils: Pupils are equal, round, and reactive to light.   Cardiovascular:      Rate and Rhythm: Normal rate and regular rhythm.      Heart sounds: Murmur heard.      Comments: Rhythm and rate currently regular. Pt has had episodes of irregular rhythm.  Grade 1/6 late systolic murmur head at the apex  Pulmonary:      Effort: Pulmonary effort is normal. No respiratory distress.      Breath sounds: Normal breath sounds.   Abdominal:      Palpations: Abdomen is soft.      Tenderness: There is no abdominal tenderness.      Comments: Surgical changes, healing appropriately   Musculoskeletal:      Cervical back: No rigidity.   Skin:     Coloration: Skin is not jaundiced or pale.      Findings: No rash.   Neurological:      Mental Status: He is alert. Mental status is at baseline.   Psychiatric:         Behavior: Behavior normal.         Thought Content: Thought content normal.             Significant Labs: All pertinent labs within the past 24 hours have been reviewed.  Recent Lab Results  (Last 5 results in the past 24 hours)        08/16/23  1033   08/16/23  0656   08/16/23  0444   08/16/23  0337   08/15/23  1923        Albumin/Globulin Ratio       0.5         Albumin       1.9         Alkaline Phosphatase       57         ALT       17         Ammonia   35             Anion Gap       8         AST       28         Baso #       0.02         Basophil %       0.4         BILIRUBIN TOTAL       1.2         BUN       8         BUN/CREAT RATIO       15         Calcium       7.7         Chloride       111         CO2       28         Creatinine       0.52         Differential Type       Auto         eGFR       104         Eos #       0.27         Eosinophil %       5.9         Globulin, Total        3.5         Glucose       107         Hematocrit       28.6         Hemoglobin       9.6         Immature Grans (Abs)       0.03         Immature Granulocytes       0.7         Lymph #       0.85         Lymph %       18.7         Magnesium       1.6         MCH       32.0         MCHC       33.6         MCV       95.3         Mono #       0.28         Mono %       6.2         MPV       9.7         Neutrophils, Abs       3.10         Neutrophils Relative       68.1         nRBC       0.0         NUCLEATED RBC ABSOLUTE       0.00         Platelets       156         POC Glucose 106     126     152       Potassium       2.9         PROTEIN TOTAL       5.4         RBC       3.00         RDW       12.7         Sodium       144         WBC       4.55                                Significant Imaging: I have reviewed all pertinent imaging results/findings within the past 24 hours.

## 2023-08-16 NOTE — DISCHARGE SUMMARY
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  Benign pathology.  Tolerating diet without nausea or vomiting.  Abdomen soft, nontender, nondistended.  Abdominal binder in place.  Incision loosely approximated with a few staples intact.  Granulation tissue in wound.  Continue wet-to-dry dressings.  Discharge was delayed by a rise in troponin 2 days ago.  Cleared by cardiology following left heart catheterization with trivial nonobstructive CAD and an EF of 55%.  Patient will discharge home today and follow-up in clinic.  [facility] on board.      Goals of Care Treatment Preferences:  Code Status: Full Code      Consults:   Consults (From admission, onward)        Status Ordering Provider     Inpatient consult to Social Work  Once        Provider:  (Not yet assigned)    Completed YOU MOLINA     Inpatient consult to Cardiology  Once        Provider:  (Not yet assigned)    Completed ALEX MUÑOZ     Inpatient consult to Hospital Medicine  Once        Provider:  Maura Castellano, DO    Acknowledged FENG NATARAJAN          Significant Diagnostic Studies: Labs:   CMP   Recent Labs   Lab 08/15/23  0704 08/16/23  0337    144   K 2.8* 2.9*   * 111*   CO2 26 28   * 107*   BUN 13 8   CREATININE 0.57* 0.52*   CALCIUM 8.0* 7.7*   PROT 6.3* 5.4*   ALBUMIN 2.3* 1.9*   BILITOT 1.5* 1.2   ALKPHOS 65 57   AST 31 28   ALT 20 17    ANIONGAP 9 8    and CBC   Recent Labs   Lab 08/15/23  0704 08/16/23  0337   WBC 7.67 4.55   HGB 11.8* 9.6*   HCT 35.3* 28.6*    156       Pending Diagnostic Studies:     Procedure Component Value Units Date/Time    EXTRA TUBES [[id number]] Collected: 08/14/23 1101    Order Status: Sent Lab Status: In process Updated: 08/14/23 1113    Specimen: Blood, Venous     Narrative:      The following orders were created for panel order EXTRA TUBES.  Procedure                               Abnormality         Status                     ---------                               -----------         ------                     Light Green Top Hold[[id number]]                             In process                 Lavender Top Hold[[id number]]                                In process                   Please view results for these tests on the individual orders.    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] 08/13/2023 Yes    NSTEMI (non-ST elevated myocardial infarction) [I21.4] 08/14/2023 Unknown    Elevated troponin level [R77.8] 08/14/2023 Unknown    Diabetes mellitus [E11.9] 08/13/2023 Yes    Dementia without behavioral disturbance, psychotic disturbance, mood disturbance, or anxiety [F03.90] 08/13/2023 Yes    Hypernatremia [E87.0] 08/13/2023 Clinically Undetermined    Hypokalemia [E87.6] 11/09/2021 Yes    Hypertension [I10]  Yes    Hyperammonemia [E72.20] 11/07/2021 Yes      Problems Resolved During this Admission:      Discharged Condition: stable    Disposition: Home or Self Care    Follow Up:   Follow-up Information     Sachi Sharma FNP Follow up in 2 week(s).    Specialties: Family Medicine, Cardiology  Why: Hospital discharge  Contact information:  [address] [place] [address] [facility] [address]  [phone]             Bang Mata  PHIL OLIVER. Schedule an appointment as soon as possible for a visit in 2 week(s).    Specialty: General Surgery  Contact information:  [address] [facility] [address]  [phone]                       Patient Instructions:      Diet Adult Regular     Lifting restrictions   Order Comments: No lifting over 10 lb until clinic follow-up     Notify your health care provider if you experience any of the following:  temperature >100.4     Notify your health care provider if you experience any of the following:  persistent nausea and vomiting or diarrhea     Notify your health care provider if you experience any of the following:  severe uncontrolled pain     Notify your health care provider if you experience any of the following:  redness, tenderness, or signs of infection (pain, swelling, redness, odor or green/yellow discharge around incision site)     Activity as tolerated     Shower on day dressing removed (No bath)   Order Comments: Shower daily.  Remove old dressing.  Cleanse with Vashe and replace with Vashe or normal saline moistened gauze in a wet-to-dry fashion.  Cover with gauze and tape.     Medications:  Reconciled Home Medications:      Medication List      START taking these medications    HYDROcodone-acetaminophen 5-325 mg per tablet  Commonly known as: NORCO  Take 1 tablet by mouth every 6 (six) hours as needed for Pain.        CONTINUE taking these medications    amLODIPine 10 MG tablet  Commonly known as: NORVASC  Take 1 tablet (10 mg total) by mouth once daily.     fish oil-omega-3 fatty acids 300-1,000 mg capsule  Take 1 capsule by mouth once daily.     FOLBIC 2.5-25-2 mg Tab  Generic drug: folic acid-vit B6-vit B12 2.5-25-2 mg  Take 1 tablet by mouth once daily.     FOLIC ACID-VITAMIN B6-VIT B12 ORAL  Take 1 tablet by mouth once daily.     lactulose 10 gram/15 mL solution  Commonly known as: CHRONULAC  Take 20 g by mouth 3 (three) times daily.     losartan 100 MG tablet  Commonly known as: COZAAR  Take  1 tablet (100 mg total) by mouth once daily.     niacin 500 mg tablet  Commonly known as: SLO-NIACIN  Take 500 mg by mouth once daily. With meal     pantoprazole 40 MG tablet  Commonly known as: PROTONIX  Take 1 tablet (40 mg total) by mouth 2 (two) times daily.     pioglitazone 15 MG tablet  Commonly known as: ACTOS  Take 15 mg by mouth.     PROBIOTIC 10 billion cell Cap  Generic drug: Lactobacillus acidophilus  Take by mouth.     QUEtiapine 50 MG tablet  Commonly known as: SEROQUEL  1 tablet (50 mg total) by Per NG tube route 2 (two) times daily.     rifAXIMin 550 mg Tab  Commonly known as: XIFAXAN  1 tablet (550 mg total) by Per G Tube route 2 (two) times daily.     timolol maleate 0.5% 0.5 % Drop  Commonly known as: TIMOPTIC  1 drop once daily.          Time spent on the discharge of patient: 30 minutes    PHIL Faustin  General Surgery  [facility] - [address] [place] [address]

## 2023-08-16 NOTE — HOSPITAL COURSE
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  Benign pathology.  Tolerating diet without nausea or vomiting.  Abdomen soft, nontender, nondistended.  Abdominal binder in place.  Incision loosely approximated with a few staples intact.  Granulation tissue in wound.  Continue wet-to-dry dressings.  Discharge was delayed by a rise in troponin 2 days ago.  Cleared by cardiology following left heart catheterization with trivial nonobstructive CAD and an EF of 55%.  Patient will discharge home today and follow-up in clinic.  [facility] on board.

## 2023-08-17 VITALS
WEIGHT: 205.69 LBS | OXYGEN SATURATION: 95 % | BODY MASS INDEX: 27.86 KG/M2 | SYSTOLIC BLOOD PRESSURE: 153 MMHG | DIASTOLIC BLOOD PRESSURE: 63 MMHG | RESPIRATION RATE: 22 BRPM | HEIGHT: 72 IN | TEMPERATURE: 98 F | HEART RATE: 93 BPM

## 2023-08-17 PROBLEM — I21.A1 MYOCARDIAL INFARCTION TYPE 2: Status: ACTIVE | Noted: 2023-08-14

## 2023-08-17 LAB
ANION GAP SERPL CALCULATED.3IONS-SCNC: 15 MMOL/L (ref 7–16)
BASOPHILS # BLD AUTO: 0.03 K/UL (ref 0–0.2)
BASOPHILS NFR BLD AUTO: 0.4 % (ref 0–1)
BUN SERPL-MCNC: 6 MG/DL (ref 7–18)
BUN/CREAT SERPL: 11 (ref 6–20)
CALCIUM SERPL-MCNC: 8 MG/DL (ref 8.5–10.1)
CHLORIDE SERPL-SCNC: 101 MMOL/L (ref 98–107)
CO2 SERPL-SCNC: 22 MMOL/L (ref 21–32)
CREAT SERPL-MCNC: 0.56 MG/DL (ref 0.7–1.3)
DIFFERENTIAL METHOD BLD: ABNORMAL
EGFR (NO RACE VARIABLE) (RUSH/TITUS): 102 ML/MIN/1.73M2
EOSINOPHIL # BLD AUTO: 0.42 K/UL (ref 0–0.5)
EOSINOPHIL NFR BLD AUTO: 5.2 % (ref 1–4)
ERYTHROCYTE [DISTWIDTH] IN BLOOD BY AUTOMATED COUNT: 13.1 % (ref 11.5–14.5)
GLUCOSE SERPL-MCNC: 114 MG/DL (ref 70–105)
GLUCOSE SERPL-MCNC: 130 MG/DL (ref 74–106)
GLUCOSE SERPL-MCNC: 136 MG/DL (ref 70–105)
GLUCOSE SERPL-MCNC: 142 MG/DL (ref 70–105)
GLUCOSE SERPL-MCNC: 150 MG/DL (ref 70–105)
HCT VFR BLD AUTO: 33.8 % (ref 40–54)
HGB BLD-MCNC: 10.8 G/DL (ref 13.5–18)
IMM GRANULOCYTES # BLD AUTO: 0.02 K/UL (ref 0–0.04)
IMM GRANULOCYTES NFR BLD: 0.2 % (ref 0–0.4)
LYMPHOCYTES # BLD AUTO: 1.47 K/UL (ref 1–4.8)
LYMPHOCYTES NFR BLD AUTO: 18.3 % (ref 27–41)
MCH RBC QN AUTO: 32.3 PG (ref 27–31)
MCHC RBC AUTO-ENTMCNC: 32 G/DL (ref 32–36)
MCV RBC AUTO: 101.2 FL (ref 80–96)
MONOCYTES # BLD AUTO: 0.51 K/UL (ref 0–0.8)
MONOCYTES NFR BLD AUTO: 6.3 % (ref 2–6)
MPC BLD CALC-MCNC: 10.1 FL (ref 9.4–12.4)
NEUTROPHILS # BLD AUTO: 5.59 K/UL (ref 1.8–7.7)
NEUTROPHILS NFR BLD AUTO: 69.6 % (ref 53–65)
NRBC # BLD AUTO: 0 X10E3/UL
NRBC, AUTO (.00): 0 %
PLATELET # BLD AUTO: 198 K/UL (ref 150–400)
POTASSIUM SERPL-SCNC: 4.8 MMOL/L (ref 3.5–5.1)
RBC # BLD AUTO: 3.34 M/UL (ref 4.6–6.2)
SODIUM SERPL-SCNC: 133 MMOL/L (ref 136–145)
WBC # BLD AUTO: 8.04 K/UL (ref 4.5–11)

## 2023-08-17 PROCEDURE — 99239 HOSP IP/OBS DSCHRG MGMT >30: CPT | Mod: GC,,, | Performed by: FAMILY MEDICINE

## 2023-08-17 PROCEDURE — 63600175 PHARM REV CODE 636 W HCPCS: Performed by: SURGERY

## 2023-08-17 PROCEDURE — 94761 N-INVAS EAR/PLS OXIMETRY MLT: CPT

## 2023-08-17 PROCEDURE — 99900035 HC TECH TIME PER 15 MIN (STAT)

## 2023-08-17 PROCEDURE — 25000003 PHARM REV CODE 250: Performed by: STUDENT IN AN ORGANIZED HEALTH CARE EDUCATION/TRAINING PROGRAM

## 2023-08-17 PROCEDURE — 25000003 PHARM REV CODE 250: Performed by: GENERAL PRACTICE

## 2023-08-17 PROCEDURE — 82962 GLUCOSE BLOOD TEST: CPT

## 2023-08-17 PROCEDURE — 25000003 PHARM REV CODE 250: Performed by: FAMILY MEDICINE

## 2023-08-17 PROCEDURE — 80048 BASIC METABOLIC PNL TOTAL CA: CPT

## 2023-08-17 PROCEDURE — C9113 INJ PANTOPRAZOLE SODIUM, VIA: HCPCS | Performed by: FAMILY MEDICINE

## 2023-08-17 PROCEDURE — 63600175 PHARM REV CODE 636 W HCPCS: Performed by: FAMILY MEDICINE

## 2023-08-17 PROCEDURE — 25000003 PHARM REV CODE 250

## 2023-08-17 PROCEDURE — 1111F PR DISCHARGE MEDS RECONCILED W/ CURRENT OUTPATIENT MED LIST: ICD-10-PCS | Mod: CPTII,GC,, | Performed by: FAMILY MEDICINE

## 2023-08-17 PROCEDURE — 85025 COMPLETE CBC W/AUTO DIFF WBC: CPT

## 2023-08-17 PROCEDURE — 1111F DSCHRG MED/CURRENT MED MERGE: CPT | Mod: CPTII,GC,, | Performed by: FAMILY MEDICINE

## 2023-08-17 PROCEDURE — 25000003 PHARM REV CODE 250: Performed by: SURGERY

## 2023-08-17 PROCEDURE — 99239 PR HOSPITAL DISCHARGE DAY,>30 MIN: ICD-10-PCS | Mod: GC,,, | Performed by: FAMILY MEDICINE

## 2023-08-17 PROCEDURE — 63600175 PHARM REV CODE 636 W HCPCS: Performed by: NURSE PRACTITIONER

## 2023-08-17 RX ORDER — LACTULOSE 10 G/15ML
20 SOLUTION ORAL; RECTAL 3 TIMES DAILY
Qty: 2700 ML | Refills: 0 | Status: SHIPPED | OUTPATIENT
Start: 2023-08-17 | End: 2023-09-16

## 2023-08-17 RX ORDER — LOSARTAN POTASSIUM 100 MG/1
100 TABLET ORAL DAILY
Status: DISCONTINUED | OUTPATIENT
Start: 2023-08-17 | End: 2023-08-17

## 2023-08-17 RX ORDER — METOPROLOL TARTRATE 25 MG/1
12.5 TABLET, FILM COATED ORAL 2 TIMES DAILY
Qty: 30 TABLET | Refills: 11 | Status: SHIPPED | OUTPATIENT
Start: 2023-08-17 | End: 2024-08-16

## 2023-08-17 RX ADMIN — METOPROLOL TARTRATE 12.5 MG: 25 TABLET, FILM COATED ORAL at 09:08

## 2023-08-17 RX ADMIN — PANTOPRAZOLE SODIUM 40 MG: 40 INJECTION, POWDER, FOR SOLUTION INTRAVENOUS at 09:08

## 2023-08-17 RX ADMIN — LACTULOSE 20 G: 20 SOLUTION ORAL at 09:08

## 2023-08-17 RX ADMIN — PIPERACILLIN AND TAZOBACTAM 4.5 G: 4; .5 INJECTION, POWDER, FOR SOLUTION INTRAVENOUS; PARENTERAL at 05:08

## 2023-08-17 RX ADMIN — APIXABAN 5 MG: 5 TABLET, FILM COATED ORAL at 09:08

## 2023-08-17 RX ADMIN — RIFAXIMIN 550 MG: 550 TABLET ORAL at 09:08

## 2023-08-17 RX ADMIN — TIMOLOL MALEATE 1 DROP: 5 SOLUTION/ DROPS OPHTHALMIC at 09:08

## 2023-08-17 RX ADMIN — QUETIAPINE FUMARATE 50 MG: 25 TABLET ORAL at 09:08

## 2023-08-17 RX ADMIN — SODIUM CHLORIDE, POTASSIUM CHLORIDE, SODIUM LACTATE AND CALCIUM CHLORIDE: 600; 310; 30; 20 INJECTION, SOLUTION INTRAVENOUS at 06:08

## 2023-08-17 RX ADMIN — ASPIRIN 81 MG: 81 TABLET, COATED ORAL at 09:08

## 2023-08-17 NOTE — PROGRESS NOTES
Pt was found to have paroxysmal afib yesterday, rate controlled and pt asymptomatic. Agree with Eliquis 5mg bid and metoprolol 12.5mg bid. Cardiology will sign off. Please call if needed.

## 2023-08-17 NOTE — NURSING
Informed  about pt increase in agitation and more of an unidentified speech pattern making it difficult to understand. Pt did not display any features of a stroke and the MD stated to monitor him for the time being.

## 2023-08-17 NOTE — DISCHARGE SUMMARY
[facility] - [facility] Medical Telemetry Hospital Medicine  Discharge Summary      Patient Name: Danny Flood  MRN: [id number]  TERESA: [id number]  Patient Class: IP- Inpatient  Admission Date: 8/9/2023  Hospital Length of Stay: 8 days  Discharge Date and Time:  08/17/2023 1:53 PM  Attending Physician: No att. providers found   Discharging Provider: Mackenzie Khan MD  Primary Care Provider: Cameron Valencia MD    Primary Care Team: Networked reference to record PCT     HPI:   Danny Flood is a 78-year-old male with history of HTN, DM, GERD, dementia, and hyperammonemia. He is post-op #4 exploratory laparotomy with infected mesh removal, and small bowel resection with anastomosis. Patient tolerated procedure well with no immediate post-operative complications. Medicine consulted for management of chronic conditions.     Patient with no current complaints. Has not yet regained normal bowel function, however NGT was pulled yesterday evening with no resulting nausea, vomiting, or increased abdominal pain. Labs do show some hypernatremia and hypokalemia, mildly increased from yesterday. Vital signs unremarkable aside from some mild hypertension overnight. Pain well controlled.      Procedure(s) (LRB):  Left heart cath (Left)      Hospital Course:   Pt was consulted to hospital service from surgery 2/2 infected mesh removal. Pt had been managed with zosyn for the infection throughout the course of admission. Zosyn was started 8/9 and discontinued 8/17. Lactulose had been stopped post surgery, and was restarted when ammonia levels began to rise. Restarting lactulose improved pts ammonia levels and mental status. Both rifaximin and lactulose should be continued after d/c. Hypokalemia and hypomagnesemia were managed with appropriate medications 8/16, both levels now corrected. Pt had an overnight tachycardic episode shortly after admission which saw rhythm periodically enter atrial fibrillation. Cardiology was  consulted for workup. Apixaban 5mg bid was started for anticoagulation, and metoprolol 12.5 mg bid was started for rate control. Cardiology recommends that pt should continue anticoagulation and rate control as given after d/c. Throughout course of stay, pt had received CT head, CTA chest, ECHO, heart cath, U/S. LHC revealed non-obstructive CAD with normal EF and normal wall motion. CT head and chest were negative for acute pathologies, U/S showed no evidence of DVT, ECHO revealed normal wall motion, EF of 60%, and moderate regurgitation of both the mitral and tricuspid valves.    Pt has received maximum benefit of hospital stay and will be discharged to home in stable condition.    Pt should follow up with primary care provider in 1 to 2 weeks. Follow up with cardiology and surgery within 2 weeks.       Goals of Care Treatment Preferences:  Code Status: Full Code      Consults:   Consults (From admission, onward)        Status Ordering Provider     Inpatient consult to Cardiology  Once        Provider:  Jose L Pace ACNP    Completed DAVID ORDAZ     Inpatient consult to Social Work  Once        Provider:  (Not yet assigned)    Completed YOU MOLINA     Inpatient consult to Cardiology  Once        Provider:  (Not yet assigned)    Completed ALEX MUÑOZ     Inpatient consult to Hospital Medicine  Once        Provider:  Maura Castellano, FENG Calero          Neuro  Dementia without behavioral disturbance, psychotic disturbance, mood disturbance, or anxiety  Seroquel BID      Cardiac/Vascular  Elevated troponin level        Myocardial infarction type 2  8/15: [facility]   Normal LV systolic function, ER 55%, trivial non-obstructive CAD.       Hypertension  Resume home medications, monitor.     Continue current medication and monitoring of BP.       Renal/  Hypernatremia  Monitor BMP    Hypokalemia  Resolved     ID  * Infected hernioplasty mesh  Follow up with surgery as  indicated      Endocrine  Diabetes mellitus  Continue home medications      Hyperammonemia  History of hyperammonemia without cirrhosis.   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] 08/13/2023 Yes    Atrial fibrillation [I48.91] 08/16/2023 Unknown    Myocardial infarction type 2 [I21.A1] 08/14/2023 Unknown    Elevated troponin level [R77.8] 08/14/2023 Unknown    Diabetes mellitus [E11.9] 08/13/2023 Yes    Dementia without behavioral disturbance, psychotic disturbance, mood disturbance, or anxiety [F03.90] 08/13/2023 Yes    Hypernatremia [E87.0] 08/13/2023 Clinically Undetermined    Hypokalemia [E87.6] 11/09/2021 Yes    Hypertension [I10]  Yes    Hyperammonemia [E72.20] 11/07/2021 Yes      Problems Resolved During this Admission:       Discharged Condition: stable    Disposition: Home or Self Care    Follow Up:   Contact information for follow-up providers     Sachi Sharma FNP Follow up in 2 week(s).    Specialties: Family Medicine, Cardiology  Why: Hospital discharge  Contact information:  [address] [facility] [address]  [phone]             Bang Mata FNP. Schedule an appointment as soon as possible for a visit on 8/31/2023.    Specialty: General Surgery  Why: 1:45 pm  Contact information:  [address] [facility] [address]  [phone]             Cameron Valencia MD Follow up in 1 week(s).    Specialty: Hematology and Oncology  Contact information:  [address] [place] [address] [place] [address]  [phone]                   Contact information for after-discharge care     Home Medical Care     [facility] .    Service: Home Health Services  Contact information:  [address]  [facility] [address]  [phone]                           Patient Instructions:      Diet Adult Regular      Lifting restrictions   Order Comments: No lifting over 10 lb until clinic follow-up     Notify your health care provider if you experience any of the following:  temperature >100.4     Notify your health care provider if you experience any of the following:  persistent nausea and vomiting or diarrhea     Notify your health care provider if you experience any of the following:  severe uncontrolled pain     Notify your health care provider if you experience any of the following:  redness, tenderness, or signs of infection (pain, swelling, redness, odor or green/yellow discharge around incision site)     Activity as tolerated     Shower on day dressing removed (No bath)   Order Comments: Shower daily.  Remove old dressing.  Cleanse with Vashe and replace with Vashe or normal saline moistened gauze in a wet-to-dry fashion.  Cover with gauze and tape.       Significant Diagnostic Studies: Labs: All labs within the past 24 hours have been reviewed    Pending Diagnostic Studies:     Procedure Component Value Units Date/Time    EKG 12-lead [[id number]]     Order Status: Sent Lab Status: No result     EXTRA TUBES [[id number]] Collected: 08/14/23 1101    Order Status: Sent Lab Status: In process Updated: 08/14/23 1113    Specimen: Blood, Venous     Narrative:      The following orders were created for panel order EXTRA TUBES.  Procedure                               Abnormality         Status                     ---------                               -----------         ------                     Light Green Top Hold[[id number]]                             In process                 Lavender Top Hold[[id number]]                                In process                   Please view results for these tests on the individual orders.    Echo [[id number]]     Order Status: Sent Lab Status: No result          Medications:  Reconciled Home Medications:      Medication List      START taking these medications    apixaban 5 mg  Tab  Commonly known as: ELIQUIS  Take 1 tablet (5 mg total) by mouth 2 (two) times daily. for 30 doses     HYDROcodone-acetaminophen 5-325 mg per tablet  Commonly known as: NORCO  Take 1 tablet by mouth every 6 (six) hours as needed for Pain.     metoprolol tartrate 25 MG tablet  Commonly known as: LOPRESSOR  Take 0.5 tablets (12.5 mg total) by mouth 2 (two) times daily.        CONTINUE taking these medications    amLODIPine 10 MG tablet  Commonly known as: NORVASC  Take 1 tablet (10 mg total) by mouth once daily.     fish oil-omega-3 fatty acids 300-1,000 mg capsule  Take 1 capsule by mouth once daily.     FOLBIC 2.5-25-2 mg Tab  Generic drug: folic acid-vit B6-vit B12 2.5-25-2 mg  Take 1 tablet by mouth once daily.     FOLIC ACID-VITAMIN B6-VIT B12 ORAL  Take 1 tablet by mouth once daily.     lactulose 10 gram/15 mL solution  Commonly known as: CHRONULAC  Take 30 mLs (20 g total) by mouth 3 (three) times daily.     losartan 100 MG tablet  Commonly known as: COZAAR  Take 1 tablet (100 mg total) by mouth once daily.     niacin 500 mg tablet  Commonly known as: SLO-NIACIN  Take 500 mg by mouth once daily. With meal     pantoprazole 40 MG tablet  Commonly known as: PROTONIX  Take 1 tablet (40 mg total) by mouth 2 (two) times daily.     pioglitazone 15 MG tablet  Commonly known as: ACTOS  Take 15 mg by mouth.     PROBIOTIC 10 billion cell Cap  Generic drug: Lactobacillus acidophilus  Take by mouth.     QUEtiapine 50 MG tablet  Commonly known as: SEROQUEL  1 tablet (50 mg total) by Per NG tube route 2 (two) times daily.     rifAXIMin 550 mg Tab  Commonly known as: XIFAXAN  1 tablet (550 mg total) by Per G Tube route 2 (two) times daily.     timolol maleate 0.5% 0.5 % Drop  Commonly known as: TIMOPTIC  1 drop once daily.            Indwelling Lines/Drains at time of discharge:   Lines/Drains/Airways     None                 Time spent on the discharge of patient: 40 minutes         Mackenzie Khan MD  Department of  [facility] Medicine  [facility] - [address] [place] [address]

## 2023-08-17 NOTE — PLAN OF CARE
Problem: Adult Inpatient Plan of Care  Goal: Patient-Specific Goal (Individualized)  Outcome: Ongoing, Progressing  Goal: Optimal Comfort and Wellbeing  Outcome: Ongoing, Progressing     Problem: Skin Injury Risk Increased  Goal: Skin Health and Integrity  Outcome: Ongoing, Progressing

## 2023-08-17 NOTE — DISCHARGE INSTRUCTIONS
Notiadrian Natarajan of signs of infection at surgical site, elevated temperature or increased pain.

## 2023-08-17 NOTE — HOSPITAL COURSE
Pt was consulted to hospital service from surgery 2/2 infected mesh removal. Pt had been managed with zosyn for the infection throughout the course of admission. Zosyn was started 8/9 and discontinued 8/17. Lactulose had been stopped post surgery, and was restarted when ammonia levels began to rise. Restarting lactulose improved pts ammonia levels and mental status. Both rifaximin and lactulose should be continued after d/c. Hypokalemia and hypomagnesemia were managed with appropriate medications 8/16, both levels now corrected. Pt had an overnight tachycardic episode shortly after admission which saw rhythm periodically enter atrial fibrillation. Cardiology was consulted for workup. Apixaban 5mg bid was started for anticoagulation, and metoprolol 12.5 mg bid was started for rate control. Cardiology recommends that pt should continue anticoagulation and rate control as given after d/c. Throughout course of stay, pt had received CT head, CTA chest, ECHO, heart cath, U/S. LHC revealed non-obstructive CAD with normal EF and normal wall motion. CT head and chest were negative for acute pathologies, U/S showed no evidence of DVT, ECHO revealed normal wall motion, EF of 60%, and moderate regurgitation of both the mitral and tricuspid valves.    Pt has received maximum benefit of hospital stay and will be discharged to home in stable condition.    Pt should follow up with primary care provider in 1 to 2 weeks. Follow up with cardiology and surgery within 2 weeks.

## 2023-08-18 ENCOUNTER — HOSPITAL ENCOUNTER (OUTPATIENT)
Facility: HOSPITAL | Age: 78
LOS: 1 days | Discharge: NURSING, REHAB OR LONG-TERM CARE | End: 2023-08-23
Attending: EMERGENCY MEDICINE | Admitting: INTERNAL MEDICINE
Payer: MEDICARE

## 2023-08-18 ENCOUNTER — PATIENT OUTREACH (OUTPATIENT)
Dept: ADMINISTRATIVE | Facility: CLINIC | Age: 78
End: 2023-08-18

## 2023-08-18 DIAGNOSIS — G93.41 ACUTE METABOLIC ENCEPHALOPATHY: ICD-10-CM

## 2023-08-18 DIAGNOSIS — J18.9 PNEUMONIA DUE TO INFECTIOUS ORGANISM, UNSPECIFIED LATERALITY, UNSPECIFIED PART OF LUNG: Primary | ICD-10-CM

## 2023-08-18 DIAGNOSIS — G93.41 ENCEPHALOPATHY, METABOLIC: ICD-10-CM

## 2023-08-18 DIAGNOSIS — R07.9 CHEST PAIN: ICD-10-CM

## 2023-08-18 DIAGNOSIS — J18.9 PNEUMONIA: ICD-10-CM

## 2023-08-18 LAB
ALBUMIN SERPL BCP-MCNC: 2.1 G/DL (ref 3.5–5)
ALBUMIN/GLOB SERPL: 0.6 {RATIO}
ALP SERPL-CCNC: 60 U/L (ref 45–115)
ALT SERPL W P-5'-P-CCNC: 25 U/L (ref 16–61)
ANION GAP SERPL CALCULATED.3IONS-SCNC: 9 MMOL/L (ref 7–16)
AST SERPL W P-5'-P-CCNC: 31 U/L (ref 15–37)
BASOPHILS # BLD AUTO: 0.05 K/UL (ref 0–0.2)
BASOPHILS NFR BLD AUTO: 0.8 % (ref 0–1)
BILIRUB SERPL-MCNC: 0.6 MG/DL (ref ?–1.2)
BILIRUB UR QL STRIP: NEGATIVE
BUN SERPL-MCNC: 6 MG/DL (ref 7–18)
BUN/CREAT SERPL: 11 (ref 6–20)
CALCIUM SERPL-MCNC: 7.6 MG/DL (ref 8.5–10.1)
CHLORIDE SERPL-SCNC: 108 MMOL/L (ref 98–107)
CLARITY UR: CLEAR
CO2 SERPL-SCNC: 28 MMOL/L (ref 21–32)
COLOR UR: YELLOW
CREAT SERPL-MCNC: 0.54 MG/DL (ref 0.7–1.3)
DIFFERENTIAL METHOD BLD: ABNORMAL
EGFR (NO RACE VARIABLE) (RUSH/TITUS): 103 ML/MIN/1.73M2
EOSINOPHIL # BLD AUTO: 0.25 K/UL (ref 0–0.5)
EOSINOPHIL NFR BLD AUTO: 3.9 % (ref 1–4)
ERYTHROCYTE [DISTWIDTH] IN BLOOD BY AUTOMATED COUNT: 13.2 % (ref 11.5–14.5)
GLOBULIN SER-MCNC: 3.3 G/DL (ref 2–4)
GLUCOSE SERPL-MCNC: 110 MG/DL (ref 74–106)
GLUCOSE UR STRIP-MCNC: NORMAL MG/DL
HCT VFR BLD AUTO: 30.8 % (ref 40–54)
HGB BLD-MCNC: 10.3 G/DL (ref 13.5–18)
IMM GRANULOCYTES # BLD AUTO: 0.04 K/UL (ref 0–0.04)
IMM GRANULOCYTES NFR BLD: 0.6 % (ref 0–0.4)
KETONES UR STRIP-SCNC: NEGATIVE MG/DL
LEUKOCYTE ESTERASE UR QL STRIP: NEGATIVE
LIPASE SERPL-CCNC: 45 U/L (ref 73–393)
LYMPHOCYTES # BLD AUTO: 0.87 K/UL (ref 1–4.8)
LYMPHOCYTES NFR BLD AUTO: 13.7 % (ref 27–41)
MCH RBC QN AUTO: 32.6 PG (ref 27–31)
MCHC RBC AUTO-ENTMCNC: 33.4 G/DL (ref 32–36)
MCV RBC AUTO: 97.5 FL (ref 80–96)
MONOCYTES # BLD AUTO: 0.5 K/UL (ref 0–0.8)
MONOCYTES NFR BLD AUTO: 7.9 % (ref 2–6)
MPC BLD CALC-MCNC: 9.3 FL (ref 9.4–12.4)
NEUTROPHILS # BLD AUTO: 4.62 K/UL (ref 1.8–7.7)
NEUTROPHILS NFR BLD AUTO: 73.1 % (ref 53–65)
NITRITE UR QL STRIP: NEGATIVE
NRBC # BLD AUTO: 0 X10E3/UL
NRBC, AUTO (.00): 0 %
NT-PROBNP SERPL-MCNC: 1016 PG/ML (ref 1–450)
PH UR STRIP: 7.5 PH UNITS
PLATELET # BLD AUTO: 265 K/UL (ref 150–400)
POTASSIUM SERPL-SCNC: 3.8 MMOL/L (ref 3.5–5.1)
PROT SERPL-MCNC: 5.4 G/DL (ref 6.4–8.2)
PROT UR QL STRIP: 10
RBC # BLD AUTO: 3.16 M/UL (ref 4.6–6.2)
RBC # UR STRIP: NEGATIVE /UL
SARS-COV-2 RDRP RESP QL NAA+PROBE: NEGATIVE
SODIUM SERPL-SCNC: 141 MMOL/L (ref 136–145)
SP GR UR STRIP: 1.02
TROPONIN I SERPL DL<=0.01 NG/ML-MCNC: 56.3 PG/ML
UROBILINOGEN UR STRIP-ACNC: 2 MG/DL
WBC # BLD AUTO: 6.33 K/UL (ref 4.5–11)

## 2023-08-18 PROCEDURE — 99223 PR INITIAL HOSPITAL CARE,LEVL III: ICD-10-PCS | Mod: ,,, | Performed by: INTERNAL MEDICINE

## 2023-08-18 PROCEDURE — 99285 EMERGENCY DEPT VISIT HI MDM: CPT | Mod: 25

## 2023-08-18 PROCEDURE — 85025 COMPLETE CBC W/AUTO DIFF WBC: CPT | Performed by: EMERGENCY MEDICINE

## 2023-08-18 PROCEDURE — 93010 ELECTROCARDIOGRAM REPORT: CPT | Mod: ,,, | Performed by: INTERNAL MEDICINE

## 2023-08-18 PROCEDURE — 63600175 PHARM REV CODE 636 W HCPCS: Performed by: GENERAL PRACTICE

## 2023-08-18 PROCEDURE — 93005 ELECTROCARDIOGRAM TRACING: CPT

## 2023-08-18 PROCEDURE — 99223 1ST HOSP IP/OBS HIGH 75: CPT | Mod: ,,, | Performed by: INTERNAL MEDICINE

## 2023-08-18 PROCEDURE — 83880 ASSAY OF NATRIURETIC PEPTIDE: CPT | Performed by: EMERGENCY MEDICINE

## 2023-08-18 PROCEDURE — 83690 ASSAY OF LIPASE: CPT | Performed by: EMERGENCY MEDICINE

## 2023-08-18 PROCEDURE — 87635 SARS-COV-2 COVID-19 AMP PRB: CPT | Performed by: GENERAL PRACTICE

## 2023-08-18 PROCEDURE — 11000001 HC ACUTE MED/SURG PRIVATE ROOM

## 2023-08-18 PROCEDURE — 84484 ASSAY OF TROPONIN QUANT: CPT | Performed by: EMERGENCY MEDICINE

## 2023-08-18 PROCEDURE — 99285 EMERGENCY DEPT VISIT HI MDM: CPT | Mod: ,,, | Performed by: EMERGENCY MEDICINE

## 2023-08-18 PROCEDURE — 25000003 PHARM REV CODE 250: Performed by: GENERAL PRACTICE

## 2023-08-18 PROCEDURE — 99285 PR EMERGENCY DEPT VISIT,LEVEL V: ICD-10-PCS | Mod: ,,, | Performed by: EMERGENCY MEDICINE

## 2023-08-18 PROCEDURE — 93010 EKG 12-LEAD: ICD-10-PCS | Mod: ,,, | Performed by: INTERNAL MEDICINE

## 2023-08-18 PROCEDURE — 81003 URINALYSIS AUTO W/O SCOPE: CPT | Performed by: EMERGENCY MEDICINE

## 2023-08-18 PROCEDURE — 80053 COMPREHEN METABOLIC PANEL: CPT | Performed by: EMERGENCY MEDICINE

## 2023-08-18 RX ORDER — IBUPROFEN 200 MG
24 TABLET ORAL
Status: DISCONTINUED | OUTPATIENT
Start: 2023-08-18 | End: 2023-08-23 | Stop reason: HOSPADM

## 2023-08-18 RX ORDER — ACETAMINOPHEN 325 MG/1
650 TABLET ORAL EVERY 4 HOURS PRN
Status: DISCONTINUED | OUTPATIENT
Start: 2023-08-18 | End: 2023-08-23 | Stop reason: HOSPADM

## 2023-08-18 RX ORDER — SODIUM CHLORIDE 0.9 % (FLUSH) 0.9 %
10 SYRINGE (ML) INJECTION EVERY 12 HOURS PRN
Status: DISCONTINUED | OUTPATIENT
Start: 2023-08-18 | End: 2023-08-23 | Stop reason: HOSPADM

## 2023-08-18 RX ORDER — ONDANSETRON 2 MG/ML
4 INJECTION INTRAMUSCULAR; INTRAVENOUS EVERY 8 HOURS PRN
Status: DISCONTINUED | OUTPATIENT
Start: 2023-08-18 | End: 2023-08-23 | Stop reason: HOSPADM

## 2023-08-18 RX ORDER — LACTOBACILLUS ACIDOPHILUS 500MM CELL
3 CAPSULE ORAL
Status: DISCONTINUED | OUTPATIENT
Start: 2023-08-19 | End: 2023-08-19

## 2023-08-18 RX ORDER — PANTOPRAZOLE SODIUM 40 MG/1
40 TABLET, DELAYED RELEASE ORAL 2 TIMES DAILY
Status: DISCONTINUED | OUTPATIENT
Start: 2023-08-18 | End: 2023-08-19

## 2023-08-18 RX ORDER — MUPIROCIN 20 MG/G
OINTMENT TOPICAL 2 TIMES DAILY
Status: DISCONTINUED | OUTPATIENT
Start: 2023-08-19 | End: 2023-08-23 | Stop reason: HOSPADM

## 2023-08-18 RX ORDER — INSULIN ASPART 100 [IU]/ML
0-5 INJECTION, SOLUTION INTRAVENOUS; SUBCUTANEOUS
Status: DISCONTINUED | OUTPATIENT
Start: 2023-08-18 | End: 2023-08-23 | Stop reason: HOSPADM

## 2023-08-18 RX ORDER — NALOXONE HCL 0.4 MG/ML
0.02 VIAL (ML) INJECTION
Status: DISCONTINUED | OUTPATIENT
Start: 2023-08-18 | End: 2023-08-23 | Stop reason: HOSPADM

## 2023-08-18 RX ORDER — QUETIAPINE FUMARATE 25 MG/1
50 TABLET, FILM COATED ORAL 2 TIMES DAILY
Status: DISCONTINUED | OUTPATIENT
Start: 2023-08-18 | End: 2023-08-19

## 2023-08-18 RX ORDER — SIMETHICONE 80 MG
1 TABLET,CHEWABLE ORAL 4 TIMES DAILY PRN
Status: DISCONTINUED | OUTPATIENT
Start: 2023-08-18 | End: 2023-08-23 | Stop reason: HOSPADM

## 2023-08-18 RX ORDER — IBUPROFEN 200 MG
16 TABLET ORAL
Status: DISCONTINUED | OUTPATIENT
Start: 2023-08-18 | End: 2023-08-23 | Stop reason: HOSPADM

## 2023-08-18 RX ORDER — TIMOLOL MALEATE 5 MG/ML
1 SOLUTION/ DROPS OPHTHALMIC DAILY
Status: DISCONTINUED | OUTPATIENT
Start: 2023-08-19 | End: 2023-08-23 | Stop reason: HOSPADM

## 2023-08-18 RX ORDER — GLUCAGON 1 MG
1 KIT INJECTION
Status: DISCONTINUED | OUTPATIENT
Start: 2023-08-18 | End: 2023-08-23 | Stop reason: HOSPADM

## 2023-08-18 RX ORDER — LOSARTAN POTASSIUM 100 MG/1
100 TABLET ORAL DAILY
Status: DISCONTINUED | OUTPATIENT
Start: 2023-08-19 | End: 2023-08-23 | Stop reason: HOSPADM

## 2023-08-18 RX ORDER — METOPROLOL TARTRATE 25 MG/1
12.5 TABLET ORAL 2 TIMES DAILY
Status: DISCONTINUED | OUTPATIENT
Start: 2023-08-18 | End: 2023-08-23 | Stop reason: HOSPADM

## 2023-08-18 RX ORDER — LACTULOSE 10 G/15ML
20 SOLUTION ORAL 3 TIMES DAILY
Status: DISCONTINUED | OUTPATIENT
Start: 2023-08-18 | End: 2023-08-21

## 2023-08-18 RX ORDER — AMLODIPINE BESYLATE 10 MG/1
10 TABLET ORAL DAILY
Status: DISCONTINUED | OUTPATIENT
Start: 2023-08-19 | End: 2023-08-23 | Stop reason: HOSPADM

## 2023-08-18 RX ORDER — POLYETHYLENE GLYCOL 3350 17 G/17G
17 POWDER, FOR SOLUTION ORAL 2 TIMES DAILY PRN
Status: DISCONTINUED | OUTPATIENT
Start: 2023-08-18 | End: 2023-08-23 | Stop reason: HOSPADM

## 2023-08-18 RX ORDER — NIACIN 500 MG/1
500 TABLET, EXTENDED RELEASE ORAL DAILY
Status: DISCONTINUED | OUTPATIENT
Start: 2023-08-19 | End: 2023-08-23 | Stop reason: HOSPADM

## 2023-08-18 RX ADMIN — APIXABAN 5 MG: 5 TABLET, FILM COATED ORAL at 10:08

## 2023-08-18 RX ADMIN — LACTULOSE 20 G: 20 SOLUTION ORAL at 10:08

## 2023-08-18 RX ADMIN — METOPROLOL TARTRATE 12.5 MG: 25 TABLET, FILM COATED ORAL at 10:08

## 2023-08-18 RX ADMIN — PANTOPRAZOLE SODIUM 40 MG: 40 TABLET, DELAYED RELEASE ORAL at 10:08

## 2023-08-18 RX ADMIN — QUETIAPINE FUMARATE 50 MG: 25 TABLET ORAL at 10:08

## 2023-08-18 RX ADMIN — PIPERACILLIN AND TAZOBACTAM 4.5 G: 4; .5 INJECTION, POWDER, FOR SOLUTION INTRAVENOUS; PARENTERAL at 10:08

## 2023-08-18 RX ADMIN — RIFAXIMIN 550 MG: 550 TABLET ORAL at 10:08

## 2023-08-18 NOTE — PLAN OF CARE
[facility] - [facility] Telemetry  Discharge Final Note    Primary Care Provider: Cameron Valencia MD    Expected Discharge Date: 8/17/2023    Final Discharge Note (most recent)       Final Note - 08/16/23 1418          Final Note    Assessment Type Final Discharge Note     Anticipated Discharge Disposition Home-Health Care [facility]     What phone number can be called within the next 1-3 days to see how you are doing after discharge? [phone]        Post-Acute Status    Post-Acute Authorization Home Health     Home Health Status Set-up Complete/Auth obtained     Patient choice form signed by patient/caregiver List with quality metrics by geographic area provided;List from CMS Compare;List from System Post-Acute Care     Discharge Delays None known at this time                     Important Message from Medicare  Important Message from Medicare regarding Discharge Appeal Rights: Given to patient/caregiver, Explained to patient/caregiver, Signed/date by patient/caregiver     Date IMM was signed: 08/16/23  Time IMM was signed: 0926     Follow-up providers       Sachi Sharma FNP   Specialty: Family Medicine, Cardiology    [address] [place] [address] [facility] [place] [address]   Phone: [phone]       Next Steps: Follow up on 8/29/2023    Instructions: Hospital discharge; 1:30 pm    Bang Mata FNP   Specialty: General Surgery    [address] [place], [place] [address]   Phone: [phone]       Next Steps: Schedule an appointment as soon as possible for a visit on 8/31/2023    Instructions: 1:45 pm    Cameron Valencia MD   Specialty: Hematology and Oncology   Relationship: PCP - [address] [place] [address]   Phone: [phone]       Next Steps: Follow up in 1 week(s)              After-discharge care                Home Medical Care       [facility]   Service: Home Health Services    [address] [place] [address]   Phone:  [phone]                             Pt dc home with [facility]. 0 dc needs noted.

## 2023-08-18 NOTE — PROGRESS NOTES
C3 nurse attempted to contact Danny Flood's son  for a TCC post hospital discharge follow up call. The patient is unable to conduct the call @ this time. The patient requested a callback.  Mr. Flood states his father is back in the emergency room as a patient at this time.     The patient does not have a scheduled HOSFU appointment within 5-7 days post hospital discharge date 08/17/2023.   Unable to route to provider due to pcp being a NON [facility] provider.

## 2023-08-19 PROBLEM — T82.591A: Status: ACTIVE | Noted: 2023-08-19

## 2023-08-19 PROBLEM — S22.000A COMPRESSION FRACTURE OF BODY OF THORACIC VERTEBRA: Status: ACTIVE | Noted: 2023-08-19

## 2023-08-19 PROBLEM — E88.09 EDEMA DUE TO HYPOALBUMINEMIA: Status: ACTIVE | Noted: 2023-08-19

## 2023-08-19 LAB
AMMONIA PLAS-SCNC: 85 ΜMOL/L (ref 11–32)
ANION GAP SERPL CALCULATED.3IONS-SCNC: 9 MMOL/L (ref 7–16)
BASOPHILS # BLD AUTO: 0.04 K/UL (ref 0–0.2)
BASOPHILS NFR BLD AUTO: 0.5 % (ref 0–1)
BUN SERPL-MCNC: 6 MG/DL (ref 7–18)
BUN/CREAT SERPL: 12 (ref 6–20)
CALCIUM SERPL-MCNC: 7.7 MG/DL (ref 8.5–10.1)
CHLORIDE SERPL-SCNC: 109 MMOL/L (ref 98–107)
CO2 SERPL-SCNC: 26 MMOL/L (ref 21–32)
CREAT SERPL-MCNC: 0.52 MG/DL (ref 0.7–1.3)
CREAT UR-MCNC: 127 MG/DL (ref 39–259)
CRP SERPL-MCNC: 2.42 MG/DL (ref 0–0.8)
EGFR (NO RACE VARIABLE) (RUSH/TITUS): 104 ML/MIN/1.73M2
EOSINOPHIL # BLD AUTO: 0.36 K/UL (ref 0–0.5)
EOSINOPHIL NFR BLD AUTO: 4.6 % (ref 1–4)
ERYTHROCYTE [DISTWIDTH] IN BLOOD BY AUTOMATED COUNT: 13.1 % (ref 11.5–14.5)
ERYTHROCYTE [SEDIMENTATION RATE] IN BLOOD BY WESTERGREN METHOD: 28 MM/HR (ref 0–20)
GLUCOSE SERPL-MCNC: 138 MG/DL (ref 74–106)
HCT VFR BLD AUTO: 27.6 % (ref 40–54)
HGB BLD-MCNC: 9.6 G/DL (ref 13.5–18)
IMM GRANULOCYTES # BLD AUTO: 0.04 K/UL (ref 0–0.04)
IMM GRANULOCYTES NFR BLD: 0.5 % (ref 0–0.4)
INR BLD: 1.37
LYMPHOCYTES # BLD AUTO: 0.95 K/UL (ref 1–4.8)
LYMPHOCYTES NFR BLD AUTO: 12.2 % (ref 27–41)
MCH RBC QN AUTO: 32.9 PG (ref 27–31)
MCHC RBC AUTO-ENTMCNC: 34.8 G/DL (ref 32–36)
MCV RBC AUTO: 94.5 FL (ref 80–96)
MONOCYTES # BLD AUTO: 0.62 K/UL (ref 0–0.8)
MONOCYTES NFR BLD AUTO: 8 % (ref 2–6)
MPC BLD CALC-MCNC: 9.7 FL (ref 9.4–12.4)
NEUTROPHILS # BLD AUTO: 5.75 K/UL (ref 1.8–7.7)
NEUTROPHILS NFR BLD AUTO: 74.2 % (ref 53–65)
NRBC # BLD AUTO: 0 X10E3/UL
NRBC, AUTO (.00): 0 %
PLATELET # BLD AUTO: 263 K/UL (ref 150–400)
POTASSIUM SERPL-SCNC: 3.3 MMOL/L (ref 3.5–5.1)
PROT UR-MCNC: 27.9 MG/DL (ref 0–11.9)
PROTHROMBIN TIME: 16.7 SECONDS (ref 11.7–14.7)
RBC # BLD AUTO: 2.92 M/UL (ref 4.6–6.2)
SODIUM SERPL-SCNC: 141 MMOL/L (ref 136–145)
TROPONIN I SERPL DL<=0.01 NG/ML-MCNC: 42.2 PG/ML
WBC # BLD AUTO: 7.76 K/UL (ref 4.5–11)

## 2023-08-19 PROCEDURE — G0378 HOSPITAL OBSERVATION PER HR: HCPCS

## 2023-08-19 PROCEDURE — 25000003 PHARM REV CODE 250: Performed by: GENERAL PRACTICE

## 2023-08-19 PROCEDURE — 80048 BASIC METABOLIC PNL TOTAL CA: CPT | Performed by: GENERAL PRACTICE

## 2023-08-19 PROCEDURE — 63600175 PHARM REV CODE 636 W HCPCS: Performed by: GENERAL PRACTICE

## 2023-08-19 PROCEDURE — 84156 ASSAY OF PROTEIN URINE: CPT | Performed by: HOSPITALIST

## 2023-08-19 PROCEDURE — 85651 RBC SED RATE NONAUTOMATED: CPT | Performed by: HOSPITALIST

## 2023-08-19 PROCEDURE — 96366 THER/PROPH/DIAG IV INF ADDON: CPT

## 2023-08-19 PROCEDURE — 85610 PROTHROMBIN TIME: CPT | Performed by: HOSPITALIST

## 2023-08-19 PROCEDURE — 82570 ASSAY OF URINE CREATININE: CPT | Performed by: HOSPITALIST

## 2023-08-19 PROCEDURE — 86140 C-REACTIVE PROTEIN: CPT | Performed by: HOSPITALIST

## 2023-08-19 PROCEDURE — 99233 PR SUBSEQUENT HOSPITAL CARE,LEVL III: ICD-10-PCS | Mod: ,,, | Performed by: HOSPITALIST

## 2023-08-19 PROCEDURE — 85025 COMPLETE CBC W/AUTO DIFF WBC: CPT | Performed by: GENERAL PRACTICE

## 2023-08-19 PROCEDURE — 99233 SBSQ HOSP IP/OBS HIGH 50: CPT | Mod: ,,, | Performed by: HOSPITALIST

## 2023-08-19 PROCEDURE — 84484 ASSAY OF TROPONIN QUANT: CPT | Performed by: GENERAL PRACTICE

## 2023-08-19 PROCEDURE — 25000003 PHARM REV CODE 250: Performed by: INTERNAL MEDICINE

## 2023-08-19 PROCEDURE — 25000003 PHARM REV CODE 250: Performed by: HOSPITALIST

## 2023-08-19 PROCEDURE — 82140 ASSAY OF AMMONIA: CPT | Performed by: GENERAL PRACTICE

## 2023-08-19 PROCEDURE — 84145 PROCALCITONIN (PCT): CPT | Mod: 90 | Performed by: HOSPITALIST

## 2023-08-19 PROCEDURE — 96365 THER/PROPH/DIAG IV INF INIT: CPT

## 2023-08-19 PROCEDURE — 96367 TX/PROPH/DG ADDL SEQ IV INF: CPT

## 2023-08-19 RX ORDER — LACTOBACILLUS ACIDOPHILUS 500MM CELL
3 CAPSULE ORAL
Status: DISCONTINUED | OUTPATIENT
Start: 2023-08-19 | End: 2023-08-23 | Stop reason: HOSPADM

## 2023-08-19 RX ORDER — PANTOPRAZOLE SODIUM 40 MG/1
40 TABLET, DELAYED RELEASE ORAL
Status: DISCONTINUED | OUTPATIENT
Start: 2023-08-19 | End: 2023-08-23 | Stop reason: HOSPADM

## 2023-08-19 RX ORDER — QUETIAPINE FUMARATE 25 MG/1
50 TABLET, FILM COATED ORAL 2 TIMES DAILY
Status: DISCONTINUED | OUTPATIENT
Start: 2023-08-19 | End: 2023-08-23 | Stop reason: HOSPADM

## 2023-08-19 RX ADMIN — VANCOMYCIN HYDROCHLORIDE 1750 MG: 10 INJECTION, POWDER, LYOPHILIZED, FOR SOLUTION INTRAVENOUS at 09:08

## 2023-08-19 RX ADMIN — MUPIROCIN: 20 OINTMENT TOPICAL at 08:08

## 2023-08-19 RX ADMIN — APIXABAN 5 MG: 5 TABLET, FILM COATED ORAL at 09:08

## 2023-08-19 RX ADMIN — RIFAXIMIN 550 MG: 550 TABLET ORAL at 08:08

## 2023-08-19 RX ADMIN — QUETIAPINE FUMARATE 50 MG: 25 TABLET ORAL at 08:08

## 2023-08-19 RX ADMIN — QUETIAPINE FUMARATE 50 MG: 25 TABLET ORAL at 09:08

## 2023-08-19 RX ADMIN — Medication 3 CAPSULE: at 08:08

## 2023-08-19 RX ADMIN — Medication 3 CAPSULE: at 04:08

## 2023-08-19 RX ADMIN — MUPIROCIN: 20 OINTMENT TOPICAL at 09:08

## 2023-08-19 RX ADMIN — AMLODIPINE BESYLATE 10 MG: 10 TABLET ORAL at 08:08

## 2023-08-19 RX ADMIN — Medication 3 CAPSULE: at 12:08

## 2023-08-19 RX ADMIN — LOSARTAN POTASSIUM 100 MG: 100 TABLET, FILM COATED ORAL at 08:08

## 2023-08-19 RX ADMIN — METOPROLOL TARTRATE 12.5 MG: 25 TABLET, FILM COATED ORAL at 08:08

## 2023-08-19 RX ADMIN — FOLIC ACID-PYRIDOXINE-CYANOCOBALAMIN TAB 2.5-25-2 MG 1 TABLET: 2.5-25-2 TAB at 09:08

## 2023-08-19 RX ADMIN — LACTULOSE 20 G: 20 SOLUTION ORAL at 09:08

## 2023-08-19 RX ADMIN — APIXABAN 5 MG: 5 TABLET, FILM COATED ORAL at 08:08

## 2023-08-19 RX ADMIN — PANTOPRAZOLE SODIUM 40 MG: 40 TABLET, DELAYED RELEASE ORAL at 03:08

## 2023-08-19 RX ADMIN — VANCOMYCIN HYDROCHLORIDE 1750 MG: 10 INJECTION, POWDER, LYOPHILIZED, FOR SOLUTION INTRAVENOUS at 02:08

## 2023-08-19 RX ADMIN — TIMOLOL MALEATE 1 DROP: 5 SOLUTION/ DROPS OPHTHALMIC at 09:08

## 2023-08-19 RX ADMIN — PIPERACILLIN AND TAZOBACTAM 4.5 G: 4; .5 INJECTION, POWDER, FOR SOLUTION INTRAVENOUS; PARENTERAL at 01:08

## 2023-08-19 RX ADMIN — METOPROLOL TARTRATE 12.5 MG: 25 TABLET, FILM COATED ORAL at 09:08

## 2023-08-19 RX ADMIN — PIPERACILLIN AND TAZOBACTAM 4.5 G: 4; .5 INJECTION, POWDER, FOR SOLUTION INTRAVENOUS; PARENTERAL at 10:08

## 2023-08-19 RX ADMIN — LACTULOSE 20 G: 20 SOLUTION ORAL at 08:08

## 2023-08-19 RX ADMIN — LACTULOSE 20 G: 20 SOLUTION ORAL at 03:08

## 2023-08-19 RX ADMIN — PANTOPRAZOLE SODIUM 40 MG: 40 TABLET, DELAYED RELEASE ORAL at 08:08

## 2023-08-19 RX ADMIN — PIPERACILLIN AND TAZOBACTAM 4.5 G: 4; .5 INJECTION, POWDER, FOR SOLUTION INTRAVENOUS; PARENTERAL at 05:08

## 2023-08-19 RX ADMIN — RIFAXIMIN 550 MG: 550 TABLET ORAL at 09:08

## 2023-08-19 RX ADMIN — NIACIN 500 MG: 500 TABLET, EXTENDED RELEASE ORAL at 09:08

## 2023-08-19 NOTE — ASSESSMENT & PLAN NOTE
Patient with Long standing persistent (>12 months) atrial fibrillation which is controlled currently with Beta Blocker. Patient is currently in sinus rhythm.[id number] Score: 3. HASBLED Score: 1. Anticoagulation indicated. Anticoagulation done with Eliquis.

## 2023-08-19 NOTE — ED PROVIDER NOTES
Encounter Date: 8/18/2023    SCRIBE #1 NOTE: I, Marlen Turk, am scribing for, and in the presence of,  Dean Arguello MD. I have scribed the entire note.       History     Chief Complaint   Patient presents with    Abdominal Pain     Ems from [facility] - sent for admit with pneumonia  - recent abd sx here      78 y.o. male was transferred from [facility] with pneumonia. Patient had a small bowel resection on the 9th. Patient had about 12 inches of small intestine removed. Relative stated the patient has been acting confused, falling, and had swelling from his groin down through his legs. Relative also stated the patient has been having left ankle pain and swelling that started about a week ago. No other symptoms were reported.     The history is provided by the patient and a relative. No  was used.     Review of patient's allergies indicates:  No Known Allergies  Past Medical History:   Diagnosis Date    Diverticulitis     Hyperlipemia     Hypokalemia 11/09/2021     Past Surgical History:   Procedure Laterality Date    COLON SURGERY      partial colon resection    EXCISION, SMALL INTESTINE N/A 8/9/2023    Procedure: EXCISION, SMALL INTESTINE;  Surgeon: Tesfaye Natarajan MD;  Location: [facility] OR;  Service: General;  Laterality: N/A;    HERNIA REPAIR      LAPAROTOMY, EXPLORATORY N/A 8/9/2023    Procedure: LAPAROTOMY, EXPLORATORY;  Surgeon: Tesfaye Natarajan MD;  Location: [facility] OR;  Service: General;  Laterality: N/A;    LEFT HEART CATHETERIZATION Left 8/15/2023    Procedure: Left heart cath;  Surgeon: Edinson Torres DO;  Location: [facility] CATH LAB;  Service: Cardiology;  Laterality: Left;    REMOVAL OF IMPLANT N/A 8/9/2023    Procedure: REMOVAL, IMPLANT;  Surgeon: Tesfaye Natarajan MD;  Location: [facility] OR;  Service: General;  Laterality: N/A;  removal mesh     Family History   Problem Relation Age of Onset    Diabetes Mother     Diabetes Father      Social History      Tobacco Use    Smoking status: Never    Smokeless tobacco: Never   Substance Use Topics    Alcohol use: Never    Drug use: Never     Review of Systems   Constitutional: Negative.    HENT: Negative.     Eyes: Negative.    Respiratory: Negative.     Cardiovascular: Negative.    Gastrointestinal: Negative.    Endocrine: Negative.    Genitourinary: Negative.    Musculoskeletal:         Bilateral groin pain and swelling, left ankle pain and swelling.    Skin: Negative.    Allergic/Immunologic: Negative.    Neurological:         Experiences falls.    Hematological: Negative.    Psychiatric/Behavioral:  Positive for confusion.    All other systems reviewed and are negative.      Physical Exam     Initial Vitals [08/18/23 1933]   BP Pulse Resp Temp SpO2   (!) 142/71 81 16 98.5 °F (36.9 °C) 95 %      MAP       --         Physical Exam    Nursing note and vitals reviewed.  Constitutional: He appears well-developed and well-nourished.   HENT:   Head: Normocephalic and atraumatic.   Eyes: Conjunctivae and EOM are normal. Pupils are equal, round, and reactive to light.   Neck: Neck supple.   Normal range of motion.  Cardiovascular:  Normal rate, regular rhythm, normal heart sounds and intact distal pulses.           Pulmonary/Chest: Breath sounds normal.   Abdominal: Abdomen is soft. Bowel sounds are normal. There is abdominal tenderness (mildy, diffuse).   Abdominal binder.    Musculoskeletal:         General: Normal range of motion.      Cervical back: Normal range of motion and neck supple.     Neurological: He is alert and oriented to person, place, and time. He has normal strength.   Skin: Skin is warm and dry. Capillary refill takes less than 2 seconds.   Psychiatric: He has a normal mood and affect. Thought content normal.         ED Course   Procedures  Labs Reviewed   COMPREHENSIVE METABOLIC PANEL - Abnormal; Notable for the following components:       Result Value    Chloride 108 (*)     Glucose 110 (*)     BUN 6  (*)     Creatinine 0.54 (*)     Calcium 7.6 (*)     Total Protein 5.4 (*)     Albumin 2.1 (*)     All other components within normal limits   NT-PRO NATRIURETIC PEPTIDE - Abnormal; Notable for the following components:    ProBNP 1,016 (*)     All other components within normal limits   LIPASE - Abnormal; Notable for the following components:    Lipase 45 (*)     All other components within normal limits   CBC WITH DIFFERENTIAL - Abnormal; Notable for the following components:    RBC 3.16 (*)     Hemoglobin 10.3 (*)     Hematocrit 30.8 (*)     MCV 97.5 (*)     MCH 32.6 (*)     MPV 9.3 (*)     Neutrophils % 73.1 (*)     Lymphocytes % 13.7 (*)     Monocytes % 7.9 (*)     Immature Granulocytes % 0.6 (*)     Lymphocytes, Absolute 0.87 (*)     All other components within normal limits   TROPONIN I - Normal   SARS-COV-2 RNA AMPLIFICATION, QUAL - Normal    Narrative:     Negative SARS-CoV results should not be used as the sole basis for treatment or patient management decisions; negative results should be considered in the context of a patient's recent exposures, history and the presene of clinical signs and symptoms consistent with COVID-19.  Negative results should be treated as presumptive and confirmed by molecular assay, if necessary for patient management.   CBC W/ AUTO DIFFERENTIAL    Narrative:     The following orders were created for panel order CBC auto differential.  Procedure                               Abnormality         Status                     ---------                               -----------         ------                     CBC with Differential[[id number]]        Abnormal            Final result                 Please view results for these tests on the individual orders.        ECG Results    None       Imaging Results              X-Ray Chest AP Portable (Final result)  Result time 08/18/23 20:23:14      Final result by Ghulam Waldrop DO (08/18/23 20:23:14)                   Impression:      As  above.    Point of Service: [facility]      Electronically signed by: Ghulam Waldrop  Date:    08/18/2023  Time:    20:23               Narrative:    EXAMINATION:  XR CHEST AP PORTABLE    CLINICAL HISTORY:  Chest pain, unspecified    COMPARISON:  Chest x-ray November 12, 2021    TECHNIQUE:  Frontal view/views of the chest.    FINDINGS:  Mild-to-moderate cardiomegaly.  Bilateral infrahilar atelectasis/infiltrate.  Visualized osseous and surrounding soft tissue structures appear grossly unchanged.                                       Medications   potassium bicarbonate disintegrating tablet 50 mEq (50 mEq Oral Given 8/21/23 0954)   potassium chloride 10 mEq in 100 mL IVPB (20 mEq Intravenous New Bag 8/21/23 1125)     Medical Decision Making  CONFUSION AND WEAKNESS AND DIAGNOSED WITH PNEUMONIA FOR WHICH HE WAS SENT FROM [facility].  RECENT ABDOMINAL SURGERY.  DDX:  INFECTIOUS VS METABOLIC VS OTHER.  ADMIT FOR IV ANTIBIOTICS AND FURTHER WORKUP IF NECESSARY.      Amount and/or Complexity of Data Reviewed  External Data Reviewed: labs and radiology.     Details: PNEUMONIA  Labs: ordered. Decision-making details documented in ED Course.  Radiology: ordered. Decision-making details documented in ED Course.  ECG/medicine tests: ordered. Decision-making details documented in ED Course.    Risk  Decision regarding hospitalization.              Attending Attestation:           Physician Attestation for Scribe:  Physician Attestation Statement for Scribe #1: IDean MD, reviewed documentation, as scribed by Marlen Turk in my presence, and it is both accurate and complete.                             Clinical Impression:   Final diagnoses:  [J18.9] Pneumonia  [R07.9] Chest pain  [G93.41] Acute metabolic encephalopathy        ED Disposition Condition    Admit                 Dean Arguello MD  09/13/23 4475

## 2023-08-19 NOTE — ASSESSMENT & PLAN NOTE
Patient with confusion and agitation at [facility] earlier today after discharged from [facility] on 8/17  Chest xray showing B/L hilar infiltrates  Start IV Vancomycin and Zosyn  Monitor vital signs, CBC and physical exam

## 2023-08-19 NOTE — ASSESSMENT & PLAN NOTE
Patient with edema throughout and was 1 of the causes for patient coming to the hospital given scrotal edema and lower extremity edema.    Patient with low albumin.    Blood pressure soft so will not stay out less than spironolactone at this time.  Patient is improving.

## 2023-08-19 NOTE — PROGRESS NOTES
[facility]  [facility] Medicine  Progress Note    Patient Name: Danny Flood  MRN: [id number]  Patient Class: OP- Observation   Admission Date: 8/18/2023  Length of Stay: 1 days  Attending Physician: Bertrand Arndt MD  Primary Care Provider: Cameron Valencia MD        Subjective:     Principal Problem:Encephalopathy, metabolic    HPI:  Patient is a 78 year-old male with PMHx of HTN, DM, GERD, dementia, and hyperammonemia who presentes to the [facility] transferred from [facility] due to confusion, agitation and swelling of his legs. Patient was discharged from [facility] yesterday s/p exploratory laparotomy with infected mesh removal, and small bowel resection with anastomosis on 8/9. Also, during last hospitalization patient had LHC and was found to have normal LV systolic function, EF 55%, trivial non-obstructive CAD on 8/15. Patient 's daughter who is present in the room state that the patient is having swelling of his groin down to his legs. Patient states that he had some chest pain earlier today that improved. Patient also reports SOB. Patient denies fever, chills, nausea, vomiting, bowel or urinary habit changes.    In the ED vital signs showed /71, HR 81, RR 16, SpO2 95% and afebrile. Blood work showed H/H 10.3/30.8 which seem stable for the last few day. No leukocytosis with WBC 6.33. . Sodium 141, potassium 3.8, BUN/Cr 6/0.54, glucose 110, lipase 45, p-BNP 1016 from 1400 four days ago. Troponin 56.3 from 1500 four days ago. EKG nsr with HR 75. Chest xray showing B/L hilar infiltrates. Patient will be admitted to the hospital for further management.      Overview/Hospital Course:  No notes on file    Interval History:     Review of Systems   Constitutional:  Negative for chills and fever.   HENT:  Negative for congestion and rhinorrhea.    Respiratory:  Positive for cough and shortness of breath.    Cardiovascular:  Negative for chest pain and palpitations.    Gastrointestinal:  Negative for abdominal distention, abdominal pain, nausea and vomiting.   Endocrine: Negative for polydipsia, polyphagia and polyuria.   Genitourinary:  Negative for decreased urine volume and dysuria.   Musculoskeletal:  Negative for myalgias.   Skin:  Positive for wound (abdomen. post op).   Neurological:  Negative for dizziness, seizures, syncope, speech difficulty and headaches.   Psychiatric/Behavioral:  Positive for agitation and confusion.    All other systems reviewed and are negative.    Objective:     Vital Signs (Most Recent):  Temp: 98.4 °F (36.9 °C) (08/19/23 1225)  Pulse: 71 (08/19/23 1225)  Resp: 17 (08/19/23 1225)  BP: (!) 103/52 (08/19/23 1225)  SpO2: (!) 94 % (08/19/23 1225) Vital Signs (24h Range):  Temp:  [97.9 °F (36.6 °C)-99.1 °F (37.3 °C)] 98.4 °F (36.9 °C)  Pulse:  [70-81] 71  Resp:  [16-18] 17  SpO2:  [94 %-97 %] 94 %  BP: (103-152)/(46-71) 103/52     Weight: 87.1 kg (192 lb 0.3 oz)  Body mass index is 27.55 kg/m².    Intake/Output Summary (Last 24 hours) at 8/19/2023 1645  Last data filed at 8/19/2023 0236  Gross per 24 hour   Intake --   Output 200 ml   Net -200 ml         Physical Exam  Vitals and nursing note reviewed.   Constitutional:       General: He is not in acute distress.     Appearance: He is normal weight. He is not ill-appearing.      Comments: Frail appearance   HENT:      Head: Normocephalic and atraumatic.      Nose: Nose normal.      Mouth/Throat:      Mouth: Mucous membranes are dry.      Pharynx: No posterior oropharyngeal erythema.   Eyes:      General: No scleral icterus.     Conjunctiva/sclera: Conjunctivae normal.      Pupils: Pupils are equal, round, and reactive to light.   Cardiovascular:      Rate and Rhythm: Normal rate and regular rhythm.      Pulses: Normal pulses.      Heart sounds: Murmur heard.   Pulmonary:      Effort: Pulmonary effort is normal. No respiratory distress.      Breath sounds: Rales present. No wheezing.   Abdominal:       General: Bowel sounds are normal. There is no distension.      Palpations: Abdomen is soft.      Tenderness: There is abdominal tenderness. There is no guarding or rebound.      Comments: Surgical wound with granulation tissue. Dressing in place, clean and dry. No signs of infection or bleeding.   Musculoskeletal:      Cervical back: No rigidity.   Skin:     Capillary Refill: Capillary refill takes less than 2 seconds.      Coloration: Skin is not jaundiced or pale.      Findings: No rash.   Neurological:      General: No focal deficit present.      Mental Status: He is alert. Mental status is at baseline.      Cranial Nerves: No cranial nerve deficit.      Sensory: No sensory deficit.      Motor: Weakness present.             Significant Labs: All pertinent labs within the past 24 hours have been reviewed.    Significant Imaging: I have reviewed all pertinent imaging results/findings within the past 24 hours.      Assessment/Plan:      * Encephalopathy, metabolic  Patient admitted with hepatic encephalopathy.  It was stated that he was confused.  This has improved over time.  May have been related to elevated ammonia versus  Infection.    8/19:  Given patient's recent surgery and profound weakness and inability to walk, family's request of the patient go to a swing bed as he is unable to be cared for at home.      Pneumonia  Patient with confusion and agitation at [facility] earlier today after discharged from [facility] on 8/17  Chest xray showing B/L hilar infiltrates  Start IV Vancomycin and Zosyn  Monitor vital signs, CBC and physical exam      Compression fracture of body of thoracic vertebra  T12 compression fracture.  Query if this contributes to patient's inability to walk.  Will discuss with family and consider whether patient should be referred to Dr. Washington, ortho spine.      Edema due to hypoalbuminemia  Patient with edema throughout and was 1 of the causes for patient coming to the hospital given scrotal  edema and lower extremity edema.    Patient with low albumin.    Blood pressure soft so will not stay out less than spironolactone at this time.  Patient is improving.      Splenorenal shunt malfunction  Malformation seen on CT abdomen pelvis from July 2023.    Query if this is contributing to increased portal pressure with pronounced varices and potential decreased liver function with hypoalbuminemia.  Patient's family state that he has a history of cirrhosis and is on Actos for this.    Follow-up with GI as outpatient.  It is notable that the patient's sister has cirrhosis and she has no risk factors for cirrhosis.    Ultrasound does not demonstrate any nodularity.  GI may consider a fibrosis scan.    US Abdomen Limited_Liver  Narrative: EXAMINATION:  US ABDOMEN LIMITED_LIVER    CLINICAL HISTORY:  eval for cirrhosis;    TECHNIQUE:  7/28/23    COMPARISON:  7/28/23.    FINDINGS:  The liver is normal size measuring up to 17 cm.  The liver does not appear to be cirrhotic.  No focal liver lesion.  The portal vein is not visualized.  One of the dilated collateral vessels within the angie hepatis is patent.    Gallbladder removed.    The common bile duct measures C 0.7 cm.    The right kidney measures 11.4 cm.  Simple cyst right kidney.    The pancreas not seen.  Impression: The liver is normal size measuring up to 17 cm.  The liver does not appear to be cirrhotic.  Correlate with need for random liver biopsy.    No focal liver lesion. The portal vein is not visualized. One of the dilated collateral vessels within the angie hepatis is patent.    Ultrasound images captured and stored.    Electronically signed by: Emiliano Valencia  Date:    08/19/2023  Time:    13:42  X-Ray Ankle Complete Left  Narrative: EXAMINATION:  XR ANKLE COMPLETE 3 VIEW LEFT    CLINICAL HISTORY:  Left ankle swelling and pain;    COMPARISON:  None    TECHNIQUE:  Frontal, lateral, and oblique views of the left ankle.    FINDINGS:  Soft tissue  "swelling.  Plantar and posterior calcaneal spurring.  No convincing acute fracture.  Impression: As above.    Point of Service: [facility]    Electronically signed by: Ghulam Waldrop  Date:    08/19/2023  Time:    07:55  US Lower Extremity Veins Left  Narrative: EXAMINATION:  US LOWER EXTREMITY VEINS LEFT    CLINICAL HISTORY:  Swelling and pain;    COMPARISON:  August 14, 2023.    TECHNIQUE:  Grayscale, spectral, and color Doppler interrogation of the left lower extremity veins was performed. Augmentation and compression was performed.    FINDINGS:  Grayscale, color Doppler, and pulsed Doppler evaluation of the veins of the left lower extremity demonstrate no evidence of deep venous thrombosis.  Impression: No evidence of deep venous thrombosis in the left lower extremity.    Point of Service: [facility]    Electronically signed by: Ghulam Waldrop  Date:    08/19/2023  Time:    07:45        Atrial fibrillation  Patient with Long standing persistent (>12 months) atrial fibrillation which is controlled currently with Beta Blocker. Patient is currently in sinus rhythm.[id number] Score: 3. HASBLED Score: 1. Anticoagulation indicated. Anticoagulation done with Eliquis.    Dementia without behavioral disturbance, psychotic disturbance, mood disturbance, or anxiety  Continue home quitiapine      Infected hernioplasty mesh  S/p exploratory laparotomy with infected mesh removal, and small bowel resection with anastomosis on 8/9  Surgical wound with granulation tissue. No signs of infection or bleeding  Continue daily dressing changes      Diabetes mellitus  Patient's FSGs are controlled on current medication regimen.  Last A1c reviewed-   Lab Results   Component Value Date    HGBA1C 5.6 11/06/2021     Most recent fingerstick glucose reviewed- No results for input(s): "POCTGLUCOSE" in the last 24 hours.  Current correctional scale  Low  Maintain anti-hyperglycemic dose as follows-   Antihyperglycemics " (From admission, onward)    Start     Stop Route Frequency Ordered    08/18/23 2115  insulin aspart U-100 injection 0-5 Units         -- SubQ Before meals & nightly PRN 08/18/23 2017        Hold Oral hypoglycemics while patient is in the hospital.    Dysphagia  History of dysphagia.    This patient is patient at risk for aspiration pneumonia and will treating for pneumonia currently.      Hypertension  Continue home amlodipine 10mg QD, losartan 100mg QD and metoprolol 12.5mg BID  Monitor BP      Hyperammonemia  History of hyperammonemia without cirrhosis. On home lactulose and rifaximim  Ammonia levels pending  Continue home lactulose and rifaximin  Monitor mental status    8/19:  Ammonia of 85.  May contribute to patient's confusion.  May be related to a liver process for which patient will be seen by GI as outpatient.      VTE Risk Mitigation (From admission, onward)         Ordered     Place sequential compression device  Until discontinued         08/18/23 2124     apixaban tablet 5 mg  2 times daily         08/18/23 2014                Discharge Planning   AMBAR:      Code Status: Full Code   Is the patient medically ready for discharge?:     Reason for patient still in hospital (select all that apply): Treatment               Bertrand Arndt MD  Department of Hospital Medicine   [facility]   Statement Selected

## 2023-08-19 NOTE — PLAN OF CARE
Problem: Adult Inpatient Plan of Care  Goal: Plan of Care Review  Outcome: Ongoing, Progressing  Flowsheets (Taken 8/19/2023 0123)  Plan of Care Reviewed With: patient     Problem: Fluid Imbalance (Pneumonia)  Goal: Fluid Balance  Outcome: Ongoing, Progressing  Intervention: Monitor and Manage Fluid Balance  Flowsheets (Taken 8/19/2023 0123)  Fluid/Electrolyte Management: fluids provided     Problem: Infection (Pneumonia)  Goal: Resolution of Infection Signs and Symptoms  Outcome: Ongoing, Progressing  Intervention: Prevent Infection Progression  Flowsheets (Taken 8/19/2023 0123)  Fever Reduction/Comfort Measures:   lightweight bedding   lightweight clothing  Infection Management: aseptic technique maintained  Isolation Precautions: precautions maintained     Problem: Respiratory Compromise (Pneumonia)  Goal: Effective Oxygenation and Ventilation  Outcome: Ongoing, Progressing  Intervention: Promote Airway Secretion Clearance  Flowsheets (Taken 8/19/2023 0123)  Breathing Techniques/Airway Clearance: deep/controlled cough encouraged  Cough And Deep Breathing: done independently per patient     Problem: Skin Injury Risk Increased  Goal: Skin Health and Integrity  Outcome: Ongoing, Progressing  Intervention: Optimize Skin Protection  Flowsheets (Taken 8/19/2023 0123)  Pressure Reduction Techniques:   frequent weight shift encouraged   weight shift assistance provided  Pressure Reduction Devices:   positioning supports utilized   foam padding utilized  Skin Protection:   incontinence pads utilized   silicone foam dressing in place   transparent dressing maintained   tubing/devices free from skin contact   skin-to-device areas padded  Head of Bed (HOB) Positioning: HOB at 20-30 degrees     Problem: Fall Injury Risk  Goal: Absence of Fall and Fall-Related Injury  Outcome: Ongoing, Progressing  Intervention: Promote Injury-Free Environment  Flowsheets (Taken 8/19/2023 0123)  Safety Promotion/Fall Prevention:   assistive  device/personal item within reach   bed alarm set   Fall Risk reviewed with patient/family   Fall Risk signage in place   family to remain at bedside   lighting adjusted   room near unit station   side rails raised x 3

## 2023-08-19 NOTE — MEDICAL/APP STUDENT
[facility]  [facility] Medicine  Progress Note    Patient Name: Danny Flood  MRN: [id number]  Patient Class: IP- Inpatient   Admission Date: 8/18/2023  Length of Stay: 1 days  Attending Physician: Bertrand Arndt MD  Primary Care Provider: Cameron Valencia MD        Subjective:     Principal Problem:Metabolic Encephalopathy     Interval History: 79 yo  male with a PMH of MI, A-fib, Dementia, Diabetes mellitus, hyperlipidemia, hypokalemia, hypernatremia, hyperammoniemia, ALIYAH,  and  small bowel resection on 8/9  due due to mesh infection, presented to the  ED on 8/18 with confusion, falls, swelling in his groin, scrotum, and lower extremities. Patient was complaining of SOB and chest pain upon admission.    Pertinent labs upon admission include: Cl 108, BUN 6, Cr 0.52, glucose 110,total protein 5.4, albumin 2.1, lipae 45, protein UA 10.  An AP CXR  was performed and showed bilateral infrahilar atelectasis/infiltrate, A BNP collected was 1.016, with a normal troponin.    Today the patient appeared drowsy, dozing between conversations. Patient was A&O x3 and was able to follow conversation when spoken to. History was also collected from his daughter Mary Ovalle. Patient states that he is no longer feeling short of breath but he does feel like there is something he needs to cough up. He has been eating well and urinating at his normal baseline per his daughter. Patient states he is sleeping well.  Patient states his last bowel movement was last night, however the daughter states that she doubts that is true, as she has been with him for 24 hours and has not witnessed it. He admits to feeling nauseous periodically.      Review of Systems   Constitutional:  Negative for appetite change.   Respiratory:  Positive for cough. Negative for chest tightness, shortness of breath and wheezing.    Cardiovascular:  Positive for leg swelling. Negative for chest pain.   Gastrointestinal:   Positive for abdominal pain and nausea.   Genitourinary:  Negative for difficulty urinating.   Neurological:  Positive for weakness.   Psychiatric/Behavioral:  Negative for confusion and sleep disturbance.      Objective:     Vital Signs (Most Recent):  Temp: 97.9 °F (36.6 °C) (08/19/23 0730)  Pulse: 70 (08/19/23 0730)  Resp: 17 (08/19/23 0730)  BP: (!) 108/46 (08/19/23 0730)  SpO2: (!) 94 % (08/19/23 0730) Vital Signs (24h Range):  Temp:  [97.9 °F (36.6 °C)-99.1 °F (37.3 °C)] 97.9 °F (36.6 °C)  Pulse:  [70-83] 70  Resp:  [16-20] 17  SpO2:  [94 %-97 %] 94 %  BP: (108-152)/(46-71) 108/46     Weight: 87.1 kg (192 lb 0.3 oz)  Body mass index is 27.55 kg/m².    Intake/Output Summary (Last 24 hours) at 8/19/2023 1002  Last data filed at 8/19/2023 0236  Gross per 24 hour   Intake --   Output 200 ml   Net -200 ml      Physical Exam  Constitutional:       Appearance: Normal appearance.      Comments: drowsy   HENT:      Head: Normocephalic and atraumatic.   Eyes:      Extraocular Movements: Extraocular movements intact.      Conjunctiva/sclera: Conjunctivae normal.      Pupils: Pupils are equal, round, and reactive to light.   Cardiovascular:      Rate and Rhythm: Normal rate. Rhythm irregular.      Heart sounds: Murmur heard.   Pulmonary:      Breath sounds: Normal breath sounds.   Abdominal:      General: There is distension.      Tenderness: There is no abdominal tenderness.      Comments: Bowel resection 8/9. Non-tender to palpation   Genitourinary:     Penis: Normal.       Testes: Normal.   Musculoskeletal:         General: Swelling and deformity present. No tenderness.      Right lower leg: Edema present.      Left lower leg: Edema present.      Comments: 1+ pitting edema left LE   Skin:     General: Skin is warm and dry.      Capillary Refill: Capillary refill takes less than 2 seconds.   Neurological:      Mental Status: He is oriented to person, place, and time.      GCS: GCS eye subscore is 3. GCS verbal subscore  is 5. GCS motor subscore is 6.      Sensory: Sensation is intact.      Gait: Gait abnormal.      Comments: Pt is unable to ambulate   Psychiatric:         Mood and Affect: Mood normal.         Behavior: Behavior normal.     Pertinent Labs:    RBC 2.92    Hemoglobin 9.6    Hematocrit 27.6    Neutrophils Relative 74.2    Lymph % 12.2    Mono % 8.0    Eosinophil % 4.6    Immature Granulocytes 0.5    Lymph # 0.95      Protime 16.7   K- 3.3  Cl- 109  BUN-6  Cr:0.52  Glucose-138  Ca:7.7  Ammonia 85    CRP 2.42              Assessment/Plan:    8/18: 77 yo  male with a PMH of MI, A-fib, hyperlipidemia, hypokalemia, hypernatremia, ALIYAH,  and  small bowel resection on 8/9 presented to the  ED on 8/18 with confusion, falls, swelling in his groin and lower extremities.    Metabolic encephalopathy:  Daughter states patient was confused at home after discharge on 8/17  Due to possible metabolic encephalopathy  Today patient was A & Ox3 and returned to his baseline per daughter.    Possible Pneumonia:  bilateral infrahilar atelectasis/infiltrate  Continue IV Vancomycin and Zosyn  Continue monitoring CBC, O2 stats, vitals    3.  Infected hernioplasty mesh:  Recent bowel resection 8/9  Patient currently experiencing no tenderness to palpation on abdomen  No changes to current regimen  Change dressing and repack BID    4. Splenorenal shunt:  Splenorenal shunt seen on CT 7/28/23  Narrative & Impression  EXAMINATION:  CT ABDOMEN PELVIS WITH CONTRAST     CLINICAL HISTORY:  Abdominal pain, acute, nonlocalized; Unspecified abdominal pain     TECHNIQUE:  Low dose axial images, sagittal and coronal reformations were obtained from the lung bases to the pubic symphysis following the IV administration of 100 mL of Isovue 370 and the oral administration of contrast.     The CT examination was performed using one or more of the following dose reduction techniques: Automated exposure control, adjustment of the mA and kV according to  patient's size, use of acute or iterative reconstruction techniques.     COMPARISON:  11/10/2021     FINDINGS:  Lung bases are clear.     No focal hepatic lesion.  Gallbladder absent.  Mild degree of intra hepatic biliary ductal dilatation is similar to prior.  Pancreas and pancreatic duct appear normal.  Adrenals normal.  Simple appearing cyst inferior pole left kidney is unchanged.  No hydronephrosis or solid renal lesion.  Spleen is unremarkable.     Splenorenal shunt and prominent varices right lower quadrant draining to the IVC are unchanged.  Cavernous transformation of the portal vein.     Abdominal aorta is normal in course and caliber.     No pneumoperitoneum.  No ascites.  No adenopathy.  There is no bowel obstruction.  A few colonic diverticular seen.  No acute bowel abnormality.  There is surgical change from previous anterior abdominal wall repair with bowel loops adjacent to the abdominal wall.  No inflammatory change identified.  There is also a defect in the right upper quadrant anterior abdominal wall that is similar to prior with a small amount of colon that extends into this defect also without inflammatory change.     No adenopathy.  Urinary bladder unremarkable.  Prostate moderately enlarged.     T12 compression fracture is again seen.  No acute fracture.  Multilevel degenerative change.     Impression:     No acute abnormality identified.     Postsurgical changes of the anterior abdominal wall.  No defined fluid collection.        Electronically signed by: Frank Lopez  Date:                                            07/28/2023  Refer to out patient gastroenterologist    5. Hypervolemia:  Swelling in lower extremities and scrotum present at admission  Scrotal swelling has improved, however 1+ pitting edema present in LE.  Continue to monitor    6. Hyperammonemia:   Recent ammonia level of 85  On home lactulose and rifaximim  Continue home regimen    7. Cirrhosis:  Per patient's daughter pt  has a  diagnosis of cirrhosis, takes at home rifaimin  Hold rifaximin due to propensity to increase swelling    8. Scrotal swelling:  Present upon admission, likely due to hypervolemia  Swelling has improved    9. T12 compression fracture:  Seen on most recent abdominal/pelvic CT 7/28/23, appears to be a long standing issue per patient's history    10 Atrial Fibrillation:  [id number] score: 5  Continue antiplatelet therapy with apixaban  Continue Beta blocker      11. Dementia:  Previous Dx of dementia  No changes to current regimen- continue Seroquel       12. Hypertension:  Most recent BP: 108/46 mmHg  Continue regimen of losartan and amlodipine  Continue monitoring vitals    13. Diabetes Mellitus   HGBA1C 5.6 11/06/2021   Last Glucose-138  No changes to current regimen                    VTE Risk Mitigation (From admission, onward)           Ordered     Place sequential compression device  Until discontinued         08/18/23 2124     apixaban tablet 5 mg  2 times daily         08/18/23 2014                       [facility]   [facility]

## 2023-08-19 NOTE — PROGRESS NOTES
[facility]  Adult Nutrition  First Assessment Note         Reason for Assessment  Reason For Assessment: identified at risk by screening criteria (MST score)   Nutrition Risk Screen: no indicators present    Assessment and Plan  Patient is a 78 year-old male with PMHx of HTN, DM, GERD, dementia, and hyperammonemia who presentes to the [facility] transferred from [facility] due to confusion, agitation and swelling of his legs. Patient was discharged from [facility] yesterday s/p exploratory laparotomy with infected mesh removal, and small bowel resection with anastomosis on 8/9. Patient admitted with hepatic encephalopathy.  It was stated that he was confused.  This has improved over time.  May have been related to elevated ammonia versus  Infection.     8/19:  Given patient's recent surgery and profound weakness and inability to walk, family's request of the patient go to a swing bed as he is unable to be cared for at home.    RD assessment of pt due to MST score. Weight history:  8/9-180  8/  8/  Weight currently indicates weight loss of ~13 lbs/6.3% body weight in the past 5 days. Possible weight changes are related to changes in volume status, but unsure. Pt documented consuming % of meals since readmission. Cannot determine malnutrition at this time. Will plan full NFPE when next available. Recommend continuing consistent CHO diet, but increasing to 2000 calorie diet to better meet estimated needs and encouraging po intakes. IF intakes decline, consider addition of Ensure Max protein TID(if/when pt ammonia level normalizes/no longer with symptoms of encephalopathy r/t hyperammonemia).     Last BM 8/16. Monitor labs, meds, weights, po intakes, updates in pt condition and adjust nutrition recommendations as appropriate. RD following.     Malnutrition  Unable to determine at this time. Possible 13 lb weight loss/6% body weight in the past 5 days. Unsure appetite/po intakes in that  time, plan for full NFPE and discussion of appetite/po when next available.     Skin Integrity  Evans Risk Assessment  Sensory Perception: 4-->no impairment  Moisture: 3-->occasionally moist  Activity: 1-->bedfast  Mobility: 2-->very limited  Nutrition: 2-->probably inadequate  Friction and Shear: 2-->potential problem  Evans Score: 14    Nutrition Diagnosis  Altered Nutrition Related Lab Values   related to Diabetes Mellitus as evidenced by elevated glucose levels    Nutrition Diagnosis Status: Chronic/ continues  Comments: pt receiving a consistent CHO 1800 calorie diet   Nutrition Risk  Level of Risk/Frequency of Follow-up: high    Recent Labs   Lab 08/17/23  1531 08/18/23 2008 08/19/23  0609   GLU  --    < > 138*   POCGLU 114*  --   --     < > = values in this interval not displayed.     Comments on Glucose: hx DM   Nutrition Prescription / Recommendations  Recommendation/Intervention: Will plan full NFPE when next available. Recommend continuing consistent CHO diet, but increasing to 2000 calorie diet to better meet estimated needs and encouraging po intakes. IF intakes decline, consider addition of Ensure Max protein TID(if/when pt ammonia level normalizes/no longer with symptoms of encephalopathy r/t hyperammonemia).  Goals: adequate po intakes to meet needs, weight stability, lab stability/normalization  Nutrition Goal Status: new  Current Diet Order: Consistent Carbohydrate 1800 calorie  Chewing or Swallowing Difficulty?: No Chewing or swallowing difficulty  Recommended Diet: Consistent Carbohydrate 2000 (75g Carbs)  Recommended Oral Supplement: No Oral Supplements  Is Nutrition Support Recommended: No  Is Education Recommended: No  Monitor and Evaluation  % current Intake: P.O. intake of 75 - 100 %  % intake to meet estimated needs: 75 - 100 %  Food and Nutrient Intake: energy intake  Food and Nutrient Adminstration: diet order  Anthropometric Measurements: weight, weight change  Biochemical Data,  "Medical Tests and Procedures: electrolyte and renal panel, gastrointestinal profile, glucose/endocrine profile, inflammatory profile, lipid profile  Nutrition-Focused Physical Findings: overall appearance     Current Medical Diagnosis and Past Medical History  Diagnosis: other (see comments) (metabolic encephalopathy)  Past Medical History:   Diagnosis Date    Diverticulitis     Hyperlipemia     Hypokalemia 11/09/2021     Nutrition/Diet History     Lab/Procedures/Meds  Recent Labs   Lab 08/18/23 2008 08/19/23  0609    141   K 3.8 3.3*   BUN 6* 6*   CREATININE 0.54* 0.52*   CALCIUM 7.6* 7.7*   ALBUMIN 2.1*  --    * 109*   ALT 25  --    AST 31  --      Last A1c:   Lab Results   Component Value Date    HGBA1C 5.6 11/06/2021     Lab Results   Component Value Date    RBC 2.92 (L) 08/19/2023    HGB 9.6 (L) 08/19/2023    HCT 27.6 (L) 08/19/2023    MCV 94.5 08/19/2023    MCH 32.9 (H) 08/19/2023    MCHC 34.8 08/19/2023    TIBC 216 (L) 11/08/2021     Pertinent Labs Reviewed: reviewed  Pertinent Labs Comments: K 3.3(L)-recommend consider repletion to WNL, Cl 109(H)-poss r/t volume status, BUN 6(L), Creat 0.52(L)-poss r/t volume status, (H)-hx DM, Ca 7.7(L)-recommend consider repletion to WNL, ammonia 85(H), CRP 2.42(H) unsure etiology, will monitor trends   Pertinent Medications Reviewed: reviewed  Pertinent Medications Comments: amlodipine, apixaban, folic acid vitamin B6 vitamin B12, lactobacillus acidophilus, lactulose, losartan, lopressor, niacin, pantoprazole, zosyn, quetiapine, rifaximin, vancomycin  Anthropometrics  Temp: 98.4 °F (36.9 °C)  Height Method: Stated  Height: 5' 10" (177.8 cm)  Height (inches): 70 in  Weight Method: Bed Scale  Weight: 87.1 kg (192 lb 0.3 oz)  Weight (lb): 192.02 lb  Ideal Body Weight (IBW), Male: 166 lb  % Ideal Body Weight, Male (lb): 115.67 %  BMI (Calculated): 27.6     Estimated/Assessed Needs    Weight Used For Calorie Calculations: 87.1 kg (192 lb 0.3 oz)   Energy " Need Method: Kcal/kg Energy Calorie Requirements (kcal): 0576-4021 kcal (25-30 kcal/kg)  Weight Used For Protein Calculations: 87.1 kg (192 lb 0.3 oz)  Protein Requirements:  g PRO (1.0-1.2 g PRO/kg)       RDA Method (mL): 2177     Nutrition by Nursing     Intake (%): 75%        Last Bowel Movement: 08/16/23              Nutrition Follow-Up  RD Follow-up?: Yes

## 2023-08-19 NOTE — ASSESSMENT & PLAN NOTE
Malformation seen on CT abdomen pelvis from July 2023.    Query if this is contributing to increased portal pressure with pronounced varices and potential decreased liver function with hypoalbuminemia.  Patient's family state that he has a history of cirrhosis and is on Actos for this.    Follow-up with GI as outpatient.  It is notable that the patient's sister has cirrhosis and she has no risk factors for cirrhosis.    Ultrasound does not demonstrate any nodularity.  GI may consider a fibrosis scan.    US Abdomen Limited_Liver  Narrative: EXAMINATION:  US ABDOMEN LIMITED_LIVER    CLINICAL HISTORY:  eval for cirrhosis;    TECHNIQUE:  7/28/23    COMPARISON:  7/28/23.    FINDINGS:  The liver is normal size measuring up to 17 cm.  The liver does not appear to be cirrhotic.  No focal liver lesion.  The portal vein is not visualized.  One of the dilated collateral vessels within the angie hepatis is patent.    Gallbladder removed.    The common bile duct measures C 0.7 cm.    The right kidney measures 11.4 cm.  Simple cyst right kidney.    The pancreas not seen.  Impression: The liver is normal size measuring up to 17 cm.  The liver does not appear to be cirrhotic.  Correlate with need for random liver biopsy.    No focal liver lesion. The portal vein is not visualized. One of the dilated collateral vessels within the angie hepatis is patent.    Ultrasound images captured and stored.    Electronically signed by: Emiliano Valencia  Date:    08/19/2023  Time:    13:42  X-Ray Ankle Complete Left  Narrative: EXAMINATION:  XR ANKLE COMPLETE 3 VIEW LEFT    CLINICAL HISTORY:  Left ankle swelling and pain;    COMPARISON:  None    TECHNIQUE:  Frontal, lateral, and oblique views of the left ankle.    FINDINGS:  Soft tissue swelling.  Plantar and posterior calcaneal spurring.  No convincing acute fracture.  Impression: As above.    Point of Service: [facility]    Electronically signed by: Ghulam  Palma  Date:    08/19/2023  Time:    07:55  US Lower Extremity Veins Left  Narrative: EXAMINATION:  US LOWER EXTREMITY VEINS LEFT    CLINICAL HISTORY:  Swelling and pain;    COMPARISON:  August 14, 2023.    TECHNIQUE:  Grayscale, spectral, and color Doppler interrogation of the left lower extremity veins was performed. Augmentation and compression was performed.    FINDINGS:  Grayscale, color Doppler, and pulsed Doppler evaluation of the veins of the left lower extremity demonstrate no evidence of deep venous thrombosis.  Impression: No evidence of deep venous thrombosis in the left lower extremity.    Point of Service: [facility]    Electronically signed by: Ghulam Waldrop  Date:    08/19/2023  Time:    07:45

## 2023-08-19 NOTE — CONSULTS
Pharmacy consulted to dose Vancomycin.    Based on pt wt of 93 kg and Scr 0.54, the following therapy will be initiated:    Vancomycin 1750 mg IV q 18 hrs. Trough prior 4th dose.    Goal trough of 15-20.    Pharmacy to follow daily and make necessary adjustments.    Thank you.

## 2023-08-19 NOTE — ASSESSMENT & PLAN NOTE
Patient admitted with hepatic encephalopathy.  It was stated that he was confused.  This has improved over time.  May have been related to elevated ammonia versus  Infection.    8/19:  Given patient's recent surgery and profound weakness and inability to walk, family's request of the patient go to a swing bed as he is unable to be cared for at home.

## 2023-08-19 NOTE — ASSESSMENT & PLAN NOTE
History of dysphagia.    This patient is patient at risk for aspiration pneumonia and will treating for pneumonia currently.

## 2023-08-19 NOTE — HPI
Patient is a 78 year-old male with PMHx of HTN, DM, GERD, dementia, and hyperammonemia who presentes to the [facility] transferred from [facility] due to confusion, agitation and swelling of his legs. Patient was discharged from [facility] yesterday s/p exploratory laparotomy with infected mesh removal, and small bowel resection with anastomosis on 8/9. Also, during last hospitalization patient had LHC and was found to have normal LV systolic function, EF 55%, trivial non-obstructive CAD on 8/15. Patient 's daughter who is present in the room state that the patient is having swelling of his groin down to his legs. Patient states that he had some chest pain earlier today that improved. Patient also reports SOB. Patient denies fever, chills, nausea, vomiting, bowel or urinary habit changes.    In the ED vital signs showed /71, HR 81, RR 16, SpO2 95% and afebrile. Blood work showed H/H 10.3/30.8 which seem stable for the last few day. No leukocytosis with WBC 6.33. . Sodium 141, potassium 3.8, BUN/Cr 6/0.54, glucose 110, lipase 45, p-BNP 1016 from 1400 four days ago. Troponin 56.3 from 1500 four days ago. EKG nsr with HR 75. Chest xray showing B/L hilar infiltrates. Patient will be admitted to the hospital for further management.

## 2023-08-19 NOTE — ASSESSMENT & PLAN NOTE
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 On home lactulose and rifaximim  Ammonia levels pending  Continue home lactulose and rifaximin  Monitor mental status    8/19:  Ammonia of 85.  May contribute to patient's confusion.  May be related to a liver process for which patient will be seen by GI as outpatient.

## 2023-08-19 NOTE — ASSESSMENT & PLAN NOTE
S/p exploratory laparotomy with infected mesh removal, and small bowel resection with anastomosis on 8/9  Surgical wound with granulation tissue. No signs of infection or bleeding  Continue daily dressing changes

## 2023-08-19 NOTE — ASSESSMENT & PLAN NOTE
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 On home lactulose and rifaximim  Ammonia levels pending  Continue home lactulose and rifaximin  Monitor mental status

## 2023-08-19 NOTE — ASSESSMENT & PLAN NOTE
Patient with confusion and agitation at [facility] earlier today after discharged from [facility] on 8/17  Chest xray showing B/L hilar infiltrates  Start IV Vancomycin and Zosyn  Monitor vital signs, AM labs and physical exam

## 2023-08-19 NOTE — ASSESSMENT & PLAN NOTE
"Patient's FSGs are controlled on current medication regimen.  Last A1c reviewed-   Lab Results   Component Value Date    HGBA1C 5.6 11/06/2021     Most recent fingerstick glucose reviewed- No results for input(s): "POCTGLUCOSE" in the last 24 hours.  Current correctional scale  Low  Maintain anti-hyperglycemic dose as follows-   Antihyperglycemics (From admission, onward)    Start     Stop Route Frequency Ordered    08/18/23 211  insulin aspart U-100 injection 0-5 Units         -- SubQ Before meals & nightly PRN 08/18/23 2017        Hold Oral hypoglycemics while patient is in the hospital.  "

## 2023-08-19 NOTE — ASSESSMENT & PLAN NOTE
Patient with Long standing persistent (>12 months) atrial fibrillation which is controlled currently with Beta Blocker. Patient is currently in sinus rhythm.[id number] Score: 3. HASBLED Score: 1. Anticoagulation indicated. Anticoagulation done with Eliquis.

## 2023-08-19 NOTE — SUBJECTIVE & OBJECTIVE
Past Medical History:   Diagnosis Date    Diverticulitis     Hyperlipemia     Hypokalemia 11/09/2021       Past Surgical History:   Procedure Laterality Date    COLON SURGERY      partial colon resection    EXCISION, SMALL INTESTINE N/A 8/9/2023    Procedure: EXCISION, SMALL INTESTINE;  Surgeon: Tesfaye Natarajan MD;  Location: [facility] OR;  Service: General;  Laterality: N/A;    HERNIA REPAIR      LAPAROTOMY, EXPLORATORY N/A 8/9/2023    Procedure: LAPAROTOMY, EXPLORATORY;  Surgeon: Tesfaye Natarajan MD;  Location: [facility] OR;  Service: General;  Laterality: N/A;    LEFT HEART CATHETERIZATION Left 8/15/2023    Procedure: Left heart cath;  Surgeon: Edinson Torres DO;  Location: [facility] CATH LAB;  Service: Cardiology;  Laterality: Left;    REMOVAL OF IMPLANT N/A 8/9/2023    Procedure: REMOVAL, IMPLANT;  Surgeon: Tesfaye Natarajan MD;  Location: [facility] OR;  Service: General;  Laterality: N/A;  removal mesh       Review of patient's allergies indicates:  No Known Allergies    Current Facility-Administered Medications on File Prior to Encounter   Medication    [DISCONTINUED] 0.45% NaCl infusion    [DISCONTINUED] acetaminophen tablet 650 mg    [DISCONTINUED] apixaban tablet 5 mg    [DISCONTINUED] aspirin EC tablet 81 mg    [DISCONTINUED] atorvastatin tablet 40 mg    [DISCONTINUED] dextrose 10% bolus 125 mL 125 mL    [DISCONTINUED] dextrose 10% bolus 250 mL 250 mL    [DISCONTINUED] glucagon (human recombinant) injection 1 mg    [DISCONTINUED] glucose chewable tablet 16 g    [DISCONTINUED] glucose chewable tablet 24 g    [DISCONTINUED] hydrALAZINE injection 10 mg    [DISCONTINUED] insulin aspart U-100 injection 0-5 Units    [DISCONTINUED] lactated ringers infusion    [DISCONTINUED] lactulose 20 gram/30 mL solution Soln 20 g    [DISCONTINUED] metoprolol tartrate (LOPRESSOR) split tablet 12.5 mg    [DISCONTINUED] naloxone 0.4 mg/mL injection 0.4 mg    [DISCONTINUED] ondansetron disintegrating tablet 8 mg     [DISCONTINUED] pantoprazole injection 40 mg    [DISCONTINUED] QUEtiapine tablet 50 mg    [DISCONTINUED] rifAXIMin tablet 550 mg    [DISCONTINUED] timolol maleate 0.5% ophthalmic solution 1 drop     Current Outpatient Medications on File Prior to Encounter   Medication Sig    amLODIPine (NORVASC) 10 MG tablet Take 1 tablet (10 mg total) by mouth once daily.    apixaban (ELIQUIS) 5 mg Tab Take 1 tablet (5 mg total) by mouth 2 (two) times daily. for 30 doses    FOLBIC 2.5-25-2 mg Tab Take 1 tablet by mouth once daily.    FOLIC ACID-VITAMIN B6-VIT B12 ORAL Take 1 tablet by mouth once daily.    HYDROcodone-acetaminophen (NORCO) 5-325 mg per tablet Take 1 tablet by mouth every 6 (six) hours as needed for Pain.    Lactobacillus acidophilus (PROBIOTIC) 10 billion cell Cap Take by mouth.    lactulose (CHRONULAC) 10 gram/15 mL solution Take 30 mLs (20 g total) by mouth 3 (three) times daily.    losartan (COZAAR) 100 MG tablet Take 1 tablet (100 mg total) by mouth once daily.    metoprolol tartrate (LOPRESSOR) 25 MG tablet Take 0.5 tablets (12.5 mg total) by mouth 2 (two) times daily.    niacin (SLO-NIACIN) 500 mg tablet Take 500 mg by mouth once daily. With meal    omega-3 fatty acids/fish oil (FISH OIL-OMEGA-3 FATTY ACIDS) 300-1,000 mg capsule Take 1 capsule by mouth once daily.    pantoprazole (PROTONIX) 40 MG tablet Take 1 tablet (40 mg total) by mouth 2 (two) times daily.    pioglitazone (ACTOS) 15 MG tablet Take 15 mg by mouth.    QUEtiapine (SEROQUEL) 50 MG tablet 1 tablet (50 mg total) by Per NG tube route 2 (two) times daily.    rifAXIMin (XIFAXAN) 550 mg Tab 1 tablet (550 mg total) by Per G Tube route 2 (two) times daily.    timolol maleate 0.5% (TIMOPTIC) 0.5 % Drop 1 drop once daily.     Family History       Problem Relation (Age of Onset)    Diabetes Mother, Father          Tobacco Use    Smoking status: Never    Smokeless tobacco: Never   Substance and Sexual Activity    Alcohol use: Never    Drug use: Never     Sexual activity: Not Currently     Review of Systems   Constitutional:  Negative for chills and fever.   HENT:  Negative for congestion and rhinorrhea.    Respiratory:  Positive for cough and shortness of breath.    Cardiovascular:  Negative for chest pain and palpitations.   Gastrointestinal:  Negative for abdominal distention, abdominal pain, nausea and vomiting.   Endocrine: Negative for polydipsia, polyphagia and polyuria.   Genitourinary:  Negative for decreased urine volume and dysuria.   Musculoskeletal:  Negative for myalgias.   Skin:  Positive for wound (abdomen. post op).   Neurological:  Negative for dizziness, seizures, syncope, speech difficulty and headaches.   Psychiatric/Behavioral:  Positive for agitation and confusion.    All other systems reviewed and are negative.    Objective:     Vital Signs (Most Recent):  Temp: 98.5 °F (36.9 °C) (08/18/23 1933)  Pulse: 77 (08/18/23 2001)  Resp: 16 (08/18/23 1933)  BP: (!) 152/62 (08/18/23 2001)  SpO2: 97 % (08/18/23 2001) Vital Signs (24h Range):  Temp:  [98.5 °F (36.9 °C)] 98.5 °F (36.9 °C)  Pulse:  [77-83] 77  Resp:  [16-20] 16  SpO2:  [95 %-97 %] 97 %  BP: (142-152)/(62-71) 152/62     Weight: 93 kg (205 lb)  Body mass index is 29.41 kg/m².     Physical Exam  Vitals and nursing note reviewed.   Constitutional:       General: He is not in acute distress.     Appearance: He is normal weight. He is not ill-appearing.      Comments: Frail appearance   HENT:      Head: Normocephalic and atraumatic.      Nose: Nose normal.      Mouth/Throat:      Mouth: Mucous membranes are dry.      Pharynx: No posterior oropharyngeal erythema.   Eyes:      General: No scleral icterus.     Conjunctiva/sclera: Conjunctivae normal.      Pupils: Pupils are equal, round, and reactive to light.   Cardiovascular:      Rate and Rhythm: Normal rate and regular rhythm.      Pulses: Normal pulses.      Heart sounds: Murmur heard.   Pulmonary:      Effort: Pulmonary effort is normal. No  respiratory distress.      Breath sounds: Rales present. No wheezing.   Abdominal:      General: Bowel sounds are normal. There is no distension.      Palpations: Abdomen is soft.      Tenderness: There is abdominal tenderness. There is no guarding or rebound.      Comments: Surgical wound with granulation tissue. Dressing in place, clean and dry. No signs of infection or bleeding.   Musculoskeletal:      Cervical back: No rigidity.   Skin:     Capillary Refill: Capillary refill takes less than 2 seconds.      Coloration: Skin is not jaundiced or pale.      Findings: No rash.   Neurological:      General: No focal deficit present.      Mental Status: He is alert. Mental status is at baseline.      Cranial Nerves: No cranial nerve deficit.      Sensory: No sensory deficit.      Motor: Weakness present.              CRANIAL NERVES     CN III, IV, VI   Pupils are equal, round, and reactive to light.       Significant Labs: All pertinent labs within the past 24 hours have been reviewed.    Significant Imaging: I have reviewed all pertinent imaging results/findings within the past 24 hours.

## 2023-08-19 NOTE — H&P
[facility]  [facility] Medicine  History & Physical    Patient Name: Danny Flood  MRN: [id number]  Patient Class: IP- Inpatient  Admission Date: 8/18/2023  Attending Physician: Ruben Rock MD   Primary Care Provider: Cameron Valencia MD         Patient information was obtained from patient, relative(s), past medical records and ER records.     Subjective:     Principal Problem:Pneumonia    Chief Complaint:   Chief Complaint   Patient presents with    Abdominal Pain     Ems from [facility] - sent for admit with pneumonia  - recent abd sx here         HPI: Patient is a 78 year-old male with PMHx of HTN, DM, GERD, dementia, and hyperammonemia who presentes to the [facility] transferred from [facility] due to confusion, agitation and swelling of his legs. Patient was discharged from [facility] yesterday s/p exploratory laparotomy with infected mesh removal, and small bowel resection with anastomosis on 8/9. Also, during last hospitalization patient had LHC and was found to have normal LV systolic function, EF 55%, trivial non-obstructive CAD on 8/15. Patient 's daughter who is present in the room state that the patient is having swelling of his groin down to his legs. Patient states that he had some chest pain earlier today that improved. Patient also reports SOB. Patient denies fever, chills, nausea, vomiting, bowel or urinary habit changes.    In the ED vital signs showed /71, HR 81, RR 16, SpO2 95% and afebrile. Blood work showed H/H 10.3/30.8 which seem stable for the last few day. No leukocytosis with WBC 6.33. . Sodium 141, potassium 3.8, BUN/Cr 6/0.54, glucose 110, lipase 45, p-BNP 1016 from 1400 four days ago. Troponin 56.3 from 1500 four days ago. EKG nsr with HR 75. Chest xray showing B/L hilar infiltrates. Patient will be admitted to the hospital for further management.      Past Medical History:   Diagnosis Date    Diverticulitis     Hyperlipemia     Hypokalemia 11/09/2021       Past  Surgical History:   Procedure Laterality Date    COLON SURGERY      partial colon resection    EXCISION, SMALL INTESTINE N/A 8/9/2023    Procedure: EXCISION, SMALL INTESTINE;  Surgeon: Tesfaye Natarajan MD;  Location: [facility] OR;  Service: General;  Laterality: N/A;    HERNIA REPAIR      LAPAROTOMY, EXPLORATORY N/A 8/9/2023    Procedure: LAPAROTOMY, EXPLORATORY;  Surgeon: Tesfaye Natarajan MD;  Location: [facility] OR;  Service: General;  Laterality: N/A;    LEFT HEART CATHETERIZATION Left 8/15/2023    Procedure: Left heart cath;  Surgeon: Edinson Torres DO;  Location: [facility] CATH LAB;  Service: Cardiology;  Laterality: Left;    REMOVAL OF IMPLANT N/A 8/9/2023    Procedure: REMOVAL, IMPLANT;  Surgeon: Tesfaye Natarajan MD;  Location: [facility] OR;  Service: General;  Laterality: N/A;  removal mesh       Review of patient's allergies indicates:  No Known Allergies    Current Facility-Administered Medications on File Prior to Encounter   Medication    [DISCONTINUED] 0.45% NaCl infusion    [DISCONTINUED] acetaminophen tablet 650 mg    [DISCONTINUED] apixaban tablet 5 mg    [DISCONTINUED] aspirin EC tablet 81 mg    [DISCONTINUED] atorvastatin tablet 40 mg    [DISCONTINUED] dextrose 10% bolus 125 mL 125 mL    [DISCONTINUED] dextrose 10% bolus 250 mL 250 mL    [DISCONTINUED] glucagon (human recombinant) injection 1 mg    [DISCONTINUED] glucose chewable tablet 16 g    [DISCONTINUED] glucose chewable tablet 24 g    [DISCONTINUED] hydrALAZINE injection 10 mg    [DISCONTINUED] insulin aspart U-100 injection 0-5 Units    [DISCONTINUED] lactated ringers infusion    [DISCONTINUED] lactulose 20 gram/30 mL solution Soln 20 g    [DISCONTINUED] metoprolol tartrate (LOPRESSOR) split tablet 12.5 mg    [DISCONTINUED] naloxone 0.4 mg/mL injection 0.4 mg    [DISCONTINUED] ondansetron disintegrating tablet 8 mg    [DISCONTINUED] pantoprazole injection 40 mg    [DISCONTINUED] QUEtiapine tablet 50 mg    [DISCONTINUED] rifAXIMin tablet  550 mg    [DISCONTINUED] timolol maleate 0.5% ophthalmic solution 1 drop     Current Outpatient Medications on File Prior to Encounter   Medication Sig    amLODIPine (NORVASC) 10 MG tablet Take 1 tablet (10 mg total) by mouth once daily.    apixaban (ELIQUIS) 5 mg Tab Take 1 tablet (5 mg total) by mouth 2 (two) times daily. for 30 doses    FOLBIC 2.5-25-2 mg Tab Take 1 tablet by mouth once daily.    FOLIC ACID-VITAMIN B6-VIT B12 ORAL Take 1 tablet by mouth once daily.    HYDROcodone-acetaminophen (NORCO) 5-325 mg per tablet Take 1 tablet by mouth every 6 (six) hours as needed for Pain.    Lactobacillus acidophilus (PROBIOTIC) 10 billion cell Cap Take by mouth.    lactulose (CHRONULAC) 10 gram/15 mL solution Take 30 mLs (20 g total) by mouth 3 (three) times daily.    losartan (COZAAR) 100 MG tablet Take 1 tablet (100 mg total) by mouth once daily.    metoprolol tartrate (LOPRESSOR) 25 MG tablet Take 0.5 tablets (12.5 mg total) by mouth 2 (two) times daily.    niacin (SLO-NIACIN) 500 mg tablet Take 500 mg by mouth once daily. With meal    omega-3 fatty acids/fish oil (FISH OIL-OMEGA-3 FATTY ACIDS) 300-1,000 mg capsule Take 1 capsule by mouth once daily.    pantoprazole (PROTONIX) 40 MG tablet Take 1 tablet (40 mg total) by mouth 2 (two) times daily.    pioglitazone (ACTOS) 15 MG tablet Take 15 mg by mouth.    QUEtiapine (SEROQUEL) 50 MG tablet 1 tablet (50 mg total) by Per NG tube route 2 (two) times daily.    rifAXIMin (XIFAXAN) 550 mg Tab 1 tablet (550 mg total) by Per G Tube route 2 (two) times daily.    timolol maleate 0.5% (TIMOPTIC) 0.5 % Drop 1 drop once daily.     Family History       Problem Relation (Age of Onset)    Diabetes Mother, Father          Tobacco Use    Smoking status: Never    Smokeless tobacco: Never   Substance and Sexual Activity    Alcohol use: Never    Drug use: Never    Sexual activity: Not Currently     Review of Systems   Constitutional:  Negative for chills and fever.   HENT:  Negative  for congestion and rhinorrhea.    Respiratory:  Positive for cough and shortness of breath.    Cardiovascular:  Negative for chest pain and palpitations.   Gastrointestinal:  Negative for abdominal distention, abdominal pain, nausea and vomiting.   Endocrine: Negative for polydipsia, polyphagia and polyuria.   Genitourinary:  Negative for decreased urine volume and dysuria.   Musculoskeletal:  Negative for myalgias.   Skin:  Positive for wound (abdomen. post op).   Neurological:  Negative for dizziness, seizures, syncope, speech difficulty and headaches.   Psychiatric/Behavioral:  Positive for agitation and confusion.    All other systems reviewed and are negative.    Objective:     Vital Signs (Most Recent):  Temp: 98.5 °F (36.9 °C) (08/18/23 1933)  Pulse: 77 (08/18/23 2001)  Resp: 16 (08/18/23 1933)  BP: (!) 152/62 (08/18/23 2001)  SpO2: 97 % (08/18/23 2001) Vital Signs (24h Range):  Temp:  [98.5 °F (36.9 °C)] 98.5 °F (36.9 °C)  Pulse:  [77-83] 77  Resp:  [16-20] 16  SpO2:  [95 %-97 %] 97 %  BP: (142-152)/(62-71) 152/62     Weight: 93 kg (205 lb)  Body mass index is 29.41 kg/m².     Physical Exam  Vitals and nursing note reviewed.   Constitutional:       General: He is not in acute distress.     Appearance: He is normal weight. He is not ill-appearing.      Comments: Frail appearance   HENT:      Head: Normocephalic and atraumatic.      Nose: Nose normal.      Mouth/Throat:      Mouth: Mucous membranes are dry.      Pharynx: No posterior oropharyngeal erythema.   Eyes:      General: No scleral icterus.     Conjunctiva/sclera: Conjunctivae normal.      Pupils: Pupils are equal, round, and reactive to light.   Cardiovascular:      Rate and Rhythm: Normal rate and regular rhythm.      Pulses: Normal pulses.      Heart sounds: Murmur heard.   Pulmonary:      Effort: Pulmonary effort is normal. No respiratory distress.      Breath sounds: Rales present. No wheezing.   Abdominal:      General: Bowel sounds are normal.  There is no distension.      Palpations: Abdomen is soft.      Tenderness: There is abdominal tenderness. There is no guarding or rebound.      Comments: Surgical wound with granulation tissue. Dressing in place, clean and dry. No signs of infection or bleeding.   Musculoskeletal:      Cervical back: No rigidity.   Skin:     Capillary Refill: Capillary refill takes less than 2 seconds.      Coloration: Skin is not jaundiced or pale.      Findings: No rash.   Neurological:      General: No focal deficit present.      Mental Status: He is alert. Mental status is at baseline.      Cranial Nerves: No cranial nerve deficit.      Sensory: No sensory deficit.      Motor: Weakness present.              CRANIAL NERVES     CN III, IV, VI   Pupils are equal, round, and reactive to light.       Significant Labs: All pertinent labs within the past 24 hours have been reviewed.    Significant Imaging: I have reviewed all pertinent imaging results/findings within the past 24 hours.      Assessment/Plan:     * Pneumonia  Patient with confusion and agitation at [facility] earlier today after discharged from [facility] on 8/17  Chest xray showing B/L hilar infiltrates  Start IV Vancomycin and Zosyn  Monitor vital signs, CBC and physical exam      Atrial fibrillation  Patient with Long standing persistent (>12 months) atrial fibrillation which is controlled currently with Beta Blocker. Patient is currently in sinus rhythm.[id number] Score: 3. HASBLED Score: 1. Anticoagulation indicated. Anticoagulation done with Eliquis.    Dementia without behavioral disturbance, psychotic disturbance, mood disturbance, or anxiety  Continue home quitiapine      Infected hernioplasty mesh  S/p exploratory laparotomy with infected mesh removal, and small bowel resection with anastomosis on 8/9  Surgical wound with granulation tissue. No signs of infection or bleeding  Continue daily dressing changes      Diabetes mellitus  Patient's FSGs are controlled  "on current medication regimen.  Last A1c reviewed-   Lab Results   Component Value Date    HGBA1C 5.6 11/06/2021     Most recent fingerstick glucose reviewed- No results for input(s): "POCTGLUCOSE" in the last 24 hours.  Current correctional scale  Low  Maintain anti-hyperglycemic dose as follows-   Antihyperglycemics (From admission, onward)      Start     Stop Route Frequency Ordered    08/18/23 2115  insulin aspart U-100 injection 0-5 Units         -- SubQ Before meals & nightly PRN 08/18/23 2017          Hold Oral hypoglycemics while patient is in the hospital.    Hypertension  Continue home amlodipine 10mg QD, losartan 100mg QD and metoprolol 12.5mg BID  Monitor BP      Hyperammonemia  History of hyperammonemia without cirrhosis. On home lactulose and rifaximim  Ammonia levels pending  Continue home lactulose and rifaximin  Monitor mental status      VTE Risk Mitigation (From admission, onward)           Ordered     Place sequential compression device  Until discontinued         08/18/23 2124     apixaban tablet 5 mg  2 times daily         08/18/23 2014                               Walt Medina MD  Department of Hospital Medicine  [facility]  "

## 2023-08-19 NOTE — ASSESSMENT & PLAN NOTE
T12 compression fracture.  Query if this contributes to patient's inability to walk.  Will discuss with family and consider whether patient should be referred to Dr. Washington, ortho spine.

## 2023-08-20 LAB
ANION GAP SERPL CALCULATED.3IONS-SCNC: 9 MMOL/L (ref 7–16)
BASOPHILS # BLD AUTO: 0.09 K/UL (ref 0–0.2)
BASOPHILS NFR BLD AUTO: 0.8 % (ref 0–1)
BUN SERPL-MCNC: 8 MG/DL (ref 7–18)
BUN/CREAT SERPL: 11 (ref 6–20)
CALCIUM SERPL-MCNC: 7.5 MG/DL (ref 8.5–10.1)
CHLORIDE SERPL-SCNC: 109 MMOL/L (ref 98–107)
CO2 SERPL-SCNC: 26 MMOL/L (ref 21–32)
CREAT SERPL-MCNC: 0.71 MG/DL (ref 0.7–1.3)
DIFFERENTIAL METHOD BLD: ABNORMAL
EGFR (NO RACE VARIABLE) (RUSH/TITUS): 94 ML/MIN/1.73M2
EOSINOPHIL # BLD AUTO: 0.35 K/UL (ref 0–0.5)
EOSINOPHIL NFR BLD AUTO: 3 % (ref 1–4)
ERYTHROCYTE [DISTWIDTH] IN BLOOD BY AUTOMATED COUNT: 13.4 % (ref 11.5–14.5)
GLUCOSE SERPL-MCNC: 147 MG/DL (ref 74–106)
HCT VFR BLD AUTO: 30.6 % (ref 40–54)
HGB BLD-MCNC: 10.1 G/DL (ref 13.5–18)
IMM GRANULOCYTES # BLD AUTO: 0.06 K/UL (ref 0–0.04)
IMM GRANULOCYTES NFR BLD: 0.5 % (ref 0–0.4)
LYMPHOCYTES # BLD AUTO: 0.93 K/UL (ref 1–4.8)
LYMPHOCYTES NFR BLD AUTO: 7.9 % (ref 27–41)
MCH RBC QN AUTO: 32.4 PG (ref 27–31)
MCHC RBC AUTO-ENTMCNC: 33 G/DL (ref 32–36)
MCV RBC AUTO: 98.1 FL (ref 80–96)
MONOCYTES # BLD AUTO: 0.75 K/UL (ref 0–0.8)
MONOCYTES NFR BLD AUTO: 6.4 % (ref 2–6)
MPC BLD CALC-MCNC: 9.7 FL (ref 9.4–12.4)
NEUTROPHILS # BLD AUTO: 9.62 K/UL (ref 1.8–7.7)
NEUTROPHILS NFR BLD AUTO: 81.4 % (ref 53–65)
NRBC # BLD AUTO: 0 X10E3/UL
NRBC, AUTO (.00): 0 %
PLATELET # BLD AUTO: 288 K/UL (ref 150–400)
POTASSIUM SERPL-SCNC: 3.7 MMOL/L (ref 3.5–5.1)
RBC # BLD AUTO: 3.12 M/UL (ref 4.6–6.2)
SODIUM SERPL-SCNC: 140 MMOL/L (ref 136–145)
WBC # BLD AUTO: 11.8 K/UL (ref 4.5–11)

## 2023-08-20 PROCEDURE — 63600175 PHARM REV CODE 636 W HCPCS: Performed by: GENERAL PRACTICE

## 2023-08-20 PROCEDURE — 99233 SBSQ HOSP IP/OBS HIGH 50: CPT | Mod: ,,, | Performed by: HOSPITALIST

## 2023-08-20 PROCEDURE — 25000003 PHARM REV CODE 250: Performed by: GENERAL PRACTICE

## 2023-08-20 PROCEDURE — 85025 COMPLETE CBC W/AUTO DIFF WBC: CPT | Performed by: GENERAL PRACTICE

## 2023-08-20 PROCEDURE — 96366 THER/PROPH/DIAG IV INF ADDON: CPT

## 2023-08-20 PROCEDURE — 25000003 PHARM REV CODE 250: Performed by: INTERNAL MEDICINE

## 2023-08-20 PROCEDURE — G0378 HOSPITAL OBSERVATION PER HR: HCPCS

## 2023-08-20 PROCEDURE — 25000003 PHARM REV CODE 250: Performed by: HOSPITALIST

## 2023-08-20 PROCEDURE — 99233 PR SUBSEQUENT HOSPITAL CARE,LEVL III: ICD-10-PCS | Mod: ,,, | Performed by: HOSPITALIST

## 2023-08-20 PROCEDURE — 80048 BASIC METABOLIC PNL TOTAL CA: CPT | Performed by: GENERAL PRACTICE

## 2023-08-20 RX ADMIN — NIACIN 500 MG: 500 TABLET, EXTENDED RELEASE ORAL at 09:08

## 2023-08-20 RX ADMIN — RIFAXIMIN 550 MG: 550 TABLET ORAL at 09:08

## 2023-08-20 RX ADMIN — PANTOPRAZOLE SODIUM 40 MG: 40 TABLET, DELAYED RELEASE ORAL at 05:08

## 2023-08-20 RX ADMIN — VANCOMYCIN HYDROCHLORIDE 1750 MG: 10 INJECTION, POWDER, LYOPHILIZED, FOR SOLUTION INTRAVENOUS at 03:08

## 2023-08-20 RX ADMIN — PIPERACILLIN AND TAZOBACTAM 4.5 G: 4; .5 INJECTION, POWDER, FOR SOLUTION INTRAVENOUS; PARENTERAL at 03:08

## 2023-08-20 RX ADMIN — PIPERACILLIN AND TAZOBACTAM 4.5 G: 4; .5 INJECTION, POWDER, FOR SOLUTION INTRAVENOUS; PARENTERAL at 05:08

## 2023-08-20 RX ADMIN — LACTULOSE 20 G: 20 SOLUTION ORAL at 09:08

## 2023-08-20 RX ADMIN — PIPERACILLIN AND TAZOBACTAM 4.5 G: 4; .5 INJECTION, POWDER, FOR SOLUTION INTRAVENOUS; PARENTERAL at 09:08

## 2023-08-20 RX ADMIN — QUETIAPINE FUMARATE 50 MG: 25 TABLET ORAL at 09:08

## 2023-08-20 RX ADMIN — LACTULOSE 20 G: 20 SOLUTION ORAL at 03:08

## 2023-08-20 RX ADMIN — MUPIROCIN: 20 OINTMENT TOPICAL at 09:08

## 2023-08-20 RX ADMIN — LOSARTAN POTASSIUM 100 MG: 100 TABLET, FILM COATED ORAL at 09:08

## 2023-08-20 RX ADMIN — AMLODIPINE BESYLATE 10 MG: 10 TABLET ORAL at 09:08

## 2023-08-20 RX ADMIN — Medication 3 CAPSULE: at 09:08

## 2023-08-20 RX ADMIN — FOLIC ACID-PYRIDOXINE-CYANOCOBALAMIN TAB 2.5-25-2 MG 1 TABLET: 2.5-25-2 TAB at 09:08

## 2023-08-20 RX ADMIN — Medication 3 CAPSULE: at 05:08

## 2023-08-20 RX ADMIN — APIXABAN 5 MG: 5 TABLET, FILM COATED ORAL at 09:08

## 2023-08-20 RX ADMIN — METOPROLOL TARTRATE 12.5 MG: 25 TABLET, FILM COATED ORAL at 09:08

## 2023-08-20 RX ADMIN — TIMOLOL MALEATE 1 DROP: 5 SOLUTION/ DROPS OPHTHALMIC at 09:08

## 2023-08-20 RX ADMIN — Medication 3 CAPSULE: at 11:08

## 2023-08-20 NOTE — PLAN OF CARE
Problem: Adult Inpatient Plan of Care  Goal: Plan of Care Review  Outcome: Ongoing, Progressing  Goal: Patient-Specific Goal (Individualized)  Outcome: Ongoing, Progressing  Goal: Absence of Hospital-Acquired Illness or Injury  Outcome: Ongoing, Progressing  Goal: Optimal Comfort and Wellbeing  Outcome: Ongoing, Progressing  Intervention: Monitor Pain and Promote Comfort  Flowsheets (Taken 8/20/2023 1634)  Pain Management Interventions:   relaxation techniques promoted   quiet environment facilitated   pillow support provided   position adjusted   care clustered  Intervention: Provide Person-Centered Care  Flowsheets (Taken 8/20/2023 1634)  Trust Relationship/Rapport:   care explained   reassurance provided   choices provided   thoughts/feelings acknowledged   emotional support provided   empathic listening provided   questions answered   questions encouraged  Goal: Readiness for Transition of Care  Outcome: Ongoing, Progressing     Problem: Diabetes Comorbidity  Goal: Blood Glucose Level Within Targeted Range  Outcome: Ongoing, Progressing     Problem: Fluid Imbalance (Pneumonia)  Goal: Fluid Balance  Outcome: Ongoing, Progressing     Problem: Infection (Pneumonia)  Goal: Resolution of Infection Signs and Symptoms  Outcome: Ongoing, Progressing     Problem: Respiratory Compromise (Pneumonia)  Goal: Effective Oxygenation and Ventilation  Outcome: Ongoing, Progressing     Problem: Skin Injury Risk Increased  Goal: Skin Health and Integrity  Outcome: Ongoing, Progressing     Problem: Fall Injury Risk  Goal: Absence of Fall and Fall-Related Injury  Outcome: Ongoing, Progressing     Problem: Impaired Wound Healing  Goal: Optimal Wound Healing  Outcome: Ongoing, Progressing     Problem: Infection  Goal: Absence of Infection Signs and Symptoms  Outcome: Ongoing, Progressing

## 2023-08-20 NOTE — ASSESSMENT & PLAN NOTE
Patient admitted with hepatic encephalopathy.  It was stated that he was confused.  This has improved over time.  May have been related to elevated ammonia versus  Infection.    8/19:  Given patient's recent surgery and profound weakness and inability to walk, family's request of the patient go to a swing bed as he is unable to be cared for at home.    8/20:  Likely multifactorial in this patient with baseline cognitive impairment.  He appears to be improving.

## 2023-08-20 NOTE — PROGRESS NOTES
[facility]  [facility] Medicine  Progress Note    Patient Name: Danny Flood  MRN: [id number]  Patient Class: OP- Observation   Admission Date: 8/18/2023  Length of Stay: 1 days  Attending Physician: Bertrand Arndt MD  Primary Care Provider: Cameron Valencia MD        Subjective:     Principal Problem:Encephalopathy, metabolic    HPI:  Patient is a 78 year-old male with PMHx of HTN, DM, GERD, dementia, and hyperammonemia who presentes to the [facility] transferred from [facility] due to confusion, agitation and swelling of his legs. Patient was discharged from [facility] yesterday s/p exploratory laparotomy with infected mesh removal, and small bowel resection with anastomosis on 8/9. Also, during last hospitalization patient had LHC and was found to have normal LV systolic function, EF 55%, trivial non-obstructive CAD on 8/15. Patient 's daughter who is present in the room state that the patient is having swelling of his groin down to his legs. Patient states that he had some chest pain earlier today that improved. Patient also reports SOB. Patient denies fever, chills, nausea, vomiting, bowel or urinary habit changes.    In the ED vital signs showed /71, HR 81, RR 16, SpO2 95% and afebrile. Blood work showed H/H 10.3/30.8 which seem stable for the last few day. No leukocytosis with WBC 6.33. . Sodium 141, potassium 3.8, BUN/Cr 6/0.54, glucose 110, lipase 45, p-BNP 1016 from 1400 four days ago. Troponin 56.3 from 1500 four days ago. EKG nsr with HR 75. Chest xray showing B/L hilar infiltrates. Patient will be admitted to the hospital for further management.      Overview/Hospital Course:  No notes on file    Interval History:     Review of Systems   Constitutional:  Negative for chills and fever.   HENT:  Negative for congestion and rhinorrhea.    Respiratory:  Positive for cough and shortness of breath.    Cardiovascular:  Negative for chest pain and palpitations.    Gastrointestinal:  Negative for abdominal distention, abdominal pain, nausea and vomiting.   Endocrine: Negative for polydipsia, polyphagia and polyuria.   Genitourinary:  Negative for decreased urine volume and dysuria.   Musculoskeletal:  Negative for myalgias.   Skin:  Positive for wound (abdomen. post op).   Neurological:  Negative for dizziness, seizures, syncope, speech difficulty and headaches.   Psychiatric/Behavioral:  Positive for agitation and confusion.    All other systems reviewed and are negative.    Objective:     Vital Signs (Most Recent):  Temp: 98.1 °F (36.7 °C) (08/20/23 1107)  Pulse: 71 (08/20/23 1107)  Resp: 14 (08/20/23 1107)  BP: (!) 105/51 (08/20/23 1107)  SpO2: (!) 94 % (08/20/23 1107) Vital Signs (24h Range):  Temp:  [98 °F (36.7 °C)-100.2 °F (37.9 °C)] 98.1 °F (36.7 °C)  Pulse:  [67-79] 71  Resp:  [14-18] 14  SpO2:  [92 %-96 %] 94 %  BP: ()/(43-72) 105/51     Weight: 87.1 kg (192 lb 0.3 oz)  Body mass index is 27.55 kg/m².  No intake or output data in the 24 hours ending 08/20/23 1515      Physical Exam  Vitals and nursing note reviewed.   Constitutional:       General: He is not in acute distress.     Appearance: He is normal weight. He is not ill-appearing.      Comments: Frail appearance   HENT:      Head: Normocephalic and atraumatic.      Nose: Nose normal.      Mouth/Throat:      Mouth: Mucous membranes are dry.      Pharynx: No posterior oropharyngeal erythema.   Eyes:      General: No scleral icterus.     Conjunctiva/sclera: Conjunctivae normal.      Pupils: Pupils are equal, round, and reactive to light.   Cardiovascular:      Rate and Rhythm: Normal rate and regular rhythm.      Pulses: Normal pulses.      Heart sounds: Murmur heard.   Pulmonary:      Effort: Pulmonary effort is normal. No respiratory distress.      Breath sounds: Rales present. No wheezing.   Abdominal:      General: Bowel sounds are normal. There is no distension.      Palpations: Abdomen is soft.       Tenderness: There is abdominal tenderness. There is no guarding or rebound.      Comments: Surgical wound with granulation tissue. Dressing in place, clean and dry. No signs of infection or bleeding.   Musculoskeletal:      Cervical back: No rigidity.   Skin:     Capillary Refill: Capillary refill takes less than 2 seconds.      Coloration: Skin is not jaundiced or pale.      Findings: No rash.   Neurological:      General: No focal deficit present.      Mental Status: He is alert. Mental status is at baseline.      Cranial Nerves: No cranial nerve deficit.      Sensory: No sensory deficit.      Motor: Weakness present.             Significant Labs: All pertinent labs within the past 24 hours have been reviewed.    Significant Imaging: I have reviewed all pertinent imaging results/findings within the past 24 hours.      Assessment/Plan:      * Encephalopathy, metabolic  Patient admitted with hepatic encephalopathy.  It was stated that he was confused.  This has improved over time.  May have been related to elevated ammonia versus  Infection.    8/19:  Given patient's recent surgery and profound weakness and inability to walk, family's request of the patient go to a swing bed as he is unable to be cared for at home.    8/20:  Likely multifactorial in this patient with baseline cognitive impairment.  He appears to be improving.      Pneumonia  Patient with confusion and agitation at [facility] earlier today after discharged from [facility] on 8/17  Chest xray showing B/L hilar infiltrates  Start IV Vancomycin and Zosyn  Monitor vital signs, CBC and physical exam      Edema due to hypoalbuminemia  Patient with edema throughout and was 1 of the causes for patient coming to the hospital given scrotal edema and lower extremity edema.    Patient with low albumin.    Blood pressure soft so will not stay out less than spironolactone at this time.  Patient is improving.    8/20: Increased protein in urine.  This may contribute  to hypoalbuminemia as well.    Quantitate protein loss and refer to nephrology if needed.        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 On home lactulose and rifaximim  Ammonia levels pending  Continue home lactulose and rifaximin  Monitor mental status    8/19:  Ammonia of 85.  May contribute to patient's confusion.  May be related to a liver process for which patient will be seen by GI as outpatient.    Compression fracture of body of thoracic vertebra  T12 compression fracture.  Query if this contributes to patient's inability to walk.  Will discuss with family and consider whether patient should be referred to Dr. Washington, ortho spine.      Splenorenal shunt malfunction  Malformation seen on CT abdomen pelvis from July 2023.    Query if this is contributing to increased portal pressure with pronounced varices and potential decreased liver function with hypoalbuminemia.  Patient's family state that he has a history of cirrhosis and is on Actos for this.    Follow-up with GI as outpatient.  It is notable that the patient's sister has cirrhosis and she has no risk factors for cirrhosis.    Ultrasound does not demonstrate any nodularity.  GI may consider a fibrosis scan.    US Abdomen Limited_Liver  Narrative: EXAMINATION:  US ABDOMEN LIMITED_LIVER    CLINICAL HISTORY:  eval for cirrhosis;    TECHNIQUE:  7/28/23    COMPARISON:  7/28/23.    FINDINGS:  The liver is normal size measuring up to 17 cm.  The liver does not appear to be cirrhotic.  No focal liver lesion.  The portal vein is not visualized.  One of the dilated collateral vessels within the angie hepatis is patent.    Gallbladder removed.    The common bile duct measures C 0.7 cm.    The right kidney measures 11.4 cm.  Simple cyst right kidney.    The pancreas not seen.  Impression: The liver is normal size measuring up to 17 cm.  The liver does not appear to be cirrhotic.  Correlate with need for random liver biopsy.    No focal liver  lesion. The portal vein is not visualized. One of the dilated collateral vessels within the angie hepatis is patent.    Ultrasound images captured and stored.    Electronically signed by: Emiliano Valencia  Date:    08/19/2023  Time:    13:42  X-Ray Ankle Complete Left  Narrative: EXAMINATION:  XR ANKLE COMPLETE 3 VIEW LEFT    CLINICAL HISTORY:  Left ankle swelling and pain;    COMPARISON:  None    TECHNIQUE:  Frontal, lateral, and oblique views of the left ankle.    FINDINGS:  Soft tissue swelling.  Plantar and posterior calcaneal spurring.  No convincing acute fracture.  Impression: As above.    Point of Service: [facility]    Electronically signed by: Ghulam Waldrop  Date:    08/19/2023  Time:    07:55  US Lower Extremity Veins Left  Narrative: EXAMINATION:  US LOWER EXTREMITY VEINS LEFT    CLINICAL HISTORY:  Swelling and pain;    COMPARISON:  August 14, 2023.    TECHNIQUE:  Grayscale, spectral, and color Doppler interrogation of the left lower extremity veins was performed. Augmentation and compression was performed.    FINDINGS:  Grayscale, color Doppler, and pulsed Doppler evaluation of the veins of the left lower extremity demonstrate no evidence of deep venous thrombosis.  Impression: No evidence of deep venous thrombosis in the left lower extremity.    Point of Service: [facility]    Electronically signed by: Ghulam Waldrop  Date:    08/19/2023  Time:    07:45        Atrial fibrillation  Patient with Long standing persistent (>12 months) atrial fibrillation which is controlled currently with Beta Blocker. Patient is currently in sinus rhythm.[id number] Score: 3. HASBLED Score: 1. Anticoagulation indicated. Anticoagulation done with Eliquis.    Dementia without behavioral disturbance, psychotic disturbance, mood disturbance, or anxiety  Continue home quitiapine      Infected hernioplasty mesh  S/p exploratory laparotomy with infected mesh removal, and small bowel resection with  "anastomosis on 8/9  Surgical wound with granulation tissue. No signs of infection or bleeding  Continue daily dressing changes      Diabetes mellitus  Patient's FSGs are controlled on current medication regimen.  Last A1c reviewed-   Lab Results   Component Value Date    HGBA1C 5.6 11/06/2021     Most recent fingerstick glucose reviewed- No results for input(s): "POCTGLUCOSE" in the last 24 hours.  Current correctional scale  Low  Maintain anti-hyperglycemic dose as follows-   Antihyperglycemics (From admission, onward)    Start     Stop Route Frequency Ordered    08/18/23 2115  insulin aspart U-100 injection 0-5 Units         -- SubQ Before meals & nightly PRN 08/18/23 2017        Hold Oral hypoglycemics while patient is in the hospital.    Dysphagia  History of dysphagia.    This patient is patient at risk for aspiration pneumonia and will treating for pneumonia currently.      Hypertension  Continue home amlodipine 10mg QD, losartan 100mg QD and metoprolol 12.5mg BID  Monitor BP        VTE Risk Mitigation (From admission, onward)         Ordered     Place sequential compression device  Until discontinued         08/18/23 2124     apixaban tablet 5 mg  2 times daily         08/18/23 2014                Discharge Planning   AMBAR:      Code Status: Full Code   Is the patient medically ready for discharge?:     Reason for patient still in hospital (select all that apply): Treatment                     Bertrand Arndt MD  Department of Hospital Medicine   [facility]  "

## 2023-08-20 NOTE — ASSESSMENT & PLAN NOTE
Patient with edema throughout and was 1 of the causes for patient coming to the hospital given scrotal edema and lower extremity edema.    Patient with low albumin.    Blood pressure soft so will not stay out less than spironolactone at this time.  Patient is improving.    8/20: Increased protein in urine.  This may contribute to hypoalbuminemia as well.    Quantitate protein loss and refer to nephrology if needed.

## 2023-08-20 NOTE — SUBJECTIVE & OBJECTIVE
Interval History:     Review of Systems   Constitutional:  Negative for chills and fever.   HENT:  Negative for congestion and rhinorrhea.    Respiratory:  Positive for cough and shortness of breath.    Cardiovascular:  Negative for chest pain and palpitations.   Gastrointestinal:  Negative for abdominal distention, abdominal pain, nausea and vomiting.   Endocrine: Negative for polydipsia, polyphagia and polyuria.   Genitourinary:  Negative for decreased urine volume and dysuria.   Musculoskeletal:  Negative for myalgias.   Skin:  Positive for wound (abdomen. post op).   Neurological:  Negative for dizziness, seizures, syncope, speech difficulty and headaches.   Psychiatric/Behavioral:  Positive for agitation and confusion.    All other systems reviewed and are negative.    Objective:     Vital Signs (Most Recent):  Temp: 98.1 °F (36.7 °C) (08/20/23 1107)  Pulse: 71 (08/20/23 1107)  Resp: 14 (08/20/23 1107)  BP: (!) 105/51 (08/20/23 1107)  SpO2: (!) 94 % (08/20/23 1107) Vital Signs (24h Range):  Temp:  [98 °F (36.7 °C)-100.2 °F (37.9 °C)] 98.1 °F (36.7 °C)  Pulse:  [67-79] 71  Resp:  [14-18] 14  SpO2:  [92 %-96 %] 94 %  BP: ()/(43-72) 105/51     Weight: 87.1 kg (192 lb 0.3 oz)  Body mass index is 27.55 kg/m².  No intake or output data in the 24 hours ending 08/20/23 1515      Physical Exam  Vitals and nursing note reviewed.   Constitutional:       General: He is not in acute distress.     Appearance: He is normal weight. He is not ill-appearing.      Comments: Frail appearance   HENT:      Head: Normocephalic and atraumatic.      Nose: Nose normal.      Mouth/Throat:      Mouth: Mucous membranes are dry.      Pharynx: No posterior oropharyngeal erythema.   Eyes:      General: No scleral icterus.     Conjunctiva/sclera: Conjunctivae normal.      Pupils: Pupils are equal, round, and reactive to light.   Cardiovascular:      Rate and Rhythm: Normal rate and regular rhythm.      Pulses: Normal pulses.      Heart  none sounds: Murmur heard.   Pulmonary:      Effort: Pulmonary effort is normal. No respiratory distress.      Breath sounds: Rales present. No wheezing.   Abdominal:      General: Bowel sounds are normal. There is no distension.      Palpations: Abdomen is soft.      Tenderness: There is abdominal tenderness. There is no guarding or rebound.      Comments: Surgical wound with granulation tissue. Dressing in place, clean and dry. No signs of infection or bleeding.   Musculoskeletal:      Cervical back: No rigidity.   Skin:     Capillary Refill: Capillary refill takes less than 2 seconds.      Coloration: Skin is not jaundiced or pale.      Findings: No rash.   Neurological:      General: No focal deficit present.      Mental Status: He is alert. Mental status is at baseline.      Cranial Nerves: No cranial nerve deficit.      Sensory: No sensory deficit.      Motor: Weakness present.             Significant Labs: All pertinent labs within the past 24 hours have been reviewed.    Significant Imaging: I have reviewed all pertinent imaging results/findings within the past 24 hours.

## 2023-08-21 LAB
AMMONIA PLAS-SCNC: 60 ΜMOL/L (ref 11–32)
ANION GAP SERPL CALCULATED.3IONS-SCNC: 7 MMOL/L (ref 7–16)
BASOPHILS # BLD AUTO: 0.07 K/UL (ref 0–0.2)
BASOPHILS NFR BLD AUTO: 0.7 % (ref 0–1)
BUN SERPL-MCNC: 12 MG/DL (ref 7–18)
BUN/CREAT SERPL: 15 (ref 6–20)
CALCIUM SERPL-MCNC: 7.7 MG/DL (ref 8.5–10.1)
CHLORIDE SERPL-SCNC: 111 MMOL/L (ref 98–107)
CO2 SERPL-SCNC: 27 MMOL/L (ref 21–32)
CREAT SERPL-MCNC: 0.82 MG/DL (ref 0.7–1.3)
DIFFERENTIAL METHOD BLD: ABNORMAL
EGFR (NO RACE VARIABLE) (RUSH/TITUS): 90 ML/MIN/1.73M2
EOSINOPHIL # BLD AUTO: 0.43 K/UL (ref 0–0.5)
EOSINOPHIL NFR BLD AUTO: 4.4 % (ref 1–4)
ERYTHROCYTE [DISTWIDTH] IN BLOOD BY AUTOMATED COUNT: 13.5 % (ref 11.5–14.5)
GLUCOSE SERPL-MCNC: 160 MG/DL (ref 74–106)
HCT VFR BLD AUTO: 29.6 % (ref 40–54)
HGB BLD-MCNC: 10 G/DL (ref 13.5–18)
IMM GRANULOCYTES # BLD AUTO: 0.05 K/UL (ref 0–0.04)
IMM GRANULOCYTES NFR BLD: 0.5 % (ref 0–0.4)
LYMPHOCYTES # BLD AUTO: 1.04 K/UL (ref 1–4.8)
LYMPHOCYTES NFR BLD AUTO: 10.5 % (ref 27–41)
MCH RBC QN AUTO: 32.5 PG (ref 27–31)
MCHC RBC AUTO-ENTMCNC: 33.8 G/DL (ref 32–36)
MCV RBC AUTO: 96.1 FL (ref 80–96)
MONOCYTES # BLD AUTO: 0.86 K/UL (ref 0–0.8)
MONOCYTES NFR BLD AUTO: 8.7 % (ref 2–6)
MPC BLD CALC-MCNC: 9.8 FL (ref 9.4–12.4)
NEUTROPHILS # BLD AUTO: 7.41 K/UL (ref 1.8–7.7)
NEUTROPHILS NFR BLD AUTO: 75.2 % (ref 53–65)
NRBC # BLD AUTO: 0 X10E3/UL
NRBC, AUTO (.00): 0 %
PLATELET # BLD AUTO: 316 K/UL (ref 150–400)
POTASSIUM SERPL-SCNC: 3.2 MMOL/L (ref 3.5–5.1)
RBC # BLD AUTO: 3.08 M/UL (ref 4.6–6.2)
SODIUM SERPL-SCNC: 142 MMOL/L (ref 136–145)
VANCOMYCIN TROUGH SERPL-MCNC: 12.6 ΜG/ML (ref 10–20)
WBC # BLD AUTO: 9.86 K/UL (ref 4.5–11)

## 2023-08-21 PROCEDURE — 99233 PR SUBSEQUENT HOSPITAL CARE,LEVL III: ICD-10-PCS | Mod: ,,, | Performed by: HOSPITALIST

## 2023-08-21 PROCEDURE — 96375 TX/PRO/DX INJ NEW DRUG ADDON: CPT

## 2023-08-21 PROCEDURE — 96366 THER/PROPH/DIAG IV INF ADDON: CPT

## 2023-08-21 PROCEDURE — 63600175 PHARM REV CODE 636 W HCPCS: Performed by: GENERAL PRACTICE

## 2023-08-21 PROCEDURE — 82140 ASSAY OF AMMONIA: CPT | Performed by: HOSPITALIST

## 2023-08-21 PROCEDURE — 80202 ASSAY OF VANCOMYCIN: CPT | Performed by: GENERAL PRACTICE

## 2023-08-21 PROCEDURE — 96367 TX/PROPH/DG ADDL SEQ IV INF: CPT

## 2023-08-21 PROCEDURE — G0378 HOSPITAL OBSERVATION PER HR: HCPCS

## 2023-08-21 PROCEDURE — 85025 COMPLETE CBC W/AUTO DIFF WBC: CPT | Performed by: HOSPITALIST

## 2023-08-21 PROCEDURE — 80048 BASIC METABOLIC PNL TOTAL CA: CPT | Performed by: GENERAL PRACTICE

## 2023-08-21 PROCEDURE — 25000003 PHARM REV CODE 250: Performed by: GENERAL PRACTICE

## 2023-08-21 PROCEDURE — 25000003 PHARM REV CODE 250: Performed by: INTERNAL MEDICINE

## 2023-08-21 PROCEDURE — 25000003 PHARM REV CODE 250: Performed by: HOSPITALIST

## 2023-08-21 PROCEDURE — 63600175 PHARM REV CODE 636 W HCPCS: Performed by: HOSPITALIST

## 2023-08-21 PROCEDURE — 99233 SBSQ HOSP IP/OBS HIGH 50: CPT | Mod: ,,, | Performed by: HOSPITALIST

## 2023-08-21 RX ORDER — LACTULOSE 10 G/15ML
20 SOLUTION ORAL 2 TIMES DAILY
Status: DISCONTINUED | OUTPATIENT
Start: 2023-08-21 | End: 2023-08-23 | Stop reason: HOSPADM

## 2023-08-21 RX ORDER — POTASSIUM CHLORIDE 7.45 MG/ML
20 INJECTION INTRAVENOUS ONCE
Status: COMPLETED | OUTPATIENT
Start: 2023-08-21 | End: 2023-08-21

## 2023-08-21 RX ADMIN — RIFAXIMIN 550 MG: 550 TABLET ORAL at 08:08

## 2023-08-21 RX ADMIN — FOLIC ACID-PYRIDOXINE-CYANOCOBALAMIN TAB 2.5-25-2 MG 1 TABLET: 2.5-25-2 TAB at 08:08

## 2023-08-21 RX ADMIN — VANCOMYCIN HYDROCHLORIDE 1750 MG: 10 INJECTION, POWDER, LYOPHILIZED, FOR SOLUTION INTRAVENOUS at 08:08

## 2023-08-21 RX ADMIN — METOPROLOL TARTRATE 12.5 MG: 25 TABLET, FILM COATED ORAL at 08:08

## 2023-08-21 RX ADMIN — PANTOPRAZOLE SODIUM 40 MG: 40 TABLET, DELAYED RELEASE ORAL at 03:08

## 2023-08-21 RX ADMIN — DEXTROSE MONOHYDRATE 1 G: 5 INJECTION INTRAVENOUS at 05:08

## 2023-08-21 RX ADMIN — MUPIROCIN: 20 OINTMENT TOPICAL at 08:08

## 2023-08-21 RX ADMIN — APIXABAN 5 MG: 5 TABLET, FILM COATED ORAL at 08:08

## 2023-08-21 RX ADMIN — PANTOPRAZOLE SODIUM 40 MG: 40 TABLET, DELAYED RELEASE ORAL at 05:08

## 2023-08-21 RX ADMIN — TIMOLOL MALEATE 1 DROP: 5 SOLUTION/ DROPS OPHTHALMIC at 08:08

## 2023-08-21 RX ADMIN — Medication 3 CAPSULE: at 05:08

## 2023-08-21 RX ADMIN — POTASSIUM BICARBONATE 50 MEQ: 977.5 TABLET, EFFERVESCENT ORAL at 09:08

## 2023-08-21 RX ADMIN — POTASSIUM CHLORIDE 20 MEQ: 7.46 INJECTION, SOLUTION INTRAVENOUS at 11:08

## 2023-08-21 RX ADMIN — AZITHROMYCIN DIHYDRATE 500 MG: 500 INJECTION, POWDER, LYOPHILIZED, FOR SOLUTION INTRAVENOUS at 05:08

## 2023-08-21 RX ADMIN — QUETIAPINE FUMARATE 50 MG: 25 TABLET ORAL at 08:08

## 2023-08-21 RX ADMIN — NIACIN 500 MG: 500 TABLET, EXTENDED RELEASE ORAL at 08:08

## 2023-08-21 RX ADMIN — PIPERACILLIN AND TAZOBACTAM 4.5 G: 4; .5 INJECTION, POWDER, FOR SOLUTION INTRAVENOUS; PARENTERAL at 05:08

## 2023-08-21 RX ADMIN — Medication 3 CAPSULE: at 08:08

## 2023-08-21 RX ADMIN — AMLODIPINE BESYLATE 10 MG: 10 TABLET ORAL at 08:08

## 2023-08-21 RX ADMIN — Medication 3 CAPSULE: at 11:08

## 2023-08-21 RX ADMIN — LOSARTAN POTASSIUM 100 MG: 100 TABLET, FILM COATED ORAL at 08:08

## 2023-08-21 RX ADMIN — LACTULOSE 20 G: 20 SOLUTION ORAL at 08:08

## 2023-08-21 RX ADMIN — PIPERACILLIN AND TAZOBACTAM 4.5 G: 4; .5 INJECTION, POWDER, FOR SOLUTION INTRAVENOUS; PARENTERAL at 03:08

## 2023-08-21 NOTE — PLAN OF CARE
Problem: Adult Inpatient Plan of Care  Goal: Plan of Care Review  Outcome: Ongoing, Progressing  Goal: Patient-Specific Goal (Individualized)  Outcome: Ongoing, Progressing  Goal: Absence of Hospital-Acquired Illness or Injury  Outcome: Ongoing, Progressing  Goal: Optimal Comfort and Wellbeing  Outcome: Ongoing, Progressing  Goal: Readiness for Transition of Care  Outcome: Ongoing, Progressing     Problem: Diabetes Comorbidity  Goal: Blood Glucose Level Within Targeted Range  Outcome: Ongoing, Progressing     Problem: Fluid Imbalance (Pneumonia)  Goal: Fluid Balance  Outcome: Ongoing, Progressing     Problem: Infection (Pneumonia)  Goal: Resolution of Infection Signs and Symptoms  Outcome: Ongoing, Progressing     Problem: Respiratory Compromise (Pneumonia)  Goal: Effective Oxygenation and Ventilation  Outcome: Ongoing, Progressing     Problem: Skin Injury Risk Increased  Goal: Skin Health and Integrity  Outcome: Ongoing, Progressing     Problem: Fall Injury Risk  Goal: Absence of Fall and Fall-Related Injury  Outcome: Ongoing, Progressing     Problem: Impaired Wound Healing  Goal: Optimal Wound Healing  Outcome: Ongoing, Progressing     Problem: Infection  Goal: Absence of Infection Signs and Symptoms  Outcome: Ongoing, Progressing

## 2023-08-21 NOTE — PROGRESS NOTES
Vancomycin trough resulted this morning as 12.6, slightly below goal of 15-20. Will continue current dose of vancomycin 1750 mg IV every 18 hours. Creatinine slightly creeping up. Will re-check trough 8/24 at 0730. Pharmacy will continue to follow and make necessary adjustments.      Janae Nevarez, PharmD  Ext. [phone]

## 2023-08-21 NOTE — PROGRESS NOTES
Patient is not eligible for post discharge call due to being in [facility] at this time.

## 2023-08-21 NOTE — PLAN OF CARE
[facility]  Initial Discharge Assessment       Primary Care Provider: Cameron Valencia MD    Admission Diagnosis: Pneumonia [J18.9]  Chest pain [R07.9]  Acute metabolic encephalopathy [G93.41]    Admission Date: 8/18/2023  Expected Discharge Date:     Transition of Care Barriers: None    Payor: HU[employer] MANAGED MEDICARE / Plan: [employer] MEDICARE PPO / Product Type: Medicare Advantage /     Extended Emergency Contact Information  Primary Emergency Contact: Nikolay Flood  Mobile Phone: [phone]  Relation: Son  Preferred language: English   needed? No  Secondary Emergency Contact: Nubia Flood  Address: [address]  Home Phone: [phone]  Mobile Phone: [phone]  Relation: Spouse  Preferred language: English   needed? No    Discharge Plan A: Home Health, Home with family  Discharge Plan B: Home with family, Home Health      [facility] [address] [place], [place] [id number]  Phone: [phone] Fax: [phone]    [facility] - [place] [id number] [facility]  [address] [id number]  Phone: [phone] Fax: [phone]    The Pharmacy at [address] [address]  Phone: [phone] Fax: [phone]      Initial Assessment (most recent)       Adult Discharge Assessment - 08/21/23 0900          Discharge Assessment    Assessment Type Discharge Planning Assessment     Confirmed/corrected address, phone number and insurance Yes     Source of Information patient;family     Contact Name/Number son     Communicated AMBAR with patient/caregiver Date not available/Unable to determine     People in Home spouse     Do you expect to return to your current living situation? Yes     Do you have help at home or someone to help you manage your care at home? Yes     Who  are your caregiver(s) and their phone number(s)? son     Prior to hospitilization cognitive status: Unable to Assess     Equipment Currently Used at Home cane, straight     Patient currently being followed by outpatient case management? No     Do you currently have service(s) that help you manage your care at home? Yes     Name and Contact number of agency [facility] care     Is the pt/caregiver preference to resume services with current agency Yes     Do you take prescription medications? Yes     Do you have prescription coverage? Yes     Coverage [employer]     Do you have any problems affording any of your prescribed medications? No     Is the patient taking medications as prescribed? yes     How do you get to doctors appointments? family or friend will provide     Are you on dialysis? No     Do you take coumadin? No     DME Needed Upon Discharge  none     Discharge Plan discussed with: Patient     Transition of Care Barriers None     Discharge Plan A Home Health;Home with family     Discharge Plan B Home with family;Home Health                   Sdoh completed a few weeks ago. Ss spoke with pt and pt lives at home with spouse and son helps when needed. Pt current with [facility] and choice obtained and initial info faxed. Ss spoke with pt and son and pt requesting swb at d/c. Choice obtained for [facility]e and referral made to [facility] at [facility]. Ss following.            to order pt/ot.

## 2023-08-21 NOTE — PROGRESS NOTES
[facility]  [facility] Medicine  Progress Note    Patient Name: Danny Flood  MRN: [id number]  Patient Class: OP- Observation   Admission Date: 8/18/2023  Length of Stay: 1 days  Attending Physician: Bertrand Arndt MD  Primary Care Provider: Cameron Valencia MD        Subjective:     Principal Problem:Encephalopathy, metabolic    HPI:  Patient is a 78 year-old male with PMHx of HTN, DM, GERD, dementia, and hyperammonemia who presentes to the [facility] transferred from [facility] due to confusion, agitation and swelling of his legs. Patient was discharged from [facility] yesterday s/p exploratory laparotomy with infected mesh removal, and small bowel resection with anastomosis on 8/9. Also, during last hospitalization patient had LHC and was found to have normal LV systolic function, EF 55%, trivial non-obstructive CAD on 8/15. Patient 's daughter who is present in the room state that the patient is having swelling of his groin down to his legs. Patient states that he had some chest pain earlier today that improved. Patient also reports SOB. Patient denies fever, chills, nausea, vomiting, bowel or urinary habit changes.    In the ED vital signs showed /71, HR 81, RR 16, SpO2 95% and afebrile. Blood work showed H/H 10.3/30.8 which seem stable for the last few day. No leukocytosis with WBC 6.33. . Sodium 141, potassium 3.8, BUN/Cr 6/0.54, glucose 110, lipase 45, p-BNP 1016 from 1400 four days ago. Troponin 56.3 from 1500 four days ago. EKG nsr with HR 75. Chest xray showing B/L hilar infiltrates. Patient will be admitted to the hospital for further management.      Overview/Hospital Course:  No notes on file    Interval History:     Review of Systems   Constitutional:  Negative for chills and fever.   HENT:  Negative for congestion and rhinorrhea.    Respiratory:  Positive for cough and shortness of breath.    Cardiovascular:  Negative for chest pain and palpitations.    Gastrointestinal:  Negative for abdominal distention, abdominal pain, nausea and vomiting.   Endocrine: Negative for polydipsia, polyphagia and polyuria.   Genitourinary:  Negative for decreased urine volume and dysuria.   Musculoskeletal:  Negative for myalgias.   Skin:  Positive for wound (abdomen. post op).   Neurological:  Negative for dizziness, seizures, syncope, speech difficulty and headaches.   Psychiatric/Behavioral:  Positive for agitation and confusion.    All other systems reviewed and are negative.    Objective:     Vital Signs (Most Recent):  Temp: 97.6 °F (36.4 °C) (08/21/23 0737)  Pulse: 80 (08/21/23 0737)  Resp: 17 (08/21/23 0737)  BP: (!) 122/58 (08/21/23 0737)  SpO2: 97 % (08/21/23 0737) Vital Signs (24h Range):  Temp:  [97.3 °F (36.3 °C)-98.5 °F (36.9 °C)] 97.6 °F (36.4 °C)  Pulse:  [76-88] 80  Resp:  [12-18] 17  SpO2:  [96 %-97 %] 97 %  BP: (117-144)/(53-63) 122/58     Weight: 87.1 kg (192 lb 0.3 oz)  Body mass index is 27.55 kg/m².    Intake/Output Summary (Last 24 hours) at 8/21/2023 1547  Last data filed at 8/21/2023 1038  Gross per 24 hour   Intake 350 ml   Output 1100 ml   Net -750 ml         Physical Exam  Vitals and nursing note reviewed.   Constitutional:       General: He is not in acute distress.     Appearance: He is normal weight. He is not ill-appearing.      Comments: Frail appearance   HENT:      Head: Normocephalic and atraumatic.      Nose: Nose normal.      Mouth/Throat:      Mouth: Mucous membranes are dry.      Pharynx: No posterior oropharyngeal erythema.   Eyes:      General: No scleral icterus.     Conjunctiva/sclera: Conjunctivae normal.      Pupils: Pupils are equal, round, and reactive to light.   Cardiovascular:      Rate and Rhythm: Normal rate and regular rhythm.      Pulses: Normal pulses.      Heart sounds: Murmur heard.   Pulmonary:      Effort: Pulmonary effort is normal. No respiratory distress.      Breath sounds: Rales present. No wheezing.   Abdominal:       General: Bowel sounds are normal. There is no distension.      Palpations: Abdomen is soft.      Tenderness: There is abdominal tenderness. There is no guarding or rebound.      Comments: Surgical wound with granulation tissue. Dressing in place, clean and dry. No signs of infection or bleeding.   Musculoskeletal:      Cervical back: No rigidity.   Skin:     Capillary Refill: Capillary refill takes less than 2 seconds.      Coloration: Skin is not jaundiced or pale.      Findings: No rash.   Neurological:      General: No focal deficit present.      Mental Status: He is alert. Mental status is at baseline.      Cranial Nerves: No cranial nerve deficit.      Sensory: No sensory deficit.      Motor: Weakness present.             Significant Labs: All pertinent labs within the past 24 hours have been reviewed.    Significant Imaging: I have reviewed all pertinent imaging results/findings within the past 24 hours.      Assessment/Plan:      * Encephalopathy, metabolic  Patient admitted with hepatic encephalopathy.  It was stated that he was confused.  This has improved over time.  May have been related to elevated ammonia versus  Infection.    8/19:  Given patient's recent surgery and profound weakness and inability to walk, family's request of the patient go to a swing bed as he is unable to be cared for at home.    8/20:  Likely multifactorial in this patient with baseline cognitive impairment.  He appears to be improving.    8/21: continues to improve.        Pneumonia  Patient with confusion and agitation at [facility] earlier today after discharged from [facility] on 8/17  Chest xray showing B/L hilar infiltrates  Start IV Vancomycin and Zosyn  Monitor vital signs, CBC and physical exam    8/21: Will narrow coverage to ceftriaxone and azithromycin    Antibiotics (From admission, onward)    Start     Stop Route Frequency Ordered    08/21/23 1700  cefTRIAXone (ROCEPHIN) 1 g in dextrose 5 % in water (D5W) 100 mL IVPB  (MB+)         -- IV Every 24 hours (non-standard times) 08/21/23 1553    08/21/23 1700  azithromycin (ZITHROMAX) 500 mg in dextrose 5 % (D5W) 250 mL IVPB         -- IV Every 24 hours (non-standard times) 08/21/23 1553    08/19/23 2100  rifAXIMin tablet 550 mg         -- Oral 2 times daily 08/19/23 0842    08/19/23 0900  mupirocin 2 % ointment         08/24/23 0859 Nasl 2 times daily 08/18/23 2126                 Edema due to hypoalbuminemia  Patient with edema throughout and was 1 of the causes for patient coming to the hospital given scrotal edema and lower extremity edema.    Patient with low albumin.    Blood pressure soft so will not stay out less than spironolactone at this time.  Patient is improving.    8/20: Increased protein in urine.  This may contribute to hypoalbuminemia as well.    Quantitate protein loss and refer to nephrology if needed.        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 On home lactulose and rifaximim  Ammonia levels pending  Continue home lactulose and rifaximin  Monitor mental status    8/19:  Ammonia of 85.  May contribute to patient's confusion.  May be related to a liver process for which patient will be seen by GI as outpatient.    Compression fracture of body of thoracic vertebra  T12 compression fracture.  Query if this contributes to patient's inability to walk.  Will discuss with family and consider whether patient should be referred to Dr. Washington, ortho spine.      Splenorenal shunt malfunction  Malformation seen on CT abdomen pelvis from July 2023.    Query if this is contributing to increased portal pressure with pronounced varices and potential decreased liver function with hypoalbuminemia.  Patient's family state that he has a history of cirrhosis and is on Actos for this.    Follow-up with GI as outpatient.  It is notable that the patient's sister has cirrhosis and she has no risk factors for cirrhosis.    Ultrasound does not demonstrate any  nodularity.  GI may consider a fibrosis scan.    US Abdomen Limited_Liver  Narrative: EXAMINATION:  US ABDOMEN LIMITED_LIVER    CLINICAL HISTORY:  eval for cirrhosis;    TECHNIQUE:  7/28/23    COMPARISON:  7/28/23.    FINDINGS:  The liver is normal size measuring up to 17 cm.  The liver does not appear to be cirrhotic.  No focal liver lesion.  The portal vein is not visualized.  One of the dilated collateral vessels within the angie hepatis is patent.    Gallbladder removed.    The common bile duct measures C 0.7 cm.    The right kidney measures 11.4 cm.  Simple cyst right kidney.    The pancreas not seen.  Impression: The liver is normal size measuring up to 17 cm.  The liver does not appear to be cirrhotic.  Correlate with need for random liver biopsy.    No focal liver lesion. The portal vein is not visualized. One of the dilated collateral vessels within the angie hepatis is patent.    Ultrasound images captured and stored.    Electronically signed by: Emiliano Valencia  Date:    08/19/2023  Time:    13:42  X-Ray Ankle Complete Left  Narrative: EXAMINATION:  XR ANKLE COMPLETE 3 VIEW LEFT    CLINICAL HISTORY:  Left ankle swelling and pain;    COMPARISON:  None    TECHNIQUE:  Frontal, lateral, and oblique views of the left ankle.    FINDINGS:  Soft tissue swelling.  Plantar and posterior calcaneal spurring.  No convincing acute fracture.  Impression: As above.    Point of Service: [facility]    Electronically signed by: Ghulam Waldrop  Date:    08/19/2023  Time:    07:55  US Lower Extremity Veins Left  Narrative: EXAMINATION:  US LOWER EXTREMITY VEINS LEFT    CLINICAL HISTORY:  Swelling and pain;    COMPARISON:  August 14, 2023.    TECHNIQUE:  Grayscale, spectral, and color Doppler interrogation of the left lower extremity veins was performed. Augmentation and compression was performed.    FINDINGS:  Grayscale, color Doppler, and pulsed Doppler evaluation of the veins of the left lower extremity  "demonstrate no evidence of deep venous thrombosis.  Impression: No evidence of deep venous thrombosis in the left lower extremity.    Point of Service: [facility]    Electronically signed by: Ghulam Waldrop  Date:    08/19/2023  Time:    07:45        Atrial fibrillation  Patient with Long standing persistent (>12 months) atrial fibrillation which is controlled currently with Beta Blocker. Patient is currently in sinus rhythm.[id number] Score: 3. HASBLED Score: 1. Anticoagulation indicated. Anticoagulation done with Eliquis.    Dementia without behavioral disturbance, psychotic disturbance, mood disturbance, or anxiety  Continue home quitiapine      Infected hernioplasty mesh  S/p exploratory laparotomy with infected mesh removal, and small bowel resection with anastomosis on 8/9  Surgical wound with granulation tissue. No signs of infection or bleeding  Continue daily dressing changes      Diabetes mellitus  Patient's FSGs are controlled on current medication regimen.  Last A1c reviewed-   Lab Results   Component Value Date    HGBA1C 5.6 11/06/2021     Most recent fingerstick glucose reviewed- No results for input(s): "POCTGLUCOSE" in the last 24 hours.  Current correctional scale  Low  Maintain anti-hyperglycemic dose as follows-   Antihyperglycemics (From admission, onward)    Start     Stop Route Frequency Ordered    08/18/23 2115  insulin aspart U-100 injection 0-5 Units         -- SubQ Before meals & nightly PRN 08/18/23 2017        Hold Oral hypoglycemics while patient is in the hospital.    Dysphagia  History of dysphagia.    This patient is patient at risk for aspiration pneumonia and will treating for pneumonia currently.      Hypertension  Continue home amlodipine 10mg QD, losartan 100mg QD and metoprolol 12.5mg BID  Monitor BP        VTE Risk Mitigation (From admission, onward)         Ordered     Place sequential compression device  Until discontinued         08/18/23 2124     apixaban tablet " 5 mg  2 times daily         08/18/23 2014                Discharge Planning   AMBAR:      Code Status: Full Code   Is the patient medically ready for discharge?:     Reason for patient still in hospital (select all that apply): Treatment  Discharge Plan A: Home Health, Home with family                  Bertrand Arndt MD  Department of Hospital Medicine   [facility]

## 2023-08-21 NOTE — ASSESSMENT & PLAN NOTE
Patient with confusion and agitation at [facility] earlier today after discharged from [facility] on 8/17  Chest xray showing B/L hilar infiltrates  Start IV Vancomycin and Zosyn  Monitor vital signs, CBC and physical exam    8/21: Will narrow coverage to ceftriaxone and azithromycin    Antibiotics (From admission, onward)    Start     Stop Route Frequency Ordered    08/21/23 1700  cefTRIAXone (ROCEPHIN) 1 g in dextrose 5 % in water (D5W) 100 mL IVPB (MB+)         -- IV Every 24 hours (non-standard times) 08/21/23 1553    08/21/23 1700  azithromycin (ZITHROMAX) 500 mg in dextrose 5 % (D5W) 250 mL IVPB         -- IV Every 24 hours (non-standard times) 08/21/23 1553    08/19/23 2100  rifAXIMin tablet 550 mg         -- Oral 2 times daily 08/19/23 0842    08/19/23 0900  mupirocin 2 % ointment         08/24/23 0859 Nasl 2 times daily 08/18/23 8244

## 2023-08-21 NOTE — SUBJECTIVE & OBJECTIVE
Interval History:     Review of Systems   Constitutional:  Negative for chills and fever.   HENT:  Negative for congestion and rhinorrhea.    Respiratory:  Positive for cough and shortness of breath.    Cardiovascular:  Negative for chest pain and palpitations.   Gastrointestinal:  Negative for abdominal distention, abdominal pain, nausea and vomiting.   Endocrine: Negative for polydipsia, polyphagia and polyuria.   Genitourinary:  Negative for decreased urine volume and dysuria.   Musculoskeletal:  Negative for myalgias.   Skin:  Positive for wound (abdomen. post op).   Neurological:  Negative for dizziness, seizures, syncope, speech difficulty and headaches.   Psychiatric/Behavioral:  Positive for agitation and confusion.    All other systems reviewed and are negative.    Objective:     Vital Signs (Most Recent):  Temp: 97.6 °F (36.4 °C) (08/21/23 0737)  Pulse: 80 (08/21/23 0737)  Resp: 17 (08/21/23 0737)  BP: (!) 122/58 (08/21/23 0737)  SpO2: 97 % (08/21/23 0737) Vital Signs (24h Range):  Temp:  [97.3 °F (36.3 °C)-98.5 °F (36.9 °C)] 97.6 °F (36.4 °C)  Pulse:  [76-88] 80  Resp:  [12-18] 17  SpO2:  [96 %-97 %] 97 %  BP: (117-144)/(53-63) 122/58     Weight: 87.1 kg (192 lb 0.3 oz)  Body mass index is 27.55 kg/m².    Intake/Output Summary (Last 24 hours) at 8/21/2023 1547  Last data filed at 8/21/2023 1038  Gross per 24 hour   Intake 350 ml   Output 1100 ml   Net -750 ml         Physical Exam  Vitals and nursing note reviewed.   Constitutional:       General: He is not in acute distress.     Appearance: He is normal weight. He is not ill-appearing.      Comments: Frail appearance   HENT:      Head: Normocephalic and atraumatic.      Nose: Nose normal.      Mouth/Throat:      Mouth: Mucous membranes are dry.      Pharynx: No posterior oropharyngeal erythema.   Eyes:      General: No scleral icterus.     Conjunctiva/sclera: Conjunctivae normal.      Pupils: Pupils are equal, round, and reactive to light.    Cardiovascular:      Rate and Rhythm: Normal rate and regular rhythm.      Pulses: Normal pulses.      Heart sounds: Murmur heard.   Pulmonary:      Effort: Pulmonary effort is normal. No respiratory distress.      Breath sounds: Rales present. No wheezing.   Abdominal:      General: Bowel sounds are normal. There is no distension.      Palpations: Abdomen is soft.      Tenderness: There is abdominal tenderness. There is no guarding or rebound.      Comments: Surgical wound with granulation tissue. Dressing in place, clean and dry. No signs of infection or bleeding.   Musculoskeletal:      Cervical back: No rigidity.   Skin:     Capillary Refill: Capillary refill takes less than 2 seconds.      Coloration: Skin is not jaundiced or pale.      Findings: No rash.   Neurological:      General: No focal deficit present.      Mental Status: He is alert. Mental status is at baseline.      Cranial Nerves: No cranial nerve deficit.      Sensory: No sensory deficit.      Motor: Weakness present.             Significant Labs: All pertinent labs within the past 24 hours have been reviewed.    Significant Imaging: I have reviewed all pertinent imaging results/findings within the past 24 hours.

## 2023-08-21 NOTE — PROGRESS NOTES
[facility]  Adult Nutrition  First Assessment Note         Reason for Assessment  Reason For Assessment: RD follow-up   Nutrition Risk Screen: no indicators present    Assessment and Plan  Patient seen for follow-up. Visited with patient this morning. He endorsed a good appetite at home. He was unsure of any weight loss. Chart review reveals no significant weight changes. Patient does not meet criteria for malnutrition at this time. He was receptive to supplement. RD will add Glucerna TID.     RD note 8/19:  Patient is a 78 year-old male with PMHx of HTN, DM, GERD, dementia, and hyperammonemia who presentes to the [facility] transferred from [facility] due to confusion, agitation and swelling of his legs. Patient was discharged from [facility] yesterday s/p exploratory laparotomy with infected mesh removal, and small bowel resection with anastomosis on 8/9. Patient admitted with hepatic encephalopathy.  It was stated that he was confused.  This has improved over time.  May have been related to elevated ammonia versus  Infection.     8/19:  Given patient's recent surgery and profound weakness and inability to walk, family's request of the patient go to a swing bed as he is unable to be cared for at home.    RD assessment of pt due to MST score. Weight history:  8/9-180  8/  8/  Weight currently indicates weight loss of ~13 lbs/6.3% body weight in the past 5 days. Possible weight changes are related to changes in volume status, but unsure. Pt documented consuming % of meals since readmission. Cannot determine malnutrition at this time. Will plan full NFPE when next available. Recommend continuing consistent CHO diet, but increasing to 2000 calorie diet to better meet estimated needs and encouraging po intakes. IF intakes decline, consider addition of Ensure Max protein TID(if/when pt ammonia level normalizes/no longer with symptoms of encephalopathy r/t hyperammonemia).     Last BM  8/16. Monitor labs, meds, weights, po intakes, updates in pt condition and adjust nutrition recommendations as appropriate. RD following.     Malnutrition  Unable to determine at this time. Possible 13 lb weight loss/6% body weight in the past 5 days. Unsure appetite/po intakes in that time, plan for full NFPE and discussion of appetite/po when next available.     Skin Integrity  Evans Risk Assessment  Sensory Perception: 4-->no impairment  Moisture: 3-->occasionally moist  Activity: 2-->chairfast  Mobility: 3-->slightly limited  Nutrition: 3-->adequate  Friction and Shear: 3-->no apparent problem  Evans Score: 18    Nutrition Diagnosis  Altered Nutrition Related Lab Values   related to Diabetes Mellitus as evidenced by elevated glucose levels    Nutrition Diagnosis Status: Chronic/ continues  Comments: pt receiving a consistent CHO 1800 calorie diet   Nutrition Risk  Level of Risk/Frequency of Follow-up: high    Recent Labs   Lab 08/21/23  0411   *     Comments on Glucose: hx DM   Nutrition Prescription / Recommendations  Recommendation/Intervention: Will plan full NFPE when next available. Recommend continuing consistent CHO diet, but increasing to 2000 calorie diet to better meet estimated needs and encouraging po intakes. IF intakes decline, consider addition of Ensure Max protein TID(if/when pt ammonia level normalizes/no longer with symptoms of encephalopathy r/t hyperammonemia).  Goals: adequate po intakes to meet needs, weight stability, lab stability/normalization  Nutrition Goal Status: new  Current Diet Order: Consistent Carbohydrate 1800 calorie  Chewing or Swallowing Difficulty?: No Chewing or swallowing difficulty  Recommended Diet: Consistent Carbohydrate 2000 (75g Carbs)  Recommended Oral Supplement: Glucerna Shake [220 kcals, 10g Protein, 26g Carbs(4g Fiber, 7g Sugar), 9g Fat] three times daily  Is Nutrition Support Recommended: No  Is Education Recommended: No  Monitor and Evaluation  %  current Intake: P.O. intake of 75 - 100 %  % intake to meet estimated needs: 75 - 100 %  Food and Nutrient Intake: energy intake  Food and Nutrient Adminstration: diet order  Anthropometric Measurements: weight, weight change  Biochemical Data, Medical Tests and Procedures: electrolyte and renal panel, gastrointestinal profile, glucose/endocrine profile, inflammatory profile, lipid profile  Nutrition-Focused Physical Findings: overall appearance     Current Medical Diagnosis and Past Medical History  Diagnosis: other (see comments) (metabolic encephalopathy)  Past Medical History:   Diagnosis Date    Diverticulitis     Hyperlipemia     Hypokalemia 11/09/2021     Nutrition/Diet History     Lab/Procedures/Meds  Recent Labs   Lab 08/18/23 2008 08/19/23  0609 08/21/23  0411      < > 142   K 3.8   < > 3.2*   BUN 6*   < > 12   CREATININE 0.54*   < > 0.82   CALCIUM 7.6*   < > 7.7*   ALBUMIN 2.1*  --   --    *   < > 111*   ALT 25  --   --    AST 31  --   --     < > = values in this interval not displayed.     Last A1c:   Lab Results   Component Value Date    HGBA1C 5.6 11/06/2021     Lab Results   Component Value Date    RBC 3.08 (L) 08/21/2023    HGB 10.0 (L) 08/21/2023    HCT 29.6 (L) 08/21/2023    MCV 96.1 (H) 08/21/2023    MCH 32.5 (H) 08/21/2023    MCHC 33.8 08/21/2023    TIBC 216 (L) 11/08/2021     Pertinent Labs Reviewed: reviewed  Pertinent Labs Comments: K 3.3(L)-recommend consider repletion to WNL, Cl 109(H)-poss r/t volume status, BUN 6(L), Creat 0.52(L)-poss r/t volume status, (H)-hx DM, Ca 7.7(L)-recommend consider repletion to WNL, ammonia 85(H), CRP 2.42(H) unsure etiology, will monitor trends   Pertinent Medications Reviewed: reviewed  Pertinent Medications Comments: amlodipine, apixaban, folic acid vitamin B6 vitamin B12, lactobacillus acidophilus, lactulose, losartan, lopressor, niacin, pantoprazole, zosyn, quetiapine, rifaximin, vancomycin  Anthropometrics  Temp: 97.6 °F (36.4  "°C)  Height Method: Stated  Height: 5' 10" (177.8 cm)  Height (inches): 70 in  Weight Method: Bed Scale  Weight: 87.1 kg (192 lb 0.3 oz)  Weight (lb): 192.02 lb  Ideal Body Weight (IBW), Male: 166 lb  % Ideal Body Weight, Male (lb): 115.67 %  BMI (Calculated): 27.6     Estimated/Assessed Needs    Weight Used For Calorie Calculations: 87.1 kg (192 lb 0.3 oz)   Energy Need Method: Kcal/kg Energy Calorie Requirements (kcal): 5962-3848 kcal (25-30 kcal/kg)  Weight Used For Protein Calculations: 87.1 kg (192 lb 0.3 oz)  Protein Requirements:  g PRO (1.0-1.2 g PRO/kg)       RDA Method (mL): 2177     Nutrition by Nursing     Intake (%): 75%        Last Bowel Movement: 08/20/23              Nutrition Follow-Up  RD Follow-up?: Yes        "

## 2023-08-22 LAB — PROCALCITONIN SERPL-MCNC: 0.11 NG/ML

## 2023-08-22 PROCEDURE — 96368 THER/DIAG CONCURRENT INF: CPT

## 2023-08-22 PROCEDURE — 99900035 HC TECH TIME PER 15 MIN (STAT)

## 2023-08-22 PROCEDURE — 97166 OT EVAL MOD COMPLEX 45 MIN: CPT

## 2023-08-22 PROCEDURE — 97162 PT EVAL MOD COMPLEX 30 MIN: CPT

## 2023-08-22 PROCEDURE — 96366 THER/PROPH/DIAG IV INF ADDON: CPT

## 2023-08-22 PROCEDURE — 99231 PR SUBSEQUENT HOSPITAL CARE,LEVL I: ICD-10-PCS | Mod: ,,, | Performed by: HOSPITALIST

## 2023-08-22 PROCEDURE — 25000003 PHARM REV CODE 250: Performed by: GENERAL PRACTICE

## 2023-08-22 PROCEDURE — 63600175 PHARM REV CODE 636 W HCPCS: Performed by: HOSPITALIST

## 2023-08-22 PROCEDURE — 99231 SBSQ HOSP IP/OBS SF/LOW 25: CPT | Mod: ,,, | Performed by: HOSPITALIST

## 2023-08-22 PROCEDURE — 25000003 PHARM REV CODE 250: Performed by: HOSPITALIST

## 2023-08-22 PROCEDURE — G0378 HOSPITAL OBSERVATION PER HR: HCPCS

## 2023-08-22 RX ADMIN — AZITHROMYCIN DIHYDRATE 500 MG: 500 INJECTION, POWDER, LYOPHILIZED, FOR SOLUTION INTRAVENOUS at 04:08

## 2023-08-22 RX ADMIN — QUETIAPINE FUMARATE 50 MG: 25 TABLET ORAL at 08:08

## 2023-08-22 RX ADMIN — LOSARTAN POTASSIUM 100 MG: 100 TABLET, FILM COATED ORAL at 09:08

## 2023-08-22 RX ADMIN — APIXABAN 5 MG: 5 TABLET, FILM COATED ORAL at 08:08

## 2023-08-22 RX ADMIN — METOPROLOL TARTRATE 12.5 MG: 25 TABLET, FILM COATED ORAL at 08:08

## 2023-08-22 RX ADMIN — DEXTROSE MONOHYDRATE 1 G: 5 INJECTION INTRAVENOUS at 04:08

## 2023-08-22 RX ADMIN — NIACIN 500 MG: 500 TABLET, EXTENDED RELEASE ORAL at 09:08

## 2023-08-22 RX ADMIN — METOPROLOL TARTRATE 12.5 MG: 25 TABLET, FILM COATED ORAL at 09:08

## 2023-08-22 RX ADMIN — PANTOPRAZOLE SODIUM 40 MG: 40 TABLET, DELAYED RELEASE ORAL at 06:08

## 2023-08-22 RX ADMIN — MUPIROCIN: 20 OINTMENT TOPICAL at 09:08

## 2023-08-22 RX ADMIN — FOLIC ACID-PYRIDOXINE-CYANOCOBALAMIN TAB 2.5-25-2 MG 1 TABLET: 2.5-25-2 TAB at 09:08

## 2023-08-22 RX ADMIN — Medication 3 CAPSULE: at 11:08

## 2023-08-22 RX ADMIN — QUETIAPINE FUMARATE 50 MG: 25 TABLET ORAL at 09:08

## 2023-08-22 RX ADMIN — APIXABAN 5 MG: 5 TABLET, FILM COATED ORAL at 09:08

## 2023-08-22 RX ADMIN — Medication 3 CAPSULE: at 07:08

## 2023-08-22 RX ADMIN — LACTULOSE 20 G: 20 SOLUTION ORAL at 08:08

## 2023-08-22 RX ADMIN — PANTOPRAZOLE SODIUM 40 MG: 40 TABLET, DELAYED RELEASE ORAL at 04:08

## 2023-08-22 RX ADMIN — TIMOLOL MALEATE 1 DROP: 5 SOLUTION/ DROPS OPHTHALMIC at 09:08

## 2023-08-22 RX ADMIN — Medication 3 CAPSULE: at 04:08

## 2023-08-22 RX ADMIN — LACTULOSE 20 G: 20 SOLUTION ORAL at 09:08

## 2023-08-22 RX ADMIN — RIFAXIMIN 550 MG: 550 TABLET ORAL at 08:08

## 2023-08-22 RX ADMIN — RIFAXIMIN 550 MG: 550 TABLET ORAL at 09:08

## 2023-08-22 RX ADMIN — MUPIROCIN: 20 OINTMENT TOPICAL at 08:08

## 2023-08-22 RX ADMIN — AMLODIPINE BESYLATE 10 MG: 10 TABLET ORAL at 09:08

## 2023-08-22 NOTE — PROGRESS NOTES
78 year old male with an extensive PMH presents with confusion.  He is still confused at times.  Patient denies chest pain, shortness of breath, nausea, vomiting, or diarrhea.  Son at bedside    Vitals:    08/22/23 0016 08/22/23 0443 08/22/23 0600 08/22/23 1200   BP: (!) 108/57 (!) 100/54 (!) 106/55 (!) 100/47   Pulse: 89 74 76 67   Resp: 18 18 18 18   Temp: 97.5 °F (36.4 °C) 97.5 °F (36.4 °C) 97.6 °F (36.4 °C) 98.3 °F (36.8 °C)   TempSrc:       SpO2: 96% (!) 93% 95% 96%   Weight:       Height:             PHYSICAL EXAM:    GEN: NAD; alert  CVS: regular rate and rhythm without murmurs  RESP: clear to auscultation bilaterally; no rhonchi, rales, or wheezes noted  GI: non-tender, distended; + bowel sounds  EXTR: no clubbing, cyanosis; + 1 LE edema bilaterally; +scrotal swelling      ASSESSMENT AND PLAN:    Acute metabolic encephalopathy: he has elevated ammonia levels without evidence of cirrhosis; he is alert and has a history of dementia; he may be baseline. CT brain did not show acute abnormality.  On Lactulose and Rifamixin    2. Hypoalbuminemia: he has LE swelling and scrotal swelling; will monitor    3.  Acute respiratory failure: due to pneumonia; he is on Zithromax and Rocephin; he is not complaining of SOB but is on 2L O2 satting 90-95%    4. Chronic atrial fibrillation: on Eliquis and Metoprolol    5. Hypertension: BP stable; continue current meds    6. Hypokalemia: supplemented yesterday; will monitor    7. Dementia: on Seroquel      May be able to be discharged tomorrow to swing bed

## 2023-08-22 NOTE — NURSING
Abdominal wound cleansed with vashe after removing prior dressing. Wound repacked with vashed soaked gauze and  covered with dry gauze and an abd pain. Coonsult placed to wound care and Dr. Hendrix notified

## 2023-08-22 NOTE — PT/OT/SLP EVAL
Occupational Therapy   Evaluation    Name: Danny Flood  MRN: [id number]  Admitting Diagnosis: Encephalopathy, metabolic  Recent Surgery: * No surgery found *      Recommendations:     Discharge Recommendations: rehabilitation facility, nursing facility, skilled  Discharge Equipment Recommendations:  to be determined by next level of care  Barriers to discharge:  None    Assessment:     Danny Flood is a 78 y.o. male with a medical diagnosis of Encephalopathy, metabolic.  He presents with no complaints. Pt agreed to OT evaluation. Performance deficits affecting function: weakness, impaired endurance, impaired self care skills, impaired functional mobility, gait instability, impaired balance.      Pt reports a decline in self care and mobility since recent surgery.. Pt may benefit from skilled OT to incr (I). Pt appears motivated to work with therapy.    Rehab Prognosis: Good; patient would benefit from acute skilled OT services to address these deficits and reach maximum level of function.       Plan:     Patient to be seen 5 x/week to address the above listed problems via self-care/home management, therapeutic activities, therapeutic exercises  Plan of Care Expires:    Plan of Care Reviewed with: patient, son    Subjective     Chief Complaint: Metabolic Encephalopathy  Patient/Family Comments/goals: Swingbed prior to d/c home    Occupational Profile:  Living Environment: Pt lives at home with wife in single level home with 1 small step to enter  Previous level of function: I with self care prior  Roles and Routines: Pt reports working priro  Equipment Used at Home: cane, straight, walker, rolling, bedside commode, wheelchair  Assistance upon Discharge: Staff/Wife     Pain/Comfort:  Pain Rating 1: 0/10  Pain Rating Post-Intervention 1: 0/10    Patients cultural, spiritual, [employer]ism conflicts given the current situation: no    Objective:     Communicated with: BROCK price prior to session.  Patient found  HOB elevated with ribera catheter, peripheral IV, SCD upon OT entry to room.    General Precautions: Standard, fall  Orthopedic Precautions: N/A  Braces: N/A  Respiratory Status: Room air    Occupational Performance:    Bed Mobility:    Patient completed Rolling/Turning to Right with minimum assistance  Patient completed Supine to Sit with minimum assistance    Functional Mobility/Transfers:  Patient completed Sit <> Stand Transfer with contact guard assistance  with  gait belt to stand to RW   Patient completed Bed <> Chair Transfer using Step Transfer technique with minimum assistance with rolling walker and gait belt  Functional Mobility: Min a with RW    Activities of Daily Living:  Upper Body Dressing: minimum assistance donning gown as a robe  Lower Body Dressing: dependence donning socks    Cognitive/Visual Perceptual:  Cognitive/Psychosocial Skills:     -       Oriented to: Person, Place, and Situation   -       Follows Commands/attention:Follows one-step commands  -       Communication: clear/fluent  Visual/Perceptual:      -Intact      Physical Exam:  Balance:    -       (S) with EOB sitting, Min a with mobility  Edema:  None noted and UE  Sensation:    -       Intact  Motor Planning:    -       wfl  Dominant hand:    -       Right  Upper Extremity Range of Motion:     -       Right Upper Extremity: WFL  -       Left Upper Extremity: WFL  Upper Extremity Strength:    -       Right Upper Extremity: WFL  -       Left Upper Extremity: WFL   Strength:    -       Right Upper Extremity: WFL  -       Left Upper Extremity: WFL    AMPAC 6 Click ADL:  AMPAC Total Score: 16    Treatment & Education:  OT evaluation completed. See eval for details.  Pt educated on OT role/POC.   Importance of OOB activity with staff assistance.  Importance of sitting up in the chair throughout the day as tolerated, especially for meals   Safety during functional t/f and mobility with use of RW  Importance of assisting with self-care  activities   All questions/concerns answered within OT scope of practice     Patient left up in chair with all lines intact, call button in reach, and son present    GOALS:   Multidisciplinary Problems       Occupational Therapy Goals          Problem: Occupational Therapy    Goal Priority Disciplines Outcome Interventions   Occupational Therapy Goal     OT, PT/OT Ongoing, Progressing    Description: STG:  Pt will perform grooming (I)  Pt will be I/Mod I with bathing  Pt will perform UE dressing with (I)  Pt will perform LE dressing with I/Mod I  Pt will sit EOB x 10 min with (I)   Pt will transfer bed/chair/bsc with Mod I with RW  Pt will perform standing task x 2 min with Mod I   Pt will tolerate 15 minutes of tx without fatigue      LT.Restore to max I with self care and mobility.                          History:     Past Medical History:   Diagnosis Date    Diverticulitis     Hyperlipemia     Hypokalemia 2021         Past Surgical History:   Procedure Laterality Date    COLON SURGERY      partial colon resection    EXCISION, SMALL INTESTINE N/A 2023    Procedure: EXCISION, SMALL INTESTINE;  Surgeon: Tesfaye Natarajan MD;  Location: [facility] OR;  Service: General;  Laterality: N/A;    HERNIA REPAIR      LAPAROTOMY, EXPLORATORY N/A 2023    Procedure: LAPAROTOMY, EXPLORATORY;  Surgeon: Tesfaye Natarajan MD;  Location: [facility] OR;  Service: General;  Laterality: N/A;    LEFT HEART CATHETERIZATION Left 8/15/2023    Procedure: Left heart cath;  Surgeon: Edinson Torres DO;  Location: [facility] CATH LAB;  Service: Cardiology;  Laterality: Left;    REMOVAL OF IMPLANT N/A 2023    Procedure: REMOVAL, IMPLANT;  Surgeon: Tesfaye Natarajan MD;  Location: [facility] OR;  Service: General;  Laterality: N/A;  removal mesh       Time Tracking:     OT Date of Treatment: 23  OT Start Time: 1009  OT Stop Time: 1026  OT Total Time (min): 17 min    Billable Minutes:Evaluation 17    2023

## 2023-08-22 NOTE — PT/OT/SLP EVAL
Physical Therapy Evaluation     Patient Name: Danny Flood   MRN: [id number]  Recent Surgery: * No surgery found *      Recommendations:     Discharge Recommendations: nursing facility, skilled, rehabilitation facility   Discharge Equipment Recommendations: to be determined by next level of care   Barriers to discharge: Increased level of assist and Ongoing medical treatment    Assessment:     Danny Flood is a 78 y.o. male admitted with a medical diagnosis of Encephalopathy, metabolic. He presents with the following impairments/functional limitations: weakness, impaired endurance, impaired functional mobility, gait instability, impaired skin, edema. Pt has had multiple medical problems lately with frequent re-hospitalizations related to abdominal surgeries. Pt would best benefit from rehab at d/c prior to home.     Rehab Prognosis: Good; patient would benefit from acute PT services to address these deficits and reach maximum level of function.    Plan:     During this hospitalization, patient to be seen 5 x/week to address the above listed problems via gait training, therapeutic activities, therapeutic exercises    Plan of Care Expires: 09/22/23    Subjective     Chief Complaint: encephalopathy  Patient Comments/Goals: agreeable  Pain/Comfort:  Pain Rating 1: 0/10    Social History:  Living Environment: Patient lives with their spouse in a single story home with number of outside stair(s): 0  Prior Level of Function: Prior to admission, patient was independent with intermittent use of SPC  Equipment Used at Home: cane, straight, walker, rolling, bedside commode, wheelchair  DME owned (not currently used): none  Assistance Upon Discharge: facility staff    Objective:     Communicated with RN prior to session. Patient found HOB elevated with SCD, ribera catheter, peripheral IV upon PT entry to room.    General Precautions: Standard, fall   Orthopedic Precautions: N/A   Braces: N/A    Respiratory Status: Room  air    Exams:  Cognition: Patient is oriented to Person, Place, Time, Situation  RLE ROM: WFL  RLE Strength: WFL  LLE ROM: WFL  LLE Strength: WFL  Sensation:    -       Intact  Skin Integrity/Edema:     -       Skin integrity: abdominal incision with binder covering    Functional Mobility:  Gait belt applied - Yes  Bed Mobility  Supine to Sit: minimum assistance for LE management and trunk management  Transfers  Sit to Stand: contact guard assistance with rolling walker and with cues for hand placement and foot placement  Gait  Patient ambulated 5-7ft with rolling walker and minimum assistance. Patient demonstrates occasional unsteady gait, decreased step length, wide base of support, flexed posture, and decreased adithya. . All lines remained intact throughout ambulation trail.  Balance  Sitting: stand by assistance  Standing: contact guard assistance      Therapeutic Activities and Exercises:   Patient educated on role of acute care PT and PT POC, safety while in hospital including calling nurse for mobility, and call light usage  Patient educated about importance of OOB mobility and remaining up in chair most of the day.      AM-PAC 6 CLICK MOBILITY  Total Score:17    Patient left up in chair with all lines intact, call button in reach, and son present.    GOALS:   Multidisciplinary Problems       Physical Therapy Goals          Problem: Physical Therapy    Goal Priority Disciplines Outcome Goal Variances Interventions   Physical Therapy Goal     PT, PT/OT Ongoing, Progressing     Description: Short Term Goals to be met by: 23    Patient will increase functional independence with mobility by performin. Supine to sit with Stand by assist  2. Sit to stand transfer with Stand by assist using Rolling walker  3. Bed to chair transfer with Stand by assist using Rolling walker  4. Gait  x 100 feet with Stand by assist using Rolling walker  5. Lower extremity exercise program x30 reps per handout, with  assistance as needed      Long Term Goals to be met by: 9/22/23    Pt will regain full independent functional mobility with Rolling walker to return to home situation and prior activities of daily living.                        History:     Past Medical History:   Diagnosis Date    Diverticulitis     Hyperlipemia     Hypokalemia 11/09/2021       Past Surgical History:   Procedure Laterality Date    COLON SURGERY      partial colon resection    EXCISION, SMALL INTESTINE N/A 8/9/2023    Procedure: EXCISION, SMALL INTESTINE;  Surgeon: Tesfaye Natarajan MD;  Location: [facility] OR;  Service: General;  Laterality: N/A;    HERNIA REPAIR      LAPAROTOMY, EXPLORATORY N/A 8/9/2023    Procedure: LAPAROTOMY, EXPLORATORY;  Surgeon: Tesfaye Natarajan MD;  Location: [facility] OR;  Service: General;  Laterality: N/A;    LEFT HEART CATHETERIZATION Left 8/15/2023    Procedure: Left heart cath;  Surgeon: Edinson Torres DO;  Location: [facility] CATH LAB;  Service: Cardiology;  Laterality: Left;    REMOVAL OF IMPLANT N/A 8/9/2023    Procedure: REMOVAL, IMPLANT;  Surgeon: Tesfaye Natarajan MD;  Location: [facility] OR;  Service: General;  Laterality: N/A;  removal mesh       Time Tracking:     PT Received On: 08/22/23  PT Start Time: 1009  PT Stop Time: 1027  PT Total Time (min): 18 min     Billable Minutes: Evaluation moderate complexity    8/22/2023

## 2023-08-22 NOTE — PLAN OF CARE
Problem: Physical Therapy  Goal: Physical Therapy Goal  Description: Short Term Goals to be met by: 23    Patient will increase functional independence with mobility by performin. Supine to sit with Stand by assist  2. Sit to stand transfer with Stand by assist using Rolling walker  3. Bed to chair transfer with Stand by assist using Rolling walker  4. Gait  x 100 feet with Stand by assist using Rolling walker  5. Lower extremity exercise program x30 reps per handout, with assistance as needed      Long Term Goals to be met by: 23    Pt will regain full independent functional mobility with Rolling walker to return to home situation and prior activities of daily living.   Outcome: Ongoing, Progressing    PT eval completed. Please see eval note for details on current mobility status.

## 2023-08-22 NOTE — PLAN OF CARE
[facility]  Discharge Final Note    Primary Care Provider: Cameron Valencia MD    Expected Discharge Date:     Final Discharge Note (most recent)       Final Note - 08/22/23 1347          Final Note    Assessment Type Final Discharge Note     Anticipated Discharge Disposition Skilled Nursing Facility        Post-Acute Status    Post-Acute Authorization Placement     Post-Acute Placement Status Set-up Complete/Auth obtained     Patient choice form signed by patient/caregiver List with quality metrics by geographic area provided;List from CMS Compare;List from System Post-Acute Care     Discharge Delays None known at this time                   [email or web address]  Important Message from Medicare             After-discharge care                Home Medical Care       [facility]   Service: Home Health Services    [address] [place] [id number]   Phone: [phone]

## 2023-08-22 NOTE — PLAN OF CARE
Problem: Occupational Therapy  Goal: Occupational Therapy Goal  Description: STG:  Pt will perform grooming (I)  Pt will be I/Mod I with bathing  Pt will perform UE dressing with (I)  Pt will perform LE dressing with I/Mod I  Pt will sit EOB x 10 min with (I)   Pt will transfer bed/chair/bsc with Mod I with RW  Pt will perform standing task x 2 min with Mod I   Pt will tolerate 15 minutes of tx without fatigue      LT.Restore to max I with self care and mobility.     Outcome: Ongoing, Progressing

## 2023-08-23 ENCOUNTER — APPOINTMENT (OUTPATIENT)
Dept: RADIOLOGY | Facility: HOSPITAL | Age: 78
End: 2023-08-23
Payer: MEDICARE

## 2023-08-23 ENCOUNTER — HOSPITAL ENCOUNTER (INPATIENT)
Facility: HOSPITAL | Age: 78
LOS: 16 days | Discharge: HOME HEALTH | DRG: 919 | End: 2023-09-08
Attending: EMERGENCY MEDICINE | Admitting: FAMILY MEDICINE
Payer: MEDICARE

## 2023-08-23 VITALS
RESPIRATION RATE: 18 BRPM | SYSTOLIC BLOOD PRESSURE: 106 MMHG | DIASTOLIC BLOOD PRESSURE: 50 MMHG | TEMPERATURE: 98 F | HEART RATE: 73 BPM | HEIGHT: 70 IN | WEIGHT: 192 LBS | BODY MASS INDEX: 27.49 KG/M2 | OXYGEN SATURATION: 96 %

## 2023-08-23 DIAGNOSIS — M62.81 MUSCLE WEAKNESS: ICD-10-CM

## 2023-08-23 DIAGNOSIS — T85.79XA INFECTED HERNIOPLASTY MESH, INITIAL ENCOUNTER: Primary | ICD-10-CM

## 2023-08-23 DIAGNOSIS — T85.79XA: ICD-10-CM

## 2023-08-23 PROBLEM — I21.4 NSTEMI (NON-ST ELEVATED MYOCARDIAL INFARCTION): Status: ACTIVE | Noted: 2023-08-23

## 2023-08-23 LAB
ALBUMIN SERPL BCP-MCNC: 2 G/DL (ref 3.5–5)
ALBUMIN/GLOB SERPL: 0.5 {RATIO}
ALP SERPL-CCNC: 73 U/L (ref 45–115)
ALT SERPL W P-5'-P-CCNC: 39 U/L (ref 16–61)
ANION GAP SERPL CALCULATED.3IONS-SCNC: 9 MMOL/L (ref 7–16)
AST SERPL W P-5'-P-CCNC: 45 U/L (ref 15–37)
BASOPHILS # BLD AUTO: 0.09 K/UL (ref 0–0.2)
BASOPHILS NFR BLD AUTO: 1.3 % (ref 0–1)
BILIRUB SERPL-MCNC: 0.7 MG/DL (ref ?–1.2)
BUN SERPL-MCNC: 26 MG/DL (ref 7–18)
BUN/CREAT SERPL: 23 (ref 6–20)
CALCIUM SERPL-MCNC: 8.1 MG/DL (ref 8.5–10.1)
CHLORIDE SERPL-SCNC: 104 MMOL/L (ref 98–107)
CO2 SERPL-SCNC: 31 MMOL/L (ref 21–32)
CREAT SERPL-MCNC: 1.15 MG/DL (ref 0.7–1.3)
DIFFERENTIAL METHOD BLD: ABNORMAL
EGFR (NO RACE VARIABLE) (RUSH/TITUS): 66 ML/MIN/1.73M2
EOSINOPHIL # BLD AUTO: 0.36 K/UL (ref 0–0.5)
EOSINOPHIL NFR BLD AUTO: 5 % (ref 1–4)
EOSINOPHIL NFR BLD MANUAL: 6 % (ref 1–4)
ERYTHROCYTE [DISTWIDTH] IN BLOOD BY AUTOMATED COUNT: 13.6 % (ref 11.5–14.5)
GLOBULIN SER-MCNC: 4.2 G/DL (ref 2–4)
GLUCOSE SERPL-MCNC: 144 MG/DL (ref 74–106)
HCT VFR BLD AUTO: 26.6 % (ref 40–54)
HGB BLD-MCNC: 8.3 G/DL (ref 13.5–18)
LYMPHOCYTES # BLD AUTO: 0.93 K/UL (ref 1–4.8)
LYMPHOCYTES NFR BLD AUTO: 13 % (ref 27–41)
LYMPHOCYTES NFR BLD MANUAL: 13 % (ref 27–41)
MACROCYTES BLD QL SMEAR: ABNORMAL
MCH RBC QN AUTO: 32.4 PG (ref 27–31)
MCHC RBC AUTO-ENTMCNC: 31.2 G/DL (ref 32–36)
MCV RBC AUTO: 103.9 FL (ref 80–96)
MONOCYTES # BLD AUTO: 0.89 K/UL (ref 0–0.8)
MONOCYTES NFR BLD AUTO: 12.4 % (ref 2–6)
MONOCYTES NFR BLD MANUAL: 9 % (ref 2–6)
MPC BLD CALC-MCNC: 9.3 FL (ref 9.4–12.4)
NEUTROPHILS # BLD AUTO: 4.89 K/UL (ref 1.8–7.7)
NEUTROPHILS NFR BLD AUTO: 68.3 % (ref 53–65)
NEUTS SEG NFR BLD MANUAL: 72 % (ref 50–62)
NRBC BLD MANUAL-RTO: ABNORMAL %
PLATELET # BLD AUTO: 358 K/UL (ref 150–400)
PLATELET MORPHOLOGY: NORMAL
POTASSIUM SERPL-SCNC: 4.8 MMOL/L (ref 3.5–5.1)
PROT SERPL-MCNC: 6.2 G/DL (ref 6.4–8.2)
RBC # BLD AUTO: 2.56 M/UL (ref 4.6–6.2)
SODIUM SERPL-SCNC: 139 MMOL/L (ref 136–145)
WBC # BLD AUTO: 7.16 K/UL (ref 4.5–11)

## 2023-08-23 PROCEDURE — 25000003 PHARM REV CODE 250: Performed by: HOSPITALIST

## 2023-08-23 PROCEDURE — 11000004 HC SNF PRIVATE

## 2023-08-23 PROCEDURE — 82306 VITAMIN D 25 HYDROXY: CPT | Performed by: PHYSICIAN ASSISTANT

## 2023-08-23 PROCEDURE — 99900035 HC TECH TIME PER 15 MIN (STAT)

## 2023-08-23 PROCEDURE — 27000958

## 2023-08-23 PROCEDURE — 25000003 PHARM REV CODE 250: Performed by: GENERAL PRACTICE

## 2023-08-23 PROCEDURE — G0378 HOSPITAL OBSERVATION PER HR: HCPCS

## 2023-08-23 PROCEDURE — 84443 ASSAY THYROID STIM HORMONE: CPT | Performed by: PHYSICIAN ASSISTANT

## 2023-08-23 PROCEDURE — 85025 COMPLETE CBC W/AUTO DIFF WBC: CPT | Performed by: PHYSICIAN ASSISTANT

## 2023-08-23 PROCEDURE — 99239 HOSP IP/OBS DSCHRG MGMT >30: CPT | Mod: ,,, | Performed by: HOSPITALIST

## 2023-08-23 PROCEDURE — 80053 COMPREHEN METABOLIC PANEL: CPT | Performed by: PHYSICIAN ASSISTANT

## 2023-08-23 PROCEDURE — 94761 N-INVAS EAR/PLS OXIMETRY MLT: CPT

## 2023-08-23 PROCEDURE — 25000003 PHARM REV CODE 250: Performed by: PHYSICIAN ASSISTANT

## 2023-08-23 PROCEDURE — 99239 PR HOSPITAL DISCHARGE DAY,>30 MIN: ICD-10-PCS | Mod: ,,, | Performed by: HOSPITALIST

## 2023-08-23 PROCEDURE — 71045 X-RAY EXAM CHEST 1 VIEW: CPT | Mod: TC

## 2023-08-23 PROCEDURE — 25000003 PHARM REV CODE 250: Performed by: FAMILY MEDICINE

## 2023-08-23 RX ORDER — NIACIN 500 MG/1
500 TABLET, EXTENDED RELEASE ORAL DAILY
Status: DISCONTINUED | OUTPATIENT
Start: 2023-08-24 | End: 2023-08-25

## 2023-08-23 RX ORDER — LACTULOSE 10 G/15ML
20 SOLUTION ORAL 3 TIMES DAILY
Status: DISCONTINUED | OUTPATIENT
Start: 2023-08-23 | End: 2023-09-08 | Stop reason: HOSPADM

## 2023-08-23 RX ORDER — TIMOLOL MALEATE 5 MG/ML
1 SOLUTION/ DROPS OPHTHALMIC DAILY
Status: DISCONTINUED | OUTPATIENT
Start: 2023-08-24 | End: 2023-09-08 | Stop reason: HOSPADM

## 2023-08-23 RX ORDER — ONDANSETRON 4 MG/1
4 TABLET, ORALLY DISINTEGRATING ORAL EVERY 8 HOURS PRN
Status: DISCONTINUED | OUTPATIENT
Start: 2023-08-23 | End: 2023-09-08 | Stop reason: HOSPADM

## 2023-08-23 RX ORDER — OMEGA-3-ACID ETHYL ESTERS 1 G/1
1 CAPSULE, LIQUID FILLED ORAL DAILY
Status: DISCONTINUED | OUTPATIENT
Start: 2023-08-24 | End: 2023-08-24 | Stop reason: CLARIF

## 2023-08-23 RX ORDER — LACTOBACILLUS ACIDOPHILUS 500MM CELL
1 CAPSULE ORAL DAILY
Status: COMPLETED | OUTPATIENT
Start: 2023-08-24 | End: 2023-08-28

## 2023-08-23 RX ORDER — CALCIUM CARBONATE 200(500)MG
500 TABLET,CHEWABLE ORAL 2 TIMES DAILY PRN
Status: DISCONTINUED | OUTPATIENT
Start: 2023-08-23 | End: 2023-09-08 | Stop reason: HOSPADM

## 2023-08-23 RX ORDER — AMOXICILLIN 250 MG
1 CAPSULE ORAL 2 TIMES DAILY
Status: DISCONTINUED | OUTPATIENT
Start: 2023-08-23 | End: 2023-09-08 | Stop reason: HOSPADM

## 2023-08-23 RX ORDER — MUPIROCIN 20 MG/G
OINTMENT TOPICAL 2 TIMES DAILY
Status: COMPLETED | OUTPATIENT
Start: 2023-08-23 | End: 2023-08-28

## 2023-08-23 RX ORDER — QUETIAPINE FUMARATE 25 MG/1
50 TABLET, FILM COATED ORAL 2 TIMES DAILY
Status: DISCONTINUED | OUTPATIENT
Start: 2023-08-23 | End: 2023-09-08 | Stop reason: HOSPADM

## 2023-08-23 RX ORDER — ACETAMINOPHEN 325 MG/1
650 TABLET ORAL EVERY 6 HOURS PRN
Status: DISCONTINUED | OUTPATIENT
Start: 2023-08-23 | End: 2023-09-08 | Stop reason: HOSPADM

## 2023-08-23 RX ORDER — AMOXICILLIN 500 MG
1 CAPSULE ORAL DAILY
Status: DISCONTINUED | OUTPATIENT
Start: 2023-08-24 | End: 2023-08-23

## 2023-08-23 RX ORDER — TALC
6 POWDER (GRAM) TOPICAL NIGHTLY PRN
Status: DISCONTINUED | OUTPATIENT
Start: 2023-08-23 | End: 2023-09-08 | Stop reason: HOSPADM

## 2023-08-23 RX ORDER — FOLIC ACID 1 MG/1
1 TABLET ORAL DAILY
Status: DISCONTINUED | OUTPATIENT
Start: 2023-08-24 | End: 2023-09-08 | Stop reason: HOSPADM

## 2023-08-23 RX ORDER — PANTOPRAZOLE SODIUM 40 MG/1
40 TABLET, DELAYED RELEASE ORAL 2 TIMES DAILY
Status: DISCONTINUED | OUTPATIENT
Start: 2023-08-23 | End: 2023-09-08 | Stop reason: HOSPADM

## 2023-08-23 RX ORDER — METOPROLOL TARTRATE 25 MG/1
12.5 TABLET ORAL 2 TIMES DAILY
Status: DISCONTINUED | OUTPATIENT
Start: 2023-08-23 | End: 2023-09-08 | Stop reason: HOSPADM

## 2023-08-23 RX ADMIN — TIMOLOL MALEATE 1 DROP: 5 SOLUTION/ DROPS OPHTHALMIC at 09:08

## 2023-08-23 RX ADMIN — APIXABAN 5 MG: 5 TABLET, FILM COATED ORAL at 08:08

## 2023-08-23 RX ADMIN — QUETIAPINE 50 MG: 25 TABLET ORAL at 08:08

## 2023-08-23 RX ADMIN — RIFAXIMIN 550 MG: 550 TABLET ORAL at 08:08

## 2023-08-23 RX ADMIN — Medication 3 CAPSULE: at 12:08

## 2023-08-23 RX ADMIN — PANTOPRAZOLE SODIUM 40 MG: 40 TABLET, DELAYED RELEASE ORAL at 08:08

## 2023-08-23 RX ADMIN — METOPROLOL TARTRATE 12.5 MG: 25 TABLET, FILM COATED ORAL at 08:08

## 2023-08-23 RX ADMIN — NIACIN 500 MG: 500 TABLET, EXTENDED RELEASE ORAL at 09:08

## 2023-08-23 RX ADMIN — APIXABAN 5 MG: 5 TABLET, FILM COATED ORAL at 09:08

## 2023-08-23 RX ADMIN — Medication 3 CAPSULE: at 09:08

## 2023-08-23 RX ADMIN — FOLIC ACID-PYRIDOXINE-CYANOCOBALAMIN TAB 2.5-25-2 MG 1 TABLET: 2.5-25-2 TAB at 09:08

## 2023-08-23 RX ADMIN — MUPIROCIN: 20 OINTMENT TOPICAL at 09:08

## 2023-08-23 RX ADMIN — LOSARTAN POTASSIUM 100 MG: 100 TABLET, FILM COATED ORAL at 09:08

## 2023-08-23 RX ADMIN — AMLODIPINE BESYLATE 10 MG: 10 TABLET ORAL at 09:08

## 2023-08-23 RX ADMIN — RIFAXIMIN 550 MG: 550 TABLET ORAL at 09:08

## 2023-08-23 RX ADMIN — METOPROLOL TARTRATE 12.5 MG: 25 TABLET, FILM COATED ORAL at 09:08

## 2023-08-23 RX ADMIN — LACTULOSE 20 G: 20 SOLUTION ORAL at 09:08

## 2023-08-23 RX ADMIN — MUPIROCIN: 20 OINTMENT TOPICAL at 08:08

## 2023-08-23 RX ADMIN — PANTOPRAZOLE SODIUM 40 MG: 40 TABLET, DELAYED RELEASE ORAL at 06:08

## 2023-08-23 RX ADMIN — QUETIAPINE FUMARATE 50 MG: 25 TABLET ORAL at 09:08

## 2023-08-23 RX ADMIN — LACTULOSE 20 G: 20 SOLUTION ORAL at 08:08

## 2023-08-23 NOTE — DISCHARGE SUMMARY
[facility] Hospital Medicine  Discharge Summary      Patient Name: Danny Flood  MRN: [id number]  [facility] [place]: [id number]  Patient Class: OP- Observation  Admission Date: 8/18/2023  Hospital Length of Stay: 1 days  Discharge Date and Time:  08/23/2023 1:11 PM  Attending Physician: Cheri Hendrix DO   Discharging Provider: Cheri Hendrix DO, DO  Primary Care Provider: Cameron Valencia MD       CHIEF COMPLAINT: infected wound     HOSPITAL COURSE:  78 year old male with a PMH of HTN, DM, GERD, dementia and hyperammonemia presents to the Surgical service for an infected mesh.  He was taken to the OR for an exploratory laparotomy for infected mesh removal and small bowel resection with anastomosis.  Cultures showed Bacteroides and Anaerococcus as well as Klebsiella, E. Coli, and Proteus.  He received a course of Zosyn.  The patient developed elevated troponins and was transferred to the Internal Medicine service for further evaluation.  He was continued on IV antibiotics for his infection.  CTA chest did not show evidence of PE.  Cardiology was consulted for his elevated troponin.  He had a LHC on Aug 15 that showed EF 55% with non-obstructive CAD.  ECHO showed EF 60%.  He has chronic atrial fibrillation and was continued on Eliquis and Metoprolol.  LE Doppler did not show evidence of DVT.  He also developed LE and scrotal swelling due to his low albumin state.  He had confusion during his hospital stay and was found to have pneumonia on CXR.  He was placed on Vancomycin and Zosyn with improvement.  He also has chronically elevated ammonia levels and confusion at home.  CT brain did not show acute abnormality.  Patient denies chest pain, shortness of breath, nausea, vomiting, or diarrhea and is stable for discharge.        Vitals          Vitals:     08/23/23 0006 08/23/23 0445 08/23/23 0600 08/23/23 1200   BP: (!) 124/55 (!) 139/54 (!) 134/54 (!) 106/50   Pulse: 64 67 73 73   Resp: 18 18  18 18   Temp: 97.5 °F (36.4 °C) 97.2 °F (36.2 °C) 98.5 °F (36.9 °C) 98 °F (36.7 °C)   TempSrc:           SpO2: 96% 95% 99% 96%   Weight:           Height:                       DISCHARGE MEDICATIONS:  Per med rec        DISCHARGE DIAGNOSIS:     1. Infected mesh s/p ex lap  2. Pneumonia  3. NSTEMI  4. Chronic atrial fibrillation  5. HTN  6. ? DM  7. GERD  8. Dementia  9. Hyperammonemia        FOLLOW UP:     PCP 2 weeks  Cardiology 2 weeks     Discharge to swing bed in stable condition        All of the information was discussed with the patient who acknowledged verbal understanding.        Time spent on discharge: 45 minutes        Routing History      Primary Care Team: Networked reference to record PCT     HPI:   Patient is a 78 year-old male with PMHx of HTN, DM, GERD, dementia, and hyperammonemia who presentes to the [facility] transferred from [facility] due to confusion, agitation and swelling of his legs. Patient was discharged from [facility] yesterday s/p exploratory laparotomy with infected mesh removal, and small bowel resection with anastomosis on 8/9. Also, during last hospitalization patient had LHC and was found to have normal LV systolic function, EF 55%, trivial non-obstructive CAD on 8/15. Patient 's daughter who is present in the room state that the patient is having swelling of his groin down to his legs. Patient states that he had some chest pain earlier today that improved. Patient also reports SOB. Patient denies fever, chills, nausea, vomiting, bowel or urinary habit changes.    In the ED vital signs showed /71, HR 81, RR 16, SpO2 95% and afebrile. Blood work showed H/H 10.3/30.8 which seem stable for the last few day. No leukocytosis with WBC 6.33. . Sodium 141, potassium 3.8, BUN/Cr 6/0.54, glucose 110, lipase 45, p-BNP 1016 from 1400 four days ago. Troponin 56.3 from 1500 four days ago. EKG nsr with HR 75. Chest xray showing B/L hilar infiltrates. Patient will be admitted to the  [facility] for further management.      * No surgery found *      Hospital Course:   No notes on file     Goals of Care Treatment Preferences:  Code Status: Full Code      Consults:   Consults (From admission, onward)        Status Ordering Provider     Pharmacy to dose Vancomycin consult  Once        Provider:  (Not yet assigned)    TRAVON Ruiz          No new Assessment & Plan notes have been filed under this hospital service since the last note was generated.  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 Schedule an appointment as soon as possible for a visit in 2 week(s).    Specialty: Hematology and Oncology  Contact information:  [address]  [place] [address]  [phone]             Tim Batres MD Follow up.    Specialty: Cardiology  Contact information:  [address] [facility] [place] [address]  [phone]                   Contact information for after-discharge care     Destination     [facility] .     Service: Skilled Nursing  Contact information:  [address] [address] [address]  [phone]                 Home Medical Care     [facility] .    Service: Home Health Services  Contact information:  [address]  [address]  [facility] [address]  [phone]                           Patient Instructions:   No discharge procedures on file.    Significant Diagnostic Studies: Labs: All labs within the past 24 hours have been reviewed    Pending Diagnostic Studies:     None         Medications:  Reconciled Home Medications:      Medication List      START taking these medications    dextrose 5 % (D5W) SolP 250 mL with azithromycin 500 mg SolR 500 mg  Inject 500 mg into the vein once daily. for 5 days     dextrose 5 % in water (D5W) PgBk 100 mL with cefTRIAXone 1 gram SolR 1 g  Inject 1 g into the vein once daily. for 5 days        CHANGE how you take these medications    rifAXIMin 550 mg Tab  Commonly known as: XIFAXAN  Take 1 tablet (550 mg total) by mouth 2 (two) times daily.  What changed: how to take this        CONTINUE taking these medications    apixaban 5 mg Tab  Commonly known as: ELIQUIS  Take 1 tablet (5 mg total) by mouth 2 (two) times daily. for 30 doses     fish oil-omega-3 fatty acids 300-1,000 mg capsule  Take 1 capsule by mouth once daily.     FOLIC ACID-VITAMIN B6-VIT B12 ORAL  Take 1 tablet by mouth once daily.     lactulose 10 gram/15 mL solution  Commonly known as: CHRONULAC  Take 30 mLs (20 g total) by mouth 3 (three) times daily.     metoprolol tartrate 25 MG tablet  Commonly known as: LOPRESSOR  Take 0.5 tablets (12.5 mg total) by mouth 2 (two) times daily.     niacin 500 mg tablet  Commonly known as: SLO-NIACIN  Take 500 mg by mouth once daily. With meal     pantoprazole 40 MG tablet  Commonly known as: PROTONIX  Take 1 tablet (40 mg total) by mouth 2 (two) times daily.     PROBIOTIC 10 billion cell Cap  Generic drug: Lactobacillus acidophilus  Take by mouth.      QUEtiapine 50 MG tablet  Commonly known as: SEROQUEL  1 tablet (50 mg total) by Per NG tube route 2 (two) times daily.     timolol maleate 0.5% 0.5 % Drop  Commonly known as: TIMOPTIC  1 drop once daily.        STOP taking these medications    amLODIPine 10 MG tablet  Commonly known as: NORVASC     FOLBIC 2.5-25-2 mg Tab  Generic drug: folic acid-vit B6-vit B12 2.5-25-2 mg     HYDROcodone-acetaminophen 5-325 mg per tablet  Commonly known as: NORCO     losartan 100 MG tablet  Commonly known as: COZAAR     pioglitazone 15 MG tablet  Commonly known as: ACTOS            Indwelling Lines/Drains at time of discharge:   Lines/Drains/Airways     Drain  Duration                Urethral Catheter 08/20/23 1632 16 Fr. 2 days                Time spent on the discharge of patient: 45 minutes         Cheri Hendrix DO, DO  Department of Hospital Medicine  [facility]

## 2023-08-23 NOTE — DISCHARGE SUMMARY
CHIEF COMPLAINT: infected wound    HOSPITAL COURSE:  78 year old male with a PMH of HTN, DM, GERD, dementia and hyperammonemia presents to the Surgical service for an infected mesh.  He was taken to the OR for an exploratory laparotomy for infected mesh removal and small bowel resection with anastomosis.  Cultures showed Bacteroides and Anaerococcus as well as Klebsiella, E. Coli, and Proteus.  He received a course of Zosyn.  The patient developed elevated troponins and was transferred to the Internal Medicine service for further evaluation.  He was continued on IV antibiotics for his infection.  CTA chest did not show evidence of PE.  Cardiology was consulted for his elevated troponin.  He had a LHC on Aug 15 that showed EF 55% with non-obstructive CAD.  ECHO showed EF 60%.  He has chronic atrial fibrillation and was continued on Eliquis and Metoprolol.  LE Doppler did not show evidence of DVT.  He also developed LE and scrotal swelling due to his low albumin state.  He had confusion during his hospital stay and was found to have pneumonia on CXR.  He was placed on Vancomycin and Zosyn with improvement.  He also has chronically elevated ammonia levels and confusion at home.  CT brain did not show acute abnormality.  Patient denies chest pain, shortness of breath, nausea, vomiting, or diarrhea and is stable for discharge.      Vitals:    08/23/23 0006 08/23/23 0445 08/23/23 0600 08/23/23 1200   BP: (!) 124/55 (!) 139/54 (!) 134/54 (!) 106/50   Pulse: 64 67 73 73   Resp: 18 18 18 18   Temp: 97.5 °F (36.4 °C) 97.2 °F (36.2 °C) 98.5 °F (36.9 °C) 98 °F (36.7 °C)   TempSrc:       SpO2: 96% 95% 99% 96%   Weight:       Height:             DISCHARGE MEDICATIONS:  Per med rec      DISCHARGE DIAGNOSIS:    Infected mesh s/p ex lap  Pneumonia  NSTEMI  Chronic atrial fibrillation  HTN  ? DM  GERD  Dementia  Hyperammonemia      FOLLOW UP:    PCP 2 weeks  Cardiology 2 weeks    Discharge to swing bed in stable condition      All of  the information was discussed with the patient who acknowledged verbal understanding.      Time spent on discharge: 45 minutes

## 2023-08-23 NOTE — NURSING
Nurses Note -- 4 Eyes      8/23/2023   6:06 PM      Skin assessed during: Admit      [] No Altered Skin Integrity Present    []Prevention Measures Documented      [] Yes- Altered Skin Integrity Present or Discovered   [] LDA Added if Not in Epic (Describe Wound)   [x] New Altered Skin Integrity was Present on Admit and Documented in LDA   [] Wound Image Taken    Wound Care Consulted? No    Attending Nurse:  Heather Hackett RN/Staff Member:  Julia RN

## 2023-08-23 NOTE — NURSING
Pt with discharge instructions in hand and education provided. Pt taken down by transport to daughter's vehicle. Called jasbir and gave report.

## 2023-08-23 NOTE — PLAN OF CARE
Problem: Adult Inpatient Plan of Care  Goal: Plan of Care Review  8/23/2023 1547 by Justin Vo RN  Outcome: Met  8/23/2023 1500 by Justin Vo RN  Outcome: Ongoing, Progressing  Goal: Patient-Specific Goal (Individualized)  8/23/2023 1547 by Justin Vo RN  Outcome: Met  8/23/2023 1500 by Justin Vo RN  Outcome: Ongoing, Progressing  Goal: Absence of Hospital-Acquired Illness or Injury  8/23/2023 1547 by Justin Vo RN  Outcome: Met  8/23/2023 1500 by Justin Vo RN  Outcome: Ongoing, Progressing  Goal: Optimal Comfort and Wellbeing  8/23/2023 1547 by Justin Vo RN  Outcome: Met  8/23/2023 1500 by Justin Vo RN  Outcome: Ongoing, Progressing  Goal: Readiness for Transition of Care  8/23/2023 1547 by Justin Vo RN  Outcome: Met  8/23/2023 1500 by Justin Vo RN  Outcome: Ongoing, Progressing     Problem: Diabetes Comorbidity  Goal: Blood Glucose Level Within Targeted Range  8/23/2023 1547 by Justin Vo RN  Outcome: Met  8/23/2023 1500 by Justin Vo RN  Outcome: Ongoing, Progressing     Problem: Fluid Imbalance (Pneumonia)  Goal: Fluid Balance  8/23/2023 1547 by Justin Vo RN  Outcome: Met  8/23/2023 1500 by Justin Vo RN  Outcome: Ongoing, Progressing     Problem: Infection (Pneumonia)  Goal: Resolution of Infection Signs and Symptoms  8/23/2023 1547 by Justin Vo RN  Outcome: Met  8/23/2023 1500 by Justin Vo RN  Outcome: Ongoing, Progressing     Problem: Respiratory Compromise (Pneumonia)  Goal: Effective Oxygenation and Ventilation  8/23/2023 1547 by Justin Vo RN  Outcome: Met  8/23/2023 1500 by Justin Vo RN  Outcome: Ongoing, Progressing     Problem: Skin Injury Risk Increased  Goal: Skin Health and Integrity  8/23/2023 1547 by Justin Vo RN  Outcome: Met  8/23/2023 1500 by Justin Vo, RN  Outcome: Ongoing, Progressing     Problem: Fall Injury Risk  Goal: Absence of Fall and Fall-Related  Injury  8/23/2023 1547 by Justin Vo RN  Outcome: Met  8/23/2023 1500 by Justin Vo RN  Outcome: Ongoing, Progressing     Problem: Impaired Wound Healing  Goal: Optimal Wound Healing  8/23/2023 1547 by Justin Vo RN  Outcome: Met  8/23/2023 1500 by Justin Vo RN  Outcome: Ongoing, Progressing     Problem: Infection  Goal: Absence of Infection Signs and Symptoms  8/23/2023 1547 by Justin Vo RN  Outcome: Met  8/23/2023 1500 by Justin Vo RN  Outcome: Ongoing, Progressing

## 2023-08-23 NOTE — PLAN OF CARE
Problem: Adult Inpatient Plan of Care  Goal: Plan of Care Review  Outcome: Ongoing, Progressing  Goal: Patient-Specific Goal (Individualized)  Outcome: Ongoing, Progressing  Goal: Absence of Hospital-Acquired Illness or Injury  Outcome: Ongoing, Progressing  Goal: Optimal Comfort and Wellbeing  Outcome: Ongoing, Progressing  Goal: Readiness for Transition of Care  Outcome: Ongoing, Progressing     Problem: Diabetes Comorbidity  Goal: Blood Glucose Level Within Targeted Range  Outcome: Ongoing, Progressing     Problem: Fluid Imbalance (Pneumonia)  Goal: Fluid Balance  Outcome: Ongoing, Progressing     Problem: Infection (Pneumonia)  Goal: Resolution of Infection Signs and Symptoms  Outcome: Ongoing, Progressing     Problem: Respiratory Compromise (Pneumonia)  Goal: Effective Oxygenation and Ventilation  Outcome: Ongoing, Progressing     Problem: Impaired Wound Healing  Goal: Optimal Wound Healing  Outcome: Ongoing, Progressing

## 2023-08-23 NOTE — PROGRESS NOTES
08/23/23 0850   Wound Care Follow Up   Wound Care Follow-up? Yes   Wound Care- Next Visit Date 08/29/23   Follow Up Plan Abbey POC

## 2023-08-23 NOTE — FIRST PROVIDER EVALUATION
Pt is a 78 year old male with history of hypertension, GERD, dementia, hyperammonia, pneumonia, AFib, infected hernioplasty mesh status post surgical removal, and reanastomosis.    Patient is resting comfortably in his bed at time of exam, he states he is not having any pain.    He states his swelling in his legs have decreased considerably, however the left still swells little bit more than the right.    He was alert to person, place, and time.    Short-term memory intact  Patient denied any chest pain, shortness of breath, abdominal pain, denied any diarrhea, nausea or vomiting.    Objective:   Vital signs stable (see nursing note)  Patient is resting comfortably in his bed, is in no apparent distress.    His extraocular motion is intact his speech is clear.    His heart was regular rate and rhythm.    Lungs:  Clear to auscultation bilaterally.    Abdomen:  Bowel sounds positive, dressing is clean dry and intact.    Extremities:  2+ pitting edema bilaterally.    Negative Homans sign.    Assessment and plan:   1.Patient was admitted to swing bed, he will be evaluated by physical therapy and occupational therapy.    2.Pneumonia patient is getting azithromycin, ceftriaxone, and Xifaxan  3. Kenny 0 plasty mesh removal, will be referred to wound care, and get daily wound care.    4. AFib patient is anticoagulated with Eliquis 5 mg p.o. b.i.d..    5. Dementia continue with current treatment.

## 2023-08-23 NOTE — PROGRESS NOTES
[facility]  Wound Care    Patient Name:  Danny Flood   MRN:  [id number]  Date: 8/23/2023  Diagnosis: Encephalopathy, metabolic    History:     Past Medical History:   Diagnosis Date    Diverticulitis     Hyperlipemia     Hypokalemia 11/09/2021       Social History     Socioeconomic History    Marital status:    Tobacco Use    Smoking status: Never    Smokeless tobacco: Never   Substance and Sexual Activity    Alcohol use: Never    Drug use: Never    Sexual activity: Not Currently     Social Determinants of Health     Financial Resource Strain: Low Risk  (8/10/2023)    Overall Financial Resource Strain (CARDIA)     Difficulty of Paying Living Expenses: Not hard at all   Food Insecurity: No Food Insecurity (8/10/2023)    Hunger Vital Sign     Worried About Running Out of Food in the Last Year: Never true     Ran Out of Food in the Last Year: Never true   Transportation Needs: No Transportation Needs (8/10/2023)    PRAPARE - Transportation     Lack of Transportation (Medical): No     Lack of Transportation (Non-Medical): No   Physical Activity: Inactive (8/10/2023)    Exercise Vital Sign     Days of Exercise per Week: 0 days     Minutes of Exercise per Session: 0 min   Stress: No Stress Concern Present (8/10/2023)    [employer] [place] [employer] - Occupational Stress Questionnaire     Feeling of Stress : Not at all   Social Connections: Moderately Integrated (8/10/2023)    Social Connection and Isolation Panel [NHANES]     Frequency of Communication with Friends and Family: More than three times a week     Frequency of Social Gatherings with Friends and Family: More than three times a week     Attends Zoroastrianism Services: More than 4 times per year     Active Member of Clubs or Organizations: No     Attends Club or Organization Meetings: Never     Marital Status:    Housing Stability: Low Risk  (8/10/2023)    Housing Stability Vital Sign     Unable to Pay for Housing in  "the Last Year: No     Number of Places Lived in the Last Year: 1     Unstable Housing in the Last Year: No       Precautions:     Allergies as of 08/18/2023    (No Known Allergies)       WO Assessment Details/Treatment        08/23/23 0852   WOCN Assessment   WOCN Total Time (mins) 75   Visit Date 08/23/23   Visit Time 0800   Consult Type New   WOCN Speciality Wound   Wound I&D   Number of Wounds 1   Intervention chart review;assessed;changed;applied;coordination of care;orders;team conference   Skin Interventions   Dehiscence Prevention/Management abdominal binder secured in place   Skin Protection adhesive use limited;skin sealant/moisture barrier applied        Incision/Site 08/09/23 1434 Abdomen   Date First Assessed/Time First Assessed: 08/09/23 1434   Location: Abdomen   Wound Image    Dressing Appearance Intact;Moist drainage   Drainage Amount Moderate   Drainage Characteristics/Odor Serosanguineous   Appearance Red;Tan;Yellow;Moist;Granulating;Staples intact;Sutures intact;Adipose;Muscle   Yellow (%), Wound Tissue Color 5 %   Periwound Area Intact;Redness   Wound Edges Defined;Dehisced;Open   Wound Length (cm) 11 cm   Wound Width (cm) 6.5 cm   Wound Depth (cm) 2.6 cm   Wound Volume (cm^3) 185.9 cm^3   Wound Surface Area (cm^2) 71.5 cm^2   Care Cleansed with:;Antimicrobial agent;Wound cleanser;Applied:;Skin Barrier   Dressing Changed;Other (comment);Gauze;Absorptive Pad  (Vashe Moistened)   Packing packing removed;packed with;other (see comment);gauze, iodoform  (Vashe Moistened)   Dressing Change Due 08/24/23     WOC Team consulted for "abdomen"    Narrative: Pt received alert and oriented. Abdominal binder in place.      Active Wounds: RLQ I&D open for secondary closure.    Goals for Wound Healing: Promote moist wound healing, Manage drainage, Apply antimicrobial, Reduce bioburden, and Educate on proper wound management post D/C     Barriers to Wound Healing: multiple co-morbidities large surface area " large volume advanced age fragile skin    Recommendations made to primary team for Vashe wet to dry.     Orders placed.    Thank you for the consult.     We will continue to follow. See additional note under Notes TAB for tentative f/u plan/dates.    08/23/2023

## 2023-08-23 NOTE — PROGRESS NOTES
[facility]  Adult Nutrition  First Assessment Note         Reason for Assessment  Reason For Assessment: RD follow-up   Nutrition Risk Screen: no indicators present    Assessment and Plan  Patient seen for follow-up. Patient continues on Glucerna TID and 2000kcal CCD diet. Continue with poc.    RD note 8/21/2023  Visited with patient this morning. He endorsed a good appetite at home. He was unsure of any weight loss. Chart review reveals no significant weight changes. Patient does not meet criteria for malnutrition at this time. He was receptive to supplement. RD will add Glucerna TID.     RD note 8/19:  Patient is a 78 year-old male with PMHx of HTN, DM, GERD, dementia, and hyperammonemia who presentes to the [facility] transferred from [facility] due to confusion, agitation and swelling of his legs. Patient was discharged from [facility] yesterday s/p exploratory laparotomy with infected mesh removal, and small bowel resection with anastomosis on 8/9. Patient admitted with hepatic encephalopathy.  It was stated that he was confused.  This has improved over time.  May have been related to elevated ammonia versus  Infection.     8/19:  Given patient's recent surgery and profound weakness and inability to walk, family's request of the patient go to a swing bed as he is unable to be cared for at home.    RD assessment of pt due to MST score. Weight history:  8/9-180 8/  8/  Weight currently indicates weight loss of ~13 lbs/6.3% body weight in the past 5 days. Possible weight changes are related to changes in volume status, but unsure. Pt documented consuming % of meals since readmission. Recommend continuing consistent CHO diet, but increasing to 2000 calorie diet to better meet estimated needs and encouraging po intakes. IF intakes decline, consider addition of Ensure Max protein TID(if/when pt ammonia level normalizes/no longer with symptoms of encephalopathy r/t hyperammonemia).      Last BM 8/22. Monitor labs, meds, weights, po intakes, updates in pt condition and adjust nutrition recommendations as appropriate. RD following.     Malnutrition  no    Skin Integrity  Evans Risk Assessment  Sensory Perception: 4-->no impairment  Moisture: 4-->rarely moist  Activity: 3-->walks occasionally  Mobility: 3-->slightly limited  Nutrition: 3-->adequate  Friction and Shear: 3-->no apparent problem  Evans Score: 20    Nutrition Diagnosis  Altered Nutrition Related Lab Values   related to Diabetes Mellitus as evidenced by elevated glucose levels    Nutrition Diagnosis Status: Chronic/ continues  Comments: pt receiving a consistent CHO 2000 calorie diet   Nutrition Risk  Level of Risk/Frequency of Follow-up: moderate - high/moderate    Recent Labs   Lab 08/21/23  0411   *       Comments on Glucose: hx DM   Nutrition Prescription / Recommendations  Recommendation/Intervention: Recommend continue with CCD 2000kcal diet. Continue with Vanilla Glucerna TID.  Goals: adequate po intakes to meet needs, weight stability, lab stability/normalization  Nutrition Goal Status: progressing towards goal  Current Diet Order: Consistent Carbohydrate 1800 calorie  Oral Nutrition Supplement: Glucerna TID  Chewing or Swallowing Difficulty?: No Chewing or swallowing difficulty  Recommended Diet: Consistent Carbohydrate 2000 (75g Carbs)  Recommended Oral Supplement: Glucerna Shake [220 kcals, 10g Protein, 26g Carbs(4g Fiber, 7g Sugar), 9g Fat] three times daily  Is Nutrition Support Recommended: No  Is Education Recommended: No  Monitor and Evaluation  % current Intake: P.O. intake of 75 - 100 %  % intake to meet estimated needs: 75 - 100 %  Food and Nutrient Intake: food and beverage intake  Food and Nutrient Adminstration: diet order  Anthropometric Measurements: weight, weight change, body mass index  Biochemical Data, Medical Tests and Procedures: electrolyte and renal panel, lipid profile, gastrointestinal  "profile, glucose/endocrine profile, inflammatory profile  Nutrition-Focused Physical Findings: overall appearance, extremities, muscles and bones, head and eyes, skin     Current Medical Diagnosis and Past Medical History  Diagnosis: other (see comments) (metabolic encephalopathy)  Past Medical History:   Diagnosis Date    Diverticulitis     Hyperlipemia     Hypokalemia 11/09/2021     Nutrition/Diet History  Spiritual, Cultural Beliefs, Adventist Practices, Values that Affect Care: no  Lab/Procedures/Meds  Recent Labs   Lab 08/21/23  0411      K 3.2*   BUN 12   CREATININE 0.82   CALCIUM 7.7*   *       Last A1c:   Lab Results   Component Value Date    HGBA1C 5.6 11/06/2021     Lab Results   Component Value Date    RBC 3.08 (L) 08/21/2023    HGB 10.0 (L) 08/21/2023    HCT 29.6 (L) 08/21/2023    MCV 96.1 (H) 08/21/2023    MCH 32.5 (H) 08/21/2023    MCHC 33.8 08/21/2023    TIBC 216 (L) 11/08/2021     Pertinent Labs Reviewed: reviewed  Pertinent Labs Comments: K 3.3(L)-recommend consider repletion to WNL, Cl 109(H)-poss r/t volume status, BUN 6(L), Creat 0.52(L)-poss r/t volume status, (H)-hx DM, Ca 7.7(L)-recommend consider repletion to WNL, ammonia 85(H), CRP 2.42(H) unsure etiology, will monitor trends   Pertinent Medications Reviewed: reviewed  Pertinent Medications Comments: amlodipine, apixaban, folic acid vitamin B6 vitamin B12, lactobacillus acidophilus, lactulose, losartan, lopressor, niacin, pantoprazole, zosyn, quetiapine, rifaximin, vancomycin  Anthropometrics  Temp: 98.5 °F (36.9 °C)  Height Method: Stated  Height: 5' 10" (177.8 cm)  Height (inches): 70 in  Weight Method: Bed Scale  Weight: 87.1 kg (192 lb 0.3 oz)  Weight (lb): 192.02 lb  Ideal Body Weight (IBW), Male: 166 lb  % Ideal Body Weight, Male (lb): 115.67 %  BMI (Calculated): 27.6     Estimated/Assessed Needs    Weight Used For Calorie Calculations: 87.1 kg (192 lb 0.3 oz)   Energy Need Method: Kcal/kg Energy Calorie Requirements " (kcal): 3671-1601 kcal (25-30 kcal/kg)  Weight Used For Protein Calculations: 87.1 kg (192 lb 0.3 oz)  Protein Requirements:  g PRO (1.0-1.2 g PRO/kg)       RDA Method (mL): 2177     Nutrition by Nursing     Intake (%): 75%     Diet/Feeding Tolerance: good  Last Bowel Movement: 08/22/23              Nutrition Follow-Up  RD Follow-up?: Yes

## 2023-08-24 LAB
25(OH)D3 SERPL-MCNC: 34.7 NG/ML
ANION GAP SERPL CALCULATED.3IONS-SCNC: 12 MMOL/L (ref 7–16)
BASOPHILS # BLD AUTO: 0.08 K/UL (ref 0–0.2)
BASOPHILS NFR BLD AUTO: 1.2 % (ref 0–1)
BUN SERPL-MCNC: 24 MG/DL (ref 7–18)
BUN/CREAT SERPL: 26 (ref 6–20)
CALCIUM SERPL-MCNC: 7.7 MG/DL (ref 8.5–10.1)
CHLORIDE SERPL-SCNC: 105 MMOL/L (ref 98–107)
CO2 SERPL-SCNC: 27 MMOL/L (ref 21–32)
CREAT SERPL-MCNC: 0.94 MG/DL (ref 0.7–1.3)
DIFFERENTIAL METHOD BLD: ABNORMAL
EGFR (NO RACE VARIABLE) (RUSH/TITUS): 83 ML/MIN/1.73M2
EOSINOPHIL # BLD AUTO: 0.28 K/UL (ref 0–0.5)
EOSINOPHIL NFR BLD AUTO: 4.3 % (ref 1–4)
ERYTHROCYTE [DISTWIDTH] IN BLOOD BY AUTOMATED COUNT: 13.3 % (ref 11.5–14.5)
GLUCOSE SERPL-MCNC: 166 MG/DL (ref 74–106)
HCT VFR BLD AUTO: 28.7 % (ref 40–54)
HGB BLD-MCNC: 9.1 G/DL (ref 13.5–18)
LYMPHOCYTES # BLD AUTO: 0.89 K/UL (ref 1–4.8)
LYMPHOCYTES NFR BLD AUTO: 13.6 % (ref 27–41)
MCH RBC QN AUTO: 32.5 PG (ref 27–31)
MCHC RBC AUTO-ENTMCNC: 31.7 G/DL (ref 32–36)
MCV RBC AUTO: 102.5 FL (ref 80–96)
MONOCYTES # BLD AUTO: 0.73 K/UL (ref 0–0.8)
MONOCYTES NFR BLD AUTO: 11.1 % (ref 2–6)
MPC BLD CALC-MCNC: 9.4 FL (ref 9.4–12.4)
NEUTROPHILS # BLD AUTO: 4.57 K/UL (ref 1.8–7.7)
NEUTROPHILS NFR BLD AUTO: 69.8 % (ref 53–65)
PLATELET # BLD AUTO: 360 K/UL (ref 150–400)
POTASSIUM SERPL-SCNC: 3.9 MMOL/L (ref 3.5–5.1)
RBC # BLD AUTO: 2.8 M/UL (ref 4.6–6.2)
SARS-COV-2 RDRP RESP QL NAA+PROBE: NEGATIVE
SODIUM SERPL-SCNC: 140 MMOL/L (ref 136–145)
TSH SERPL DL<=0.005 MIU/L-ACNC: 1.12 UIU/ML (ref 0.36–3.74)
WBC # BLD AUTO: 6.55 K/UL (ref 4.5–11)

## 2023-08-24 PROCEDURE — 25000003 PHARM REV CODE 250: Performed by: FAMILY MEDICINE

## 2023-08-24 PROCEDURE — 94761 N-INVAS EAR/PLS OXIMETRY MLT: CPT

## 2023-08-24 PROCEDURE — 97161 PT EVAL LOW COMPLEX 20 MIN: CPT

## 2023-08-24 PROCEDURE — 80048 BASIC METABOLIC PNL TOTAL CA: CPT | Performed by: PHYSICIAN ASSISTANT

## 2023-08-24 PROCEDURE — 97166 OT EVAL MOD COMPLEX 45 MIN: CPT

## 2023-08-24 PROCEDURE — 27000958

## 2023-08-24 PROCEDURE — 63600175 PHARM REV CODE 636 W HCPCS: Performed by: FAMILY MEDICINE

## 2023-08-24 PROCEDURE — 85025 COMPLETE CBC W/AUTO DIFF WBC: CPT | Performed by: PHYSICIAN ASSISTANT

## 2023-08-24 PROCEDURE — 11000004 HC SNF PRIVATE

## 2023-08-24 PROCEDURE — 87635 SARS-COV-2 COVID-19 AMP PRB: CPT

## 2023-08-24 PROCEDURE — 63600175 PHARM REV CODE 636 W HCPCS: Performed by: PHYSICIAN ASSISTANT

## 2023-08-24 PROCEDURE — 99900035 HC TECH TIME PER 15 MIN (STAT)

## 2023-08-24 PROCEDURE — 25000003 PHARM REV CODE 250: Performed by: PHYSICIAN ASSISTANT

## 2023-08-24 PROCEDURE — 27000981 HC MATTRESS, ACCUCAIR DAILY RENTAL

## 2023-08-24 RX ORDER — FERROUS SULFATE, DRIED 160(50) MG
1 TABLET, EXTENDED RELEASE ORAL 2 TIMES DAILY
Status: COMPLETED | OUTPATIENT
Start: 2023-08-24 | End: 2023-09-01

## 2023-08-24 RX ORDER — GLUCOSAM/CHONDRO/HERB 149/HYAL 750-100 MG
1 TABLET ORAL DAILY
Status: DISCONTINUED | OUTPATIENT
Start: 2023-08-24 | End: 2023-09-08 | Stop reason: HOSPADM

## 2023-08-24 RX ORDER — CHOLECALCIFEROL (VITAMIN D3) 125 MCG
5000 CAPSULE ORAL DAILY
Status: DISCONTINUED | OUTPATIENT
Start: 2023-08-24 | End: 2023-09-08 | Stop reason: HOSPADM

## 2023-08-24 RX ORDER — SODIUM,POTASSIUM PHOSPHATES 280-250MG
1 POWDER IN PACKET (EA) ORAL 2 TIMES DAILY
Status: COMPLETED | OUTPATIENT
Start: 2023-08-24 | End: 2023-08-28

## 2023-08-24 RX ADMIN — TIMOLOL MALEATE 1 DROP: 5 SOLUTION/ DROPS OPHTHALMIC at 09:08

## 2023-08-24 RX ADMIN — CEFTRIAXONE 1 G: 1 INJECTION, POWDER, FOR SOLUTION INTRAMUSCULAR; INTRAVENOUS at 11:08

## 2023-08-24 RX ADMIN — POTASSIUM, SODIUM PHOSPHATES 280 MG-160 MG-250 MG ORAL POWDER PACKET 1 PACKET: POWDER IN PACKET at 11:08

## 2023-08-24 RX ADMIN — MUPIROCIN: 20 OINTMENT TOPICAL at 09:08

## 2023-08-24 RX ADMIN — Medication 1 TABLET: at 09:08

## 2023-08-24 RX ADMIN — AZITHROMYCIN MONOHYDRATE 500 MG: 500 INJECTION, POWDER, LYOPHILIZED, FOR SOLUTION INTRAVENOUS at 09:08

## 2023-08-24 RX ADMIN — QUETIAPINE 50 MG: 25 TABLET ORAL at 09:08

## 2023-08-24 RX ADMIN — LACTULOSE 20 G: 20 SOLUTION ORAL at 09:08

## 2023-08-24 RX ADMIN — PANTOPRAZOLE SODIUM 40 MG: 40 TABLET, DELAYED RELEASE ORAL at 09:08

## 2023-08-24 RX ADMIN — METOPROLOL TARTRATE 12.5 MG: 25 TABLET, FILM COATED ORAL at 09:08

## 2023-08-24 RX ADMIN — RIFAXIMIN 550 MG: 550 TABLET ORAL at 09:08

## 2023-08-24 RX ADMIN — FOLIC ACID 1 MG: 1 TABLET ORAL at 09:08

## 2023-08-24 RX ADMIN — APIXABAN 5 MG: 5 TABLET, FILM COATED ORAL at 09:08

## 2023-08-24 RX ADMIN — SENNOSIDES AND DOCUSATE SODIUM 1 TABLET: 50; 8.6 TABLET ORAL at 09:08

## 2023-08-24 RX ADMIN — Medication 1 TABLET: at 11:08

## 2023-08-24 RX ADMIN — POTASSIUM, SODIUM PHOSPHATES 280 MG-160 MG-250 MG ORAL POWDER PACKET 1 PACKET: POWDER IN PACKET at 09:08

## 2023-08-24 RX ADMIN — LACTULOSE 20 G: 20 SOLUTION ORAL at 03:08

## 2023-08-24 RX ADMIN — Medication 1 CAPSULE: at 09:08

## 2023-08-24 RX ADMIN — OMEGA-3 FATTY ACIDS CAP 1000 MG 1 CAPSULE: 1000 CAP at 11:08

## 2023-08-24 RX ADMIN — Medication 5000 UNITS: at 11:08

## 2023-08-24 RX ADMIN — MAGNESIUM 64 MG (MAGNESIUM CHLORIDE) TABLET,DELAYED RELEASE 64 MG: at 11:08

## 2023-08-24 NOTE — PT/OT/SLP EVAL
Occupational Therapy   Evaluation    Name: Danny Flood  MRN: [id number]  Admitting Diagnosis: Physical deconditioning, metabolic encephalopathy, pneumonia, T12 compression fx;  Recent Surgery: * No surgery found *      Recommendations:     Discharge Recommendations: home with home health, rehabilitation facility  Discharge Equipment Recommendations:  bedside commode, walker, rolling  Barriers to discharge:       Assessment:     Danny Flood is a 78 y.o. male with a medical diagnosis of Physical deconditioning, metabolic encephalopathy, pneumonia, T12 compression fx; .  He presents with decreased endurance, balance, safety and strength. Performance deficits affecting function: weakness, impaired endurance, impaired functional mobility, impaired self care skills, impaired balance, decreased safety awareness.      Rehab Prognosis: Fair; patient would benefit from acute skilled OT services to address these deficits and reach maximum level of function.       Plan:     Patient to be seen 5 x/week to address the above listed problems via self-care/home management, therapeutic exercises, therapeutic activities, neuromuscular re-education  Plan of Care Expires:    Plan of Care Reviewed with: patient    Subjective     Chief Complaint: limited strength, endurance, ADLs and mobility  Patient/Family Comments/goals: increase ADLs, mobility    Occupational Profile:  Living Environment: lives at home with wife.   Previous level of function: Mod I with used   Equipment Used at Home: cane, straight, bedside commode, walker, rolling  Assistance upon Discharge: TBD    Pain/Comfort:  Pain Rating 1: 3/10  Location - Side 1: Right  Location - Orientation 1: lower    Patients cultural, spiritual, Anabaptism conflicts given the current situation:      Objective:     Communicated with: Pt prior to session.  Patient found supine with SCD upon OT entry to room.    General Precautions: Standard, fall  Orthopedic Precautions:    Braces:     Respiratory Status: Room air    Occupational Performance:    Bed Mobility:    Patient completed Rolling/Turning to Left with  supervision  Patient completed Rolling/Turning to Right with supervision  Patient completed Supine to Sit with modified independence and supervision    Functional Mobility/Transfers:  Patient completed Sit <> Stand Transfer with modified independence and supervision  with  rolling walker   Patient completed Toilet Transfer Step Transfer technique with modified independence and supervision with  rolling walker  Functional Mobility: Pt completed functional mobility down the hallway using the RW for balance and safety.    Activities of Daily Living:  Lower Body Dressing: maximal assistance with [place] assistance  Toileting: minimum assistance with using the RW and safety    Cognitive/Visual Perceptual:  Cognitive/Psychosocial Skills:     -       Oriented to: Person, Place, Time, and Situation   -       Follows Commands/attention:Follows multistep  commands  -       Safety awareness/insight to disability: impaired     Physical Exam:  Balance:    -       Fair  Upper Extremity Range of Motion:     -       Right Upper Extremity: WFL  -       Left Upper Extremity: WFL  Upper Extremity Strength:    -       Right Upper Extremity: 3-/5  -       Left Upper Extremity: 3-/5    AMPAC 6 Click ADL:  AMPAC Total Score: 16    Treatment & Education:  OT eval    Patient left supine with all lines intact and call button in reach    GOALS:   Multidisciplinary Problems       Occupational Therapy Goals          Problem: Occupational Therapy    Goal Priority Disciplines Outcome Interventions   Occupational Therapy Goal     OT, PT/OT Ongoing, Progressing    Description: Goals to be met by: 9/24/23     Patient will increase functional independence with ADLs by performing:    Feeding with Modified [place].  UE Dressing with Modified [place].  LE Dressing with Modified [place].  Grooming while standing with  Modified [place].  Toileting from toilet with Modified [place] for hygiene and clothing management.   Standing x5-10 minutes with Modified [place].  Supine to sit with Modified [place].  Step transfer with Modified [place]  Toilet transfer to toilet with Modified [place].                         History:     Past Medical History:   Diagnosis Date    Diverticulitis     Hyperlipemia     Hypokalemia 11/09/2021         Past Surgical History:   Procedure Laterality Date    COLON SURGERY      partial colon resection    EXCISION, SMALL INTESTINE N/A 8/9/2023    Procedure: EXCISION, SMALL INTESTINE;  Surgeon: Tesfaye Natarajan MD;  Location: [facility] OR;  Service: General;  Laterality: N/A;    HERNIA REPAIR      LAPAROTOMY, EXPLORATORY N/A 8/9/2023    Procedure: LAPAROTOMY, EXPLORATORY;  Surgeon: Tesfaye Natarajan MD;  Location: [facility] OR;  Service: General;  Laterality: N/A;    LEFT HEART CATHETERIZATION Left 8/15/2023    Procedure: Left heart cath;  Surgeon: Edinson Torres DO;  Location: [facility] CATH LAB;  Service: Cardiology;  Laterality: Left;    REMOVAL OF IMPLANT N/A 8/9/2023    Procedure: REMOVAL, IMPLANT;  Surgeon: Tesfaye Natarajan MD;  Location: [facility] OR;  Service: General;  Laterality: N/A;  removal mesh       Time Tracking:     OT Date of Treatment:    OT Start Time:  9:20  OT Stop Time:  9:40  OT Total Time (min):      Billable Minutes:Evaluation 20 8/24/2023

## 2023-08-24 NOTE — PLAN OF CARE
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 No  Secondary Emergency Contact: Nubia Flood  Address: [address]  Home Phone: [phone]  Mobile Phone: [phone]  Relation: Spouse  Preferred language: English   needed? No    Discharge Plan A: Home with family  Discharge Plan B: Home with family, Home Health      [facility] [address] [place] [address]  [address] [facility] [address]  Phone: [phone] Fax: [phone]    [facility] - [place] [id number] [facility]  [address] [facility] [address]  Phone: [phone] Fax: [phone]    The Pharmacy at [address]  [address] [place], [place] [address]  Phone: [phone] Fax: [phone]      Initial Assessment (most recent)       Adult Discharge Assessment - 08/24/23 1244          Discharge Assessment    Assessment Type Discharge Planning Assessment     Confirmed/corrected address, phone number and insurance Yes     Confirmed Demographics Correct on Facesheet     Source of Information patient;family;health record     If unable to respond/provide information was family/caregiver contacted?  Yes     Contact Name/Number davis Agarwal [phone]     Communicated AMBAR with patient/caregiver Date not available/Unable to determine     Reason For Admission Physical deconditioning, wound dehiscence of infected hernioplasty mesh s/p exploratory lap with SBR, Metabolic encephalopathy, NSTEMI, ^ammonia levels, T12 compression fx     People in Home spouse     Facility Arrived From: [facility]     Do you expect to return to your current living situation? Yes     Do you have help at home or someone to help you manage your care at home? Yes     Who are your caregiver(s) and their phone number(s)? davis Agarwal     Prior to hospitilization cognitive status: Not Oriented to Time     Current cognitive status: Not Oriented to Time     Walking or Climbing Stairs ambulation difficulty, requires equipment;stair climbing difficulty, requires equipment;transferring difficulty, requires equipment     Mobility Management RW or w/c     Dressing/Bathing bathing difficulty, assistance 1 person;dressing difficulty, requires equipment     Home Accessibility wheelchair accessible     Home Layout Able to live on 1st floor     Equipment Currently Used at Home cane, straight;wheelchair;walker, rolling     Readmission within 30 days? No     Patient currently being followed by outpatient case management? No     Do you currently have service(s) that help you manage your care at home? No     Do you take prescription medications? Yes     Do you have prescription coverage? Yes     Coverage [employer]     Do you have any problems affording any of your prescribed medications? No     Is the patient taking medications as prescribed? yes     Who is going to help you get home at discharge? davis Agarwal     How do you get to doctors appointments? family or friend will provide     Are you on dialysis? No     Do you take coumadin? No     DME Needed Upon Discharge  none     Discharge Plan discussed with: Adult children;Patient      Transition of Care Barriers None     Discharge Plan A Home with family     Discharge Plan B Home with family;Home Health        Physical Activity    On average, how many days per week do you engage in moderate to strenuous exercise (like a brisk walk)? 0 days     On average, how many minutes do you engage in exercise at this level? 0 min        Financial Resource Strain    How hard is it for you to pay for the very basics like food, housing, medical care, and heating? Not hard at all        Housing Stability    In the last 12 months, was there a time when you were not able to pay the mortgage or rent on time? No     In the last 12 months, how many places have you lived? 1     In the last 12 months, was there a time when you did not have a steady place to sleep or slept in a shelter (including now)? No        Transportation Needs    In the past 12 months, has lack of transportation kept you from medical appointments or from getting medications? No     In the past 12 months, has lack of transportation kept you from meetings, work, or from getting things needed for daily living? No        Food Insecurity    Within the past 12 months, you worried that your food would run out before you got the money to buy more. Never true        Stress    Do you feel stress - tense, restless, nervous, or anxious, or unable to sleep at night because your mind is troubled all the time - these days? Only a little        Social Connections    In a typical week, how many times do you talk on the phone with family, friends, or neighbors? More than three times a week     How often do you get together with friends or relatives? More than three times a week     How often do you attend Protestant or Mandaen services? More than 4 times per year     Do you belong to any clubs or organizations such as Protestant groups, unions, fraternal or athletic groups, or school groups? No     How often do you attend meetings of the clubs or organizations you belong  to? Never     Are you , , , , never , or living with a partner?         Alcohol Use    Q1: How often do you have a drink containing alcohol? Never     Q2: How many drinks containing alcohol do you have on a typical day when you are drinking? Patient does not drink     Q3: How often do you have six or more drinks on one occasion? Never        OTHER    Name(s) of People in Home spouse                 Pt. Admitted to swing bed services for continued IV antibiotics, wound care to abdomen incision, PT/OT, and supportive care. Pt. Lives with spouse. Has good family support. Pt. Has used [facility] in past but is not current with  services at this time. Pt. Has a cane, RW, and w/c at home for mobility. Plans are to return home with spouse and HH services if needed at d/c from swing bed. Will cont. To follow pt. And assist as needed.

## 2023-08-24 NOTE — PT/OT/SLP EVAL
Physical Therapy Evaluation    Patient Name:  Danny Flood   MRN:  [id number]    Recommendations:     Discharge Recommendations: home with home health   Discharge Equipment Recommendations: none   Barriers to discharge: None    Assessment:     Danny Flood is a 78 y.o. male admitted with a medical diagnosis of Physical deconditioning, Metabolic encephalopathy, Pneumonia, Hyperammonemia, T12 compression fracture, Diabetes,exploratory laparotomy with infected mesh removal, and small bowel resection with anastomosis on 8/9., Atrial fibrillation .  He presents with the following impairments/functional limitations: weakness, impaired endurance, impaired functional mobility, gait instability, impaired balance .    Rehab Prognosis: Good; patient would benefit from acute skilled PT services to address these deficits and reach maximum level of function.    Recent Surgery: * No surgery found *      Plan:     During this hospitalization, patient to be seen 5 x/week to address the identified rehab impairments via gait training, therapeutic activities, therapeutic exercises, neuromuscular re-education and progress toward the following goals:    Plan of Care Expires:  09/22/23    Subjective     Chief Complaint: none  Patient/Family Comments/goals: to return home   Pain/Comfort:  Pain Rating 1: 0/10    Patients cultural, spiritual, Hinduism conflicts given the current situation: no    Living Environment:  Lives at home with spouse.  Prior to admission, patients level of function was indepenent with DME.  Equipment used at home: cane, straight, bedside commode, walker, rolling, wheelchair.  DME owned (not currently used): none.  Upon discharge, patient will have assistance from spouse.    Objective:     Communicated with patient  prior to session.  Patient found up in chair with  (abdominal binder)  upon PT entry to room.    General Precautions: Standard, fall  Orthopedic Precautions:N/A   Braces: N/A  Respiratory Status: Room  air    Exams:  Cognitive Exam:  Patient is oriented to Person, Place, Time, and Situation  Gross Motor Coordination:  WFL  RUE ROM: WFL  RUE Strength: WFL  LUE ROM: WFL  LUE Strength: WFL  RLE ROM: WFL  RLE Strength: 3+  LLE ROM: WFL  LLE Strength: 3+    Functional Mobility:  Transfers:     Sit to Stand:  moderate assistance with rolling walker  Gait: 60 feet with RW Min assist; forward posture; mildly unsteady gait.  Balance: Fair      AM-PAC 6 CLICK MOBILITY  Total Score:16       Treatment & Education:  Plan of care discussed with patient.    Patient left up in chair with call button in reach.    GOALS:   Multidisciplinary Problems       Physical Therapy Goals          Problem: Physical Therapy    Goal Priority Disciplines Outcome Goal Variances Interventions   Physical Therapy Goal     PT, PT/OT Ongoing, Progressing     Description: Goals to be met by: 2 weeks     Patient will increase functional independence with mobility by performin. Supine to sit with Stand-by Assistance  2. Sit to supine with Stand-by Assistance  3. Rolling to Left and Right with Modified [place].  4. Sit to stand transfer with Stand-by Assistance  5. Bed to chair transfer with Stand-by Assistance using Rolling Walker  6. Gait  x 200 feet with Stand-by Assistance using Rolling Walker.    Goals to be met by: 4     Patient will increase functional independence with mobility by performin. Supine to sit with Modified [place]  2. Sit to supine with Modified [place]  3. Sit to stand transfer with Modified [place]  4. Bed to chair transfer with Modified [place] using Rolling Walker  5. Gait  x 300 feet with Modified [place] using Rolling Walker.                             History:     Past Medical History:   Diagnosis Date    Diverticulitis     Hyperlipemia     Hypokalemia 2021       Past Surgical History:   Procedure Laterality Date    COLON SURGERY      partial colon resection    EXCISION, SMALL  INTESTINE N/A 8/9/2023    Procedure: EXCISION, SMALL INTESTINE;  Surgeon: Tesfaye Natarajan MD;  Location: [facility] OR;  Service: General;  Laterality: N/A;    HERNIA REPAIR      LAPAROTOMY, EXPLORATORY N/A 8/9/2023    Procedure: LAPAROTOMY, EXPLORATORY;  Surgeon: Tesfaye Natarajan MD;  Location: [facility] OR;  Service: General;  Laterality: N/A;    LEFT HEART CATHETERIZATION Left 8/15/2023    Procedure: Left heart cath;  Surgeon: Edinson Torres DO;  Location: [facility] CATH LAB;  Service: Cardiology;  Laterality: Left;    REMOVAL OF IMPLANT N/A 8/9/2023    Procedure: REMOVAL, IMPLANT;  Surgeon: Tesfaye Natarajan MD;  Location: [facility] OR;  Service: General;  Laterality: N/A;  removal mesh       Time Tracking:     PT Received On: 08/24/23  PT Start Time: 1110     PT Stop Time: 1125  PT Total Time (min): 15 min     Billable Minutes: Evaluation 15      08/24/2023

## 2023-08-24 NOTE — PLAN OF CARE
Problem: Occupational Therapy  Goal: Occupational Therapy Goal  Description: Goals to be met by: 9/24/23     Patient will increase functional independence with ADLs by performing:    Feeding with Modified [place].  UE Dressing with Modified [place].  LE Dressing with Modified [place].  Grooming while standing with Modified [place].  Toileting from toilet with Modified [place] for hygiene and clothing management.   Standing x5-10 minutes with Modified [place].  Supine to sit with Modified [place].  Step transfer with Modified [place]  Toilet transfer to toilet with Modified [place].    8/24/2023 1205 by Melissa Mahmood, OT  Outcome: Ongoing, Progressing  8/24/2023 1154 by Melissa Mahmood, OT  Outcome: Ongoing, Progressing

## 2023-08-24 NOTE — PLAN OF CARE
Problem: Physical Therapy  Goal: Physical Therapy Goal  Description: Goals to be met by: 2 weeks     Patient will increase functional independence with mobility by performin. Supine to sit with Stand-by Assistance  2. Sit to supine with Stand-by Assistance  3. Rolling to Left and Right with Modified [place].  4. Sit to stand transfer with Stand-by Assistance  5. Bed to chair transfer with Stand-by Assistance using Rolling Walker  6. Gait  x 200 feet with Stand-by Assistance using Rolling Walker.    Goals to be met by: 4     Patient will increase functional independence with mobility by performin. Supine to sit with Modified [place]  2. Sit to supine with Modified [place]  3. Sit to stand transfer with Modified [place]  4. Bed to chair transfer with Modified [place] using Rolling Walker  5. Gait  x 300 feet with Modified [place] using Rolling Walker.        Outcome: Ongoing, Progressing

## 2023-08-24 NOTE — PLAN OF CARE
Problem: Adult Inpatient Plan of Care  Goal: Plan of Care Review  Outcome: Ongoing, Progressing  Goal: Patient-Specific Goal (Individualized)  Outcome: Ongoing, Progressing  Goal: Absence of Hospital-Acquired Illness or Injury  Outcome: Ongoing, Progressing  Goal: Optimal Comfort and Wellbeing  Outcome: Ongoing, Progressing  Goal: Readiness for Transition of Care  Outcome: Ongoing, Progressing     Problem: Diabetes Comorbidity  Goal: Blood Glucose Level Within Targeted Range  Outcome: Ongoing, Progressing     Problem: Fluid Imbalance (Pneumonia)  Goal: Fluid Balance  Outcome: Ongoing, Progressing     Problem: Infection (Pneumonia)  Goal: Resolution of Infection Signs and Symptoms  Outcome: Ongoing, Progressing     Problem: Respiratory Compromise (Pneumonia)  Goal: Effective Oxygenation and Ventilation  Outcome: Ongoing, Progressing     Problem: Impaired Wound Healing  Goal: Optimal Wound Healing  Outcome: Ongoing, Progressing     Problem: Skin Injury Risk Increased  Goal: Skin Health and Integrity  Outcome: Ongoing, Progressing     Problem: Fall Injury Risk  Goal: Absence of Fall and Fall-Related Injury  Outcome: Ongoing, Progressing     Problem: Infection  Goal: Absence of Infection Signs and Symptoms  Outcome: Ongoing, Progressing

## 2023-08-24 NOTE — RESPIRATORY THERAPY
Incentive Spirometry initial instruction  with patient achieving Vt 750 x 10 breaths.  Patient tends to breath too fast and too shallow.  Patient has dementia.

## 2023-08-25 ENCOUNTER — CLINICAL SUPPORT (OUTPATIENT)
Dept: WOUND CARE | Facility: HOSPITAL | Age: 78
End: 2023-08-25
Payer: MEDICARE

## 2023-08-25 VITALS — RESPIRATION RATE: 17 BRPM

## 2023-08-25 DIAGNOSIS — T81.89XA NON-HEALING SURGICAL WOUND, INITIAL ENCOUNTER: Primary | ICD-10-CM

## 2023-08-25 PROBLEM — Z78.9 PRESENCE OF SURGICAL INCISION: Status: ACTIVE | Noted: 2023-08-25

## 2023-08-25 PROBLEM — E11.9 DIABETES MELLITUS: Status: RESOLVED | Noted: 2023-08-13 | Resolved: 2023-08-25

## 2023-08-25 LAB
GLUCOSE SERPL-MCNC: 120 MG/DL (ref 70–105)
GLUCOSE SERPL-MCNC: 159 MG/DL (ref 70–105)
GLUCOSE SERPL-MCNC: 191 MG/DL (ref 70–105)

## 2023-08-25 PROCEDURE — 97110 THERAPEUTIC EXERCISES: CPT

## 2023-08-25 PROCEDURE — 97535 SELF CARE MNGMENT TRAINING: CPT

## 2023-08-25 PROCEDURE — 97530 THERAPEUTIC ACTIVITIES: CPT

## 2023-08-25 PROCEDURE — 25000003 PHARM REV CODE 250: Performed by: PHYSICIAN ASSISTANT

## 2023-08-25 PROCEDURE — 82962 GLUCOSE BLOOD TEST: CPT

## 2023-08-25 PROCEDURE — 99305 PR NURSING FACILITY CARE, INIT, MOD SEVERITY: ICD-10-PCS | Mod: AI,,, | Performed by: FAMILY MEDICINE

## 2023-08-25 PROCEDURE — 63600175 PHARM REV CODE 636 W HCPCS: Performed by: FAMILY MEDICINE

## 2023-08-25 PROCEDURE — 36416 COLLJ CAPILLARY BLOOD SPEC: CPT

## 2023-08-25 PROCEDURE — 99202 OFFICE O/P NEW SF 15 MIN: CPT

## 2023-08-25 PROCEDURE — 27000981 HC MATTRESS, ACCUCAIR DAILY RENTAL

## 2023-08-25 PROCEDURE — 25000003 PHARM REV CODE 250: Performed by: FAMILY MEDICINE

## 2023-08-25 PROCEDURE — 97116 GAIT TRAINING THERAPY: CPT

## 2023-08-25 PROCEDURE — 99900035 HC TECH TIME PER 15 MIN (STAT)

## 2023-08-25 PROCEDURE — 99305 1ST NF CARE MODERATE MDM 35: CPT | Mod: AI,,, | Performed by: FAMILY MEDICINE

## 2023-08-25 PROCEDURE — 11000004 HC SNF PRIVATE

## 2023-08-25 PROCEDURE — 94761 N-INVAS EAR/PLS OXIMETRY MLT: CPT

## 2023-08-25 PROCEDURE — 27000958

## 2023-08-25 RX ORDER — NIACIN 500 MG/1
500 TABLET, EXTENDED RELEASE ORAL DAILY
Status: DISCONTINUED | OUTPATIENT
Start: 2023-08-25 | End: 2023-08-25 | Stop reason: CLARIF

## 2023-08-25 RX ORDER — IBUPROFEN 200 MG
24 TABLET ORAL
Status: DISCONTINUED | OUTPATIENT
Start: 2023-08-25 | End: 2023-09-03

## 2023-08-25 RX ORDER — INSULIN ASPART 100 [IU]/ML
0-5 INJECTION, SOLUTION INTRAVENOUS; SUBCUTANEOUS
Status: DISCONTINUED | OUTPATIENT
Start: 2023-08-25 | End: 2023-09-03

## 2023-08-25 RX ORDER — LANOLIN ALCOHOL/MO/W.PET/CERES
500 CREAM (GRAM) TOPICAL DAILY
Status: DISCONTINUED | OUTPATIENT
Start: 2023-08-25 | End: 2023-08-25 | Stop reason: CLARIF

## 2023-08-25 RX ORDER — GLUCAGON 1 MG
1 KIT INJECTION
Status: DISCONTINUED | OUTPATIENT
Start: 2023-08-25 | End: 2023-09-03

## 2023-08-25 RX ORDER — IBUPROFEN 200 MG
16 TABLET ORAL
Status: DISCONTINUED | OUTPATIENT
Start: 2023-08-25 | End: 2023-09-03

## 2023-08-25 RX ORDER — LANOLIN ALCOHOL/MO/W.PET/CERES
500 CREAM (GRAM) TOPICAL DAILY
Status: DISCONTINUED | OUTPATIENT
Start: 2023-08-25 | End: 2023-09-08 | Stop reason: HOSPADM

## 2023-08-25 RX ADMIN — QUETIAPINE 50 MG: 25 TABLET ORAL at 09:08

## 2023-08-25 RX ADMIN — Medication 5000 UNITS: at 09:08

## 2023-08-25 RX ADMIN — TIMOLOL MALEATE 1 DROP: 5 SOLUTION/ DROPS OPHTHALMIC at 09:08

## 2023-08-25 RX ADMIN — OMEGA-3 FATTY ACIDS CAP 1000 MG 1 CAPSULE: 1000 CAP at 09:08

## 2023-08-25 RX ADMIN — RIFAXIMIN 550 MG: 550 TABLET ORAL at 09:08

## 2023-08-25 RX ADMIN — POTASSIUM, SODIUM PHOSPHATES 280 MG-160 MG-250 MG ORAL POWDER PACKET 1 PACKET: POWDER IN PACKET at 09:08

## 2023-08-25 RX ADMIN — AZITHROMYCIN MONOHYDRATE 500 MG: 500 INJECTION, POWDER, LYOPHILIZED, FOR SOLUTION INTRAVENOUS at 09:08

## 2023-08-25 RX ADMIN — PANTOPRAZOLE SODIUM 40 MG: 40 TABLET, DELAYED RELEASE ORAL at 09:08

## 2023-08-25 RX ADMIN — Medication 500 MG: at 03:08

## 2023-08-25 RX ADMIN — Medication 1 TABLET: at 09:08

## 2023-08-25 RX ADMIN — MUPIROCIN: 20 OINTMENT TOPICAL at 09:08

## 2023-08-25 RX ADMIN — LACTULOSE 20 G: 20 SOLUTION ORAL at 09:08

## 2023-08-25 RX ADMIN — MAGNESIUM 64 MG (MAGNESIUM CHLORIDE) TABLET,DELAYED RELEASE 64 MG: at 09:08

## 2023-08-25 RX ADMIN — CEFTRIAXONE 1 G: 1 INJECTION, POWDER, FOR SOLUTION INTRAMUSCULAR; INTRAVENOUS at 12:08

## 2023-08-25 RX ADMIN — METOPROLOL TARTRATE 12.5 MG: 25 TABLET, FILM COATED ORAL at 09:08

## 2023-08-25 RX ADMIN — LACTULOSE 20 G: 20 SOLUTION ORAL at 03:08

## 2023-08-25 RX ADMIN — FOLIC ACID 1 MG: 1 TABLET ORAL at 09:08

## 2023-08-25 RX ADMIN — APIXABAN 5 MG: 5 TABLET, FILM COATED ORAL at 09:08

## 2023-08-25 RX ADMIN — Medication 1 CAPSULE: at 09:08

## 2023-08-25 NOTE — SUBJECTIVE & OBJECTIVE
Past Medical History:   Diagnosis Date    Diverticulitis     Hyperlipemia     Hypokalemia 11/09/2021       Past Surgical History:   Procedure Laterality Date    COLON SURGERY      partial colon resection    EXCISION, SMALL INTESTINE N/A 8/9/2023    Procedure: EXCISION, SMALL INTESTINE;  Surgeon: Tesfaye Natarajan MD;  Location: [facility] OR;  Service: General;  Laterality: N/A;    HERNIA REPAIR      LAPAROTOMY, EXPLORATORY N/A 8/9/2023    Procedure: LAPAROTOMY, EXPLORATORY;  Surgeon: Tesfaye Natarajan MD;  Location: [facility] OR;  Service: General;  Laterality: N/A;    LEFT HEART CATHETERIZATION Left 8/15/2023    Procedure: Left heart cath;  Surgeon: Edinson Torres DO;  Location: [facility] CATH LAB;  Service: Cardiology;  Laterality: Left;    REMOVAL OF IMPLANT N/A 8/9/2023    Procedure: REMOVAL, IMPLANT;  Surgeon: Tesfaye Natarajan MD;  Location: [facility] OR;  Service: General;  Laterality: N/A;  removal mesh       Review of patient's allergies indicates:  No Known Allergies    No current facility-administered medications on file prior to encounter.     Current Outpatient Medications on File Prior to Encounter   Medication Sig    apixaban (ELIQUIS) 5 mg Tab Take 1 tablet (5 mg total) by mouth 2 (two) times daily. for 30 doses    dextrose 5 % (D5W) SolP 250 mL with azithromycin 500 mg SolR 500 mg Inject 500 mg into the vein once daily. for 5 days    dextrose 5 % in water (D5W) PgBk 100 mL with cefTRIAXone 1 gram SolR 1 g Inject 1 g into the vein once daily. for 5 days    FOLIC ACID-VITAMIN B6-VIT B12 ORAL Take 1 tablet by mouth once daily.    Lactobacillus acidophilus (PROBIOTIC) 10 billion cell Cap Take by mouth.    lactulose (CHRONULAC) 10 gram/15 mL solution Take 30 mLs (20 g total) by mouth 3 (three) times daily.    metoprolol tartrate (LOPRESSOR) 25 MG tablet Take 0.5 tablets (12.5 mg total) by mouth 2 (two) times daily.    niacin (SLO-NIACIN) 500 mg tablet Take 500 mg by mouth once daily. With meal     omega-3 fatty acids/fish oil (FISH OIL-OMEGA-3 FATTY ACIDS) 300-1,000 mg capsule Take 1 capsule by mouth once daily.    pantoprazole (PROTONIX) 40 MG tablet Take 1 tablet (40 mg total) by mouth 2 (two) times daily.    QUEtiapine (SEROQUEL) 50 MG tablet 1 tablet (50 mg total) by Per NG tube route 2 (two) times daily.    rifAXIMin (XIFAXAN) 550 mg Tab Take 1 tablet (550 mg total) by mouth 2 (two) times daily.    timolol maleate 0.5% (TIMOPTIC) 0.5 % Drop 1 drop once daily.     Family History       Problem Relation (Age of Onset)    Diabetes Mother, Father          Tobacco Use    Smoking status: Never    Smokeless tobacco: Never   Substance and Sexual Activity    Alcohol use: Never    Drug use: Never    Sexual activity: Not Currently     Review of Systems   Constitutional:  Positive for fatigue.   HENT: Negative.     Eyes: Negative.    Respiratory: Negative.     Cardiovascular: Negative.    Gastrointestinal: Negative.    Endocrine: Negative.    Genitourinary: Negative.         De La Cruz catheterization   Musculoskeletal: Negative.    Skin: Negative.    Allergic/Immunologic: Negative.    Neurological: Negative.    Hematological: Negative.    Psychiatric/Behavioral: Negative.       Objective:     Vital Signs (Most Recent):  Temp: 98 °F (36.7 °C) (08/25/23 0711)  Pulse: 77 (08/25/23 0711)  Resp: 19 (08/25/23 0711)  BP: 133/60 (08/25/23 0711)  SpO2: 96 % (08/25/23 0711) Vital Signs (24h Range):  Temp:  [98 °F (36.7 °C)-98.9 °F (37.2 °C)] 98 °F (36.7 °C)  Pulse:  [77-82] 77  Resp:  [16-19] 17  SpO2:  [96 %-98 %] 96 %  BP: (133-135)/(58-60) 133/60     Weight: 89.1 kg (196 lb 6.4 oz)  Body mass index is 28.18 kg/m².     Physical Exam  Constitutional:       Appearance: Normal appearance. He is normal weight.   HENT:      Head: Normocephalic and atraumatic.      Nose: Nose normal.      Mouth/Throat:      Mouth: Mucous membranes are moist.      Pharynx: Oropharynx is clear.   Eyes:      Extraocular Movements: Extraocular movements  intact.      Conjunctiva/sclera: Conjunctivae normal.      Pupils: Pupils are equal, round, and reactive to light.   Cardiovascular:      Rate and Rhythm: Normal rate.   Pulmonary:      Effort: Pulmonary effort is normal.      Breath sounds: Normal breath sounds.   Abdominal:      General: Abdomen is flat. Bowel sounds are normal.      Palpations: Abdomen is soft.   Genitourinary:     Penis: Normal.       Testes: Normal.   Musculoskeletal:         General: Normal range of motion.      Cervical back: Normal range of motion and neck supple.   Skin:     General: Skin is warm and dry.      Capillary Refill: Capillary refill takes less than 2 seconds.   Neurological:      General: No focal deficit present.      Mental Status: He is alert and oriented to person, place, and time. Mental status is at baseline.   Psychiatric:         Mood and Affect: Mood normal.         Behavior: Behavior normal.         Thought Content: Thought content normal.         Judgment: Judgment normal.              CRANIAL NERVES     CN III, IV, VI   Pupils are equal, round, and reactive to light.       Significant Labs: All pertinent labs within the past 24 hours have been reviewed.    Significant Imaging: I have reviewed all pertinent imaging results/findings within the past 24 hours.

## 2023-08-25 NOTE — NURSING
Received report from Mike Arcos RN at this time. NADN. Pt stated he was okay at this time. Pt sitting up in chair noted; chair locked; call bell within reach; Flushed IV to right wrist at this time. Flushed no resistance; pt tolerated.

## 2023-08-25 NOTE — ASSESSMENT & PLAN NOTE
Completed course of Zosyn and Vancomycin at previous hospital  We will continue Rocephin and Azithromycin  Weekly CBC, BMP  Incentive spirometry

## 2023-08-25 NOTE — H&P
[facility] - Medical Surgical Unit  Hospital Medicine  History & Physical    Patient Name: Danny Flood  MRN: [id number]  Patient Class: IP- Swing  Admission Date: 8/23/2023  Attending Physician: David Stahl DO   Primary Care Provider: Cameron Valencia MD         Patient information was obtained from past medical records and ER records.     Subjective:     Principal Problem:Infected hernioplasty mesh    Chief Complaint: No chief complaint on file.       HPI: Mr Flood is a 78 year old male with PMH of hypertension, GERD, DM type 2, dementia, hyperammonia, pneumonia, AFib, infected hernioplasty mesh status post surgical removal admitted to [facility] for continuation of IV abx and rehabilitation services following a stay at [facility] for pneumonia. On 8/9 patient presented to ED and noted to have infected hernia mesh. Patient was initially admitted to general surgery and taken to the OR for an exploratory laparotomy for infected mesh removal and small bowel resection with anastomosis on 8/9. Cultures revealed Bacteroides and Anaerococcus as well as Klebsiella, E. Coli, and Proteus. He received a course of Zosyn. While under surgery services, he developed elevated troponin levels and was transferred to the Internal Medicine service for further evaluation. CTA chest showed no evidence of PE. Cardiology was consulted for his elevated troponin. He had a LHC on 8/15 that showed EF 55% with non-obstructive CAD. ECHO showed EF 60%. He has chronic atrial fibrillation and was continued on Eliquis and Metoprolol. LE Doppler did not show evidence of DVT. He was continued on IV antibiotics for his intraabdominal infection. He was discharged home on 8/18 but returned to [facility] ED the next day with confusion, agitation and LE and scrotal swelling. He was found to have pneumonia and subsequently tsf back to [facility] for further work-up. LE and scrotal swelling noted to be due to his low  albumin state. Hospital services noted patient had confusion during his hospital stay. He was placed on Vancomycin and Zosyn with improvement. It is worth noting that he has chronically elevated ammonia levels and confusion at home. CT brain did not show acute abnormality. [facility] IV Zithromax and Rocephin as well as PT/OT eval and treatment plan and wound care. Wound care team to eval patient as well. Patient noted to have ribera. However, I cannot find documentation reflecting insertion reason or further indication for ribera. We will begin bladder training and d/c ribera as soon as possible. He will be closely monitored for any early signs of deterioration.           Past Medical History:   Diagnosis Date    Diverticulitis     Hyperlipemia     Hypokalemia 11/09/2021       Past Surgical History:   Procedure Laterality Date    COLON SURGERY      partial colon resection    EXCISION, SMALL INTESTINE N/A 8/9/2023    Procedure: EXCISION, SMALL INTESTINE;  Surgeon: Tesfaye Natarajan MD;  Location: [facility] OR;  Service: General;  Laterality: N/A;    HERNIA REPAIR      LAPAROTOMY, EXPLORATORY N/A 8/9/2023    Procedure: LAPAROTOMY, EXPLORATORY;  Surgeon: Tesfaye Natarajan MD;  Location: [facility] OR;  Service: General;  Laterality: N/A;    LEFT HEART CATHETERIZATION Left 8/15/2023    Procedure: Left heart cath;  Surgeon: Edinson Torres DO;  Location: [facility] CATH LAB;  Service: Cardiology;  Laterality: Left;    REMOVAL OF IMPLANT N/A 8/9/2023    Procedure: REMOVAL, IMPLANT;  Surgeon: Tesfaye Natarajan MD;  Location: [facility] OR;  Service: General;  Laterality: N/A;  removal mesh       Review of patient's allergies indicates:  No Known Allergies    No current facility-administered medications on file prior to encounter.     Current Outpatient Medications on File Prior to Encounter   Medication Sig    apixaban (ELIQUIS) 5 mg Tab Take 1 tablet (5 mg  total) by mouth 2 (two) times daily. for 30 doses    dextrose 5 % (D5W) SolP 250 mL with azithromycin 500 mg SolR 500 mg Inject 500 mg into the vein once daily. for 5 days    dextrose 5 % in water (D5W) PgBk 100 mL with cefTRIAXone 1 gram SolR 1 g Inject 1 g into the vein once daily. for 5 days    FOLIC ACID-VITAMIN B6-VIT B12 ORAL Take 1 tablet by mouth once daily.    Lactobacillus acidophilus (PROBIOTIC) 10 billion cell Cap Take by mouth.    lactulose (CHRONULAC) 10 gram/15 mL solution Take 30 mLs (20 g total) by mouth 3 (three) times daily.    metoprolol tartrate (LOPRESSOR) 25 MG tablet Take 0.5 tablets (12.5 mg total) by mouth 2 (two) times daily.    niacin (SLO-NIACIN) 500 mg tablet Take 500 mg by mouth once daily. With meal    omega-3 fatty acids/fish oil (FISH OIL-OMEGA-3 FATTY ACIDS) 300-1,000 mg capsule Take 1 capsule by mouth once daily.    pantoprazole (PROTONIX) 40 MG tablet Take 1 tablet (40 mg total) by mouth 2 (two) times daily.    QUEtiapine (SEROQUEL) 50 MG tablet 1 tablet (50 mg total) by Per NG tube route 2 (two) times daily.    rifAXIMin (XIFAXAN) 550 mg Tab Take 1 tablet (550 mg total) by mouth 2 (two) times daily.    timolol maleate 0.5% (TIMOPTIC) 0.5 % Drop 1 drop once daily.     Family History       Problem Relation (Age of Onset)    Diabetes Mother, Father          Tobacco Use    Smoking status: Never    Smokeless tobacco: Never   Substance and Sexual Activity    Alcohol use: Never    Drug use: Never    Sexual activity: Not Currently     Review of Systems   Constitutional:  Positive for fatigue.   HENT: Negative.     Eyes: Negative.    Respiratory: Negative.     Cardiovascular: Negative.    Gastrointestinal: Negative.    Endocrine: Negative.    Genitourinary: Negative.         De La Cruz catheterization   Musculoskeletal: Negative.    Skin: Negative.    Allergic/Immunologic: Negative.    Neurological: Negative.    Hematological: Negative.    Psychiatric/Behavioral: Negative.        Objective:     Vital Signs (Most Recent):  Temp: 98 °F (36.7 °C) (08/25/23 0711)  Pulse: 77 (08/25/23 0711)  Resp: 19 (08/25/23 0711)  BP: 133/60 (08/25/23 0711)  SpO2: 96 % (08/25/23 0711) Vital Signs (24h Range):  Temp:  [98 °F (36.7 °C)-98.9 °F (37.2 °C)] 98 °F (36.7 °C)  Pulse:  [77-82] 77  Resp:  [16-19] 17  SpO2:  [96 %-98 %] 96 %  BP: (133-135)/(58-60) 133/60     Weight: 89.1 kg (196 lb 6.4 oz)  Body mass index is 28.18 kg/m².     Physical Exam  Constitutional:       Appearance: Normal appearance. He is normal weight.   HENT:      Head: Normocephalic and atraumatic.      Nose: Nose normal.      Mouth/Throat:      Mouth: Mucous membranes are moist.      Pharynx: Oropharynx is clear.   Eyes:      Extraocular Movements: Extraocular movements intact.      Conjunctiva/sclera: Conjunctivae normal.      Pupils: Pupils are equal, round, and reactive to light.   Cardiovascular:      Rate and Rhythm: Normal rate.   Pulmonary:      Effort: Pulmonary effort is normal.      Breath sounds: Normal breath sounds.   Abdominal:      General: Abdomen is flat. Bowel sounds are normal.      Palpations: Abdomen is soft.   Genitourinary:     Penis: Normal.       Testes: Normal.   Musculoskeletal:         General: Normal range of motion.      Cervical back: Normal range of motion and neck supple.   Skin:     General: Skin is warm and dry.      Capillary Refill: Capillary refill takes less than 2 seconds.   Neurological:      General: No focal deficit present.      Mental Status: He is alert and oriented to person, place, and time. Mental status is at baseline.   Psychiatric:         Mood and Affect: Mood normal.         Behavior: Behavior normal.         Thought Content: Thought content normal.         Judgment: Judgment normal.              CRANIAL NERVES     CN III, IV, VI   Pupils are equal, round, and reactive to light.       Significant Labs: All pertinent labs within the past 24 hours have been reviewed.    Significant  "Imaging: I have reviewed all pertinent imaging results/findings within the past 24 hours.    Assessment/Plan:     * Infected hernioplasty mesh  Completed course of Zosyn at previous hospital  Continue Zithromax and Rocephin      Presence of surgical incision  Wound care  Wound care team consult      Edema due to hypoalbuminemia  Close monitoring  Weekly lab monitoring      Pneumonia  Completed course of Zosyn and Vancomycin at previous hospital  We will continue Rocephin and Azithromycin  Weekly CBC, BMP  Incentive spirometry      Type 2 diabetes mellitus without complication, without long-term current use of insulin  Patient's FSGs are controlled on current medication regimen.  Last A1c reviewed-   Lab Results   Component Value Date    HGBA1C 5.6 11/06/2021     Most recent fingerstick glucose reviewed- No results for input(s): "POCTGLUCOSE" in the last 24 hours.  Current correctional scale  Low  Maintain anti-hyperglycemic dose as follows-   Antihyperglycemics (From admission, onward)    None        DM diet  SSI- low correctional scale  Hypoglycemia protocol    Hypertension  Continue home meds  Routine VS monitoring      VTE Risk Mitigation (From admission, onward)         Ordered     apixaban tablet 5 mg  2 times daily         08/23/23 1736     IP VTE HIGH RISK PATIENT  Once         08/23/23 1640     Place sequential compression device  Until discontinued         08/23/23 1640                           David Stahl DO  Department of Hospital Medicine  [facility] - Medical Surgical Unit  "

## 2023-08-25 NOTE — PLAN OF CARE
Problem: Adult Inpatient Plan of Care  Goal: Plan of Care Review  Outcome: Ongoing, Progressing  Goal: Patient-Specific Goal (Individualized)  Outcome: Ongoing, Progressing  Goal: Absence of Hospital-Acquired Illness or Injury  Outcome: Ongoing, Progressing  Goal: Optimal Comfort and Wellbeing  Outcome: Ongoing, Progressing  Goal: Readiness for Transition of Care  Outcome: Ongoing, Progressing     Problem: Diabetes Comorbidity  Goal: Blood Glucose Level Within Targeted Range  Outcome: Ongoing, Progressing     Problem: Fluid Imbalance (Pneumonia)  Goal: Fluid Balance  Outcome: Ongoing, Progressing     Problem: Infection (Pneumonia)  Goal: Resolution of Infection Signs and Symptoms  Outcome: Ongoing, Progressing

## 2023-08-25 NOTE — ASSESSMENT & PLAN NOTE
"Patient's FSGs are controlled on current medication regimen.  Last A1c reviewed-   Lab Results   Component Value Date    HGBA1C 5.6 11/06/2021     Most recent fingerstick glucose reviewed- No results for input(s): "POCTGLUCOSE" in the last 24 hours.  Current correctional scale  Low  Maintain anti-hyperglycemic dose as follows-   Antihyperglycemics (From admission, onward)    None        DM diet  SSI- low correctional scale  Hypoglycemia protocol  "

## 2023-08-25 NOTE — PT/OT/SLP PROGRESS
Physical Therapy Treatment    Patient Name:  Danny Flood   MRN:  [id number]    Recommendations:     Discharge Recommendations: home with home health  Discharge Equipment Recommendations: none  Barriers to discharge: None    Assessment:     Danny Flood is a 78 y.o. male admitted with a medical diagnosis of Infected hernioplasty mesh.  He presents with the following impairments/functional limitations: weakness, impaired endurance, impaired functional mobility, gait instability, impaired balance, decreased lower extremity function, decreased safety awareness.    Pt tolerated treatment well with no complaints voiced following treatment.  Pt required intermittent seated rest breaks throughout session due to SOB.  Increased ambulation distance tolerated this visit.    Rehab Prognosis: Good; patient would benefit from acute skilled PT services to address these deficits and reach maximum level of function.    Recent Surgery: * No surgery found *      Plan:     During this hospitalization, patient to be seen 5 x/week to address the identified rehab impairments via gait training, therapeutic activities and progress toward the following goals:    Plan of Care Expires:  09/22/23    Subjective     Chief Complaint: Pt reports no new complaints.  No reports of pain and pt agrees to treatment in Rehab gym.  Patient/Family Comments/goals: To return home with home health  Pain/Comfort:  Pain Rating 1: 0/10  Pain Rating Post-Intervention 1: 0/10      Objective:     Communicated with pt prior to session.  Patient found supine with SCD upon PT entry to room.     General Precautions: Standard, fall  Orthopedic Precautions: N/A  Braces:  (abdominal binder)  Respiratory Status: Room air     Functional Mobility:  Bed Mobility:     Rolling Right: contact guard assistance  Scooting: contact guard assistance  Supine to Sit: minimum assistance  Transfers:     Sit to Stand:  contact guard assistance and minimum assistance with rolling  walker  Bed to Chair: contact guard assistance and minimum assistance with  rolling walker  using  Step Transfer  Toilet Transfer: minimum assistance with  rolling walker  using  Step Transfer  Gait: Pt received gait training using RW for 100 ft with Min to CGA with occasional poor L foot clearance resulting in stumbling LOB which required increased assist from therapist to recover.  Pt required frequest verbal cues to step up into walker and improve upright posture.  Balance: Static standing balance is fair with support of RW while performing ADL related tasks in room.      AM-PAC 6 CLICK MOBILITY  Turning over in bed (including adjusting bedclothes, sheets and blankets)?: 3  Sitting down on and standing up from a chair with arms (e.g., wheelchair, bedside commode, etc.): 3  Moving from lying on back to sitting on the side of the bed?: 3  Moving to and from a bed to a chair (including a wheelchair)?: 3  Need to walk in hospital room?: 3  Climbing 3-5 steps with a railing?: 1  Basic Mobility Total Score: 16       Treatment & Education:  Pt performed Nustep x 10 mins for endurance training, flexibility and strengthening.  Pt performed 2x10 standing marching with CGA with support of RW requiring seated rest break between sets.  Pt performed sit<>stand x 5 reps with cueing repeated required for proper technique and sequence.    Pt performed static standing with Min A and cueing for upright posture for 2-3 mins x 2 reps while performing lower body dressing and toileting hygiene.    Patient left up in chair with  OT present.    GOALS:   Multidisciplinary Problems       Physical Therapy Goals          Problem: Physical Therapy    Goal Priority Disciplines Outcome Goal Variances Interventions   Physical Therapy Goal     PT, PT/OT Ongoing, Progressing     Description: Goals to be met by: 2 weeks     Patient will increase functional independence with mobility by performin. Supine to sit with Stand-by Assistance  2.  Sit to supine with Stand-by Assistance  3. Rolling to Left and Right with Modified [place].  4. Sit to stand transfer with Stand-by Assistance  5. Bed to chair transfer with Stand-by Assistance using Rolling Walker  6. Gait  x 200 feet with Stand-by Assistance using Rolling Walker.    Goals to be met by: 4     Patient will increase functional independence with mobility by performin. Supine to sit with Modified [place]  2. Sit to supine with Modified [place]  3. Sit to stand transfer with Modified [place]  4. Bed to chair transfer with Modified [place] using Rolling Walker  5. Gait  x 300 feet with Modified [place] using Rolling Walker.                             Time Tracking:     PT Received On: 23  PT Start Time: 930     PT Stop Time: 1020  PT Total Time (min): 50 min     Billable Minutes: Gait Training 20 and Therapeutic Activity 30    Treatment Type: Treatment  PT/PTA: PT     Number of PTA visits since last PT visit: 0     2023

## 2023-08-25 NOTE — PT/OT/SLP PROGRESS
Occupational Therapy   Treatment    Name: Danny Flood  MRN: [id number]  Admitting Diagnosis:  Infected hernioplasty mesh with deconditioning/trunk weakness      Recommendations:     Discharge Recommendations: home with home health, rehabilitation facility  Discharge Equipment Recommendations:  bedside commode, walker, rolling  Barriers to discharge:       Assessment:     Danny Flood is a 78 y.o. male with a medical diagnosis of Infected hernioplasty mesh deconditioning with trunk weakness.  He presents with decreased balance, endurance. Performance deficits affecting function are weakness, impaired endurance, impaired functional mobility, impaired self care skills, impaired balance, decreased safety awareness. Pt tolerated his first therapy session well but required rest breaks. Pt is motivated but noted to have decreased strength and activity with good motivation. Continue skilled OT at this time.     Rehab Prognosis:  Fair; patient would benefit from acute skilled OT services to address these deficits and reach maximum level of function.       Plan:     Patient to be seen 5 x/week to address the above listed problems via self-care/home management, therapeutic exercises, therapeutic activities, neuromuscular re-education  Plan of Care Expires:    Plan of Care Reviewed with: patient    Subjective     Chief Complaint: abdominal pain, decreased strength  Patient/Family Comments/goals: increase ADLs and mobility  Pain/Comfort:       Objective:     Communicated with: Pt prior to session.  Patient found supine with SCD upon OT entry to room.    General Precautions: Standard, fall    Orthopedic Precautions:   Braces:    Respiratory Status: Room air     Occupational Performance:     Bed Mobility:    Patient completed Rolling/Turning to Left with  minimum assistance  Patient completed Rolling/Turning to Right with minimum assistance  Patient completed Supine to Sit with minimum assistance     Functional  Mobility/Transfers:  Patient completed Sit <> Stand Transfer with minimum assistance  with  rolling walker   Patient completed Toilet Transfer Step Transfer technique with minimum assistance with  rolling walker  Functional Mobility: Pt was t/f to and from the therapy gym in the w/c by OT    Activities of Daily Living:  Toileting: minimum assistance using the RW      AMPAC 6 Click ADL:      Treatment & Education:  Therapeutic exercise:  While sitting in the w/c, the pt completed bicep curls 2 sets and 20 rep 3# DB and chest press 2 sets of 20 reps with 3# dowel to increase BUE strength for ADLs and IADLs.    Therapeutic activity  Pt [id number] mins on arm ergometer to increase endurance, BUE strength and activity tolerance for ADL performance    Pt stood to place pegs from peg board on the wall to increase standing tolerance, dynamic balance and endurance x 5mins      Patient left supine with all lines intact and call button in reach    GOALS:   Multidisciplinary Problems       Occupational Therapy Goals          Problem: Occupational Therapy    Goal Priority Disciplines Outcome Interventions   Occupational Therapy Goal     OT, PT/OT Ongoing, Progressing    Description: Goals to be met by: 9/24/23     Patient will increase functional independence with ADLs by performing:    Feeding with Modified [place].  UE Dressing with Modified [place].  LE Dressing with Modified [place].  Grooming while standing with Modified [place].  Toileting from toilet with Modified [place] for hygiene and clothing management.   Standing x5-10 minutes with Modified [place].  Supine to sit with Modified [place].  Step transfer with Modified [place]  Toilet transfer to toilet with Modified [place].                         Time Tracking:     OT Date of Treatment:    OT Start Time:  10:25  OT Stop Time:  11:10  OT Total Time (min):      Billable Minutes:Self Care/Home Management 15  Therapeutic  Activity 15  Therapeutic Exercise 15    OT/EVELYN: OT          8/25/2023

## 2023-08-25 NOTE — HPI
Mr Flood is a 78 year old male with PMH of hypertension, GERD, DM type 2, dementia, hyperammonia, pneumonia, AFib, infected hernioplasty mesh status post surgical removal admitted to [facility] for continuation of IV abx and rehabilitation services following a stay at [facility] for pneumonia. On 8/9 patient presented to ED and noted to have infected hernia mesh. Patient was initially admitted to general surgery and taken to the OR for an exploratory laparotomy for infected mesh removal and small bowel resection with anastomosis on 8/9. Cultures revealed Bacteroides and Anaerococcus as well as Klebsiella, E. Coli, and Proteus. He received a course of Zosyn. While under surgery services, he developed elevated troponin levels and was transferred to the Internal Medicine service for further evaluation. CTA chest showed no evidence of PE. Cardiology was consulted for his elevated troponin. He had a LHC on 8/15 that showed EF 55% with non-obstructive CAD. ECHO showed EF 60%. He has chronic atrial fibrillation and was continued on Eliquis and Metoprolol. LE Doppler did not show evidence of DVT. He was continued on IV antibiotics for his intraabdominal infection. He was discharged home on 8/18 but returned to [facility] ED the next day with confusion, agitation and LE and scrotal swelling. He was found to have pneumonia and subsequently tsf back to [facility] for further work-up. LE and scrotal swelling noted to be due to his low albumin state. Hospital services noted patient had confusion during his hospital stay. He was placed on Vancomycin and Zosyn with improvement. It is worth noting that he has chronically elevated ammonia levels and confusion at home. CT brain did not show acute abnormality. He was tsf to [facility] to swingbed services for continuation of IV Zithromax and Rocephin as well as PT/OT eval and treatment plan and wound care. Wound care team to eval patient as well. Patient noted to  have ribera. However, I cannot find documentation reflecting insertion reason or further indication for ribera. We will begin bladder training and d/c ribera as soon as possible. He will be closely monitored for any early signs of deterioration.

## 2023-08-25 NOTE — PROGRESS NOTES
NEW WOUND CARE CONSULT          Patient Name: Danny Flood is a 78 y.o. male       Chief Complaint:  New Wound Care Consult    Review of patient's allergies indicates:  No Known Allergies     I have reviewed the patient's medical, surgical, family and social history.      Subjective:    HPI     Wound Information: surgical wound to midline abdomen onset 8/9/23. Initial surgery was exp. Lap. With subsequent removal of infected hernia mesh and small bowel resection. Pt reports mild pain to area.         Review of Systems   Constitutional:  Negative for chills and fever.   Respiratory:  Negative for shortness of breath.    Cardiovascular:  Negative for chest pain.   Integumentary:  Positive for wound. Negative for rash.   Psychiatric/Behavioral:  The patient is not nervous/anxious.         Medications:    Current Facility-Administered Medications on File Prior to Visit   Medication Dose Route Frequency Provider Last Rate Last Admin    acetaminophen tablet 650 mg  650 mg Oral Q6H PRN Nuno Ward PA        apixaban tablet 5 mg  5 mg Oral BID Nuno Ward PA   5 mg at 08/25/23 0944    azithromycin (ZITHROMAX) 500 mg in dextrose 5 % (D5W) 250 mL IVPB  500 mg Intravenous Daily David Stahl, DO   Stopped at 08/25/23 1243    calcium carbonate 200 mg calcium (500 mg) chewable tablet 500 mg  500 mg Oral BID PRN Nuno Ward PA        calcium-vitamin D3 500 mg-5 mcg (200 unit) per tablet 1 tablet  1 tablet Oral BID David Stahl, DO   1 tablet at 08/25/23 0944    cefTRIAXone (ROCEPHIN) 1 g in dextrose 5 % in water (D5W) 100 mL IVPB (MB+)  1 g Intravenous Q24H David Stahl  mL/hr at 08/25/23 1253 1 g at 08/25/23 1253    cholecalciferol (vitamin D3) 125 mcg (5,000 unit) capsule 5,000 Units  5,000 Units Oral Daily David Stahl DO   5,000 Units at 08/25/23 0944    dextrose 50% injection 12.5 g  12.5 g Intravenous PRN Lilian Butler FNP        dextrose 50% injection 25 g   25 g Intravenous PRN Lilian Butler, PHIL        folic acid tablet 1 mg  1 mg Oral Daily David Stahl, DO   1 mg at 08/25/23 0944    glucagon (human recombinant) injection 1 mg  1 mg Intramuscular PRN Lilian Butler, PHIL        glucose chewable tablet 16 g  16 g Oral PRN Lilian Butler, PHIL        glucose chewable tablet 24 g  24 g Oral PRN Lilian Butler, MIHIRP        insulin aspart U-100 injection 0-5 Units  0-5 Units Subcutaneous QID (AC + HS) PRN Lilian Butler, MIHIRP        Lactobacillus acidophilus capsule 1 capsule  1 capsule Oral Daily David Stahl DO   1 capsule at 08/25/23 0944    lactulose 20 gram/30 mL solution Soln 20 g  20 g Oral TID Nuno Ward PA   20 g at 08/25/23 0944    magnesium chloride TBEC tablet TbEC 64 mg  64 mg Oral Daily David Stahl DO   64 mg at 08/25/23 0944    melatonin tablet 6 mg  6 mg Oral Nightly PRN Nuno Ward PA        metoprolol tartrate (LOPRESSOR) split tablet 12.5 mg  12.5 mg Oral BID Nuno Ward PA   12.5 mg at 08/25/23 0944    mupirocin 2 % ointment   Nasal BID David Stahl DO   Given at 08/25/23 0948    niacin CR tablet 500 mg  500 mg Oral Daily David Stahl DO        omega 3-dha-epa-fish oil 1,000 mg (120 mg-180 mg) Cap 1 capsule  1 capsule Oral Daily David Stahl DO   1 capsule at 08/25/23 0944    ondansetron disintegrating tablet 4 mg  4 mg Oral Q8H PRN Nuno Ward PA        pantoprazole EC tablet 40 mg  40 mg Oral BID Nuno Ward PA   40 mg at 08/25/23 0944    potassium, sodium phosphates 280-160-250 mg packet 1 packet  1 packet Oral BID David Stahl DO   1 packet at 08/25/23 0944    QUEtiapine tablet 50 mg  50 mg Per NG tube BID Nuno Ward PA   50 mg at 08/25/23 0944    rifAXIMin tablet 550 mg  550 mg Oral BID Nuno Ward PA   550 mg at 08/25/23 0944    senna-docusate 8.6-50 mg per tablet 1 tablet  1 tablet Oral BID Nuno Ward PA   1  tablet at 08/24/23 0921    timolol maleate 0.5% ophthalmic solution 1 drop  1 drop Both Eyes Daily Nuno Ward PA   1 drop at 08/25/23 0948    [DISCONTINUED] niacin CR tablet 500 mg  500 mg Oral Daily Nuno Ward PA         Current Outpatient Medications on File Prior to Visit   Medication Sig Dispense Refill    apixaban (ELIQUIS) 5 mg Tab Take 1 tablet (5 mg total) by mouth 2 (two) times daily. for 30 doses 30 tablet 0    dextrose 5 % (D5W) SolP 250 mL with azithromycin 500 mg SolR 500 mg Inject 500 mg into the vein once daily. for 5 days 500 mg 5    dextrose 5 % in water (D5W) PgBk 100 mL with cefTRIAXone 1 gram SolR 1 g Inject 1 g into the vein once daily. for 5 days 1 g 5    FOLIC ACID-VITAMIN B6-VIT B12 ORAL Take 1 tablet by mouth once daily.      Lactobacillus acidophilus (PROBIOTIC) 10 billion cell Cap Take by mouth.      lactulose (CHRONULAC) 10 gram/15 mL solution Take 30 mLs (20 g total) by mouth 3 (three) times daily. 2700 mL 0    metoprolol tartrate (LOPRESSOR) 25 MG tablet Take 0.5 tablets (12.5 mg total) by mouth 2 (two) times daily. 30 tablet 11    niacin (SLO-NIACIN) 500 mg tablet Take 500 mg by mouth once daily. With meal      omega-3 fatty acids/fish oil (FISH OIL-OMEGA-3 FATTY ACIDS) 300-1,000 mg capsule Take 1 capsule by mouth once daily.      pantoprazole (PROTONIX) 40 MG tablet Take 1 tablet (40 mg total) by mouth 2 (two) times daily. 180 tablet 3    QUEtiapine (SEROQUEL) 50 MG tablet 1 tablet (50 mg total) by Per NG tube route 2 (two) times daily. 60 tablet 11    rifAXIMin (XIFAXAN) 550 mg Tab Take 1 tablet (550 mg total) by mouth 2 (two) times daily. 60 tablet 0    timolol maleate 0.5% (TIMOPTIC) 0.5 % Drop 1 drop once daily.            Physical Exam  Vitals reviewed.   Constitutional:       Appearance: Normal appearance.   HENT:      Head: Normocephalic and atraumatic.      Right Ear: External ear normal.      Left Ear: External ear normal.   Cardiovascular:      Pulses:            Dorsalis pedis pulses are 2+ on the right side and 2+ on the left side.   Pulmonary:      Effort: Pulmonary effort is normal.   Abdominal:      Palpations: Abdomen is soft.   Musculoskeletal:         General: Normal range of motion.   Skin:     General: Skin is warm and dry.      Findings: Wound present.          Neurological:      Mental Status: He is alert.          Please see Wound Docs for complete Wound Assessment and lower extremity assessment when applicable.    Documentation of any procedures can be viewed in Wound Docs if applicable.         Assessment and Plan:    1. Non-healing surgical wound, initial encounter  Continue wet to dry dressings with Vashe moistened gauze.   F/U with Dr. Natarajan as scheduled.   2 small staples were removed from wound edges.          Please see Wound Docs for complete plan including patient instructions.     Follow Up & Goals:  Follow up in about 1 week (around 9/1/2023).        Short term goals  Reduction of devitalized tissue  Reduction of bacterial burden  Stimulate and/or maintain acute phases of wound healing  Promote granulation formation  Promote epithelization  Promote compliance with plan of care  Address factors affecting wound healing  Optimize nutritional status    Long term goals  Prevent infection  Conservative Wound Treatment  Resolve wound

## 2023-08-26 LAB
GLUCOSE SERPL-MCNC: 110 MG/DL (ref 70–105)
GLUCOSE SERPL-MCNC: 118 MG/DL (ref 70–105)
GLUCOSE SERPL-MCNC: 134 MG/DL (ref 70–105)
GLUCOSE SERPL-MCNC: 135 MG/DL (ref 70–105)

## 2023-08-26 PROCEDURE — 63600175 PHARM REV CODE 636 W HCPCS: Performed by: FAMILY MEDICINE

## 2023-08-26 PROCEDURE — 25000003 PHARM REV CODE 250: Performed by: PHYSICIAN ASSISTANT

## 2023-08-26 PROCEDURE — 25000003 PHARM REV CODE 250: Performed by: FAMILY MEDICINE

## 2023-08-26 PROCEDURE — 94761 N-INVAS EAR/PLS OXIMETRY MLT: CPT

## 2023-08-26 PROCEDURE — 82962 GLUCOSE BLOOD TEST: CPT

## 2023-08-26 PROCEDURE — 27000981 HC MATTRESS, ACCUCAIR DAILY RENTAL

## 2023-08-26 PROCEDURE — 36416 COLLJ CAPILLARY BLOOD SPEC: CPT

## 2023-08-26 PROCEDURE — 27000944

## 2023-08-26 PROCEDURE — 27000958

## 2023-08-26 PROCEDURE — 11000004 HC SNF PRIVATE

## 2023-08-26 PROCEDURE — 99900035 HC TECH TIME PER 15 MIN (STAT)

## 2023-08-26 RX ADMIN — OMEGA-3 FATTY ACIDS CAP 1000 MG 1 CAPSULE: 1000 CAP at 09:08

## 2023-08-26 RX ADMIN — LACTULOSE 20 G: 20 SOLUTION ORAL at 03:08

## 2023-08-26 RX ADMIN — AZITHROMYCIN MONOHYDRATE 500 MG: 500 INJECTION, POWDER, LYOPHILIZED, FOR SOLUTION INTRAVENOUS at 09:08

## 2023-08-26 RX ADMIN — TIMOLOL MALEATE 1 DROP: 5 SOLUTION/ DROPS OPHTHALMIC at 08:08

## 2023-08-26 RX ADMIN — METOPROLOL TARTRATE 12.5 MG: 25 TABLET, FILM COATED ORAL at 09:08

## 2023-08-26 RX ADMIN — QUETIAPINE 50 MG: 25 TABLET ORAL at 08:08

## 2023-08-26 RX ADMIN — QUETIAPINE 50 MG: 25 TABLET ORAL at 09:08

## 2023-08-26 RX ADMIN — MAGNESIUM 64 MG (MAGNESIUM CHLORIDE) TABLET,DELAYED RELEASE 64 MG: at 09:08

## 2023-08-26 RX ADMIN — Medication 500 MG: at 09:08

## 2023-08-26 RX ADMIN — POTASSIUM, SODIUM PHOSPHATES 280 MG-160 MG-250 MG ORAL POWDER PACKET 1 PACKET: POWDER IN PACKET at 08:08

## 2023-08-26 RX ADMIN — APIXABAN 5 MG: 5 TABLET, FILM COATED ORAL at 09:08

## 2023-08-26 RX ADMIN — PANTOPRAZOLE SODIUM 40 MG: 40 TABLET, DELAYED RELEASE ORAL at 09:08

## 2023-08-26 RX ADMIN — LACTULOSE 20 G: 20 SOLUTION ORAL at 08:08

## 2023-08-26 RX ADMIN — PANTOPRAZOLE SODIUM 40 MG: 40 TABLET, DELAYED RELEASE ORAL at 08:08

## 2023-08-26 RX ADMIN — FOLIC ACID 1 MG: 1 TABLET ORAL at 09:08

## 2023-08-26 RX ADMIN — Medication 1 TABLET: at 09:08

## 2023-08-26 RX ADMIN — RIFAXIMIN 550 MG: 550 TABLET ORAL at 09:08

## 2023-08-26 RX ADMIN — CEFTRIAXONE 1 G: 1 INJECTION, POWDER, FOR SOLUTION INTRAMUSCULAR; INTRAVENOUS at 11:08

## 2023-08-26 RX ADMIN — MUPIROCIN: 20 OINTMENT TOPICAL at 08:08

## 2023-08-26 RX ADMIN — Medication 1 TABLET: at 08:08

## 2023-08-26 RX ADMIN — Medication 1 CAPSULE: at 09:08

## 2023-08-26 RX ADMIN — Medication 5000 UNITS: at 09:08

## 2023-08-26 RX ADMIN — RIFAXIMIN 550 MG: 550 TABLET ORAL at 08:08

## 2023-08-26 RX ADMIN — APIXABAN 5 MG: 5 TABLET, FILM COATED ORAL at 08:08

## 2023-08-26 NOTE — RESPIRATORY THERAPY
Patient achieves VT 1000ml x 10 breaths with Incentive Spirometer.  Patient seemed to understand instructions better this evening and performed exercise well with slower , deeper breaths. BS = clear.  Room AIR SAT = 98% / HR 78 / RR 18 / breath sounds = clear / dry spontanous nonproductive cough

## 2023-08-27 LAB
GLUCOSE SERPL-MCNC: 130 MG/DL (ref 70–105)
GLUCOSE SERPL-MCNC: 143 MG/DL (ref 70–105)
GLUCOSE SERPL-MCNC: 144 MG/DL (ref 70–105)
GLUCOSE SERPL-MCNC: 146 MG/DL (ref 70–105)

## 2023-08-27 PROCEDURE — 94761 N-INVAS EAR/PLS OXIMETRY MLT: CPT

## 2023-08-27 PROCEDURE — 25000003 PHARM REV CODE 250: Performed by: FAMILY MEDICINE

## 2023-08-27 PROCEDURE — 36416 COLLJ CAPILLARY BLOOD SPEC: CPT

## 2023-08-27 PROCEDURE — 99900035 HC TECH TIME PER 15 MIN (STAT)

## 2023-08-27 PROCEDURE — 82962 GLUCOSE BLOOD TEST: CPT

## 2023-08-27 PROCEDURE — 63600175 PHARM REV CODE 636 W HCPCS: Performed by: FAMILY MEDICINE

## 2023-08-27 PROCEDURE — 27000958

## 2023-08-27 PROCEDURE — 11000004 HC SNF PRIVATE

## 2023-08-27 PROCEDURE — 25000003 PHARM REV CODE 250: Performed by: PHYSICIAN ASSISTANT

## 2023-08-27 RX ADMIN — APIXABAN 5 MG: 5 TABLET, FILM COATED ORAL at 08:08

## 2023-08-27 RX ADMIN — AZITHROMYCIN MONOHYDRATE 500 MG: 500 INJECTION, POWDER, LYOPHILIZED, FOR SOLUTION INTRAVENOUS at 08:08

## 2023-08-27 RX ADMIN — PANTOPRAZOLE SODIUM 40 MG: 40 TABLET, DELAYED RELEASE ORAL at 09:08

## 2023-08-27 RX ADMIN — Medication 1 TABLET: at 08:08

## 2023-08-27 RX ADMIN — MAGNESIUM 64 MG (MAGNESIUM CHLORIDE) TABLET,DELAYED RELEASE 64 MG: at 08:08

## 2023-08-27 RX ADMIN — MUPIROCIN: 20 OINTMENT TOPICAL at 09:08

## 2023-08-27 RX ADMIN — SENNOSIDES AND DOCUSATE SODIUM 1 TABLET: 50; 8.6 TABLET ORAL at 09:08

## 2023-08-27 RX ADMIN — POTASSIUM, SODIUM PHOSPHATES 280 MG-160 MG-250 MG ORAL POWDER PACKET 1 PACKET: POWDER IN PACKET at 09:08

## 2023-08-27 RX ADMIN — Medication 1 CAPSULE: at 08:08

## 2023-08-27 RX ADMIN — APIXABAN 5 MG: 5 TABLET, FILM COATED ORAL at 09:08

## 2023-08-27 RX ADMIN — METOPROLOL TARTRATE 12.5 MG: 25 TABLET, FILM COATED ORAL at 09:08

## 2023-08-27 RX ADMIN — Medication 5000 UNITS: at 08:08

## 2023-08-27 RX ADMIN — QUETIAPINE 50 MG: 25 TABLET ORAL at 09:08

## 2023-08-27 RX ADMIN — RIFAXIMIN 550 MG: 550 TABLET ORAL at 09:08

## 2023-08-27 RX ADMIN — FOLIC ACID 1 MG: 1 TABLET ORAL at 08:08

## 2023-08-27 RX ADMIN — CEFTRIAXONE 1 G: 1 INJECTION, POWDER, FOR SOLUTION INTRAMUSCULAR; INTRAVENOUS at 10:08

## 2023-08-27 RX ADMIN — TIMOLOL MALEATE 1 DROP: 5 SOLUTION/ DROPS OPHTHALMIC at 08:08

## 2023-08-27 RX ADMIN — METOPROLOL TARTRATE 12.5 MG: 25 TABLET, FILM COATED ORAL at 08:08

## 2023-08-27 RX ADMIN — PANTOPRAZOLE SODIUM 40 MG: 40 TABLET, DELAYED RELEASE ORAL at 08:08

## 2023-08-27 RX ADMIN — Medication 500 MG: at 08:08

## 2023-08-27 RX ADMIN — Medication 1 TABLET: at 09:08

## 2023-08-27 RX ADMIN — SENNOSIDES AND DOCUSATE SODIUM 1 TABLET: 50; 8.6 TABLET ORAL at 08:08

## 2023-08-27 RX ADMIN — POTASSIUM, SODIUM PHOSPHATES 280 MG-160 MG-250 MG ORAL POWDER PACKET 1 PACKET: POWDER IN PACKET at 08:08

## 2023-08-27 RX ADMIN — QUETIAPINE 50 MG: 25 TABLET ORAL at 08:08

## 2023-08-27 RX ADMIN — LACTULOSE 20 G: 20 SOLUTION ORAL at 03:08

## 2023-08-27 RX ADMIN — LACTULOSE 20 G: 20 SOLUTION ORAL at 08:08

## 2023-08-27 RX ADMIN — MUPIROCIN: 20 OINTMENT TOPICAL at 08:08

## 2023-08-27 RX ADMIN — LACTULOSE 20 G: 20 SOLUTION ORAL at 09:08

## 2023-08-27 RX ADMIN — RIFAXIMIN 550 MG: 550 TABLET ORAL at 08:08

## 2023-08-27 RX ADMIN — OMEGA-3 FATTY ACIDS CAP 1000 MG 1 CAPSULE: 1000 CAP at 08:08

## 2023-08-27 NOTE — NURSING
Pt back to bed. Voiced no complaints. Skin barrier applied to sacral and heels elevated on a pillow. CB near

## 2023-08-27 NOTE — PLAN OF CARE
Problem: Adult Inpatient Plan of Care  Goal: Plan of Care Review  Outcome: Ongoing, Progressing  Goal: Patient-Specific Goal (Individualized)  Outcome: Ongoing, Progressing  Goal: Optimal Comfort and Wellbeing  Outcome: Ongoing, Progressing  Goal: Readiness for Transition of Care  Outcome: Ongoing, Progressing     Problem: Diabetes Comorbidity  Goal: Blood Glucose Level Within Targeted Range  Outcome: Ongoing, Progressing     Problem: Fluid Imbalance (Pneumonia)  Goal: Fluid Balance  Outcome: Ongoing, Progressing     Problem: Infection (Pneumonia)  Goal: Resolution of Infection Signs and Symptoms  Outcome: Ongoing, Progressing     Problem: Respiratory Compromise (Pneumonia)  Goal: Effective Oxygenation and Ventilation  Outcome: Ongoing, Progressing      Improved

## 2023-08-27 NOTE — RESPIRATORY THERAPY
Patient resting well tonight without any compliants. No shortness of breath noted. No cough noted.  HR 72  RR 16  Room Air SAT = 97%. Patient only achieved 750 ml x 10 breaths tonight, but normally achieves around 1000ml.

## 2023-08-27 NOTE — NURSING
Resting in bed. Repositioned for comfort. SCD hoses in use. Denies pain and voiced no complaints. Safety measures ongoing.

## 2023-08-28 LAB
GLUCOSE SERPL-MCNC: 124 MG/DL (ref 70–105)
GLUCOSE SERPL-MCNC: 141 MG/DL (ref 70–105)
GLUCOSE SERPL-MCNC: 153 MG/DL (ref 70–105)

## 2023-08-28 PROCEDURE — 11000004 HC SNF PRIVATE

## 2023-08-28 PROCEDURE — 63600175 PHARM REV CODE 636 W HCPCS: Performed by: FAMILY MEDICINE

## 2023-08-28 PROCEDURE — 36416 COLLJ CAPILLARY BLOOD SPEC: CPT

## 2023-08-28 PROCEDURE — 97110 THERAPEUTIC EXERCISES: CPT

## 2023-08-28 PROCEDURE — 97530 THERAPEUTIC ACTIVITIES: CPT

## 2023-08-28 PROCEDURE — 97110 THERAPEUTIC EXERCISES: CPT | Mod: CQ

## 2023-08-28 PROCEDURE — 82962 GLUCOSE BLOOD TEST: CPT

## 2023-08-28 PROCEDURE — 84630 ASSAY OF ZINC: CPT | Mod: 90 | Performed by: FAMILY MEDICINE

## 2023-08-28 PROCEDURE — 27000958

## 2023-08-28 PROCEDURE — 25000003 PHARM REV CODE 250: Performed by: FAMILY MEDICINE

## 2023-08-28 PROCEDURE — 97535 SELF CARE MNGMENT TRAINING: CPT

## 2023-08-28 PROCEDURE — 25000003 PHARM REV CODE 250: Performed by: PHYSICIAN ASSISTANT

## 2023-08-28 PROCEDURE — 94761 N-INVAS EAR/PLS OXIMETRY MLT: CPT

## 2023-08-28 PROCEDURE — 97112 NEUROMUSCULAR REEDUCATION: CPT | Mod: CQ

## 2023-08-28 PROCEDURE — 99900035 HC TECH TIME PER 15 MIN (STAT)

## 2023-08-28 RX ADMIN — OMEGA-3 FATTY ACIDS CAP 1000 MG 1 CAPSULE: 1000 CAP at 09:08

## 2023-08-28 RX ADMIN — Medication 500 MG: at 09:08

## 2023-08-28 RX ADMIN — RIFAXIMIN 550 MG: 550 TABLET ORAL at 08:08

## 2023-08-28 RX ADMIN — APIXABAN 5 MG: 5 TABLET, FILM COATED ORAL at 08:08

## 2023-08-28 RX ADMIN — APIXABAN 5 MG: 5 TABLET, FILM COATED ORAL at 09:08

## 2023-08-28 RX ADMIN — CEFTRIAXONE 1 G: 1 INJECTION, POWDER, FOR SOLUTION INTRAMUSCULAR; INTRAVENOUS at 11:08

## 2023-08-28 RX ADMIN — MAGNESIUM 64 MG (MAGNESIUM CHLORIDE) TABLET,DELAYED RELEASE 64 MG: at 09:08

## 2023-08-28 RX ADMIN — PANTOPRAZOLE SODIUM 40 MG: 40 TABLET, DELAYED RELEASE ORAL at 08:08

## 2023-08-28 RX ADMIN — LACTULOSE 20 G: 20 SOLUTION ORAL at 03:08

## 2023-08-28 RX ADMIN — RIFAXIMIN 550 MG: 550 TABLET ORAL at 09:08

## 2023-08-28 RX ADMIN — Medication 1 CAPSULE: at 09:08

## 2023-08-28 RX ADMIN — AZITHROMYCIN MONOHYDRATE 500 MG: 500 INJECTION, POWDER, LYOPHILIZED, FOR SOLUTION INTRAVENOUS at 10:08

## 2023-08-28 RX ADMIN — QUETIAPINE 50 MG: 25 TABLET ORAL at 08:08

## 2023-08-28 RX ADMIN — MUPIROCIN: 20 OINTMENT TOPICAL at 09:08

## 2023-08-28 RX ADMIN — Medication 1 TABLET: at 09:08

## 2023-08-28 RX ADMIN — PANTOPRAZOLE SODIUM 40 MG: 40 TABLET, DELAYED RELEASE ORAL at 09:08

## 2023-08-28 RX ADMIN — QUETIAPINE 50 MG: 25 TABLET ORAL at 09:08

## 2023-08-28 RX ADMIN — TIMOLOL MALEATE 1 DROP: 5 SOLUTION/ DROPS OPHTHALMIC at 09:08

## 2023-08-28 RX ADMIN — Medication 5000 UNITS: at 09:08

## 2023-08-28 RX ADMIN — Medication 1 TABLET: at 08:08

## 2023-08-28 RX ADMIN — POTASSIUM, SODIUM PHOSPHATES 280 MG-160 MG-250 MG ORAL POWDER PACKET 1 PACKET: POWDER IN PACKET at 09:08

## 2023-08-28 RX ADMIN — LACTULOSE 20 G: 20 SOLUTION ORAL at 09:08

## 2023-08-28 RX ADMIN — FOLIC ACID 1 MG: 1 TABLET ORAL at 09:08

## 2023-08-28 RX ADMIN — POTASSIUM, SODIUM PHOSPHATES 280 MG-160 MG-250 MG ORAL POWDER PACKET 1 PACKET: POWDER IN PACKET at 08:08

## 2023-08-28 RX ADMIN — LACTULOSE 20 G: 20 SOLUTION ORAL at 08:08

## 2023-08-28 RX ADMIN — METOPROLOL TARTRATE 12.5 MG: 25 TABLET, FILM COATED ORAL at 08:08

## 2023-08-28 RX ADMIN — SENNOSIDES AND DOCUSATE SODIUM 1 TABLET: 50; 8.6 TABLET ORAL at 08:08

## 2023-08-28 RX ADMIN — METOPROLOL TARTRATE 12.5 MG: 25 TABLET, FILM COATED ORAL at 09:08

## 2023-08-28 RX ADMIN — SENNOSIDES AND DOCUSATE SODIUM 1 TABLET: 50; 8.6 TABLET ORAL at 09:08

## 2023-08-28 NOTE — NURSING
MR. ROSSL DAUGHTER WANTS TO KNOW ABOUT HIS F/U APPT. LEFT A MESSAGE ON THE PHONE FOR CHARITY DIGGS TO CALL STEVEN [phone].

## 2023-08-28 NOTE — RESPIRATORY THERAPY
Patient achieves  x 10 breaths on Incentive Spirometer.  Breath sounds = clear.  No cough noted.  HR 82 / RR 22 / Room AIR SAT = 98%.

## 2023-08-28 NOTE — NURSING
DR. AU HERE ON THE FLOOR TO SEE PT. AFTER TALKING WITH HIM  DR. AU SAID TO LEAVE BLISTER OPEN TO AIR AND LEAVE IN [place]O UNTIL SWELLING HAS GONE DOWN .

## 2023-08-28 NOTE — PT/OT/SLP PROGRESS
Physical Therapy Treatment    Patient Name:  Danny Flood   MRN:  [id number]    Recommendations:     Discharge Recommendations: home with home health  Discharge Equipment Recommendations: none  Barriers to discharge: Decreased caregiver support    Assessment:     Danny Flood is a 78 y.o. male admitted with a medical diagnosis of Infected hernioplasty mesh.  He presents with the following impairments/functional limitations: weakness, impaired endurance, gait instability, impaired functional mobility, impaired balance, impaired self care skills, decreased ROM     Patient tolerated treatment well today with no extreme complaints of pain or fatigue. He does exhibit a posterior leaning and unsteady balance when performing static balance exercises. He complained of a swollen scrotum  and said that it prevents him from standing for long periods of time.    Rehab Prognosis: Good; patient would benefit from acute skilled PT services to address these deficits and reach maximum level of function.    Recent Surgery: * No surgery found *      Plan:     During this hospitalization, patient to be seen 5 x/week to address the identified rehab impairments via gait training, therapeutic activities and progress toward the following goals:    Plan of Care Expires:  09/22/23    Subjective     Chief Complaint: LE weakness  Patient/Family Comments/goals: to return home safe   Pain/Comfort:         Objective:     Communicated with patient  prior to session.  Patient found supine with peripheral IV, ribera catheter (abdominal binder) upon PT entry to room.     General Precautions: Standard, fall  Orthopedic Precautions: N/A  Braces:  (abdominal binder)  Respiratory Status: Room air     Functional Mobility:  Transfers:     Sit to Stand:  stand by assistance and contact guard assistance with rolling walker  Bed to Chair: supervision and stand by assistance with  rolling walker  using  Step Transfer  Balance: Static balance with RW to  promote correct posture when ambulating       AM-PAC 6 CLICK MOBILITY  Turning over in bed (including adjusting bedclothes, sheets and blankets)?: 3  Sitting down on and standing up from a chair with arms (e.g., wheelchair, bedside commode, etc.): 3  Moving from lying on back to sitting on the side of the bed?: 3  Moving to and from a bed to a chair (including a wheelchair)?: 3  Need to walk in hospital room?: 3  Climbing 3-5 steps with a railing?: 1  Basic Mobility Total Score: 16       Treatment & Education:  Scifit machine x 10 min for cardiovascular fitness    Neuro jose:  Seated hip flexion, LAQ, hip adduction, hip abduction x 20   STS x 3   Standing marching x 10     Patient left supine with all lines intact, call button in reach, and bed alarm on..    GOALS:   Multidisciplinary Problems       Physical Therapy Goals          Problem: Physical Therapy    Goal Priority Disciplines Outcome Goal Variances Interventions   Physical Therapy Goal     PT, PT/OT Ongoing, Progressing     Description: Goals to be met by: 2 weeks     Patient will increase functional independence with mobility by performin. Supine to sit with Stand-by Assistance  2. Sit to supine with Stand-by Assistance  3. Rolling to Left and Right with Modified [place].  4. Sit to stand transfer with Stand-by Assistance  5. Bed to chair transfer with Stand-by Assistance using Rolling Walker  6. Gait  x 200 feet with Stand-by Assistance using Rolling Walker.    Goals to be met by: 4     Patient will increase functional independence with mobility by performin. Supine to sit with Modified [place]  2. Sit to supine with Modified [place]  3. Sit to stand transfer with Modified [place]  4. Bed to chair transfer with Modified [place] using Rolling Walker  5. Gait  x 300 feet with Modified [place] using Rolling Walker.                             Time Tracking:     PT Received On: 23  PT Start Time: 1530     PT  Stop Time: 1612  PT Total Time (min): 42 min     Billable Minutes: Therapeutic Exercise 32 and Neuromuscular Re-education 10    Treatment Type: Treatment  PT/PTA: PTA     Number of PTA visits since last PT visit: 1 08/28/2023

## 2023-08-28 NOTE — CONSULTS
"  [facility] - Medical Surgical Unit  Adult Nutrition  Consult Note    SUMMARY     Recommendations    Recommendation/Intervention: 1. 2000 kcal CON CHO diet 2. Donovan BID 3. Glucerna BID 4. Check Zinc level and assess for deficiency  Goals: Meet EEN >75% to aid in WND healing, stabilize wt  Nutrition Goal Status: new    Assessment and Plan    No new Assessment & Plan notes have been filed under this hospital service since the last note was generated.  Service: Nutrition       Malnutrition Assessment  Malnutrition Context:  (Based on assessmen this pt is not malnourished.)                                    Reason for Assessment    Reason For Assessment: consult (swing bed pt)  Diagnosis:  (recent metabolic encephalopathy, recent s/p exp lapSB resection with dehisced WND)  Relevant Medical History: HTN, HERD, Dementia, Elevated NH4 level, Diverticulitis  Interdisciplinary Rounds: attended  General Information Comments: RDN visited pt this a.m. He denied any difficulty with intake.  Meal intake ~%  Wts PTA noted.  RDN reviewed POC with pt.    Nutrition Risk Screen    Nutrition Risk Screen: large or nonhealing wound, burn or pressure injury    Nutrition/Diet History    Spiritual, Cultural Beliefs, Scientology Practices, Values that Affect Care: no  Food Allergies: NKFA  Factors Affecting Nutritional Intake: None identified at this time    Anthropometrics    Temp: 98.1 °F (36.7 °C)  Height Method: Stated  Height: 5' 10" (177.8 cm)  Height (inches): 70 in  Weight Method: Bed Scale  Weight: 89.8 kg (198 lb)  Weight (lb): 198 lb  Ideal Body Weight (IBW), Male: 166 lb  % Ideal Body Weight, Male (lb): 119.28 %  BMI (Calculated): 28.4  BMI Grade: 25 - 29.9 - overweight       Lab/Procedures/Meds    Pertinent Labs Reviewed: reviewed  Pertinent Labs Comments: BUN 24, NH4 60, Alb 2.0, Mg 1.6, Phos 2.2  Pertinent Medications Reviewed: reviewed  Pertinent Medications Comments: Zithromax, Ca, Rocephin, Vit D, " Lactobacillus, MgCl, Niacin, Omega 3, Protonix    Physical Findings/Assessment         Estimated/Assessed Needs    Weight Used For Calorie Calculations: 75.3 kg (166 lb)  Energy Calorie Requirements (kcal): 0722-4790  Energy Need Method: Kcal/kg  Protein Requirements: 76-91  Weight Used For Protein Calculations: 75.3 kg (166 lb)     Estimated Fluid Requirement Method: RDA Method  RDA Method (mL): 2258         Nutrition Prescription Ordered    Current Diet Order: 1800 cak CON CHO  Nutrition Order Comments: Needs higher kcal    Evaluation of Received Nutrient/Fluid Intake    Energy Calories Required: not meeting needs  Protein Required: not meeting needs  Fluid Required: meeting needs  Tolerance: tolerating  % Intake of Estimated Energy Needs: 50 - 75 %  % Meal Intake: 50 - 75 %    Nutrition Risk    Level of Risk/Frequency of Follow-up: high       Monitor and Evaluation    Food and Nutrient Intake: food and beverage intake  Food and Nutrient Adminstration: diet order  Anthropometric Measurements: weight  Biochemical Data, Medical Tests and Procedures: electrolyte and renal panel, glucose/endocrine profile  Nutrition-Focused Physical Findings: overall appearance, skin       Nutrition Follow-Up

## 2023-08-28 NOTE — PT/OT/SLP PROGRESS
Occupational Therapy   Treatment    Name: Danny Flood  MRN: [id number]  Admitting Diagnosis:  Infected hernioplasty mesh with deconditioning/trunk weakness      Recommendations:     Discharge Recommendations: home with home health, rehabilitation facility  Discharge Equipment Recommendations:  bedside commode, walker, rolling  Barriers to discharge:       Assessment:     Danny Flood is a 78 y.o. male with a medical diagnosis of Infected hernioplasty mesh deconditioning with trunk weakness.  He presents with decreased balance, endurance. Performance deficits affecting function are weakness, impaired endurance, impaired functional mobility, impaired self care skills, impaired balance, decreased safety awareness. Pt tolerated his therapy well but required rest breaks due decreased activity tolerance. Pt noted to have limited sit to stand t/f due to limited trunk strength.     Rehab Prognosis:  Fair; patient would benefit from acute skilled OT services to address these deficits and reach maximum level of function.       Plan:     Patient to be seen 5 x/week to address the above listed problems via self-care/home management, therapeutic exercises, therapeutic activities, neuromuscular re-education  Plan of Care Expires:    Plan of Care Reviewed with: patient    Subjective     Chief Complaint: abdominal pain, decreased strength  Patient/Family Comments/goals: increase ADLs and mobility  Pain/Comfort:  5/10 knee pain     Objective:     Communicated with: Pt prior to session.  Patient found supine with SCD upon OT entry to room.    General Precautions: Standard, fall    Orthopedic Precautions:   Braces:    Respiratory Status: Room air     Occupational Performance:     Bed Mobility:    Patient completed Rolling/Turning to Left with  SVN-Mod I  Patient completed Rolling/Turning to Right with SVN-Mod I  Patient completed Supine to Sit with  SVN-Mod I    Functional Mobility/Transfers:  Patient completed Sit <> Stand  Transfer with Contact guard assistance  with  rolling walker   Functional Mobility: Pt completed functional ambulation down the hallway but requested to sit due to knee pain    Activities of Daily Living:  Sit to stand t/f with SVN-Mod I    AMPAC 6 Click ADL:      Treatment & Education:    Therapeutic exercise:  While sitting in the w/c, the pt completed bicep curls 2 sets and 20 rep 3# DB and chest press 2 sets of 20 reps with 3# dowel to increase BUE strength for ADLs and IADLs.    Therapeutic activity    Pt [id number] mins on arm ergometer to increase endurance, BUE strength and activity tolerance for ADL performance    Pt stood to place pegs from peg board on the wall to increase standing tolerance, dynamic balance and endurance x 5mins      Patient left supine with all lines intact and call button in reach    GOALS:   Multidisciplinary Problems       Occupational Therapy Goals          Problem: Occupational Therapy    Goal Priority Disciplines Outcome Interventions   Occupational Therapy Goal     OT, PT/OT Ongoing, Progressing    Description: Goals to be met by: 9/24/23     Patient will increase functional independence with ADLs by performing:    Feeding with Modified [place].  UE Dressing with Modified [place].  LE Dressing with Modified [place].  Grooming while standing with Modified [place].  Toileting from toilet with Modified [place] for hygiene and clothing management.   Standing x5-10 minutes with Modified [place].  Supine to sit with Modified [place].  Step transfer with Modified [place]  Toilet transfer to toilet with Modified [place].                         Time Tracking:     OT Date of Treatment:    OT Start Time:  11:15  OT Stop Time:  12:00  OT Total Time (min):      Billable Minutes:Self Care/Home Management 15  Therapeutic Activity 15  Therapeutic Exercise 15    OT/EVELYN: OT          8/28/2023

## 2023-08-29 LAB
BACTERIA #/AREA URNS HPF: ABNORMAL /HPF
BILIRUB UR QL STRIP: NEGATIVE
CLARITY UR: CLEAR
COLOR UR: YELLOW
GLUCOSE SERPL-MCNC: 102 MG/DL (ref 70–105)
GLUCOSE SERPL-MCNC: 118 MG/DL (ref 70–105)
GLUCOSE SERPL-MCNC: 123 MG/DL (ref 70–105)
GLUCOSE SERPL-MCNC: 131 MG/DL (ref 70–105)
GLUCOSE UR STRIP-MCNC: NEGATIVE MG/DL
KETONES UR STRIP-SCNC: ABNORMAL MG/DL
LEUKOCYTE ESTERASE UR QL STRIP: ABNORMAL
NITRITE UR QL STRIP: NEGATIVE
PH UR STRIP: 6 PH UNITS
PROT UR QL STRIP: 30
RBC # UR STRIP: ABNORMAL /UL
RBC #/AREA URNS HPF: ABNORMAL /HPF
SP GR UR STRIP: 1.02
SQUAMOUS #/AREA URNS LPF: ABNORMAL /LPF
UROBILINOGEN UR STRIP-ACNC: 0.2 MG/DL
WBC #/AREA URNS HPF: ABNORMAL /HPF

## 2023-08-29 PROCEDURE — 97110 THERAPEUTIC EXERCISES: CPT

## 2023-08-29 PROCEDURE — 99900035 HC TECH TIME PER 15 MIN (STAT)

## 2023-08-29 PROCEDURE — 97530 THERAPEUTIC ACTIVITIES: CPT

## 2023-08-29 PROCEDURE — 27000958

## 2023-08-29 PROCEDURE — 81001 URINALYSIS AUTO W/SCOPE: CPT | Performed by: NURSE PRACTITIONER

## 2023-08-29 PROCEDURE — 97535 SELF CARE MNGMENT TRAINING: CPT

## 2023-08-29 PROCEDURE — 94761 N-INVAS EAR/PLS OXIMETRY MLT: CPT

## 2023-08-29 PROCEDURE — 82962 GLUCOSE BLOOD TEST: CPT

## 2023-08-29 PROCEDURE — 97110 THERAPEUTIC EXERCISES: CPT | Mod: CQ

## 2023-08-29 PROCEDURE — 25000003 PHARM REV CODE 250: Performed by: FAMILY MEDICINE

## 2023-08-29 PROCEDURE — 11000004 HC SNF PRIVATE

## 2023-08-29 PROCEDURE — 25000003 PHARM REV CODE 250: Performed by: PHYSICIAN ASSISTANT

## 2023-08-29 PROCEDURE — 27000944

## 2023-08-29 RX ADMIN — SENNOSIDES AND DOCUSATE SODIUM 1 TABLET: 50; 8.6 TABLET ORAL at 09:08

## 2023-08-29 RX ADMIN — Medication 5000 UNITS: at 09:08

## 2023-08-29 RX ADMIN — LACTULOSE 20 G: 20 SOLUTION ORAL at 09:08

## 2023-08-29 RX ADMIN — FOLIC ACID 1 MG: 1 TABLET ORAL at 09:08

## 2023-08-29 RX ADMIN — PANTOPRAZOLE SODIUM 40 MG: 40 TABLET, DELAYED RELEASE ORAL at 09:08

## 2023-08-29 RX ADMIN — METOPROLOL TARTRATE 12.5 MG: 25 TABLET, FILM COATED ORAL at 09:08

## 2023-08-29 RX ADMIN — QUETIAPINE 50 MG: 25 TABLET ORAL at 09:08

## 2023-08-29 RX ADMIN — RIFAXIMIN 550 MG: 550 TABLET ORAL at 09:08

## 2023-08-29 RX ADMIN — Medication 1 TABLET: at 09:08

## 2023-08-29 RX ADMIN — Medication 500 MG: at 09:08

## 2023-08-29 RX ADMIN — LACTULOSE 20 G: 20 SOLUTION ORAL at 03:08

## 2023-08-29 RX ADMIN — OMEGA-3 FATTY ACIDS CAP 1000 MG 1 CAPSULE: 1000 CAP at 09:08

## 2023-08-29 RX ADMIN — TIMOLOL MALEATE 1 DROP: 5 SOLUTION/ DROPS OPHTHALMIC at 09:08

## 2023-08-29 RX ADMIN — MAGNESIUM 64 MG (MAGNESIUM CHLORIDE) TABLET,DELAYED RELEASE 64 MG: at 09:08

## 2023-08-29 RX ADMIN — APIXABAN 5 MG: 5 TABLET, FILM COATED ORAL at 09:08

## 2023-08-29 NOTE — PT/OT/SLP PROGRESS
Physical Therapy Treatment    Patient Name:  Danny Flood   MRN:  [id number]    Recommendations:     Discharge Recommendations: home with home health  Discharge Equipment Recommendations: none  Barriers to discharge: Decreased caregiver support    Assessment:     Danny Flood is a 78 y.o. male admitted with a medical diagnosis of Infected hernioplasty mesh.  He presents with the following impairments/functional limitations: weakness, impaired endurance, impaired self care skills, impaired functional mobility, gait instability, impaired balance, decreased lower extremity function, decreased safety awareness     Patient tolerated treatment well today with no complaints of pain. He did experience mild fatigue when performing seated exercises. Patient increased weight in LAQ, hip flexion, and heel raises to 2# cuff weight. Patient is motivated and in progressing towards goals as stated in POC.    Rehab Prognosis: Good; patient would benefit from acute skilled PT services to address these deficits and reach maximum level of function.    Recent Surgery: * No surgery found *      Plan:     During this hospitalization, patient to be seen 5 x/week to address the identified rehab impairments via therapeutic activities, therapeutic exercises, gait training, neuromuscular re-education and progress toward the following goals:    Plan of Care Expires:  09/22/23    Subjective     Chief Complaint: LE weakness  Patient/Family Comments/goals: to return home safe   Pain/Comfort:  Pain Rating 1: 0/10      Objective:     Communicated with patient  prior to session.  Patient found supine with peripheral IV, ribera catheter (abdominal binder) upon PT entry to room.     General Precautions: Standard, fall  Orthopedic Precautions: N/A  Braces:  (abdominal binder)  Respiratory Status: Room air     Functional Mobility:  Transfers:     Sit to Stand:  stand by assistance and contact guard assistance with rolling walker      AM-PAC 6 CLICK  MOBILITY  Turning over in bed (including adjusting bedclothes, sheets and blankets)?: 3  Sitting down on and standing up from a chair with arms (e.g., wheelchair, bedside commode, etc.): 3  Moving from lying on back to sitting on the side of the bed?: 3  Moving to and from a bed to a chair (including a wheelchair)?: 3  Need to walk in hospital room?: 3  Climbing 3-5 steps with a railing?: 1  Basic Mobility Total Score: 16       Treatment & Education:  Scifit machine x 10 min for cardiovascular fitness  Seated hip flexion, LAQ, hip adduction, hip abduction, hamstring curls x 20 with 2# cuff weight   STS x 10 with rest break half way through    Patient left supine with all lines intact, call button in reach, and bed alarm on..    GOALS:   Multidisciplinary Problems       Physical Therapy Goals          Problem: Physical Therapy    Goal Priority Disciplines Outcome Goal Variances Interventions   Physical Therapy Goal     PT, PT/OT Ongoing, Progressing     Description: Goals to be met by: 2 weeks     Patient will increase functional independence with mobility by performin. Supine to sit with Stand-by Assistance  2. Sit to supine with Stand-by Assistance  3. Rolling to Left and Right with Modified [place].  4. Sit to stand transfer with Stand-by Assistance  5. Bed to chair transfer with Stand-by Assistance using Rolling Walker  6. Gait  x 200 feet with Stand-by Assistance using Rolling Walker.    Goals to be met by: 4     Patient will increase functional independence with mobility by performin. Supine to sit with Modified [place]  2. Sit to supine with Modified [place]  3. Sit to stand transfer with Modified [place]  4. Bed to chair transfer with Modified [place] using Rolling Walker  5. Gait  x 300 feet with Modified [place] using Rolling Walker.                             Time Tracking:     PT Received On: 23  PT Start Time: 1128     PT Stop Time: 1205  PT Total Time  (min): 37 min     Billable Minutes: Therapeutic Exercise 37    Treatment Type: Treatment  PT/PTA: PTA     Number of PTA visits since last PT visit: 2     08/29/2023

## 2023-08-29 NOTE — PT/OT/SLP PROGRESS
Occupational Therapy   Treatment    Name: Danny Flood  MRN: [id number]  Admitting Diagnosis:  Infected hernioplasty mesh with deconditioning/trunk weakness      Recommendations:     Discharge Recommendations: home with home health, rehabilitation facility  Discharge Equipment Recommendations:  bedside commode, walker, rolling  Barriers to discharge:       Assessment:     Danny Flood is a 78 y.o. male with a medical diagnosis of Infected hernioplasty mesh deconditioning with trunk weakness.  He presents with decreased balance, endurance. Performance deficits affecting function are weakness, impaired endurance, impaired functional mobility, impaired self care skills, impaired balance, decreased safety awareness. Pt tolerated his therapy well as his knee pain was better on today. Pt displayed better sitting tolerance and no complaints of trunk fatigue when sitting on edge of mat. Pt to continue skilled OT at this time to further improved overall physical condition.   Rehab Prognosis:  Fair; patient would benefit from acute skilled OT services to address these deficits and reach maximum level of function.       Plan:     Patient to be seen 5 x/week to address the above listed problems via self-care/home management, therapeutic exercises, therapeutic activities, neuromuscular re-education  Plan of Care Expires:    Plan of Care Reviewed with: patient    Subjective     Chief Complaint: abdominal pain, decreased strength  Patient/Family Comments/goals: increase ADLs and mobility  Pain/Comfort:  5/10 knee pain     Objective:     Communicated with: Pt prior to session.  Patient found supine with SCD upon OT entry to room.    General Precautions: Standard, fall    Orthopedic Precautions:   Braces:    Respiratory Status: Room air     Occupational Performance:     Bed Mobility:    Patient completed Rolling/Turning to Left with  SVN-Mod I  Patient completed Rolling/Turning to Right with SVN-Mod I  Patient completed Supine to  Sit with  SVN-Mod I    Functional Mobility/Transfers:  Patient completed Sit <> Stand Transfer with Contact guard assistance  with  rolling walker   Functional Mobility: Pt completed functional ambulation down the hallway to the therapy gym as his knee pain was better on today    Activities of Daily Living:  Sit to stand t/f with SVN-Mod I    AMPAC 6 Click ADL:      Treatment & Education:    Therapeutic exercise:  While sitting unsupported , the pt completed bicep curls 2 sets and 20 rep 3# DB and chest press 2 sets of 20 reps with 3# dowel to increase BUE strength for ADLs and IADLs.    Therapeutic activity    While sitting unsupported on the mat, the pt [id number] mins on arm ergometer to increase endurance, BUE strength and activity tolerance for ADL performance    While standing the pt reached to place cards on a Velcro board to increase BUE ROM, functional reaching, standing endurance and dynamic balance to increase independence with ADLs.    Patient left supine with all lines intact and call button in reach    GOALS:   Multidisciplinary Problems       Occupational Therapy Goals          Problem: Occupational Therapy    Goal Priority Disciplines Outcome Interventions   Occupational Therapy Goal     OT, PT/OT Ongoing, Progressing    Description: Goals to be met by: 9/24/23     Patient will increase functional independence with ADLs by performing:    Feeding with Modified [place].  UE Dressing with Modified [place].  LE Dressing with Modified [place].  Grooming while standing with Modified [place].  Toileting from toilet with Modified [place] for hygiene and clothing management.   Standing x5-10 minutes with Modified [place].  Supine to sit with Modified [place].  Step transfer with Modified [place]  Toilet transfer to toilet with Modified [place].                         Time Tracking:     OT Date of Treatment:    OT Start Time:  10:45  OT Stop Time:  11:25  OT Total  Time (min):      Billable Minutes:Self Care/Home Management 10  Therapeutic Activity 15  Therapeutic Exercise 15    OT/EVELYN: OT          8/29/2023

## 2023-08-29 NOTE — PLAN OF CARE
[facility] - Medical Surgical Unit - Swing Bed   Interdisciplinary Team Meeting    Patient: Danny Flood   Today's Date: 8/29/2023   Estimated D/C Date: 9/15/23        Physician: Nurse Practitioner: PHIL Mike   Pharmacy:Jewel Henson, Pharm D Unit Director:  REGGIE Van   : Chanelle Robison RN Physical/Occupational Therapy: Melissa Mahmood OT   Speech Therapy: ST Mary Carmen    Nursing: Ngozi Helton RN  Respiratory: Patience Mcdaniel, Resp. Dietary:  Betya Josef, Dietary  Other:      Nurse  New Symptoms/Problems:   Last Bowel Movement: 08/28/23 Urine: incontinent Diarrhea: Yes   Constipated: No Bowel: incontinent [place]: Yes   Isolation: No D/C Date:  Date:    O2 Device: Room Air O2 Flow:   SpO2: 96 %   Nutrition: Diabetic, Glucerna, [place]  Speech/Swallowing: No current speech or swallowing issues  Aspiration Precautions: No  Cognition: Memory issues    Physical Therapy  Physical Therapy/Gait: Sup. & SBA with RW ELOS: Plan to DC  9/15/23   Transfers: Supervision or Set-up Assistance Range of Motion/Restrictions:      Occupational Therapy  Eating/Grooming: Modified Independent Toileting: Contact Guard Assistance   Bathing: Contact Guard Assistance Dressing (Upper Body): Standby Assistance   Dressing (Lower Body): Standby Assistance        Tx Plan/Recommendations reviewed with family and/or patient on (date) 8/29/23.  Additional family Conference/Training:   D/C Plan/Recommendations: Home with family  AMBAR: 9/15/23    Pharmacy  Medication Changes (see MD orders in chart): Yes  MD:David Stahl D.O. Labs Reviewed: Yes New Lab Orders: Yes   MD/NP Signature:

## 2023-08-30 LAB
ANION GAP SERPL CALCULATED.3IONS-SCNC: 9 MMOL/L (ref 7–16)
BASOPHILS # BLD AUTO: 0.03 K/UL (ref 0–0.2)
BASOPHILS NFR BLD AUTO: 0.6 % (ref 0–1)
BUN SERPL-MCNC: 13 MG/DL (ref 7–18)
BUN/CREAT SERPL: 15 (ref 6–20)
CALCIUM SERPL-MCNC: 8.1 MG/DL (ref 8.5–10.1)
CHLORIDE SERPL-SCNC: 104 MMOL/L (ref 98–107)
CO2 SERPL-SCNC: 31 MMOL/L (ref 21–32)
CREAT SERPL-MCNC: 0.87 MG/DL (ref 0.7–1.3)
DIFFERENTIAL METHOD BLD: ABNORMAL
EGFR (NO RACE VARIABLE) (RUSH/TITUS): 89 ML/MIN/1.73M2
EOSINOPHIL # BLD AUTO: 0.32 K/UL (ref 0–0.5)
EOSINOPHIL NFR BLD AUTO: 6.5 % (ref 1–4)
ERYTHROCYTE [DISTWIDTH] IN BLOOD BY AUTOMATED COUNT: 13.7 % (ref 11.5–14.5)
GLUCOSE SERPL-MCNC: 101 MG/DL (ref 70–105)
GLUCOSE SERPL-MCNC: 112 MG/DL (ref 70–105)
GLUCOSE SERPL-MCNC: 123 MG/DL (ref 70–105)
GLUCOSE SERPL-MCNC: 153 MG/DL (ref 70–105)
GLUCOSE SERPL-MCNC: 158 MG/DL (ref 74–106)
HCT VFR BLD AUTO: 29.7 % (ref 40–54)
HGB BLD-MCNC: 9.1 G/DL (ref 13.5–18)
LYMPHOCYTES # BLD AUTO: 0.85 K/UL (ref 1–4.8)
LYMPHOCYTES NFR BLD AUTO: 17.3 % (ref 27–41)
MCH RBC QN AUTO: 31.9 PG (ref 27–31)
MCHC RBC AUTO-ENTMCNC: 30.6 G/DL (ref 32–36)
MCV RBC AUTO: 104.2 FL (ref 80–96)
MONOCYTES # BLD AUTO: 0.53 K/UL (ref 0–0.8)
MONOCYTES NFR BLD AUTO: 10.8 % (ref 2–6)
MPC BLD CALC-MCNC: 9.5 FL (ref 9.4–12.4)
NEUTROPHILS # BLD AUTO: 3.17 K/UL (ref 1.8–7.7)
NEUTROPHILS NFR BLD AUTO: 64.8 % (ref 53–65)
PLATELET # BLD AUTO: 289 K/UL (ref 150–400)
POTASSIUM SERPL-SCNC: 3.6 MMOL/L (ref 3.5–5.1)
RBC # BLD AUTO: 2.85 M/UL (ref 4.6–6.2)
SODIUM SERPL-SCNC: 140 MMOL/L (ref 136–145)
WBC # BLD AUTO: 4.9 K/UL (ref 4.5–11)

## 2023-08-30 PROCEDURE — 27000981 HC MATTRESS, ACCUCAIR DAILY RENTAL

## 2023-08-30 PROCEDURE — 94761 N-INVAS EAR/PLS OXIMETRY MLT: CPT

## 2023-08-30 PROCEDURE — 82962 GLUCOSE BLOOD TEST: CPT

## 2023-08-30 PROCEDURE — 97535 SELF CARE MNGMENT TRAINING: CPT

## 2023-08-30 PROCEDURE — 99900035 HC TECH TIME PER 15 MIN (STAT)

## 2023-08-30 PROCEDURE — 11000004 HC SNF PRIVATE

## 2023-08-30 PROCEDURE — 97110 THERAPEUTIC EXERCISES: CPT | Mod: CQ

## 2023-08-30 PROCEDURE — 85025 COMPLETE CBC W/AUTO DIFF WBC: CPT | Performed by: PHYSICIAN ASSISTANT

## 2023-08-30 PROCEDURE — 25000003 PHARM REV CODE 250: Performed by: FAMILY MEDICINE

## 2023-08-30 PROCEDURE — 27000944

## 2023-08-30 PROCEDURE — 97112 NEUROMUSCULAR REEDUCATION: CPT | Mod: CQ

## 2023-08-30 PROCEDURE — 97116 GAIT TRAINING THERAPY: CPT | Mod: CQ

## 2023-08-30 PROCEDURE — 80048 BASIC METABOLIC PNL TOTAL CA: CPT | Performed by: PHYSICIAN ASSISTANT

## 2023-08-30 PROCEDURE — 36416 COLLJ CAPILLARY BLOOD SPEC: CPT

## 2023-08-30 PROCEDURE — 27000958

## 2023-08-30 PROCEDURE — 25000003 PHARM REV CODE 250: Performed by: PHYSICIAN ASSISTANT

## 2023-08-30 PROCEDURE — 97530 THERAPEUTIC ACTIVITIES: CPT

## 2023-08-30 RX ADMIN — LACTULOSE 20 G: 20 SOLUTION ORAL at 09:08

## 2023-08-30 RX ADMIN — APIXABAN 5 MG: 5 TABLET, FILM COATED ORAL at 09:08

## 2023-08-30 RX ADMIN — TIMOLOL MALEATE 1 DROP: 5 SOLUTION/ DROPS OPHTHALMIC at 09:08

## 2023-08-30 RX ADMIN — OMEGA-3 FATTY ACIDS CAP 1000 MG 1 CAPSULE: 1000 CAP at 09:08

## 2023-08-30 RX ADMIN — MAGNESIUM 64 MG (MAGNESIUM CHLORIDE) TABLET,DELAYED RELEASE 64 MG: at 09:08

## 2023-08-30 RX ADMIN — Medication 1 TABLET: at 09:08

## 2023-08-30 RX ADMIN — METOPROLOL TARTRATE 12.5 MG: 25 TABLET, FILM COATED ORAL at 09:08

## 2023-08-30 RX ADMIN — QUETIAPINE 50 MG: 25 TABLET ORAL at 09:08

## 2023-08-30 RX ADMIN — PANTOPRAZOLE SODIUM 40 MG: 40 TABLET, DELAYED RELEASE ORAL at 09:08

## 2023-08-30 RX ADMIN — Medication 5000 UNITS: at 09:08

## 2023-08-30 RX ADMIN — RIFAXIMIN 550 MG: 550 TABLET ORAL at 09:08

## 2023-08-30 RX ADMIN — LACTULOSE 20 G: 20 SOLUTION ORAL at 02:08

## 2023-08-30 RX ADMIN — ACETAMINOPHEN 325MG 650 MG: 325 TABLET ORAL at 09:08

## 2023-08-30 RX ADMIN — Medication 500 MG: at 09:08

## 2023-08-30 RX ADMIN — FOLIC ACID 1 MG: 1 TABLET ORAL at 09:08

## 2023-08-30 RX ADMIN — SENNOSIDES AND DOCUSATE SODIUM 1 TABLET: 50; 8.6 TABLET ORAL at 09:08

## 2023-08-30 NOTE — PT/OT/SLP PROGRESS
Occupational Therapy   Treatment    Name: Danny Flood  MRN: [id number]  Admitting Diagnosis:  Infected hernioplasty mesh with deconditioning/trunk weakness      Recommendations:     Discharge Recommendations: home with home health, rehabilitation facility  Discharge Equipment Recommendations:  bedside commode, walker, rolling  Barriers to discharge:       Assessment:     Danny Flood is a 78 y.o. male with a medical diagnosis of Infected hernioplasty mesh deconditioning with trunk weakness.  He presents with decreased balance, endurance. Performance deficits affecting function are weakness, impaired endurance, impaired functional mobility, impaired self care skills, impaired balance, decreased safety awareness. Pt tolerated his therapy well as his knee pain was better on today. Pt displayed better sitting tolerance and no complaints of trunk fatigue when sitting on edge of mat. Pt to continue skilled OT at this time to further improved overall physical condition. Pt overall sitting balance and tolerance has improved since participating in unsupported sitting activities.     Rehab Prognosis:  Fair; patient would benefit from acute skilled OT services to address these deficits and reach maximum level of function.       Plan:     Patient to be seen 5 x/week to address the above listed problems via self-care/home management, therapeutic exercises, therapeutic activities, neuromuscular re-education  Plan of Care Expires:    Plan of Care Reviewed with: patient    Subjective     Chief Complaint: abdominal pain, decreased strength  Patient/Family Comments/goals: increase ADLs and mobility  Pain/Comfort:  5/10 knee pain     Objective:     Communicated with: Pt prior to session.  Patient found supine with SCD upon OT entry to room.    General Precautions: Standard, fall    Orthopedic Precautions:   Braces:    Respiratory Status: Room air     Occupational Performance:     Bed Mobility:    Patient completed Rolling/Turning  to Left with  SVN-Mod I  Patient completed Rolling/Turning to Right with SVN-Mod I  Patient completed Supine to Sit with  SVN-Mod I    Functional Mobility/Transfers:  Patient completed Sit <> Stand Transfer with Contact guard assistance  with  rolling walker   Functional Mobility: Pt completed functional ambulation down to and from the therapy gym to his room using the RW for balance and  safety with SVN    Activities of Daily Living:  Sit to stand t/f with SVN-Mod I  LE dressing: Min A to don pants; OT provided assistance with donning pants over feet.    [facility] 6 Click ADL:      Treatment & Education:    Therapeutic exercise:  While sitting unsupported , the pt completed bicep curls 2 sets and 20 rep 3# DB, tricep extension 2 sets 20 reps with green theraband  and chest press 2 sets of 20 reps with 3# dowel to increase BUE strength for ADLs and IADLs.      While sitting unsupported on the mat, the pt [id number] mins on arm ergometer to increase endurance, BUE strength and activity tolerance for ADL performance    While standing the pt tossed a large therapy ball back and forth with OT to improve gross motor coordination, core strength, UB strength and endurance for ADL performance.    Patient left supine with all lines intact and call button in reach    GOALS:   Multidisciplinary Problems       Occupational Therapy Goals          Problem: Occupational Therapy    Goal Priority Disciplines Outcome Interventions   Occupational Therapy Goal     OT, PT/OT Ongoing, Progressing    Description: Goals to be met by: 9/24/23     Patient will increase functional independence with ADLs by performing:    Feeding with Modified [place].  UE Dressing with Modified [place].  LE Dressing with Modified [place].  Grooming while standing with Modified [place].  Toileting from toilet with Modified [place] for hygiene and clothing management.   Standing x5-10 minutes with Modified [place].  Supine to sit with  Modified [place].  Step transfer with Modified [place]  Toilet transfer to toilet with Modified [place].                         Time Tracking:     OT Date of Treatment:    OT Start Time:  10:30  OT Stop Time:  11:30  OT Total Time (min):      Billable Minutes:Self Care/Home Management 15    Therapeutic Exercise 15    Pt was treated concurrently to increase socialization, social interaction and motivation.     OT/EVELYN: OT          8/29/2023

## 2023-08-30 NOTE — NURSING
Emptied 500mL yellow urine from ribera bag at this time. Ribera remains clamped at this time. Will report off to night shift nurse about bladder training.  Assisted pt off bed pan soft large light brown BM noted. Cleaned pt up and applied cream pt requested

## 2023-08-30 NOTE — HOSPITAL COURSE
08/30/2023:   Patient is resting comfortably in bedside chair, after working hard in physical therapy.    He states he is working hard, and has no complaints or concerns today.      09/06/2023 HOSPITAL DAY 14     PT IS PARTICIPATING WITH PT/OT PERFORMING BED MOBILITY WITH INDEPENDENCE ASSISTANCE, TRANSFERS WITH SBA WITH RW ASSISTANCE, AND AMBULATING 140 FEET WITH SBA WITH RW; PT AMBULATED TO THE THERAPY GYM USING RW FOR BALANCE AND SAFETY WITH SUPERVISION.  PT HAS WOUND DEHISCENCE OF ABDOMINAL SURGICAL WOUND   FOLLOWED PER WOUND CARE TEAM WITH IMPROVEMENT. PT HAS HAD AN INDWELLING AREVALO AND BLADDER TRAINING HAS BEEN INITIATED IN ANTICIPATION OF REMOVAL.  PT HAS BEEN EVALUATED PER  RD WITH RECOMMENDATIONS FOR GLUCERNA AND ROSA BID WITH MEAL INTAKE  % WITH WEIGHT 87 KG  (ADMISSION WEIGHT 89.1 KG).  PT IS ON RA WITH SAT 96-97%. PT IS BEING TREATED PER RESPIRATORY THERAPY WITH NEBS, INCENTIVE SPIROMETRY AND O2 MONITORING.  PT IHAS COMPLETED ROCEPHIN/S MAX FOR PNA. PT CONTINUES ELIQUIS FOR VTE PPX. THE  PHARMACY IS MONITORING ALL MEDICATIONS.      Discharge summary 09/08/2023 patient doing much better after PT and OT.  He had initially had an infected hernioplasty mesh.  He is done well.  No sign of infection at present.  He will be discharged home on lactulose.  To prevent hepatic encephalopathy.  Otherwise the patient is doing fine

## 2023-08-30 NOTE — NURSING
Pt at physical therapy at this time.    11:32 blood sugar checked by Lory Arcos RN at this time. Blood sugar 101    11:45 In room with MIKE Mathis dicussing pt care. Pt stated he was okay. NADN. MIKE Mathis stated to continue bladder training at this time to remove ribera as soon as possible.    Ribera draining below bladder.

## 2023-08-30 NOTE — SUBJECTIVE & OBJECTIVE
Interval History:  Patient is doing well in swing bed, working hard in physical therapy    Review of Systems  Objective:     Vital Signs (Most Recent):  Temp: 97.6 °F (36.4 °C) (08/30/23 0720)  Pulse: 98 (08/30/23 0720)  Resp: 18 (08/30/23 0720)  BP: (!) 147/75 (08/30/23 0720)  SpO2: 97 % (08/30/23 0720) Vital Signs (24h Range):  Temp:  [97.6 °F (36.4 °C)-98.3 °F (36.8 °C)] 97.6 °F (36.4 °C)  Pulse:  [75-98] 98  Resp:  [18] 18  SpO2:  [95 %-99 %] 97 %  BP: (144-147)/(75-77) 147/75     Weight: 89.8 kg (198 lb)  Body mass index is 28.41 kg/m².    Intake/Output Summary (Last 24 hours) at 8/30/2023 1750  Last data filed at 8/30/2023 0633  Gross per 24 hour   Intake 360 ml   Output 1600 ml   Net -1240 ml         Physical Exam  Vitals and nursing note reviewed. Exam conducted with a chaperone present.   Constitutional:       Appearance: Normal appearance.   HENT:      Head: Atraumatic.      Nose: Nose normal.      Mouth/Throat:      Pharynx: Oropharynx is clear.   Eyes:      Extraocular Movements: Extraocular movements intact.   Cardiovascular:      Rate and Rhythm: Normal rate and regular rhythm.   Pulmonary:      Effort: Pulmonary effort is normal.   Skin:     Comments: Dressing clean dry and intact   Neurological:      General: No focal deficit present.      Mental Status: He is alert and oriented to person, place, and time.   Psychiatric:         Mood and Affect: Mood normal.         Behavior: Behavior normal.             Significant Labs: All pertinent labs within the past 24 hours have been reviewed.    Significant Imaging: I have reviewed all pertinent imaging results/findings within the past 24 hours.

## 2023-08-30 NOTE — PROGRESS NOTES
[facility] - Medical Surgical Unit  Hospital Medicine  Progress Note    Patient Name: Danny Flood  MRN: [id number]  Patient Class: IP- Swing   Admission Date: 8/23/2023  Length of Stay: 7 days  Attending Physician: David Stahl DO  Primary Care Provider: Cameron Valencia MD        Subjective:     Principal Problem:Infected hernioplasty mesh        HPI:  Mr Flood is a 78 year old male with PMH of hypertension, GERD, DM type 2, dementia, hyperammonia, pneumonia, AFib, infected hernioplasty mesh status post surgical removal admitted to [facility] for continuation of IV abx and rehabilitation services following a stay at [facility] for pneumonia. On 8/9 patient presented to ED and noted to have infected hernia mesh. Patient was initially admitted to general surgery and taken to the OR for an exploratory laparotomy for infected mesh removal and small bowel resection with anastomosis on 8/9. Cultures revealed Bacteroides and Anaerococcus as well as Klebsiella, E. Coli, and Proteus. He received a course of Zosyn. While under surgery services, he developed elevated troponin levels and was transferred to the Internal Medicine service for further evaluation. CTA chest showed no evidence of PE. Cardiology was consulted for his elevated troponin. He had a LHC on 8/15 that showed EF 55% with non-obstructive CAD. ECHO showed EF 60%. He has chronic atrial fibrillation and was continued on Eliquis and Metoprolol. LE Doppler did not show evidence of DVT. He was continued on IV antibiotics for his intraabdominal infection. He was discharged home on 8/18 but returned to [facility] ED the next day with confusion, agitation and LE and scrotal swelling. He was found to have pneumonia and subsequently tsf back to [facility] for further work-up. LE and scrotal swelling noted to be due to his low albumin state. Hospital services noted patient had confusion during his hospital stay. He was placed on  Vancomycin and Zosyn with improvement. It is worth noting that he has chronically elevated ammonia levels and confusion at home. CT brain did not show acute abnormality. He was tsf to [facility] to swingbed services for continuation of IV Zithromax and Rocephin as well as PT/OT eval and treatment plan and wound care. Wound care team to eval patient as well. Patient noted to have ribera. However, I cannot find documentation reflecting insertion reason or further indication for ribera. We will begin bladder training and d/c ribera as soon as possible. He will be closely monitored for any early signs of deterioration.           Overview/Hospital Course:  08/30/2023:   Patient is resting comfortably in bedside chair, after working hard in physical therapy.    He states he is working hard, and has no complaints or concerns today.          Interval History:  Patient is doing well in swing bed, working hard in physical therapy    Review of Systems  Objective:     Vital Signs (Most Recent):  Temp: 97.6 °F (36.4 °C) (08/30/23 0720)  Pulse: 98 (08/30/23 0720)  Resp: 18 (08/30/23 0720)  BP: (!) 147/75 (08/30/23 0720)  SpO2: 97 % (08/30/23 0720) Vital Signs (24h Range):  Temp:  [97.6 °F (36.4 °C)-98.3 °F (36.8 °C)] 97.6 °F (36.4 °C)  Pulse:  [75-98] 98  Resp:  [18] 18  SpO2:  [95 %-99 %] 97 %  BP: (144-147)/(75-77) 147/75     Weight: 89.8 kg (198 lb)  Body mass index is 28.41 kg/m².    Intake/Output Summary (Last 24 hours) at 8/30/2023 1750  Last data filed at 8/30/2023 0633  Gross per 24 hour   Intake 360 ml   Output 1600 ml   Net -1240 ml         Physical Exam  Vitals and nursing note reviewed. Exam conducted with a chaperone present.   Constitutional:       Appearance: Normal appearance.   HENT:      Head: Atraumatic.      Nose: Nose normal.      Mouth/Throat:      Pharynx: Oropharynx is clear.   Eyes:      Extraocular Movements: Extraocular movements intact.   Cardiovascular:      Rate and Rhythm: Normal rate and regular  "rhythm.   Pulmonary:      Effort: Pulmonary effort is normal.   Skin:     Comments: Dressing clean dry and intact   Neurological:      General: No focal deficit present.      Mental Status: He is alert and oriented to person, place, and time.   Psychiatric:         Mood and Affect: Mood normal.         Behavior: Behavior normal.             Significant Labs: All pertinent labs within the past 24 hours have been reviewed.    Significant Imaging: I have reviewed all pertinent imaging results/findings within the past 24 hours.      Assessment/Plan:      * Infected hernioplasty mesh  Completed course of Zosyn at previous hospital  Continue Zithromax and Rocephin      Presence of surgical incision  Wound care  Wound care team consult      Edema due to hypoalbuminemia  Close monitoring  Weekly lab monitoring      Pneumonia  Completed course of Zosyn and Vancomycin at previous hospital  We will continue Rocephin and Azithromycin  Weekly CBC, BMP  Incentive spirometry      Type 2 diabetes mellitus without complication, without long-term current use of insulin  Patient's FSGs are controlled on current medication regimen.  Last A1c reviewed-   Lab Results   Component Value Date    HGBA1C 5.6 11/06/2021     Most recent fingerstick glucose reviewed- No results for input(s): "POCTGLUCOSE" in the last 24 hours.  Current correctional scale  Low  Maintain anti-hyperglycemic dose as follows-   Antihyperglycemics (From admission, onward)    None        DM diet  SSI- low correctional scale  Hypoglycemia protocol    Hypertension  Continue home meds  Routine VS monitoring        VTE Risk Mitigation (From admission, onward)         Ordered     apixaban tablet 5 mg  2 times daily         08/23/23 1736     IP VTE HIGH RISK PATIENT  Once         08/23/23 1640     Place sequential compression device  Until discontinued         08/23/23 1640                Discharge Planning   AMBAR:      Code Status: Full Code   Is the patient medically ready " for discharge?:     Reason for patient still in hospital (select all that apply):   Discharge Plan A: Home with family                  MIKE Gilmore  Department of Hospital Medicine   [facility] - Medical Surgical Unit

## 2023-08-30 NOTE — PT/OT/SLP PROGRESS
Physical Therapy Treatment    Patient Name:  Danny Flood   MRN:  [id number]    Recommendations:     Discharge Recommendations: home with home health  Discharge Equipment Recommendations: none  Barriers to discharge: Decreased caregiver support    Assessment:     Danny Flood is a 78 y.o. male admitted with a medical diagnosis of Infected hernioplasty mesh.  He presents with the following impairments/functional limitations: weakness, impaired endurance, impaired self care skills, impaired functional mobility, gait instability, impaired balance, decreased lower extremity function, decreased safety awareness     Patient tolerated treatment well this afternoon. He is progressing towards his goals as stated in POC    Rehab Prognosis: Good; patient would benefit from acute skilled PT services to address these deficits and reach maximum level of function.    Recent Surgery: * No surgery found *      Plan:     During this hospitalization, patient to be seen 5 x/week to address the identified rehab impairments via gait training, therapeutic activities, therapeutic exercises, neuromuscular re-education and progress toward the following goals:    Plan of Care Expires:  09/22/23    Subjective     Chief Complaint: LE weakness  Patient/Family Comments/goals: to return home safe   Pain/Comfort:  Pain Rating 1: 0/10      Objective:     Communicated with patient  prior to session.  Patient found supine with peripheral IV, ribera catheter (abdominal binder) upon PT entry to room.     General Precautions: Standard, fall  Orthopedic Precautions: N/A  Braces:  (abdominal binder)  Respiratory Status: Room air     Functional Mobility:  Transfers:     Sit to Stand:  stand by assistance and contact guard assistance with rolling walker  Gait: 110ft SBA with RW no rest breaks       AM-PAC 6 CLICK MOBILITY  Turning over in bed (including adjusting bedclothes, sheets and blankets)?: 3  Sitting down on and standing up from a chair with arms  "(e.g., wheelchair, bedside commode, etc.): 3  Moving from lying on back to sitting on the side of the bed?: 3  Moving to and from a bed to a chair (including a wheelchair)?: 3  Need to walk in hospital room?: 3  Climbing 3-5 steps with a railing?: 1  Basic Mobility Total Score: 16       Treatment & Education:  Scifit machine x 10 min for cardiovascular fitness  Seated hip flexion, LAQ, hip adduction, hip abduction, hamstring curls x 20 with 2# cuff weight   STS x 10 with rest break half way through    Neuro reeducation:  Step up to a 4" step with RW x 10 simulating the step at home he has to cross in order to get into his home.     Patient left up in chair with all lines intact and call button in reach..    GOALS:   Multidisciplinary Problems       Physical Therapy Goals          Problem: Physical Therapy    Goal Priority Disciplines Outcome Goal Variances Interventions   Physical Therapy Goal     PT, PT/OT Ongoing, Progressing     Description: Goals to be met by: 2 weeks     Patient will increase functional independence with mobility by performin. Supine to sit with Stand-by Assistance  2. Sit to supine with Stand-by Assistance  3. Rolling to Left and Right with Modified [place].  4. Sit to stand transfer with Stand-by Assistance  5. Bed to chair transfer with Stand-by Assistance using Rolling Walker  6. Gait  x 200 feet with Stand-by Assistance using Rolling Walker.    Goals to be met by: 4     Patient will increase functional independence with mobility by performin. Supine to sit with Modified [place]  2. Sit to supine with Modified [place]  3. Sit to stand transfer with Modified [place]  4. Bed to chair transfer with Modified [place] using Rolling Walker  5. Gait  x 300 feet with Modified [place] using Rolling Walker.                             Time Tracking:     PT Received On: 23  PT Start Time: 1305     PT Stop Time: 1345  PT Total Time (min): 40 min "     Billable Minutes: Gait Training 10, Therapeutic Exercise 20, and Neuromuscular Re-education 10    Treatment Type: Treatment  PT/PTA: PTA     Number of PTA visits since last PT visit: 3     08/30/2023

## 2023-08-31 ENCOUNTER — OFFICE VISIT (OUTPATIENT)
Dept: SURGERY | Facility: CLINIC | Age: 78
End: 2023-08-31
Payer: MEDICARE

## 2023-08-31 DIAGNOSIS — T85.79XA INFECTED HERNIOPLASTY MESH, INITIAL ENCOUNTER: Primary | ICD-10-CM

## 2023-08-31 LAB
ANION GAP SERPL CALCULATED.3IONS-SCNC: 7 MMOL/L (ref 7–16)
BASOPHILS # BLD AUTO: 0.05 K/UL (ref 0–0.2)
BASOPHILS NFR BLD AUTO: 1.1 % (ref 0–1)
BUN SERPL-MCNC: 11 MG/DL (ref 7–18)
BUN/CREAT SERPL: 14 (ref 6–20)
CALCIUM SERPL-MCNC: 8.1 MG/DL (ref 8.5–10.1)
CHLORIDE SERPL-SCNC: 105 MMOL/L (ref 98–107)
CO2 SERPL-SCNC: 31 MMOL/L (ref 21–32)
CREAT SERPL-MCNC: 0.78 MG/DL (ref 0.7–1.3)
DIFFERENTIAL METHOD BLD: ABNORMAL
EGFR (NO RACE VARIABLE) (RUSH/TITUS): 92 ML/MIN/1.73M2
EOSINOPHIL # BLD AUTO: 0.39 K/UL (ref 0–0.5)
EOSINOPHIL NFR BLD AUTO: 8.3 % (ref 1–4)
ERYTHROCYTE [DISTWIDTH] IN BLOOD BY AUTOMATED COUNT: 13.6 % (ref 11.5–14.5)
GLUCOSE SERPL-MCNC: 111 MG/DL (ref 70–105)
GLUCOSE SERPL-MCNC: 118 MG/DL (ref 74–106)
GLUCOSE SERPL-MCNC: 121 MG/DL (ref 70–105)
GLUCOSE SERPL-MCNC: 148 MG/DL (ref 70–105)
GLUCOSE SERPL-MCNC: 171 MG/DL (ref 70–105)
HCT VFR BLD AUTO: 26.6 % (ref 40–54)
HGB BLD-MCNC: 8.3 G/DL (ref 13.5–18)
LYMPHOCYTES # BLD AUTO: 0.95 K/UL (ref 1–4.8)
LYMPHOCYTES NFR BLD AUTO: 20.2 % (ref 27–41)
MCH RBC QN AUTO: 32.4 PG (ref 27–31)
MCHC RBC AUTO-ENTMCNC: 31.2 G/DL (ref 32–36)
MCV RBC AUTO: 103.9 FL (ref 80–96)
MONOCYTES # BLD AUTO: 0.53 K/UL (ref 0–0.8)
MONOCYTES NFR BLD AUTO: 11.3 % (ref 2–6)
MPC BLD CALC-MCNC: 9.5 FL (ref 9.4–12.4)
NEUTROPHILS # BLD AUTO: 2.78 K/UL (ref 1.8–7.7)
NEUTROPHILS NFR BLD AUTO: 59.1 % (ref 53–65)
PLATELET # BLD AUTO: 253 K/UL (ref 150–400)
POTASSIUM SERPL-SCNC: 3.6 MMOL/L (ref 3.5–5.1)
RBC # BLD AUTO: 2.56 M/UL (ref 4.6–6.2)
SODIUM SERPL-SCNC: 139 MMOL/L (ref 136–145)
WBC # BLD AUTO: 4.7 K/UL (ref 4.5–11)

## 2023-08-31 PROCEDURE — 94761 N-INVAS EAR/PLS OXIMETRY MLT: CPT

## 2023-08-31 PROCEDURE — 1111F DSCHRG MED/CURRENT MED MERGE: CPT | Mod: CPTII,,, | Performed by: NURSE PRACTITIONER

## 2023-08-31 PROCEDURE — 99214 OFFICE O/P EST MOD 30 MIN: CPT | Mod: PBBFAC | Performed by: NURSE PRACTITIONER

## 2023-08-31 PROCEDURE — 1160F RVW MEDS BY RX/DR IN RCRD: CPT | Mod: CPTII,,, | Performed by: NURSE PRACTITIONER

## 2023-08-31 PROCEDURE — 82962 GLUCOSE BLOOD TEST: CPT

## 2023-08-31 PROCEDURE — 25000003 PHARM REV CODE 250: Performed by: PHYSICIAN ASSISTANT

## 2023-08-31 PROCEDURE — 1111F PR DISCHARGE MEDS RECONCILED W/ CURRENT OUTPATIENT MED LIST: ICD-10-PCS | Mod: CPTII,,, | Performed by: NURSE PRACTITIONER

## 2023-08-31 PROCEDURE — 99024 POSTOP FOLLOW-UP VISIT: CPT | Mod: S$PBB,,, | Performed by: NURSE PRACTITIONER

## 2023-08-31 PROCEDURE — 99900035 HC TECH TIME PER 15 MIN (STAT)

## 2023-08-31 PROCEDURE — 1160F PR REVIEW ALL MEDS BY PRESCRIBER/CLIN PHARMACIST DOCUMENTED: ICD-10-PCS | Mod: CPTII,,, | Performed by: NURSE PRACTITIONER

## 2023-08-31 PROCEDURE — 27000944

## 2023-08-31 PROCEDURE — 85025 COMPLETE CBC W/AUTO DIFF WBC: CPT | Performed by: PHYSICIAN ASSISTANT

## 2023-08-31 PROCEDURE — 99024 PR POST-OP FOLLOW-UP VISIT: ICD-10-PCS | Mod: S$PBB,,, | Performed by: NURSE PRACTITIONER

## 2023-08-31 PROCEDURE — 25000003 PHARM REV CODE 250: Performed by: FAMILY MEDICINE

## 2023-08-31 PROCEDURE — 1159F MED LIST DOCD IN RCRD: CPT | Mod: CPTII,,, | Performed by: NURSE PRACTITIONER

## 2023-08-31 PROCEDURE — 80048 BASIC METABOLIC PNL TOTAL CA: CPT | Performed by: PHYSICIAN ASSISTANT

## 2023-08-31 PROCEDURE — 1159F PR MEDICATION LIST DOCUMENTED IN MEDICAL RECORD: ICD-10-PCS | Mod: CPTII,,, | Performed by: NURSE PRACTITIONER

## 2023-08-31 PROCEDURE — 11000004 HC SNF PRIVATE

## 2023-08-31 RX ADMIN — Medication 5000 UNITS: at 09:08

## 2023-08-31 RX ADMIN — RIFAXIMIN 550 MG: 550 TABLET ORAL at 09:08

## 2023-08-31 RX ADMIN — SENNOSIDES AND DOCUSATE SODIUM 1 TABLET: 50; 8.6 TABLET ORAL at 08:08

## 2023-08-31 RX ADMIN — OMEGA-3 FATTY ACIDS CAP 1000 MG 1 CAPSULE: 1000 CAP at 09:08

## 2023-08-31 RX ADMIN — LACTULOSE 20 G: 20 SOLUTION ORAL at 08:08

## 2023-08-31 RX ADMIN — RIFAXIMIN 550 MG: 550 TABLET ORAL at 08:08

## 2023-08-31 RX ADMIN — APIXABAN 5 MG: 5 TABLET, FILM COATED ORAL at 09:08

## 2023-08-31 RX ADMIN — PANTOPRAZOLE SODIUM 40 MG: 40 TABLET, DELAYED RELEASE ORAL at 08:08

## 2023-08-31 RX ADMIN — METOPROLOL TARTRATE 12.5 MG: 25 TABLET, FILM COATED ORAL at 08:08

## 2023-08-31 RX ADMIN — QUETIAPINE 50 MG: 25 TABLET ORAL at 08:08

## 2023-08-31 RX ADMIN — QUETIAPINE 50 MG: 25 TABLET ORAL at 09:08

## 2023-08-31 RX ADMIN — Medication 500 MG: at 09:08

## 2023-08-31 RX ADMIN — Medication 1 TABLET: at 08:08

## 2023-08-31 RX ADMIN — FOLIC ACID 1 MG: 1 TABLET ORAL at 09:08

## 2023-08-31 RX ADMIN — TIMOLOL MALEATE 1 DROP: 5 SOLUTION/ DROPS OPHTHALMIC at 09:08

## 2023-08-31 RX ADMIN — APIXABAN 5 MG: 5 TABLET, FILM COATED ORAL at 08:08

## 2023-08-31 RX ADMIN — Medication 1 TABLET: at 09:08

## 2023-08-31 RX ADMIN — METOPROLOL TARTRATE 12.5 MG: 25 TABLET, FILM COATED ORAL at 09:08

## 2023-08-31 RX ADMIN — PANTOPRAZOLE SODIUM 40 MG: 40 TABLET, DELAYED RELEASE ORAL at 09:08

## 2023-08-31 NOTE — NURSING
Wound care completed by LPN students and instructor at this time per provider order. Pt tolerated. Pt dressed for appt and waiting on son

## 2023-08-31 NOTE — NURSING
CALL PROVIDER CONCERNING PT MICKEY AU SAID TO KEEP IN AS LONG AS PT WAS STILL SWOLLEN THAT WAS ON 8/28/23 PT IS STILL SWOLLEN AND LULA MCKEON SAID LEAVE MICKEY IN UNTIL TALK WITH DR. AU AND SEE WHAT HE HAS TO SAY. HE ALSO STATED OK TO D/C PT INT AT THIS TIME.

## 2023-08-31 NOTE — PLAN OF CARE
Pt. Went to f/u surgical appt. Today with PHIL Quezada and returned to hospital. Pt. Stated that the doctor told him it would take a few more weeks for the abdomen wound to heal and to continue with same wound care. Pt. Lower extremities are very edematous and they were propped up onto leg rest. Pt. Still has ribera cath in place. Pt. Would benefit from another week in swing bed to cont. With medication management, working to remove ribera catheter, wound care, and therapy services. Will cont. To follow.

## 2023-08-31 NOTE — NURSING
Pt back from appt at this time. Son brought pt back in personal car. Assisted pt from w/c to chair at bedside. Pt requested for blue quoc pants off. Chair alarm on noted. Call bell within reach noted. [place] bag draining below bladder yellow urine noted

## 2023-08-31 NOTE — PROGRESS NOTES
Skilled OT attempted however the pt refused secondary to being fatigue from being out of facility for an appointment.

## 2023-08-31 NOTE — RESPIRATORY THERAPY
Breath sounds = clear, with a dry wheezy, bronchospastic type cough. Nonproductive noted with cough. HR 70 . RR 18 . Room Air SAT = 97%.   Patient achieves VT 1000 ml x 10 breaths, when encouraged to take SLOW, DEEP breaths.

## 2023-08-31 NOTE — NURSING
Admin. AM meds at this time. Pt sitting up in bed; ribera bag draining below bladder noted off floor; no dependent loops noted; sheet clip in use. Call bell within reach.    KUSH Mitchell stated on night shift on AM nursing report stated MIKE Mathis was notified that Dr. Stahl stated to keep the ribera until swelling is gone.

## 2023-09-01 LAB
GLUCOSE SERPL-MCNC: 126 MG/DL (ref 70–105)
GLUCOSE SERPL-MCNC: 147 MG/DL (ref 70–105)
GLUCOSE SERPL-MCNC: 92 MG/DL (ref 70–105)

## 2023-09-01 PROCEDURE — 97530 THERAPEUTIC ACTIVITIES: CPT

## 2023-09-01 PROCEDURE — 25000003 PHARM REV CODE 250: Performed by: PHYSICIAN ASSISTANT

## 2023-09-01 PROCEDURE — 25000003 PHARM REV CODE 250: Performed by: FAMILY MEDICINE

## 2023-09-01 PROCEDURE — 97535 SELF CARE MNGMENT TRAINING: CPT

## 2023-09-01 PROCEDURE — 27000958

## 2023-09-01 PROCEDURE — 97116 GAIT TRAINING THERAPY: CPT

## 2023-09-01 PROCEDURE — 36416 COLLJ CAPILLARY BLOOD SPEC: CPT

## 2023-09-01 PROCEDURE — 97110 THERAPEUTIC EXERCISES: CPT

## 2023-09-01 PROCEDURE — 94761 N-INVAS EAR/PLS OXIMETRY MLT: CPT

## 2023-09-01 PROCEDURE — 11000004 HC SNF PRIVATE

## 2023-09-01 PROCEDURE — 82962 GLUCOSE BLOOD TEST: CPT

## 2023-09-01 PROCEDURE — 99900035 HC TECH TIME PER 15 MIN (STAT)

## 2023-09-01 RX ADMIN — QUETIAPINE 50 MG: 25 TABLET ORAL at 08:09

## 2023-09-01 RX ADMIN — APIXABAN 5 MG: 5 TABLET, FILM COATED ORAL at 09:09

## 2023-09-01 RX ADMIN — SENNOSIDES AND DOCUSATE SODIUM 1 TABLET: 50; 8.6 TABLET ORAL at 08:09

## 2023-09-01 RX ADMIN — Medication 5000 UNITS: at 09:09

## 2023-09-01 RX ADMIN — TIMOLOL MALEATE 1 DROP: 5 SOLUTION/ DROPS OPHTHALMIC at 09:09

## 2023-09-01 RX ADMIN — Medication 500 MG: at 09:09

## 2023-09-01 RX ADMIN — RIFAXIMIN 550 MG: 550 TABLET ORAL at 08:09

## 2023-09-01 RX ADMIN — SENNOSIDES AND DOCUSATE SODIUM 1 TABLET: 50; 8.6 TABLET ORAL at 09:09

## 2023-09-01 RX ADMIN — METOPROLOL TARTRATE 12.5 MG: 25 TABLET, FILM COATED ORAL at 08:09

## 2023-09-01 RX ADMIN — PANTOPRAZOLE SODIUM 40 MG: 40 TABLET, DELAYED RELEASE ORAL at 09:09

## 2023-09-01 RX ADMIN — FOLIC ACID 1 MG: 1 TABLET ORAL at 09:09

## 2023-09-01 RX ADMIN — QUETIAPINE 50 MG: 25 TABLET ORAL at 09:09

## 2023-09-01 RX ADMIN — RIFAXIMIN 550 MG: 550 TABLET ORAL at 09:09

## 2023-09-01 RX ADMIN — APIXABAN 5 MG: 5 TABLET, FILM COATED ORAL at 08:09

## 2023-09-01 RX ADMIN — PANTOPRAZOLE SODIUM 40 MG: 40 TABLET, DELAYED RELEASE ORAL at 08:09

## 2023-09-01 RX ADMIN — LACTULOSE 20 G: 20 SOLUTION ORAL at 09:09

## 2023-09-01 RX ADMIN — METOPROLOL TARTRATE 12.5 MG: 25 TABLET, FILM COATED ORAL at 09:09

## 2023-09-01 RX ADMIN — OMEGA-3 FATTY ACIDS CAP 1000 MG 1 CAPSULE: 1000 CAP at 09:09

## 2023-09-01 RX ADMIN — LACTULOSE 20 G: 20 SOLUTION ORAL at 08:09

## 2023-09-01 RX ADMIN — Medication 1 TABLET: at 09:09

## 2023-09-01 NOTE — NURSING
Assisted pt with supper tray. Pt sitting up in bed; bed lowest position and locked; call bell within reach; ribera draining below bladder; no dependent loops noted; bag draining below bladder; stat lock in place noted; NADN

## 2023-09-01 NOTE — PLAN OF CARE
Problem: Adult Inpatient Plan of Care  Goal: Plan of Care Review  Outcome: Ongoing, Progressing  Goal: Patient-Specific Goal (Individualized)  Outcome: Ongoing, Progressing  Goal: Absence of Hospital-Acquired Illness or Injury  Outcome: Ongoing, Progressing  Goal: Optimal Comfort and Wellbeing  Outcome: Ongoing, Progressing  Goal: Readiness for Transition of Care  Outcome: Ongoing, Progressing     Problem: Diabetes Comorbidity  Goal: Blood Glucose Level Within Targeted Range  Outcome: Ongoing, Progressing     Problem: Fluid Imbalance (Pneumonia)  Goal: Fluid Balance  Outcome: Ongoing, Progressing     Problem: Skin Injury Risk Increased  Goal: Skin Health and Integrity  Outcome: Ongoing, Progressing     Problem: Fall Injury Risk  Goal: Absence of Fall and Fall-Related Injury  Outcome: Ongoing, Progressing     Problem: Infection  Goal: Absence of Infection Signs and Symptoms  Outcome: Ongoing, Progressing

## 2023-09-01 NOTE — PT/OT/SLP PROGRESS
Occupational Therapy   Treatment    Name: Danny Flood  MRN: [id number]  Admitting Diagnosis:  Infected hernioplasty mesh with deconditioning/trunk weakness      Recommendations:     Discharge Recommendations: home with home health, rehabilitation facility  Discharge Equipment Recommendations:  bedside commode, walker, rolling  Barriers to discharge:       Assessment:     Danny Flood is a 78 y.o. male with a medical diagnosis of Infected hernioplasty mesh deconditioning with trunk weakness.  He presents with decreased balance, endurance. Performance deficits affecting function are weakness, impaired endurance, impaired functional mobility, impaired self care skills, impaired balance, decreased safety awareness.  Pt has progress with mobility and ADLs but he continues to have decreased trunk strength due to wounds. Pt abdominal wounds limits his function including  t/f and sitting tolerance . Pt would benefit for continued skilled OT to less burden of care, hospital readmission and burden of care at home.  Pt to continue skilled OT at this time to further improved overall physical condition.   Rehab Prognosis:  Fair; patient would benefit from acute skilled OT services to address these deficits and reach maximum level of function.       Plan:     Patient to be seen 5 x/week to address the above listed problems via self-care/home management, therapeutic exercises, therapeutic activities, neuromuscular re-education  Plan of Care Expires:    Plan of Care Reviewed with: patient    Subjective     Chief Complaint: abdominal pain, decreased strength  Patient/Family Comments/goals: increase ADLs and mobility  Pain/Comfort:  5/10 knee pain     Objective:     Communicated with: Pt prior to session.  Patient found supine with SCD upon OT entry to room.    General Precautions: Standard, fall    Orthopedic Precautions:   Braces:    Respiratory Status: Room air     Occupational Performance:     Bed Mobility:    Patient  completed Rolling/Turning to Left with  SVN-Mod I  Patient completed Rolling/Turning to Right with SVN-Mod I  Patient completed Supine to Sit with  SVN-Mod I    Functional Mobility/Transfers:  Patient completed Sit <> Stand Transfer with Contact guard assistance  with  rolling walker   Functional Mobility: Pt completed functional ambulation down to he therapy gym to his room using the RW for balance and  safety with SVN    Activities of Daily Living:  Sit to stand t/f with SVN- (3 reps) with some difficulty due to trunk weakness      AMPA 6 Click ADL:      Treatment & Education:    Therapeutic exercise:  While sitting unsupported , the pt completed bicep curls 2 sets and 20 rep 5# DB, chest press 2 sets of 20 reps with 3# dowel to increase BUE strength for ADLs and IADLs.      While sitting unsupported on the mat, the pt [id number] mins on arm ergometer to increase endurance, BUE strength and activity tolerance for ADL performance    Using the RW, the pt stood to place pegs from peg board on the wall to increase standing tolerance, dynamic balance and endurance x 5mins    Patient left supine with all lines intact and call button in reach    GOALS:   Multidisciplinary Problems       Occupational Therapy Goals          Problem: Occupational Therapy    Goal Priority Disciplines Outcome Interventions   Occupational Therapy Goal     OT, PT/OT Ongoing, Progressing    Description: Goals to be met by: 9/24/23     Patient will increase functional independence with ADLs by performing:    Feeding with Modified [place].  UE Dressing with Modified [place].  LE Dressing with Modified [place].  Grooming while standing with Modified [place].  Toileting from toilet with Modified [place] for hygiene and clothing management.   Standing x5-10 minutes with Modified [place].  Supine to sit with Modified [place].  Step transfer with Modified [place]  Toilet transfer to toilet with Modified  [place].                         Time Tracking:     OT Date of Treatment:    OT Start Time:  10:30  OT Stop Time:  11:00  OT Total Time (min):      Billable Minutes:Self Care/Home Management 15    Therapeutic Exercise 15        OT/EVELYN: OT          9/1/2023

## 2023-09-01 NOTE — PT/OT/SLP PROGRESS
Physical Therapy Treatment    Patient Name:  Danny Flood   MRN:  [id number]    Recommendations:     Discharge Recommendations: home with home health  Discharge Equipment Recommendations: none  Barriers to discharge: Decreased caregiver support    Assessment:     Danny Flood is a 78 y.o. male admitted with a medical diagnosis of Infected hernioplasty mesh.  He presents with the following impairments/functional limitations: weakness, impaired endurance, impaired functional mobility, gait instability, impaired balance, decreased lower extremity function, decreased safety awareness     Patient tolerated treatment well.  Pt is progressing towards functional goals, however pt conts to exhibit decreased core strength as evidenced by impaired sitting balance and decreased ability to improve forward trunk flexion during sit to stand transfers needed for safe transition for improved functional mobility.  Pt would benefit from continued skilled treatment to address remaining deficits to allow pt to return home safely with decreased burden on caregivers.    Rehab Prognosis: Good; patient would benefit from acute skilled PT services to address these deficits and reach maximum level of function.    Recent Surgery: * No surgery found *      Plan:     During this hospitalization, patient to be seen 5 x/week to address the identified rehab impairments via gait training, therapeutic activities, therapeutic exercises and progress toward the following goals:    Plan of Care Expires:  09/22/23    Subjective     Chief Complaint: LE weakness  Patient/Family Comments/goals: to return home safe   Pain/Comfort:  Pain Rating 1: 0/10  Pain Rating Post-Intervention 1: 0/10      Objective:     Communicated with patient  prior to session.  Patient found supine with ribera catheter, peripheral IV upon PT entry to room.     General Precautions: Standard, fall  Orthopedic Precautions: N/A  Braces: N/A  Respiratory Status: Room air     Functional  Mobility:  Bed Mobility:     Rolling Right: stand by assistance  Scooting: contact guard assistance  Supine to Sit: contact guard assistance and minimum assistance  Transfers:     Sit to Stand:  stand by assistance and contact guard assistance with rolling walker  Bed to Chair: contact guard assistance with  rolling walker  using  Step Transfer  Gait: 110 ft x 2  SBA with RW no rest breaks  with skilled cueing required to improve upright posture and stepping into walker.  Balance: static standing fair+, dynamic standing fair      AM-PAC 6 CLICK MOBILITY  Turning over in bed (including adjusting bedclothes, sheets and blankets)?: 3  Sitting down on and standing up from a chair with arms (e.g., wheelchair, bedside commode, etc.): 3  Moving from lying on back to sitting on the side of the bed?: 3  Moving to and from a bed to a chair (including a wheelchair)?: 3  Need to walk in hospital room?: 3  Climbing 3-5 steps with a railing?: 1  Basic Mobility Total Score: 16       Treatment & Education:  Donning lower body clothing with Min to CGA in unsupported static sitting on EOB with frequent posterior LOB due to poor trunk control/core strength.   Scifit machine x 10 min for flexibility, strengthening, and endurance training   STS x 10 with no rest breaks required.  Pt received occasional skilled cueing for proper sequence to improve technique and facilitate improved independence with transfer.  Pt has difficulty with forward trunk flexion during sit to stand due to abdominal weakness, healing wound.  Dynamic standing activity of ball toss with therapist x 4 mins using medium ball to facilitate weightshift, multidirectional reaching, trunk rotation, and balance reactions to prevent LOB.       Patient left up in chair with all lines intact and call button in reach.    GOALS:   Multidisciplinary Problems       Physical Therapy Goals          Problem: Physical Therapy    Goal Priority Disciplines Outcome Goal Variances  Interventions   Physical Therapy Goal     PT, PT/OT Ongoing, Progressing     Description: Goals to be met by: 2 weeks     Patient will increase functional independence with mobility by performin. Supine to sit with Stand-by Assistance  2. Sit to supine with Stand-by Assistance  3. Rolling to Left and Right with Modified [place].  4. Sit to stand transfer with Stand-by Assistance  5. Bed to chair transfer with Stand-by Assistance using Rolling Walker  6. Gait  x 200 feet with Stand-by Assistance using Rolling Walker.    Goals to be met by: 4     Patient will increase functional independence with mobility by performin. Supine to sit with Modified [place]  2. Sit to supine with Modified [place]  3. Sit to stand transfer with Modified [place]  4. Bed to chair transfer with Modified [place] using Rolling Walker  5. Gait  x 300 feet with Modified [place] using Rolling Walker.                             Time Tracking:     PT Received On: 23  PT Start Time: 925     PT Stop Time: 1009  PT Total Time (min): 44 min     Billable Minutes: Gait Training 14, Therapeutic Activity 20, and Therapeutic Exercise 10    Treatment Type: Treatment  PT/PTA: PT     Number of PTA visits since last PT visit: 0     2023

## 2023-09-01 NOTE — NURSING
Daughter and wife noted in room at this time. Pt sitting in chair; chair locked; call bell within reach; ribera bag draining below bladder; ribera bag off floor and no dependent loops noted. Stat lock noted in place left upper thigh noted.

## 2023-09-02 LAB
GLUCOSE SERPL-MCNC: 101 MG/DL (ref 70–105)
GLUCOSE SERPL-MCNC: 132 MG/DL (ref 70–105)
GLUCOSE SERPL-MCNC: 141 MG/DL (ref 70–105)
GLUCOSE SERPL-MCNC: 92 MG/DL (ref 70–105)

## 2023-09-02 PROCEDURE — 94761 N-INVAS EAR/PLS OXIMETRY MLT: CPT

## 2023-09-02 PROCEDURE — 36416 COLLJ CAPILLARY BLOOD SPEC: CPT

## 2023-09-02 PROCEDURE — 25000003 PHARM REV CODE 250: Performed by: PHYSICIAN ASSISTANT

## 2023-09-02 PROCEDURE — 11000004 HC SNF PRIVATE

## 2023-09-02 PROCEDURE — 27000981 HC MATTRESS, ACCUCAIR DAILY RENTAL

## 2023-09-02 PROCEDURE — 27000958

## 2023-09-02 PROCEDURE — 99900035 HC TECH TIME PER 15 MIN (STAT)

## 2023-09-02 PROCEDURE — 25000003 PHARM REV CODE 250: Performed by: FAMILY MEDICINE

## 2023-09-02 PROCEDURE — 82962 GLUCOSE BLOOD TEST: CPT

## 2023-09-02 RX ADMIN — APIXABAN 5 MG: 5 TABLET, FILM COATED ORAL at 10:09

## 2023-09-02 RX ADMIN — TIMOLOL MALEATE 1 DROP: 5 SOLUTION/ DROPS OPHTHALMIC at 10:09

## 2023-09-02 RX ADMIN — OMEGA-3 FATTY ACIDS CAP 1000 MG 1 CAPSULE: 1000 CAP at 10:09

## 2023-09-02 RX ADMIN — METOPROLOL TARTRATE 12.5 MG: 25 TABLET, FILM COATED ORAL at 10:09

## 2023-09-02 RX ADMIN — Medication 5000 UNITS: at 10:09

## 2023-09-02 RX ADMIN — RIFAXIMIN 550 MG: 550 TABLET ORAL at 10:09

## 2023-09-02 RX ADMIN — QUETIAPINE 50 MG: 25 TABLET ORAL at 08:09

## 2023-09-02 RX ADMIN — SENNOSIDES AND DOCUSATE SODIUM 1 TABLET: 50; 8.6 TABLET ORAL at 10:09

## 2023-09-02 RX ADMIN — METOPROLOL TARTRATE 12.5 MG: 25 TABLET, FILM COATED ORAL at 08:09

## 2023-09-02 RX ADMIN — APIXABAN 5 MG: 5 TABLET, FILM COATED ORAL at 08:09

## 2023-09-02 RX ADMIN — LACTULOSE 20 G: 20 SOLUTION ORAL at 08:09

## 2023-09-02 RX ADMIN — FOLIC ACID 1 MG: 1 TABLET ORAL at 10:09

## 2023-09-02 RX ADMIN — LACTULOSE 20 G: 20 SOLUTION ORAL at 10:09

## 2023-09-02 RX ADMIN — RIFAXIMIN 550 MG: 550 TABLET ORAL at 08:09

## 2023-09-02 RX ADMIN — PANTOPRAZOLE SODIUM 40 MG: 40 TABLET, DELAYED RELEASE ORAL at 08:09

## 2023-09-02 RX ADMIN — QUETIAPINE 50 MG: 25 TABLET ORAL at 10:09

## 2023-09-02 RX ADMIN — Medication 500 MG: at 10:09

## 2023-09-02 RX ADMIN — PANTOPRAZOLE SODIUM 40 MG: 40 TABLET, DELAYED RELEASE ORAL at 10:09

## 2023-09-02 NOTE — NURSING
Changed, cleaned, and applied new dressing to abdomen per provider order: cleaned with vashe, packed moistened gauze, applied skin barrier, gauze, and absorptive pad. Pt tolerated well.     Sacral dressing changed at this time. Pt tolerated.

## 2023-09-02 NOTE — PLAN OF CARE
Problem: Adult Inpatient Plan of Care  Goal: Plan of Care Review  Outcome: Ongoing, Progressing  Goal: Patient-Specific Goal (Individualized)  Outcome: Ongoing, Progressing  Goal: Absence of Hospital-Acquired Illness or Injury  Outcome: Ongoing, Progressing  Goal: Optimal Comfort and Wellbeing  Outcome: Ongoing, Progressing  Goal: Readiness for Transition of Care  Outcome: Ongoing, Progressing     Problem: Diabetes Comorbidity  Goal: Blood Glucose Level Within Targeted Range  Outcome: Ongoing, Progressing     Problem: Fluid Imbalance (Pneumonia)  Goal: Fluid Balance  Outcome: Ongoing, Progressing     Problem: Respiratory Compromise (Pneumonia)  Goal: Effective Oxygenation and Ventilation  Outcome: Ongoing, Progressing     Problem: Skin Injury Risk Increased  Goal: Skin Health and Integrity  Outcome: Ongoing, Progressing     Problem: Fall Injury Risk  Goal: Absence of Fall and Fall-Related Injury  Outcome: Ongoing, Progressing

## 2023-09-02 NOTE — NURSING
Emptied 300mL yellow urine from ribera bag at this time. Ribera draining below bladder; no dependent loops, no kinks, ribera bag off floor noted. Sheet clip for ribera in use noted; bed alarm on noted. Assisted with MASTER Del Rosario repositioning pt at this time.  Pt requested vanilla ice cream on supper tray. Bed lowest position and locked; call bell within reach.

## 2023-09-02 NOTE — NURSING
Admin. AM meds at this time. MAHSA. Pt has music playing in room noted. Pt requested another small water bottle at this time. De La Cruz draining below bladder noted; no dependent loops noted; sheet clip in use; no kinks noted; yellow urine noted.   Class II - visualization of the soft palate, fauces, and uvula Class I (easy) - visualization of the soft palate, fauces, uvula, and both anterior and posterior pillars

## 2023-09-03 LAB — GLUCOSE SERPL-MCNC: 94 MG/DL (ref 70–105)

## 2023-09-03 PROCEDURE — 25000003 PHARM REV CODE 250: Performed by: FAMILY MEDICINE

## 2023-09-03 PROCEDURE — 82962 GLUCOSE BLOOD TEST: CPT

## 2023-09-03 PROCEDURE — 25000003 PHARM REV CODE 250: Performed by: PHYSICIAN ASSISTANT

## 2023-09-03 PROCEDURE — 27000981 HC MATTRESS, ACCUCAIR DAILY RENTAL

## 2023-09-03 PROCEDURE — 11000004 HC SNF PRIVATE

## 2023-09-03 PROCEDURE — 94761 N-INVAS EAR/PLS OXIMETRY MLT: CPT

## 2023-09-03 PROCEDURE — 99900035 HC TECH TIME PER 15 MIN (STAT)

## 2023-09-03 PROCEDURE — 27000958

## 2023-09-03 PROCEDURE — 36416 COLLJ CAPILLARY BLOOD SPEC: CPT

## 2023-09-03 RX ADMIN — APIXABAN 5 MG: 5 TABLET, FILM COATED ORAL at 08:09

## 2023-09-03 RX ADMIN — PANTOPRAZOLE SODIUM 40 MG: 40 TABLET, DELAYED RELEASE ORAL at 08:09

## 2023-09-03 RX ADMIN — LACTULOSE 20 G: 20 SOLUTION ORAL at 08:09

## 2023-09-03 RX ADMIN — TIMOLOL MALEATE 1 DROP: 5 SOLUTION/ DROPS OPHTHALMIC at 08:09

## 2023-09-03 RX ADMIN — RIFAXIMIN 550 MG: 550 TABLET ORAL at 08:09

## 2023-09-03 RX ADMIN — LACTULOSE 20 G: 20 SOLUTION ORAL at 03:09

## 2023-09-03 RX ADMIN — OMEGA-3 FATTY ACIDS CAP 1000 MG 1 CAPSULE: 1000 CAP at 08:09

## 2023-09-03 RX ADMIN — METOPROLOL TARTRATE 12.5 MG: 25 TABLET, FILM COATED ORAL at 08:09

## 2023-09-03 RX ADMIN — QUETIAPINE 50 MG: 25 TABLET ORAL at 08:09

## 2023-09-03 RX ADMIN — FOLIC ACID 1 MG: 1 TABLET ORAL at 08:09

## 2023-09-03 RX ADMIN — Medication 5000 UNITS: at 08:09

## 2023-09-03 RX ADMIN — Medication 500 MG: at 08:09

## 2023-09-03 NOTE — NURSING
Nurses Note -- 4 Eyes      9/3/2023   06:50 AM      Skin assessed during: Q Shift Change      [] No Altered Skin Integrity Present    [x]Prevention Measures Documented      [] Yes- Altered Skin Integrity Present or Discovered   [] LDA Added if Not in Epic (Describe Wound)   [] New Altered Skin Integrity was Present on Admit and Documented in LDA   [] Wound Image Taken    Wound Care Consulted? No    Attending Nurse:  BROCK CALIX  Second RN/Staff Member:  KUSH STEELE    @ 06:50AM RESTING IN BED WITH EYES CLOSED. AWAKENED WHILE OFF GOING NURSE PRESENT PERFORMING BEDSIDE SHIFT REPORT. RESPIRAITONS EVEN AND NON LABORED. COMMUNICATION BOARD UPDATED. NO C/O PAIN NOTED. ALERT AND ORIENTED. AREVALO INTACT. NO OTHER NEEDS VOICED/VISUALIZED. BROCK CHAVEZ WILL RESOURCE ME WITH THIS PT.

## 2023-09-03 NOTE — PLAN OF CARE
Problem: Adult Inpatient Plan of Care  Goal: Plan of Care Review  Outcome: Ongoing, Progressing  Goal: Patient-Specific Goal (Individualized)  Outcome: Ongoing, Progressing  Goal: Absence of Hospital-Acquired Illness or Injury  Outcome: Ongoing, Progressing  Goal: Optimal Comfort and Wellbeing  Outcome: Ongoing, Progressing  Goal: Readiness for Transition of Care  Outcome: Ongoing, Progressing     Problem: Diabetes Comorbidity  Goal: Blood Glucose Level Within Targeted Range  Outcome: Ongoing, Progressing     Problem: Infection (Pneumonia)  Goal: Resolution of Infection Signs and Symptoms  Outcome: Ongoing, Progressing     Problem: Impaired Wound Healing  Goal: Optimal Wound Healing  Outcome: Ongoing, Progressing     Problem: Skin Injury Risk Increased  Goal: Skin Health and Integrity  Outcome: Ongoing, Progressing     Problem: Fall Injury Risk  Goal: Absence of Fall and Fall-Related Injury  Outcome: Ongoing, Progressing

## 2023-09-03 NOTE — PLAN OF CARE
Problem: Adult Inpatient Plan of Care  Goal: Plan of Care Review  9/3/2023 1751 by Elpidio Muñoz LPN  Outcome: Ongoing, Progressing  9/3/2023 1742 by Elpidio Muñoz LPN  Outcome: Ongoing, Progressing  Goal: Patient-Specific Goal (Individualized)  9/3/2023 1751 by Elpidio Muñoz LPN  Outcome: Ongoing, Progressing  9/3/2023 1742 by Elpidio Muñoz LPN  Outcome: Ongoing, Progressing  Goal: Absence of Hospital-Acquired Illness or Injury  9/3/2023 1751 by Elpidio Muñoz LPN  Outcome: Ongoing, Progressing  9/3/2023 1742 by Elpidio Muñoz LPN  Outcome: Ongoing, Progressing  Goal: Optimal Comfort and Wellbeing  9/3/2023 1751 by Elpidio Muñoz LPN  Outcome: Ongoing, Progressing  9/3/2023 1742 by Elpidio Muñoz LPN  Outcome: Ongoing, Progressing  Goal: Readiness for Transition of Care  9/3/2023 1751 by Elpidio Muñoz LPN  Outcome: Ongoing, Progressing  9/3/2023 1742 by Elpidio Muñoz LPN  Outcome: Ongoing, Progressing     Problem: Diabetes Comorbidity  Goal: Blood Glucose Level Within Targeted Range  9/3/2023 1751 by Elpidio Muñoz LPN  Outcome: Ongoing, Progressing  9/3/2023 1742 by Elpidio Muñoz LPN  Outcome: Ongoing, Progressing     Problem: Fluid Imbalance (Pneumonia)  Goal: Fluid Balance  9/3/2023 1751 by Elpidio Muñoz LPN  Outcome: Ongoing, Progressing  9/3/2023 1742 by Elpidio Muñoz LPN  Outcome: Ongoing, Progressing     Problem: Infection (Pneumonia)  Goal: Resolution of Infection Signs and Symptoms  9/3/2023 1751 by Elpidio Muñoz LPN  Outcome: Ongoing, Progressing  9/3/2023 1742 by Elpidio Muñoz LPN  Outcome: Ongoing, Progressing     Problem: Respiratory Compromise (Pneumonia)  Goal: Effective Oxygenation and Ventilation  9/3/2023 1751 by Elpidio Muñoz LPN  Outcome: Ongoing, Progressing  9/3/2023 1742 by Elpidio Muñoz LPN  Outcome: Ongoing, Progressing     Problem: Impaired Wound Healing  Goal: Optimal Wound Healing  9/3/2023 1751 by  Elpidio Muñoz LPN  Outcome: Ongoing, Progressing  9/3/2023 1742 by Elpidio Muñoz LPN  Outcome: Ongoing, Progressing     Problem: Skin Injury Risk Increased  Goal: Skin Health and Integrity  9/3/2023 1751 by Elpidio Muñoz LPN  Outcome: Ongoing, Progressing  9/3/2023 1742 by Elpidio Muñoz LPN  Outcome: Ongoing, Progressing     Problem: Fall Injury Risk  Goal: Absence of Fall and Fall-Related Injury  9/3/2023 1751 by Elpidio Muñoz LPN  Outcome: Ongoing, Progressing  9/3/2023 1742 by Elpidio Muñoz LPN  Outcome: Ongoing, Progressing     Problem: Infection  Goal: Absence of Infection Signs and Symptoms  9/3/2023 1751 by Elpidio Muñoz LPN  Outcome: Ongoing, Progressing  9/3/2023 1742 by Elpidio Muñoz LPN  Outcome: Ongoing, Progressing

## 2023-09-03 NOTE — NURSING
@ 17:50pm resting in recliner chair awake. alert and oriented. no distress noted. safety measures in place. no c/o pain. respiraitons even and non labored. water pitcher filled with fresh ice and water. no other needs voiced/visualized. i will report off to oncoming shift.

## 2023-09-04 PROCEDURE — 25000003 PHARM REV CODE 250: Performed by: PHYSICIAN ASSISTANT

## 2023-09-04 PROCEDURE — 94761 N-INVAS EAR/PLS OXIMETRY MLT: CPT

## 2023-09-04 PROCEDURE — 99900035 HC TECH TIME PER 15 MIN (STAT)

## 2023-09-04 PROCEDURE — 27000981 HC MATTRESS, ACCUCAIR DAILY RENTAL

## 2023-09-04 PROCEDURE — 97116 GAIT TRAINING THERAPY: CPT | Mod: CQ

## 2023-09-04 PROCEDURE — 97110 THERAPEUTIC EXERCISES: CPT | Mod: CQ

## 2023-09-04 PROCEDURE — 11000004 HC SNF PRIVATE

## 2023-09-04 PROCEDURE — 25000003 PHARM REV CODE 250: Performed by: FAMILY MEDICINE

## 2023-09-04 PROCEDURE — 27000958

## 2023-09-04 RX ADMIN — QUETIAPINE 50 MG: 25 TABLET ORAL at 08:09

## 2023-09-04 RX ADMIN — LACTULOSE 20 G: 20 SOLUTION ORAL at 03:09

## 2023-09-04 RX ADMIN — APIXABAN 5 MG: 5 TABLET, FILM COATED ORAL at 08:09

## 2023-09-04 RX ADMIN — RIFAXIMIN 550 MG: 550 TABLET ORAL at 08:09

## 2023-09-04 RX ADMIN — FOLIC ACID 1 MG: 1 TABLET ORAL at 08:09

## 2023-09-04 RX ADMIN — LACTULOSE 20 G: 20 SOLUTION ORAL at 08:09

## 2023-09-04 RX ADMIN — PANTOPRAZOLE SODIUM 40 MG: 40 TABLET, DELAYED RELEASE ORAL at 08:09

## 2023-09-04 RX ADMIN — TIMOLOL MALEATE 1 DROP: 5 SOLUTION/ DROPS OPHTHALMIC at 08:09

## 2023-09-04 RX ADMIN — Medication 5000 UNITS: at 08:09

## 2023-09-04 RX ADMIN — Medication 500 MG: at 08:09

## 2023-09-04 RX ADMIN — METOPROLOL TARTRATE 12.5 MG: 25 TABLET, FILM COATED ORAL at 08:09

## 2023-09-04 RX ADMIN — OMEGA-3 FATTY ACIDS CAP 1000 MG 1 CAPSULE: 1000 CAP at 08:09

## 2023-09-04 NOTE — NURSING
Assisted pt with dessert on supper tray at this time. Pt sitting in chair; chair locked; call bell within reach; ribera draining below bladder; no dependent loops; no kinks; bag off floor noted

## 2023-09-04 NOTE — PT/OT/SLP PROGRESS
Physical Therapy Treatment    Patient Name:  Danny Flood   MRN:  [id number]    Recommendations:     Discharge Recommendations: home with home health  Discharge Equipment Recommendations: none  Barriers to discharge: Decreased caregiver support    Assessment:     Danny Flood is a 78 y.o. male admitted with a medical diagnosis of Infected hernioplasty mesh.  He presents with the following impairments/functional limitations: weakness, impaired endurance, gait instability, impaired balance, decreased lower extremity function, decreased safety awareness, decreased ROM, edema     Patient tolerated Skilled PT fairly well today vc on slowing down with functional mobility to prevent risk of falls.  Pt reports he is ready to get better so he can go home soon.     Rehab Prognosis: Good; patient would benefit from acute skilled PT services to address these deficits and reach maximum level of function.    Recent Surgery: * No surgery found *      Plan:     During this hospitalization, patient to be seen 5 x/week to address the identified rehab impairments via gait training, therapeutic exercises and progress toward the following goals:    Plan of Care Expires:  09/22/23    Subjective     Chief Complaint: Left ankle swelling/soreness   Patient/Family Comments/goals: to return home safe   Pain/Comfort:  Pain Rating 1: 0/10 (soreness/swelling)  Location - Side 1: Left  Location - Orientation 1: lower  Location 1: ankle  Pain Addressed 1: Reposition, Distraction      Objective:     Communicated with patient and nursing prior to session.  Patient found reclined in chair with ribera catheter (abdominal binder) donned  upon PT entry to room.     General Precautions: Standard, fall  Orthopedic Precautions: N/A  Braces: N/A  Respiratory Status: Room air     Functional Mobility:  Transfers:     W/C to toilet: SBA then CGA to help doff shorts   Sit to Stand:  stand by assistance and contact guard assistance with rolling walker  Gait: 10  ft SBA with RW no rest breaks  with skilled cueing required to improve upright posture and stepping into walker.  Balance: static standing fair+, dynamic standing fair      AM-PAC 6 CLICK MOBILITY  Turning over in bed (including adjusting bedclothes, sheets and blankets)?: 4  Sitting down on and standing up from a chair with arms (e.g., wheelchair, bedside commode, etc.): 4  Moving from lying on back to sitting on the side of the bed?: 4  Moving to and from a bed to a chair (including a wheelchair)?: 3  Need to walk in hospital room?: 3  Climbing 3-5 steps with a railing?: 1  Basic Mobility Total Score: 19       Treatment & Education:  Sit to stand from chair to RW- SBA  Donkristen shorts- static standing- using RW CGA   Scifit x 8 min for Bilateral Lower Extremities ROM   Seated hip adduction with ball. Bilateral Hamstring curls gr tband, and bilateral Long Arc Quad 3 x 10   Toilet t/f- CGA using RW with unsteadiness observed   Ambulation 100 ft to therapy gym from pt's room with SBA then ambulation in room from w/c to toilet x 10 ft       Patient left on toilet instructed to pull pt red string for assistance when finish. Pt demo understanding with all lines intact and call button in reach.    GOALS:   Multidisciplinary Problems       Physical Therapy Goals          Problem: Physical Therapy    Goal Priority Disciplines Outcome Goal Variances Interventions   Physical Therapy Goal     PT, PT/OT Ongoing, Progressing     Description: Goals to be met by: 2 weeks     Patient will increase functional independence with mobility by performin. Supine to sit with Stand-by Assistance  2. Sit to supine with Stand-by Assistance  3. Rolling to Left and Right with Modified [place].  4. Sit to stand transfer with Stand-by Assistance  5. Bed to chair transfer with Stand-by Assistance using Rolling Walker  6. Gait  x 200 feet with Stand-by Assistance using Rolling Walker.    Goals to be met by: 4     Patient will increase  functional independence with mobility by performin. Supine to sit with Modified [place]  2. Sit to supine with Modified [place]  3. Sit to stand transfer with Modified [place]  4. Bed to chair transfer with Modified [place] using Rolling Walker  5. Gait  x 300 feet with Modified [place] using Rolling Walker.                             Time Tracking:     PT Received On: 23  PT Start Time: 947     PT Stop Time: 1020  PT Total Time (min): 33 min     Billable Minutes:  gait training 13 and therapeutic exercise 20    Treatment Type: Treatment  PT/PTA: PTA     Number of PTA visits since last PT visit: 2023

## 2023-09-04 NOTE — NURSING
Assisted pt from bed to commode and from commode to chair; chair locked; Pt had BM; Pt stated he wanted to wipe himself and place cream.   Moved cell phone and call bell within reach. Moved pt music box within reach.    0840 pt talking to wife on cell phone.    0842 Admin. AM meds at this time. MAHSA

## 2023-09-05 PROCEDURE — 25000003 PHARM REV CODE 250: Performed by: PHYSICIAN ASSISTANT

## 2023-09-05 PROCEDURE — 97530 THERAPEUTIC ACTIVITIES: CPT

## 2023-09-05 PROCEDURE — 27000981 HC MATTRESS, ACCUCAIR DAILY RENTAL

## 2023-09-05 PROCEDURE — 99900035 HC TECH TIME PER 15 MIN (STAT)

## 2023-09-05 PROCEDURE — 97110 THERAPEUTIC EXERCISES: CPT | Mod: CQ

## 2023-09-05 PROCEDURE — 11000004 HC SNF PRIVATE

## 2023-09-05 PROCEDURE — 25000003 PHARM REV CODE 250: Performed by: FAMILY MEDICINE

## 2023-09-05 PROCEDURE — 94761 N-INVAS EAR/PLS OXIMETRY MLT: CPT

## 2023-09-05 PROCEDURE — 27000958

## 2023-09-05 PROCEDURE — 97110 THERAPEUTIC EXERCISES: CPT

## 2023-09-05 PROCEDURE — 97535 SELF CARE MNGMENT TRAINING: CPT

## 2023-09-05 RX ADMIN — PANTOPRAZOLE SODIUM 40 MG: 40 TABLET, DELAYED RELEASE ORAL at 08:09

## 2023-09-05 RX ADMIN — APIXABAN 5 MG: 5 TABLET, FILM COATED ORAL at 08:09

## 2023-09-05 RX ADMIN — QUETIAPINE 50 MG: 25 TABLET ORAL at 08:09

## 2023-09-05 RX ADMIN — Medication 500 MG: at 08:09

## 2023-09-05 RX ADMIN — METOPROLOL TARTRATE 12.5 MG: 25 TABLET, FILM COATED ORAL at 08:09

## 2023-09-05 RX ADMIN — FOLIC ACID 1 MG: 1 TABLET ORAL at 08:09

## 2023-09-05 RX ADMIN — RIFAXIMIN 550 MG: 550 TABLET ORAL at 08:09

## 2023-09-05 RX ADMIN — TIMOLOL MALEATE 1 DROP: 5 SOLUTION/ DROPS OPHTHALMIC at 08:09

## 2023-09-05 RX ADMIN — LACTULOSE 20 G: 20 SOLUTION ORAL at 03:09

## 2023-09-05 RX ADMIN — OMEGA-3 FATTY ACIDS CAP 1000 MG 1 CAPSULE: 1000 CAP at 08:09

## 2023-09-05 RX ADMIN — LACTULOSE 20 G: 20 SOLUTION ORAL at 08:09

## 2023-09-05 RX ADMIN — SENNOSIDES AND DOCUSATE SODIUM 1 TABLET: 50; 8.6 TABLET ORAL at 08:09

## 2023-09-05 RX ADMIN — Medication 5000 UNITS: at 08:09

## 2023-09-05 NOTE — NURSING
Unclamped ribera; pt sitting chair; chair locked; call bell within reach    Family mixed roma packet for pt in water bottle

## 2023-09-05 NOTE — PLAN OF CARE
[facility] - Medical Surgical Unit - Swing Bed   Interdisciplinary Team Meeting    Patient: Danny Flood   Today's Date: 9/5/2023   Estimated D/C Date: 9/8/23        Physician: Nurse Practitioner:  PHIL Phipps   Pharmacy:Jewel Henson, Pharm D Unit Director:  REGGIE Van   : Chanelle Robison RN Physical/Occupational Therapy: Melissa Mahmood OT   Speech Therapy: ST Mary Carmen    Nursing: Marta Del Cid RN  Respiratory:  Keny Fraire, Resp. Dietary: Stephanie Bahena, Dietary  Other:      Nurse  New Symptoms/Problems:   Last Bowel Movement: 09/04/23 Urine: incontinent Diarrhea: No   Constipated: No Bowel: incontinent De La Cruz: Yes   Isolation: No D/C Date:  Date:    O2 Device: Room Air O2 Flow:   SpO2: 98 %   Nutrition: Diabetic, Glucerna, Donovan  Speech/Swallowing: No current speech or swallowing issues  Aspiration Precautions: No  Cognition: Memory issues    Physical Therapy  Physical Therapy/Gait: ambulated 10 ft SBA with RW, no breaks ELOS: Plan to DC  9/8/23   Transfers: Standby Assistance to CGA Range of Motion/Restrictions:      Occupational Therapy  Eating/Grooming: Modified Independent Toileting: Supervision or Set-up Assistance to SBA   Bathing: Supervision or Set-up Assistance to SBA Dressing (Upper Body): Supervision or Set-up Assistance to SBA   Dressing (Lower Body): Standby Assistance        Tx Plan/Recommendations reviewed with family and/or patient on (date) 9/5/23.  Additional family Conference/Training:   D/C Plan/Recommendations: Home with HH  AMBAR: 9/8/23    Pharmacy  Medication Changes (see MD orders in chart): Yes  MD:David Stahl D.O. Labs Reviewed: Yes New Lab Orders: Yes   MD/NP Signature:

## 2023-09-05 NOTE — NURSING
Changed, cleaned, and applied new dressing to patient's abdomen according to provider order. Pt tolerated. NADN    Picture taken of wound on abdomen and placed on pt's chart. Pt gave verbal consent. SN Carly in room as witness.

## 2023-09-05 NOTE — NURSING
Clamp bacilio at this time. Pt in chair; chair locked; call bell within reach; family in room noted

## 2023-09-05 NOTE — PLAN OF CARE
Pt. Has been approved through [facility] for additional days through Thursday with a d/c date of Friday. Spoke with pt. And [facility] signed and faxed to [facility]. Spoke with pt. Son outside of his room and informed him of planned d/c date of Friday. Will plan to refer back to [facility] and pt. Stated that he has all DME already. Cont. To follow.

## 2023-09-05 NOTE — NURSING
Ribera unclamp; pt noted eating supper in chair; chair locked; call bell within reach.    Will report off to continue bladder training to night shift nurse on report. REGGIE Mcpherson and Jose Berkowitz NP aware of bladder training and stated okay.    Teaching done to pt multiple times of purpose of bladder training to d/c ribera before discharge. Pt stated he understood.

## 2023-09-05 NOTE — PT/OT/SLP PROGRESS
Physical Therapy Treatment    Patient Name:  Danny Flood   MRN:  [id number]    Recommendations:     Discharge Recommendations: home with home health  Discharge Equipment Recommendations: none  Barriers to discharge: Decreased caregiver support    Assessment:     Danny Flood is a 78 y.o. male admitted with a medical diagnosis of Infected hernioplasty mesh.  He presents with the following impairments/functional limitations: weakness, impaired endurance, impaired self care skills, impaired functional mobility, gait instability, impaired balance, decreased lower extremity function, decreased safety awareness     Patient tolerated Skilled PT well today with no complaints of extreme fatigue or pain. He is progressing towards his short and long term goals as stated in POC.    Rehab Prognosis: Good; patient would benefit from acute skilled PT services to address these deficits and reach maximum level of function.    Recent Surgery: * No surgery found *      Plan:     During this hospitalization, patient to be seen 5 x/week to address the identified rehab impairments via gait training, therapeutic activities, therapeutic exercises, neuromuscular re-education and progress toward the following goals:    Plan of Care Expires:  09/22/23    Subjective     Chief Complaint: L ankle swelling  Patient/Family Comments/goals: to return home safe   Pain/Comfort:  Pain Rating 1: 0/10      Objective:     Communicated with patient and nursing prior to session.  Patient found reclined in chair with ribera catheter (abdominal binder) donned  upon PT entry to room.     General Precautions: Standard, fall  Orthopedic Precautions: N/A  Braces: N/A  Respiratory Status: Room air     Functional Mobility:  Transfers:     Sit to Stand:  stand by assistance and contact guard assistance with rolling walker      AM-PAC 6 CLICK MOBILITY  Turning over in bed (including adjusting bedclothes, sheets and blankets)?: 4  Sitting down on and standing up  from a chair with arms (e.g., wheelchair, bedside commode, etc.): 4  Moving from lying on back to sitting on the side of the bed?: 4  Moving to and from a bed to a chair (including a wheelchair)?: 3  Need to walk in hospital room?: 3  Climbing 3-5 steps with a railing?: 1  Basic Mobility Total Score: 19       Treatment & Education:  Scifit x 10 min for Bilateral Lower Extremities strengthening and ROM   Seated hip adduction with ball, Bilateral Hamstring curls gr tband, hip abduction with green tband, bilateral LAQ and hip flexion 2 x 20  STS with RW x 10 with no breaks   Seated ball toss with small red ball x 1 minute for 3 rounds to encourage abdominal muscle recruitment and multi directional reaching     Patient left up in chair with all lines intact, call button in reach, and chair alarm on.    GOALS:   Multidisciplinary Problems       Physical Therapy Goals          Problem: Physical Therapy    Goal Priority Disciplines Outcome Goal Variances Interventions   Physical Therapy Goal     PT, PT/OT Ongoing, Progressing     Description: Goals to be met by: 2 weeks     Patient will increase functional independence with mobility by performin. Supine to sit with Stand-by Assistance  2. Sit to supine with Stand-by Assistance  3. Rolling to Left and Right with Modified [place].  4. Sit to stand transfer with Stand-by Assistance  5. Bed to chair transfer with Stand-by Assistance using Rolling Walker  6. Gait  x 200 feet with Stand-by Assistance using Rolling Walker.    Goals to be met by: 4     Patient will increase functional independence with mobility by performin. Supine to sit with Modified [place]  2. Sit to supine with Modified [place]  3. Sit to stand transfer with Modified [place]  4. Bed to chair transfer with Modified [place] using Rolling Walker  5. Gait  x 300 feet with Modified [place] using Rolling Walker.                             Time Tracking:     PT Received  On: 09/05/23  PT Start Time: 1007     PT Stop Time: 1047  PT Total Time (min): 40 min     Billable Minutes:  therapeutic exercise 40    Treatment Type: Treatment  PT/PTA: PTA     Number of PTA visits since last PT visit: 2     09/05/2023

## 2023-09-05 NOTE — PROGRESS NOTES
Post-operative Note    HPI:  The patient is status post removal of infected ventral hernia mesh.  Wound was initially loosely approximated with staple and sutures.  Sutures have become  and the wound is open.  Still healing well with wet to dry dressings.  Wound is healing well.  He is afebrile.  Is tolerating diet without nausea or vomiting.  Denies abdominal pain.    PHYSICAL EXAM:    Abdomen soft, nontender, nondistended.  Open ventral wound with granulation tissue without drainage.    Recent Results (from the past 504 hour(s))   POCT glucose    Collection Time: 08/15/23  3:24 PM   Result Value Ref Range    POC Glucose 139 (H) 70 - 105 mg/dL   POCT glucose    Collection Time: 08/15/23  7:23 PM   Result Value Ref Range    POC Glucose 152 (H) 70 - 105 mg/dL   Magnesium    Collection Time: 08/16/23  3:37 AM   Result Value Ref Range    Magnesium 1.6 (L) 1.7 - 2.3 mg/dL   Comprehensive metabolic panel    Collection Time: 08/16/23  3:37 AM   Result Value Ref Range    Sodium 144 136 - 145 mmol/L    Potassium 2.9 (L) 3.5 - 5.1 mmol/L    Chloride 111 (H) 98 - 107 mmol/L    CO2 28 21 - 32 mmol/L    Anion Gap 8 7 - 16 mmol/L    Glucose 107 (H) 74 - 106 mg/dL    BUN 8 7 - 18 mg/dL    Creatinine 0.52 (L) 0.70 - 1.30 mg/dL    BUN/Creatinine Ratio 15 6 - 20    Calcium 7.7 (L) 8.5 - 10.1 mg/dL    Total Protein 5.4 (L) 6.4 - 8.2 g/dL    Albumin 1.9 (L) 3.5 - 5.0 g/dL    Globulin 3.5 2.0 - 4.0 g/dL    A/G Ratio 0.5     Bilirubin, Total 1.2 >0.0 - 1.2 mg/dL    Alk Phos 57 45 - 115 U/L    ALT 17 16 - 61 U/L    AST 28 15 - 37 U/L    eGFR 104 >=60 mL/min/1.73m2   CBC with Differential    Collection Time: 08/16/23  3:37 AM   Result Value Ref Range    WBC 4.55 4.50 - 11.00 K/uL    RBC 3.00 (L) 4.60 - 6.20 M/uL    Hemoglobin 9.6 (L) 13.5 - 18.0 g/dL    Hematocrit 28.6 (L) 40.0 - 54.0 %    MCV 95.3 80.0 - 96.0 fL    MCH 32.0 (H) 27.0 - 31.0 pg     MCHC 33.6 32.0 - 36.0 g/dL    RDW 12.7 11.5 - 14.5 %    Platelet Count 156 150 - 400 K/uL    MPV 9.7 9.4 - 12.4 fL    Neutrophils % 68.1 (H) 53.0 - 65.0 %    Lymphocytes % 18.7 (L) 27.0 - 41.0 %    Monocytes % 6.2 (H) 2.0 - 6.0 %    Eosinophils % 5.9 (H) 1.0 - 4.0 %    Basophils % 0.4 0.0 - 1.0 %    Immature Granulocytes % 0.7 (H) 0.0 - 0.4 %    nRBC, Auto 0.0 <=0.0 %    Neutrophils, Abs 3.10 1.80 - 7.70 K/uL    Lymphocytes, Absolute 0.85 (L) 1.00 - 4.80 K/uL    Monocytes, Absolute 0.28 0.00 - 0.80 K/uL    Eosinophils, Absolute 0.27 0.00 - 0.50 K/uL    Basophils, Absolute 0.02 0.00 - 0.20 K/uL    Immature Granulocytes, Absolute 0.03 0.00 - 0.04 K/uL    nRBC, Absolute 0.00 <=0.00 x10e3/uL    Diff Type Auto    POCT glucose    Collection Time: 08/16/23  4:44 AM   Result Value Ref Range    POC Glucose 126 (H) 70 - 105 mg/dL   Ammonia    Collection Time: 08/16/23  6:56 AM   Result Value Ref Range    Ammonia 35 (H) 11 - 32 µmol/L   POCT glucose    Collection Time: 08/16/23 10:33 AM   Result Value Ref Range    POC Glucose 106 (H) 70 - 105 mg/dL   POCT glucose    Collection Time: 08/16/23  3:09 PM   Result Value Ref Range    POC Glucose 129 (H) 70 - 105 mg/dL   POCT glucose    Collection Time: 08/16/23  7:45 PM   Result Value Ref Range    POC Glucose 101 70 - 105 mg/dL   Basic Metabolic Panel    Collection Time: 08/17/23  4:14 AM   Result Value Ref Range    Sodium 133 (L) 136 - 145 mmol/L    Potassium 4.8 3.5 - 5.1 mmol/L    Chloride 101 98 - 107 mmol/L    CO2 22 21 - 32 mmol/L    Anion Gap 15 7 - 16 mmol/L    Glucose 130 (H) 74 - 106 mg/dL    BUN 6 (L) 7 - 18 mg/dL    Creatinine 0.56 (L) 0.70 - 1.30 mg/dL    BUN/Creatinine Ratio 11 6 - 20    Calcium 8.0 (L) 8.5 - 10.1 mg/dL    eGFR 102 >=60 mL/min/1.73m2   CBC with Differential    Collection Time: 08/17/23  4:14 AM   Result Value Ref Range    WBC 8.04 4.50 - 11.00 K/uL    RBC 3.34 (L) 4.60 - 6.20 M/uL    Hemoglobin 10.8 (L) 13.5 - 18.0 g/dL    Hematocrit 33.8 (L) 40.0 -  54.0 %    .2 (H) 80.0 - 96.0 fL    MCH 32.3 (H) 27.0 - 31.0 pg    MCHC 32.0 32.0 - 36.0 g/dL    RDW 13.1 11.5 - 14.5 %    Platelet Count 198 150 - 400 K/uL    MPV 10.1 9.4 - 12.4 fL    Neutrophils % 69.6 (H) 53.0 - 65.0 %    Lymphocytes % 18.3 (L) 27.0 - 41.0 %    Monocytes % 6.3 (H) 2.0 - 6.0 %    Eosinophils % 5.2 (H) 1.0 - 4.0 %    Basophils % 0.4 0.0 - 1.0 %    Immature Granulocytes % 0.2 0.0 - 0.4 %    nRBC, Auto 0.0 <=0.0 %    Neutrophils, Abs 5.59 1.80 - 7.70 K/uL    Lymphocytes, Absolute 1.47 1.00 - 4.80 K/uL    Monocytes, Absolute 0.51 0.00 - 0.80 K/uL    Eosinophils, Absolute 0.42 0.00 - 0.50 K/uL    Basophils, Absolute 0.03 0.00 - 0.20 K/uL    Immature Granulocytes, Absolute 0.02 0.00 - 0.04 K/uL    nRBC, Absolute 0.00 <=0.00 x10e3/uL    Diff Type Auto    POCT glucose    Collection Time: 08/17/23  4:47 AM   Result Value Ref Range    POC Glucose 142 (H) 70 - 105 mg/dL   POCT glucose    Collection Time: 08/17/23 11:41 AM   Result Value Ref Range    POC Glucose 150 (H) 70 - 105 mg/dL   POCT glucose    Collection Time: 08/17/23 12:49 PM   Result Value Ref Range    POC Glucose 136 (H) 70 - 105 mg/dL   POCT glucose    Collection Time: 08/17/23  3:31 PM   Result Value Ref Range    POC Glucose 114 (H) 70 - 105 mg/dL   Comprehensive metabolic panel    Collection Time: 08/18/23  8:08 PM   Result Value Ref Range    Sodium 141 136 - 145 mmol/L    Potassium 3.8 3.5 - 5.1 mmol/L    Chloride 108 (H) 98 - 107 mmol/L    CO2 28 21 - 32 mmol/L    Anion Gap 9 7 - 16 mmol/L    Glucose 110 (H) 74 - 106 mg/dL    BUN 6 (L) 7 - 18 mg/dL    Creatinine 0.54 (L) 0.70 - 1.30 mg/dL    BUN/Creatinine Ratio 11 6 - 20    Calcium 7.6 (L) 8.5 - 10.1 mg/dL    Total Protein 5.4 (L) 6.4 - 8.2 g/dL    Albumin 2.1 (L) 3.5 - 5.0 g/dL    Globulin 3.3 2.0 - 4.0 g/dL    A/G Ratio 0.6     Bilirubin, Total 0.6 >0.0 - 1.2 mg/dL    Alk Phos 60 45 - 115 U/L    ALT 25 16 - 61 U/L    AST 31 15 - 37 U/L    eGFR 103 >=60 mL/min/1.73m2   NT-Pro  Natriuretic Peptide    Collection Time: 08/18/23  8:08 PM   Result Value Ref Range    ProBNP 1,016 (H) 1 - 450 pg/mL   Lipase    Collection Time: 08/18/23  8:08 PM   Result Value Ref Range    Lipase 45 (L) 73 - 393 U/L   Troponin I    Collection Time: 08/18/23  8:08 PM   Result Value Ref Range    Troponin I High Sensitivity 56.3 <=60.4 pg/mL   CBC with Differential    Collection Time: 08/18/23  8:08 PM   Result Value Ref Range    WBC 6.33 4.50 - 11.00 K/uL    RBC 3.16 (L) 4.60 - 6.20 M/uL    Hemoglobin 10.3 (L) 13.5 - 18.0 g/dL    Hematocrit 30.8 (L) 40.0 - 54.0 %    MCV 97.5 (H) 80.0 - 96.0 fL    MCH 32.6 (H) 27.0 - 31.0 pg    MCHC 33.4 32.0 - 36.0 g/dL    RDW 13.2 11.5 - 14.5 %    Platelet Count 265 150 - 400 K/uL    MPV 9.3 (L) 9.4 - 12.4 fL    Neutrophils % 73.1 (H) 53.0 - 65.0 %    Lymphocytes % 13.7 (L) 27.0 - 41.0 %    Monocytes % 7.9 (H) 2.0 - 6.0 %    Eosinophils % 3.9 1.0 - 4.0 %    Basophils % 0.8 0.0 - 1.0 %    Immature Granulocytes % 0.6 (H) 0.0 - 0.4 %    nRBC, Auto 0.0 <=0.0 %    Neutrophils, Abs 4.62 1.80 - 7.70 K/uL    Lymphocytes, Absolute 0.87 (L) 1.00 - 4.80 K/uL    Monocytes, Absolute 0.50 0.00 - 0.80 K/uL    Eosinophils, Absolute 0.25 0.00 - 0.50 K/uL    Basophils, Absolute 0.05 0.00 - 0.20 K/uL    Immature Granulocytes, Absolute 0.04 0.00 - 0.04 K/uL    nRBC, Absolute 0.00 <=0.00 x10e3/uL    Diff Type Auto    COVID-19 Rapid Screening    Collection Time: 08/18/23  8:46 PM   Result Value Ref Range    SARS COV-2 MOLECULAR Negative Negative, Invalid   Urinalysis, Reflex to Urine Culture    Collection Time: 08/18/23  9:29 PM    Specimen: Urine   Result Value Ref Range    Color, UA Yellow Colorless, Straw, Yellow, Light Yellow, Dark Yellow    Clarity, UA Clear Clear    pH, UA 7.5 5.0 to 8.0 pH Units    Leukocytes, UA Negative Negative    Nitrites, UA Negative Negative    Protein, UA 10 (A) Negative    Glucose, UA Normal Normal mg/dL    Ketones, UA Negative Negative mg/dL    Urobilinogen, UA 2 (A)  0.2, 1.0, Normal mg/dL    Bilirubin, UA Negative Negative    Blood, UA Negative Negative    Specific Gravity, UA 1.017 <=1.030   Basic Metabolic Panel (BMP)    Collection Time: 08/19/23  6:09 AM   Result Value Ref Range    Sodium 141 136 - 145 mmol/L    Potassium 3.3 (L) 3.5 - 5.1 mmol/L    Chloride 109 (H) 98 - 107 mmol/L    CO2 26 21 - 32 mmol/L    Anion Gap 9 7 - 16 mmol/L    Glucose 138 (H) 74 - 106 mg/dL    BUN 6 (L) 7 - 18 mg/dL    Creatinine 0.52 (L) 0.70 - 1.30 mg/dL    BUN/Creatinine Ratio 12 6 - 20    Calcium 7.7 (L) 8.5 - 10.1 mg/dL    eGFR 104 >=60 mL/min/1.73m2   Ammonia    Collection Time: 08/19/23  6:09 AM   Result Value Ref Range    Ammonia 85 (H) 11 - 32 µmol/L   Troponin I    Collection Time: 08/19/23  6:09 AM   Result Value Ref Range    Troponin I High Sensitivity 42.2 <=60.4 pg/mL   CBC with Differential    Collection Time: 08/19/23  6:10 AM   Result Value Ref Range    WBC 7.76 4.50 - 11.00 K/uL    RBC 2.92 (L) 4.60 - 6.20 M/uL    Hemoglobin 9.6 (L) 13.5 - 18.0 g/dL    Hematocrit 27.6 (L) 40.0 - 54.0 %    MCV 94.5 80.0 - 96.0 fL    MCH 32.9 (H) 27.0 - 31.0 pg    MCHC 34.8 32.0 - 36.0 g/dL    RDW 13.1 11.5 - 14.5 %    Platelet Count 263 150 - 400 K/uL    MPV 9.7 9.4 - 12.4 fL    Neutrophils % 74.2 (H) 53.0 - 65.0 %    Lymphocytes % 12.2 (L) 27.0 - 41.0 %    Monocytes % 8.0 (H) 2.0 - 6.0 %    Eosinophils % 4.6 (H) 1.0 - 4.0 %    Basophils % 0.5 0.0 - 1.0 %    Immature Granulocytes % 0.5 (H) 0.0 - 0.4 %    nRBC, Auto 0.0 <=0.0 %    Neutrophils, Abs 5.75 1.80 - 7.70 K/uL    Lymphocytes, Absolute 0.95 (L) 1.00 - 4.80 K/uL    Monocytes, Absolute 0.62 0.00 - 0.80 K/uL    Eosinophils, Absolute 0.36 0.00 - 0.50 K/uL    Basophils, Absolute 0.04 0.00 - 0.20 K/uL    Immature Granulocytes, Absolute 0.04 0.00 - 0.04 K/uL    nRBC, Absolute 0.00 <=0.00 x10e3/uL   Protime-INR    Collection Time: 08/19/23  9:22 AM   Result Value Ref Range    PT 16.7 (H) 11.7 - 14.7 seconds    INR 1.37 <=3.30   Procalcitonin     Collection Time: 08/19/23  9:22 AM   Result Value Ref Range    Procalcitonin 0.11 <=0.15 ng/mL   Sedimentation Rate    Collection Time: 08/19/23  9:22 AM   Result Value Ref Range    ESR Westergren 28 (H) 0 - 20 mm/Hr   C-Reactive Protein    Collection Time: 08/19/23  9:22 AM   Result Value Ref Range    CRP 2.42 (H) 0.00 - 0.80 mg/dL   Protein, Random Urine    Collection Time: 08/19/23 10:10 PM   Result Value Ref Range    Protein, Urine 27.9 (H) 0.0 - 11.9 mg/dL   Creatinine, Random Urine    Collection Time: 08/19/23 10:10 PM   Result Value Ref Range    Creatinine, Urine 127 39 - 259 mg/dL   Basic Metabolic Panel (BMP)    Collection Time: 08/20/23  5:21 AM   Result Value Ref Range    Sodium 140 136 - 145 mmol/L    Potassium 3.7 3.5 - 5.1 mmol/L    Chloride 109 (H) 98 - 107 mmol/L    CO2 26 21 - 32 mmol/L    Anion Gap 9 7 - 16 mmol/L    Glucose 147 (H) 74 - 106 mg/dL    BUN 8 7 - 18 mg/dL    Creatinine 0.71 0.70 - 1.30 mg/dL    BUN/Creatinine Ratio 11 6 - 20    Calcium 7.5 (L) 8.5 - 10.1 mg/dL    eGFR 94 >=60 mL/min/1.73m2   CBC with Differential    Collection Time: 08/20/23  5:21 AM   Result Value Ref Range    WBC 11.80 (H) 4.50 - 11.00 K/uL    RBC 3.12 (L) 4.60 - 6.20 M/uL    Hemoglobin 10.1 (L) 13.5 - 18.0 g/dL    Hematocrit 30.6 (L) 40.0 - 54.0 %    MCV 98.1 (H) 80.0 - 96.0 fL    MCH 32.4 (H) 27.0 - 31.0 pg    MCHC 33.0 32.0 - 36.0 g/dL    RDW 13.4 11.5 - 14.5 %    Platelet Count 288 150 - 400 K/uL    MPV 9.7 9.4 - 12.4 fL    Neutrophils % 81.4 (H) 53.0 - 65.0 %    Lymphocytes % 7.9 (L) 27.0 - 41.0 %    Monocytes % 6.4 (H) 2.0 - 6.0 %    Eosinophils % 3.0 1.0 - 4.0 %    Basophils % 0.8 0.0 - 1.0 %    Immature Granulocytes % 0.5 (H) 0.0 - 0.4 %    nRBC, Auto 0.0 <=0.0 %    Neutrophils, Abs 9.62 (H) 1.80 - 7.70 K/uL    Lymphocytes, Absolute 0.93 (L) 1.00 - 4.80 K/uL    Monocytes, Absolute 0.75 0.00 - 0.80 K/uL    Eosinophils, Absolute 0.35 0.00 - 0.50 K/uL    Basophils, Absolute 0.09 0.00 - 0.20 K/uL    Immature  Granulocytes, Absolute 0.06 (H) 0.00 - 0.04 K/uL    nRBC, Absolute 0.00 <=0.00 x10e3/uL    Diff Type Auto    Basic Metabolic Panel (BMP)    Collection Time: 08/21/23  4:11 AM   Result Value Ref Range    Sodium 142 136 - 145 mmol/L    Potassium 3.2 (L) 3.5 - 5.1 mmol/L    Chloride 111 (H) 98 - 107 mmol/L    CO2 27 21 - 32 mmol/L    Anion Gap 7 7 - 16 mmol/L    Glucose 160 (H) 74 - 106 mg/dL    BUN 12 7 - 18 mg/dL    Creatinine 0.82 0.70 - 1.30 mg/dL    BUN/Creatinine Ratio 15 6 - 20    Calcium 7.7 (L) 8.5 - 10.1 mg/dL    eGFR 90 >=60 mL/min/1.73m2   CBC with Differential    Collection Time: 08/21/23  4:11 AM   Result Value Ref Range    WBC 9.86 4.50 - 11.00 K/uL    RBC 3.08 (L) 4.60 - 6.20 M/uL    Hemoglobin 10.0 (L) 13.5 - 18.0 g/dL    Hematocrit 29.6 (L) 40.0 - 54.0 %    MCV 96.1 (H) 80.0 - 96.0 fL    MCH 32.5 (H) 27.0 - 31.0 pg    MCHC 33.8 32.0 - 36.0 g/dL    RDW 13.5 11.5 - 14.5 %    Platelet Count 316 150 - 400 K/uL    MPV 9.8 9.4 - 12.4 fL    Neutrophils % 75.2 (H) 53.0 - 65.0 %    Lymphocytes % 10.5 (L) 27.0 - 41.0 %    Monocytes % 8.7 (H) 2.0 - 6.0 %    Eosinophils % 4.4 (H) 1.0 - 4.0 %    Basophils % 0.7 0.0 - 1.0 %    Immature Granulocytes % 0.5 (H) 0.0 - 0.4 %    nRBC, Auto 0.0 <=0.0 %    Neutrophils, Abs 7.41 1.80 - 7.70 K/uL    Lymphocytes, Absolute 1.04 1.00 - 4.80 K/uL    Monocytes, Absolute 0.86 (H) 0.00 - 0.80 K/uL    Eosinophils, Absolute 0.43 0.00 - 0.50 K/uL    Basophils, Absolute 0.07 0.00 - 0.20 K/uL    Immature Granulocytes, Absolute 0.05 (H) 0.00 - 0.04 K/uL    nRBC, Absolute 0.00 <=0.00 x10e3/uL    Diff Type Auto    Ammonia    Collection Time: 08/21/23  6:10 AM   Result Value Ref Range    Ammonia 60 (H) 11 - 32 µmol/L   VANCOMYCIN, TROUGH    Collection Time: 08/21/23  6:45 AM   Result Value Ref Range    Vancomycin, Trough 12.6 10.0 - 20.0 µg/mL   Comprehensive Metabolic Panel    Collection Time: 08/23/23  6:34 PM   Result Value Ref Range    Sodium 139 136 - 145 mmol/L    Potassium 4.8 3.5 -  5.1 mmol/L    Chloride 104 98 - 107 mmol/L    CO2 31 21 - 32 mmol/L    Anion Gap 9 7 - 16 mmol/L    Glucose 144 (H) 74 - 106 mg/dL    BUN 26 (H) 7 - 18 mg/dL    Creatinine 1.15 0.70 - 1.30 mg/dL    BUN/Creatinine Ratio 23 (H) 6 - 20    Calcium 8.1 (L) 8.5 - 10.1 mg/dL    Total Protein 6.2 (L) 6.4 - 8.2 g/dL    Albumin 2.0 (L) 3.5 - 5.0 g/dL    Globulin 4.2 (H) 2.0 - 4.0 g/dL    A/G Ratio 0.5     Bilirubin, Total 0.7 >0.0 - 1.2 mg/dL    Alk Phos 73 45 - 115 U/L    ALT 39 16 - 61 U/L    AST 45 (H) 15 - 37 U/L    eGFR 66 >=60 mL/min/1.73m2   Vitamin D    Collection Time: 08/23/23  6:34 PM   Result Value Ref Range    Vitamin D 25-Hydroxy, Blood 34.7 ng/mL   TSH    Collection Time: 08/23/23  6:34 PM   Result Value Ref Range    TSH 1.120 0.358 - 3.740 uIU/mL   CBC with Differential    Collection Time: 08/23/23  6:37 PM   Result Value Ref Range    WBC 7.16 4.50 - 11.00 K/uL    RBC 2.56 (L) 4.60 - 6.20 M/uL    Hemoglobin 8.3 (L) 13.5 - 18.0 g/dL    Hematocrit 26.6 (L) 40.0 - 54.0 %    .9 (H) 80.0 - 96.0 fL    MCH 32.4 (H) 27.0 - 31.0 pg    MCHC 31.2 (L) 32.0 - 36.0 g/dL    RDW 13.6 11.5 - 14.5 %    Platelet Count 358 150 - 400 K/uL    MPV 9.3 (L) 9.4 - 12.4 fL    Neutrophils % 68.3 (H) 53.0 - 65.0 %    Lymphocytes % 13.0 (L) 27.0 - 41.0 %    Neutrophils, Abs 4.89 1.80 - 7.70 K/uL    Lymphocytes, Absolute 0.93 (L) 1.00 - 4.80 K/uL    Diff Type Manual     Monocytes % 12.4 (H) 2.0 - 6.0 %    Eosinophils % 5.0 (H) 1.0 - 4.0 %    Basophils % 1.3 (H) 0.0 - 1.0 %    Monocytes, Absolute 0.89 (H) 0.00 - 0.80 K/uL    Eosinophils, Absolute 0.36 0.00 - 0.50 K/uL    Basophils, Absolute 0.09 0.00 - 0.20 K/uL   Manual Differential    Collection Time: 08/23/23  6:37 PM   Result Value Ref Range    Segmented Neutrophils, Man % 72 (H) 50 - 62 %    Lymphocytes, Man % 13 (L) 27 - 41 %    Monocytes, Man % 9 (H) 2 - 6 %    Eosinophils, Man % 6 (H) 1 - 4 %    nRBC, Manual      Platelet Morphology Normal Normal    Macrocytosis Few    Basic  Metabolic Panel (BMP)    Collection Time: 08/24/23  5:23 AM   Result Value Ref Range    Sodium 140 136 - 145 mmol/L    Potassium 3.9 3.5 - 5.1 mmol/L    Chloride 105 98 - 107 mmol/L    CO2 27 21 - 32 mmol/L    Anion Gap 12 7 - 16 mmol/L    Glucose 166 (H) 74 - 106 mg/dL    BUN 24 (H) 7 - 18 mg/dL    Creatinine 0.94 0.70 - 1.30 mg/dL    BUN/Creatinine Ratio 26 (H) 6 - 20    Calcium 7.7 (L) 8.5 - 10.1 mg/dL    eGFR 83 >=60 mL/min/1.73m2   CBC with Differential    Collection Time: 08/24/23  5:23 AM   Result Value Ref Range    WBC 6.55 4.50 - 11.00 K/uL    RBC 2.80 (L) 4.60 - 6.20 M/uL    Hemoglobin 9.1 (L) 13.5 - 18.0 g/dL    Hematocrit 28.7 (L) 40.0 - 54.0 %    .5 (H) 80.0 - 96.0 fL    MCH 32.5 (H) 27.0 - 31.0 pg    MCHC 31.7 (L) 32.0 - 36.0 g/dL    RDW 13.3 11.5 - 14.5 %    Platelet Count 360 150 - 400 K/uL    MPV 9.4 9.4 - 12.4 fL    Neutrophils % 69.8 (H) 53.0 - 65.0 %    Lymphocytes % 13.6 (L) 27.0 - 41.0 %    Neutrophils, Abs 4.57 1.80 - 7.70 K/uL    Lymphocytes, Absolute 0.89 (L) 1.00 - 4.80 K/uL    Diff Type Auto     Monocytes % 11.1 (H) 2.0 - 6.0 %    Eosinophils % 4.3 (H) 1.0 - 4.0 %    Basophils % 1.2 (H) 0.0 - 1.0 %    Monocytes, Absolute 0.73 0.00 - 0.80 K/uL    Eosinophils, Absolute 0.28 0.00 - 0.50 K/uL    Basophils, Absolute 0.08 0.00 - 0.20 K/uL   COVID-19 Rapid Screening    Collection Time: 08/24/23  1:41 PM   Result Value Ref Range    SARS COV-2 MOLECULAR Negative Negative, Invalid   POCT glucose    Collection Time: 08/25/23 11:32 AM   Result Value Ref Range    POC Glucose 191 (H) 70 - 105 mg/dL   POCT glucose    Collection Time: 08/25/23  4:28 PM   Result Value Ref Range    POC Glucose 159 (H) 70 - 105 mg/dL   POCT glucose    Collection Time: 08/25/23  9:25 PM   Result Value Ref Range    POC Glucose 120 (H) 70 - 105 mg/dL   POCT glucose    Collection Time: 08/26/23  6:11 AM   Result Value Ref Range    POC Glucose 118 (H) 70 - 105 mg/dL   POCT glucose    Collection Time: 08/26/23 11:55 AM    Result Value Ref Range    POC Glucose 110 (H) 70 - 105 mg/dL   POCT glucose    Collection Time: 08/26/23  4:56 PM   Result Value Ref Range    POC Glucose 134 (H) 70 - 105 mg/dL   POCT glucose    Collection Time: 08/26/23  8:35 PM   Result Value Ref Range    POC Glucose 135 (H) 70 - 105 mg/dL   POCT glucose    Collection Time: 08/27/23  6:16 AM   Result Value Ref Range    POC Glucose 146 (H) 70 - 105 mg/dL   POCT glucose    Collection Time: 08/27/23 11:36 AM   Result Value Ref Range    POC Glucose 143 (H) 70 - 105 mg/dL   POCT glucose    Collection Time: 08/27/23  3:44 PM   Result Value Ref Range    POC Glucose 130 (H) 70 - 105 mg/dL   POCT glucose    Collection Time: 08/27/23  9:08 PM   Result Value Ref Range    POC Glucose 144 (H) 70 - 105 mg/dL   POCT glucose    Collection Time: 08/28/23  6:21 AM   Result Value Ref Range    POC Glucose 124 (H) 70 - 105 mg/dL   POCT glucose    Collection Time: 08/28/23  6:12 PM   Result Value Ref Range    POC Glucose 153 (H) 70 - 105 mg/dL   POCT glucose    Collection Time: 08/28/23  8:47 PM   Result Value Ref Range    POC Glucose 141 (H) 70 - 105 mg/dL   POCT glucose    Collection Time: 08/29/23  5:39 AM   Result Value Ref Range    POC Glucose 102 70 - 105 mg/dL   POCT glucose    Collection Time: 08/29/23 12:13 PM   Result Value Ref Range    POC Glucose 118 (H) 70 - 105 mg/dL   Urinalysis, Reflex to Urine Culture Urine, Clean Catch    Collection Time: 08/29/23  3:47 PM    Specimen: Urine   Result Value Ref Range    Color, UA Yellow Colorless, Straw, Yellow, Light Yellow, Dark Yellow    Clarity, UA Clear Clear    pH, UA 6.0 5.0 to 8.0 pH Units    Leukocytes, UA Trace (A) Negative    Nitrites, UA Negative Negative    Protein, UA 30 (A) Negative    Glucose, UA Negative Normal mg/dL    Ketones, UA Trace Negative mg/dL    Urobilinogen, UA 0.2 0.2, 1.0, Normal mg/dL    Bilirubin, UA Negative Negative    Blood, UA Moderate (A) Negative    Specific Gravity, UA 1.020 <=1.005, 1.010,  1.015, 1.020, 1.025, 1.030   Urinalysis, Microscopic    Collection Time: 08/29/23  3:47 PM   Result Value Ref Range    WBC, UA 0-5 None Seen, 0-5 /hpf    RBC, UA 25-50 (A) None Seen, 0-3 /hpf    Bacteria, UA Rare None Seen /hpf    Squamous Epithelial Cells, UA Rare None Seen, Rare, None Seen To Occasional /lpf   POCT glucose    Collection Time: 08/29/23  6:11 PM   Result Value Ref Range    POC Glucose 131 (H) 70 - 105 mg/dL   POCT glucose    Collection Time: 08/29/23  9:12 PM   Result Value Ref Range    POC Glucose 123 (H) 70 - 105 mg/dL   POCT glucose    Collection Time: 08/30/23  5:38 AM   Result Value Ref Range    POC Glucose 112 (H) 70 - 105 mg/dL   POCT glucose    Collection Time: 08/30/23 11:32 AM   Result Value Ref Range    POC Glucose 101 70 - 105 mg/dL   POCT glucose    Collection Time: 08/30/23  4:47 PM   Result Value Ref Range    POC Glucose 153 (H) 70 - 105 mg/dL   Basic Metabolic Panel    Collection Time: 08/30/23  6:18 PM   Result Value Ref Range    Sodium 140 136 - 145 mmol/L    Potassium 3.6 3.5 - 5.1 mmol/L    Chloride 104 98 - 107 mmol/L    CO2 31 21 - 32 mmol/L    Anion Gap 9 7 - 16 mmol/L    Glucose 158 (H) 74 - 106 mg/dL    BUN 13 7 - 18 mg/dL    Creatinine 0.87 0.70 - 1.30 mg/dL    BUN/Creatinine Ratio 15 6 - 20    Calcium 8.1 (L) 8.5 - 10.1 mg/dL    eGFR 89 >=60 mL/min/1.73m2   CBC with Differential    Collection Time: 08/30/23  6:18 PM   Result Value Ref Range    WBC 4.90 4.50 - 11.00 K/uL    RBC 2.85 (L) 4.60 - 6.20 M/uL    Hemoglobin 9.1 (L) 13.5 - 18.0 g/dL    Hematocrit 29.7 (L) 40.0 - 54.0 %    .2 (H) 80.0 - 96.0 fL    MCH 31.9 (H) 27.0 - 31.0 pg    MCHC 30.6 (L) 32.0 - 36.0 g/dL    RDW 13.7 11.5 - 14.5 %    Platelet Count 289 150 - 400 K/uL    MPV 9.5 9.4 - 12.4 fL    Neutrophils % 64.8 53.0 - 65.0 %    Lymphocytes % 17.3 (L) 27.0 - 41.0 %    Neutrophils, Abs 3.17 1.80 - 7.70 K/uL    Lymphocytes, Absolute 0.85 (L) 1.00 - 4.80 K/uL    Diff Type Auto     Monocytes % 10.8 (H) 2.0 -  6.0 %    Eosinophils % 6.5 (H) 1.0 - 4.0 %    Basophils % 0.6 0.0 - 1.0 %    Monocytes, Absolute 0.53 0.00 - 0.80 K/uL    Eosinophils, Absolute 0.32 0.00 - 0.50 K/uL    Basophils, Absolute 0.03 0.00 - 0.20 K/uL   POCT glucose    Collection Time: 08/30/23  8:01 PM   Result Value Ref Range    POC Glucose 123 (H) 70 - 105 mg/dL   POCT glucose    Collection Time: 08/31/23  4:39 AM   Result Value Ref Range    POC Glucose 111 (H) 70 - 105 mg/dL   Basic Metabolic Panel (BMP)    Collection Time: 08/31/23  4:42 AM   Result Value Ref Range    Sodium 139 136 - 145 mmol/L    Potassium 3.6 3.5 - 5.1 mmol/L    Chloride 105 98 - 107 mmol/L    CO2 31 21 - 32 mmol/L    Anion Gap 7 7 - 16 mmol/L    Glucose 118 (H) 74 - 106 mg/dL    BUN 11 7 - 18 mg/dL    Creatinine 0.78 0.70 - 1.30 mg/dL    BUN/Creatinine Ratio 14 6 - 20    Calcium 8.1 (L) 8.5 - 10.1 mg/dL    eGFR 92 >=60 mL/min/1.73m2   CBC with Differential    Collection Time: 08/31/23  4:42 AM   Result Value Ref Range    WBC 4.70 4.50 - 11.00 K/uL    RBC 2.56 (L) 4.60 - 6.20 M/uL    Hemoglobin 8.3 (L) 13.5 - 18.0 g/dL    Hematocrit 26.6 (L) 40.0 - 54.0 %    .9 (H) 80.0 - 96.0 fL    MCH 32.4 (H) 27.0 - 31.0 pg    MCHC 31.2 (L) 32.0 - 36.0 g/dL    RDW 13.6 11.5 - 14.5 %    Platelet Count 253 150 - 400 K/uL    MPV 9.5 9.4 - 12.4 fL    Neutrophils % 59.1 53.0 - 65.0 %    Lymphocytes % 20.2 (L) 27.0 - 41.0 %    Neutrophils, Abs 2.78 1.80 - 7.70 K/uL    Lymphocytes, Absolute 0.95 (L) 1.00 - 4.80 K/uL    Diff Type Auto     Monocytes % 11.3 (H) 2.0 - 6.0 %    Eosinophils % 8.3 (H) 1.0 - 4.0 %    Basophils % 1.1 (H) 0.0 - 1.0 %    Monocytes, Absolute 0.53 0.00 - 0.80 K/uL    Eosinophils, Absolute 0.39 0.00 - 0.50 K/uL    Basophils, Absolute 0.05 0.00 - 0.20 K/uL   POCT glucose    Collection Time: 08/31/23 11:21 AM   Result Value Ref Range    POC Glucose 171 (H) 70 - 105 mg/dL   POCT glucose    Collection Time: 08/31/23  5:01 PM   Result Value Ref Range    POC Glucose 148 (H) 70 -  105 mg/dL   POCT glucose    Collection Time: 08/31/23  8:20 PM   Result Value Ref Range    POC Glucose 121 (H) 70 - 105 mg/dL   POCT glucose    Collection Time: 09/01/23  5:29 AM   Result Value Ref Range    POC Glucose 92 70 - 105 mg/dL   POCT glucose    Collection Time: 09/01/23 11:09 AM   Result Value Ref Range    POC Glucose 147 (H) 70 - 105 mg/dL   POCT glucose    Collection Time: 09/01/23  8:43 PM   Result Value Ref Range    POC Glucose 126 (H) 70 - 105 mg/dL   POCT glucose    Collection Time: 09/02/23  5:32 AM   Result Value Ref Range    POC Glucose 101 70 - 105 mg/dL   POCT glucose    Collection Time: 09/02/23 11:20 AM   Result Value Ref Range    POC Glucose 141 (H) 70 - 105 mg/dL   POCT glucose    Collection Time: 09/02/23  4:44 PM   Result Value Ref Range    POC Glucose 92 70 - 105 mg/dL   POCT glucose    Collection Time: 09/02/23  8:29 PM   Result Value Ref Range    POC Glucose 132 (H) 70 - 105 mg/dL   POCT glucose    Collection Time: 09/03/23  5:43 AM   Result Value Ref Range    POC Glucose 94 70 - 105 mg/dL        ASSESSMENT:      The patient is doing well after surgery.       PLAN:      Follow up PRN.    Continue wet-to-dry dressings of midline incision

## 2023-09-05 NOTE — NURSING
Bladder training started at this time. Clamped ribera at this time. Teaching done. Pt stated he understood.

## 2023-09-05 NOTE — PT/OT/SLP PROGRESS
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 male with a medical diagnosis of Infected hernioplasty mesh deconditioning with trunk weakness.  He presents with decreased balance, endurance. Performance deficits affecting function are weakness, impaired endurance, impaired functional mobility, impaired self care skills, impaired balance, decreased safety awareness.  Pt has progress with mobility and ADLs. Pt is beginning to express interested in going home.Pt to continue skilled OT this week to increase overall strength as much as possible in prep to lessen burden of care at home. Pt has good motivation and will continue to progress as his abdominal strength improves. Pt noted to have better unsupported sitting on today as he did not use is BUE as a prop to lean backward due to trunk fatigue.       Rehab Prognosis:  Fair; patient would benefit from acute skilled OT services to address these deficits and reach maximum level of function.       Plan:     Patient to be seen 5 x/week to address the above listed problems via self-care/home management, therapeutic exercises, therapeutic activities, neuromuscular re-education  Plan of Care Expires:    Plan of Care Reviewed with: patient    Subjective     Chief Complaint: abdominal pain, decreased strength  Patient/Family Comments/goals: increase ADLs and mobility  Pain/Comfort:  5/10 knee pain     Objective:     Communicated with: Pt prior to session.  Patient found supine with SCD upon OT entry to room.    General Precautions: Standard, fall    Orthopedic Precautions:   Braces:    Respiratory Status: Room air      Occupational Performance:     Bed Mobility:    Patient presented sitting upright in the w/c upon Ot's arrival  Functional Mobility/Transfers:  Patient completed Sit <> Stand Transfer with Contact guard assistance  with  rolling walker   Functional Mobility: Pt completed functional ambulation down to the therapy gym form room using the RW for balance and  safety with SVN    Activities of Daily Living:  Sit to stand t/f with SVN- (3 reps) with some difficulty due to trunk weakness at SVN-Mod I with pain      AMPAC 6 Click ADL:      Treatment & Education:    Therapeutic exercise:  While sitting unsupported , the pt completed bicep curls 2 sets and 20 rep 5# DB, chest press 2 sets of 20 reps with 3# dowel to increase BUE strength for ADLs and IADLs.      While sitting unsupported on the mat, the pt [id number] mins on arm ergometer to increase endurance, BUE strength and activity tolerance for ADL performance    Using the RW, the pt stood to place pegs from peg board on the wall to increase standing tolerance, dynamic balance and endurance x 5mins    While standing the pt tossed a large therapy ball back and forth with OT to improve gross motor coordination, core strength, UB strength and endurance for ADL performance.    Patient left supine with all lines intact and call button in reach    GOALS:   Multidisciplinary Problems       Occupational Therapy Goals          Problem: Occupational Therapy    Goal Priority Disciplines Outcome Interventions   Occupational Therapy Goal     OT, PT/OT Ongoing, Progressing    Description: Goals to be met by: 9/24/23     Patient will increase functional independence with ADLs by performing:    Feeding with Modified [place].  UE Dressing with Modified [place].  LE Dressing with Modified [place].  Grooming while standing with Modified [place].  Toileting from toilet with Modified [place] for hygiene and clothing management.   Standing x5-10 minutes with  Modified [place].  Supine to sit with Modified [place].  Step transfer with Modified [place]  Toilet transfer to toilet with Modified [place].                         Time Tracking:     OT Date of Treatment:    OT Start Time:  9:00  OT Stop Time:  9:45  OT Total Time (min):      Billable Minutes:Self Care/Home Management 15  Therapeutic activity:15  Therapeutic Exercise 15        OT/EVELYN: OT          9/1/2023

## 2023-09-06 PROCEDURE — 27000958

## 2023-09-06 PROCEDURE — 99307 PR NURSING FAC CARE, SUBSEQ, IMPROVING: ICD-10-PCS | Mod: ,,, | Performed by: EMERGENCY MEDICINE

## 2023-09-06 PROCEDURE — 11000004 HC SNF PRIVATE

## 2023-09-06 PROCEDURE — 97110 THERAPEUTIC EXERCISES: CPT

## 2023-09-06 PROCEDURE — 94761 N-INVAS EAR/PLS OXIMETRY MLT: CPT

## 2023-09-06 PROCEDURE — 27000981 HC MATTRESS, ACCUCAIR DAILY RENTAL

## 2023-09-06 PROCEDURE — 99900035 HC TECH TIME PER 15 MIN (STAT)

## 2023-09-06 PROCEDURE — 97530 THERAPEUTIC ACTIVITIES: CPT

## 2023-09-06 PROCEDURE — 97535 SELF CARE MNGMENT TRAINING: CPT

## 2023-09-06 PROCEDURE — 99307 SBSQ NF CARE SF MDM 10: CPT | Mod: ,,, | Performed by: EMERGENCY MEDICINE

## 2023-09-06 PROCEDURE — 97110 THERAPEUTIC EXERCISES: CPT | Mod: CQ

## 2023-09-06 PROCEDURE — 25000003 PHARM REV CODE 250: Performed by: FAMILY MEDICINE

## 2023-09-06 PROCEDURE — 25000003 PHARM REV CODE 250: Performed by: PHYSICIAN ASSISTANT

## 2023-09-06 RX ADMIN — LACTULOSE 20 G: 20 SOLUTION ORAL at 08:09

## 2023-09-06 RX ADMIN — SENNOSIDES AND DOCUSATE SODIUM 1 TABLET: 50; 8.6 TABLET ORAL at 08:09

## 2023-09-06 RX ADMIN — RIFAXIMIN 550 MG: 550 TABLET ORAL at 09:09

## 2023-09-06 RX ADMIN — Medication 5000 UNITS: at 09:09

## 2023-09-06 RX ADMIN — APIXABAN 5 MG: 5 TABLET, FILM COATED ORAL at 08:09

## 2023-09-06 RX ADMIN — QUETIAPINE 50 MG: 25 TABLET ORAL at 08:09

## 2023-09-06 RX ADMIN — QUETIAPINE 50 MG: 25 TABLET ORAL at 09:09

## 2023-09-06 RX ADMIN — Medication 500 MG: at 09:09

## 2023-09-06 RX ADMIN — METOPROLOL TARTRATE 12.5 MG: 25 TABLET, FILM COATED ORAL at 09:09

## 2023-09-06 RX ADMIN — METOPROLOL TARTRATE 12.5 MG: 25 TABLET, FILM COATED ORAL at 08:09

## 2023-09-06 RX ADMIN — FOLIC ACID 1 MG: 1 TABLET ORAL at 09:09

## 2023-09-06 RX ADMIN — PANTOPRAZOLE SODIUM 40 MG: 40 TABLET, DELAYED RELEASE ORAL at 09:09

## 2023-09-06 RX ADMIN — TIMOLOL MALEATE 1 DROP: 5 SOLUTION/ DROPS OPHTHALMIC at 09:09

## 2023-09-06 RX ADMIN — SENNOSIDES AND DOCUSATE SODIUM 1 TABLET: 50; 8.6 TABLET ORAL at 09:09

## 2023-09-06 RX ADMIN — OMEGA-3 FATTY ACIDS CAP 1000 MG 1 CAPSULE: 1000 CAP at 09:09

## 2023-09-06 RX ADMIN — RIFAXIMIN 550 MG: 550 TABLET ORAL at 08:09

## 2023-09-06 RX ADMIN — APIXABAN 5 MG: 5 TABLET, FILM COATED ORAL at 09:09

## 2023-09-06 RX ADMIN — PANTOPRAZOLE SODIUM 40 MG: 40 TABLET, DELAYED RELEASE ORAL at 08:09

## 2023-09-06 RX ADMIN — LACTULOSE 20 G: 20 SOLUTION ORAL at 03:09

## 2023-09-06 RX ADMIN — LACTULOSE 20 G: 20 SOLUTION ORAL at 09:09

## 2023-09-06 NOTE — PROGRESS NOTES
Pt's wt has fluctuated some since admit.  Current wt: 191#.  Meal intake ~% and he is taking Donovan and Glucerna each BID. WND is improved per EMR.  Last BM 9/5.  Hgb 8.3, Hct 26.6.  RDN contacted lab re: pending Zinc results.  RDN visited pt this a.m. and pt denied complaints or requests.  Continue POC.

## 2023-09-06 NOTE — PT/OT/SLP PROGRESS
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 male with a medical diagnosis of Infected hernioplasty mesh deconditioning with trunk weakness.  He presents with decreased balance, endurance. Performance deficits affecting function are weakness, impaired endurance, impaired functional mobility, impaired self care skills, impaired balance, decreased safety awareness.  Pt has progress with mobility and ADLs. Pt is planning to go home on Friday. Pt to continue skilled OT this week to increase overall strength as much as possible in prep to lessen burden of care at home. Pt has good motivation and effort and showed signs of good progress. Pt's overall sit to stand t/f has improved since admission. Home Health is recommended.       Rehab Prognosis:  Fair; patient would benefit from acute skilled OT services to address these deficits and reach maximum level of function.       Plan:     Patient to be seen 5 x/week to address the above listed problems via self-care/home management, therapeutic exercises, therapeutic activities, neuromuscular re-education  Plan of Care Expires:    Plan of Care Reviewed with: patient    Subjective     Chief Complaint: abdominal pain, decreased strength  Patient/Family Comments/goals: increase ADLs and mobility  Pain/Comfort:  5/10 knee pain     Objective:     Communicated with: Pt prior to session.  Patient found supine with SCD upon OT entry to room.    General Precautions: Standard, fall    Orthopedic Precautions:   Braces:    Respiratory Status: Room air     Occupational Performance:     Bed Mobility:    Patient presented sitting upright in  the w/c upon Ot's arrival    Functional Mobility/Transfers:  Patient completed Sit <> Stand Transfer with SVN-Mod I with  rolling walker   Functional Mobility: Pt completed functional ambulation down to the therapy gym  using the RW for balance and  safety with SVN    Activities of Daily Living:  Sit to stand t/f with SVN- (2 set of 10  reps) with some difficulty due to trunk weakness at SVN-Mod I with pain ( 2 sets of 10 reps)      AMPAC 6 Click ADL:      Treatment & Education:    Therapeutic exercise:  While sitting unsupported , the pt completed bicep curls 2 sets and 20 rep 5# DB,  chest press 2 sets of 20 reps with 5# dowel to increase BUE strength for ADLs and IADLs.    Therapeutic activity  While sitting unsupported on the mat, the pt [id number] mins on arm ergometer to increase endurance, BUE strength and activity tolerance for ADL performance      While standing the pt tossed a large therapy ball back and forth with OT to improve gross motor coordination, core strength, UB strength and endurance for ADL performance.    Patient left supine with all lines intact and call button in reach    GOALS:   Multidisciplinary Problems       Occupational Therapy Goals          Problem: Occupational Therapy    Goal Priority Disciplines Outcome Interventions   Occupational Therapy Goal     OT, PT/OT Ongoing, Progressing    Description: Goals to be met by: 9/24/23     Patient will increase functional independence with ADLs by performing:    Feeding with Modified [place].  UE Dressing with Modified [place].  LE Dressing with Modified [place].  Grooming while standing with Modified [place].  Toileting from toilet with Modified [place] for hygiene and clothing management.   Standing x5-10 minutes with Modified [place].  Supine to sit with Modified [place].  Step transfer with Modified [place]  Toilet transfer to toilet with Modified [place].                         Time Tracking:      OT Date of Treatment:    OT Start Time:  2:15  OT Stop Time:  3:10  OT Total Time (min):      Billable Minutes:Self Care/Home Management 15  Therapeutic activity:25  Therapeutic Exercise 15        OT/EVELYN: OT          9/6/2023

## 2023-09-06 NOTE — PT/OT/SLP PROGRESS
"Physical Therapy Treatment    Patient Name:  Danny Flood   MRN:  [id number]    Recommendations:     Discharge Recommendations: home with home health  Discharge Equipment Recommendations: none  Barriers to discharge: Decreased caregiver support    Assessment:     Danny Flood is a 78 y.o. male admitted with a medical diagnosis of Infected hernioplasty mesh.  He presents with the following impairments/functional limitations: weakness, impaired endurance, impaired self care skills, impaired functional mobility, gait instability, impaired balance, decreased lower extremity function, decreased safety awareness    Patient tolerated treatment well this morning. His gait is progressing as well as his strength and endurance. He is progressing towards his short and long term goals.    Rehab Prognosis: Good; patient would benefit from acute skilled PT services to address these deficits and reach maximum level of function.    Recent Surgery: * No surgery found *      Plan:     During this hospitalization, patient to be seen 5 x/week to address the identified rehab impairments via gait training, therapeutic activities, therapeutic exercises, neuromuscular re-education and progress toward the following goals:    Plan of Care Expires:  09/22/23    Subjective     Chief Complaint: LE weakness  Patient/Family Comments/goals: to return home safely  Pain/Comfort:         Objective:     Communicated with patient prior to session.  Patient found up in chair with ribera catheter (abdominal binder) upon PT entry to room.     General Precautions: Standard, fall  Orthopedic Precautions: N/A  Braces: N/A  Respiratory Status: Room air     Functional Mobility:  Transfers:     Sit to Stand:  stand by assistance with rolling walker  Gait: 140ft SBA with RW and wheelchair trail  Stairs:  Pt ascended/descended 4" curb step with No Assistive Device with bilateral handrails with Contact Guard Assistance.       AM-PAC 6 CLICK MOBILITY  Turning over " in bed (including adjusting bedclothes, sheets and blankets)?: 4  Sitting down on and standing up from a chair with arms (e.g., wheelchair, bedside commode, etc.): 4  Moving from lying on back to sitting on the side of the bed?: 4  Moving to and from a bed to a chair (including a wheelchair)?: 4  Need to walk in hospital room?: 3  Climbing 3-5 steps with a railing?: 3  Basic Mobility Total Score: 22       Treatment & Education:  Seated LAQ with 2# weight, hip flexion, hip adduction with ball , hip adduction with green tband 20 x2  STS x10 with no RW  Scifit machine x 10 minutes     Patient left  in bathroom   with  nurse notified..    GOALS:   Multidisciplinary Problems       Physical Therapy Goals          Problem: Physical Therapy    Goal Priority Disciplines Outcome Goal Variances Interventions   Physical Therapy Goal     PT, PT/OT Ongoing, Progressing     Description: Goals to be met by: 2 weeks     Patient will increase functional independence with mobility by performin. Supine to sit with Stand-by Assistance  2. Sit to supine with Stand-by Assistance  3. Rolling to Left and Right with Modified [place].  4. Sit to stand transfer with Stand-by Assistance  5. Bed to chair transfer with Stand-by Assistance using Rolling Walker  6. Gait  x 200 feet with Stand-by Assistance using Rolling Walker.    Goals to be met by: 4     Patient will increase functional independence with mobility by performin. Supine to sit with Modified [place]  2. Sit to supine with Modified [place]  3. Sit to stand transfer with Modified [place]  4. Bed to chair transfer with Modified [place] using Rolling Walker  5. Gait  x 300 feet with Modified [place] using Rolling Walker.                             Time Tracking:     PT Received On: 23  PT Start Time: 1135     PT Stop Time: 1208  PT Total Time (min): 33 min     Billable Minutes: Gait Training 11 and Therapeutic Exercise 22    Treatment  Type: Treatment  PT/PTA: PTA     Number of PTA visits since last PT visit: 2     09/06/2023

## 2023-09-06 NOTE — PROGRESS NOTES
Nurses Note -- 4 Eyes      9/5/2023   7:38 PM      Skin assessed during: Q Shift Change      [] No Altered Skin Integrity Present    [x]Prevention Measures Documented      [x] Yes- Altered Skin Integrity Present or Discovered   [] LDA Added if Not in Epic (Describe Wound)   [] New Altered Skin Integrity was Present on Admit and Documented in LDA   [] Wound Image Taken    Wound Care Consulted? No    Attending Nurse:  Eligio Thakkar RN    Second RN/Staff Member:  Marta Del Cid RN

## 2023-09-07 LAB
ANION GAP SERPL CALCULATED.3IONS-SCNC: 10 MMOL/L (ref 7–16)
BASOPHILS # BLD AUTO: 0.06 K/UL (ref 0–0.2)
BASOPHILS NFR BLD AUTO: 1.3 % (ref 0–1)
BUN SERPL-MCNC: 28 MG/DL (ref 7–18)
BUN/CREAT SERPL: 36 (ref 6–20)
CALCIUM SERPL-MCNC: 8.4 MG/DL (ref 8.5–10.1)
CHLORIDE SERPL-SCNC: 106 MMOL/L (ref 98–107)
CO2 SERPL-SCNC: 28 MMOL/L (ref 21–32)
CREAT SERPL-MCNC: 0.77 MG/DL (ref 0.7–1.3)
DIFFERENTIAL METHOD BLD: ABNORMAL
EGFR (NO RACE VARIABLE) (RUSH/TITUS): 92 ML/MIN/1.73M2
EOSINOPHIL # BLD AUTO: 0.52 K/UL (ref 0–0.5)
EOSINOPHIL NFR BLD AUTO: 11.7 % (ref 1–4)
ERYTHROCYTE [DISTWIDTH] IN BLOOD BY AUTOMATED COUNT: 13.5 % (ref 11.5–14.5)
GLUCOSE SERPL-MCNC: 129 MG/DL (ref 74–106)
HCT VFR BLD AUTO: 27.8 % (ref 40–54)
HGB BLD-MCNC: 8.7 G/DL (ref 13.5–18)
LYMPHOCYTES # BLD AUTO: 1.12 K/UL (ref 1–4.8)
LYMPHOCYTES NFR BLD AUTO: 25.1 % (ref 27–41)
MCH RBC QN AUTO: 32 PG (ref 27–31)
MCHC RBC AUTO-ENTMCNC: 31.3 G/DL (ref 32–36)
MCV RBC AUTO: 102.2 FL (ref 80–96)
MONOCYTES # BLD AUTO: 0.48 K/UL (ref 0–0.8)
MONOCYTES NFR BLD AUTO: 10.8 % (ref 2–6)
MPC BLD CALC-MCNC: 9.8 FL (ref 9.4–12.4)
NEUTROPHILS # BLD AUTO: 2.28 K/UL (ref 1.8–7.7)
NEUTROPHILS NFR BLD AUTO: 51.1 % (ref 53–65)
PLATELET # BLD AUTO: 200 K/UL (ref 150–400)
POTASSIUM SERPL-SCNC: 3.4 MMOL/L (ref 3.5–5.1)
RBC # BLD AUTO: 2.72 M/UL (ref 4.6–6.2)
SODIUM SERPL-SCNC: 141 MMOL/L (ref 136–145)
WBC # BLD AUTO: 4.46 K/UL (ref 4.5–11)

## 2023-09-07 PROCEDURE — 85025 COMPLETE CBC W/AUTO DIFF WBC: CPT | Performed by: PHYSICIAN ASSISTANT

## 2023-09-07 PROCEDURE — 97535 SELF CARE MNGMENT TRAINING: CPT

## 2023-09-07 PROCEDURE — 25000003 PHARM REV CODE 250: Performed by: PHYSICIAN ASSISTANT

## 2023-09-07 PROCEDURE — 27000981 HC MATTRESS, ACCUCAIR DAILY RENTAL

## 2023-09-07 PROCEDURE — 97110 THERAPEUTIC EXERCISES: CPT | Mod: CQ

## 2023-09-07 PROCEDURE — 25000003 PHARM REV CODE 250: Performed by: FAMILY MEDICINE

## 2023-09-07 PROCEDURE — 94761 N-INVAS EAR/PLS OXIMETRY MLT: CPT

## 2023-09-07 PROCEDURE — 80048 BASIC METABOLIC PNL TOTAL CA: CPT | Performed by: PHYSICIAN ASSISTANT

## 2023-09-07 PROCEDURE — 97530 THERAPEUTIC ACTIVITIES: CPT

## 2023-09-07 PROCEDURE — 97116 GAIT TRAINING THERAPY: CPT | Mod: CQ

## 2023-09-07 PROCEDURE — 97110 THERAPEUTIC EXERCISES: CPT

## 2023-09-07 PROCEDURE — 99900035 HC TECH TIME PER 15 MIN (STAT)

## 2023-09-07 PROCEDURE — 11000004 HC SNF PRIVATE

## 2023-09-07 PROCEDURE — 27000958

## 2023-09-07 RX ADMIN — RIFAXIMIN 550 MG: 550 TABLET ORAL at 08:09

## 2023-09-07 RX ADMIN — METOPROLOL TARTRATE 12.5 MG: 25 TABLET, FILM COATED ORAL at 08:09

## 2023-09-07 RX ADMIN — QUETIAPINE 50 MG: 25 TABLET ORAL at 08:09

## 2023-09-07 RX ADMIN — TIMOLOL MALEATE 1 DROP: 5 SOLUTION/ DROPS OPHTHALMIC at 08:09

## 2023-09-07 RX ADMIN — LACTULOSE 20 G: 20 SOLUTION ORAL at 08:09

## 2023-09-07 RX ADMIN — PANTOPRAZOLE SODIUM 40 MG: 40 TABLET, DELAYED RELEASE ORAL at 08:09

## 2023-09-07 RX ADMIN — SENNOSIDES AND DOCUSATE SODIUM 1 TABLET: 50; 8.6 TABLET ORAL at 08:09

## 2023-09-07 RX ADMIN — FOLIC ACID 1 MG: 1 TABLET ORAL at 08:09

## 2023-09-07 RX ADMIN — Medication 500 MG: at 08:09

## 2023-09-07 RX ADMIN — LACTULOSE 20 G: 20 SOLUTION ORAL at 03:09

## 2023-09-07 RX ADMIN — OMEGA-3 FATTY ACIDS CAP 1000 MG 1 CAPSULE: 1000 CAP at 08:09

## 2023-09-07 RX ADMIN — APIXABAN 5 MG: 5 TABLET, FILM COATED ORAL at 08:09

## 2023-09-07 RX ADMIN — Medication 5000 UNITS: at 08:09

## 2023-09-07 NOTE — RESPIRATORY THERAPY
Patient tends to breath to fast with Incentive Spirometer . Only achieves Vt 500 ml x 10 breaths tonight.  Breath sounds = clear. No cough noted.

## 2023-09-07 NOTE — PROGRESS NOTES
Nurses Note -- 4 Eyes      9/6/2023   7:31 PM      Skin assessed during: Q Shift Change      [] No Altered Skin Integrity Present    []Prevention Measures Documented      [x] Yes- Altered Skin Integrity Present or Discovered   [] LDA Added if Not in Epic (Describe Wound)   [] New Altered Skin Integrity was Present on Admit and Documented in LDA   [] Wound Image Taken    Wound Care Consulted? No    Attending Nurse:    Eligio Thakkar RN  Second RN/Staff Member: ROMAIN Zheng RN

## 2023-09-07 NOTE — PT/OT/SLP PROGRESS
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 male with a medical diagnosis of Infected hernioplasty mesh deconditioning with trunk weakness.  He presents with decreased balance, endurance. Performance deficits affecting function are weakness, impaired endurance, impaired functional mobility, impaired self care skills, impaired balance, decreased safety awareness.  Pt has progress with mobility and ADLs. Pt is planning to go home on Friday (tomorrow). Pt tolerated skilled OT well and was given therapeutic rest breaks as needed . Pt completed functional ambulation and community ambulation well with Mod I. Pt does require rest breaks due to limited endurance. Pt is planning to go home tomorrow but he is able to complete ADLs and mobility with Mod I. Pt does still have limited trunk flexion due to wound.   Rehab Prognosis:  Fair; patient would benefit from acute skilled OT services to address these deficits and reach maximum level of function.       Plan:     Patient to be seen 5 x/week to address the above listed problems via self-care/home management, therapeutic exercises, therapeutic activities, neuromuscular re-education  Plan of Care Expires:    Plan of Care Reviewed with: patient    Subjective     Chief Complaint: abdominal pain, decreased strength  Patient/Family Comments/goals: increase ADLs and mobility  Pain/Comfort:  5/10 knee pain     Objective:     Communicated with: Pt prior to session.  Patient found supine with SCD upon OT entry to room.    General Precautions: Standard, fall    Orthopedic Precautions:   Braces:    Respiratory Status: Room air      Occupational Performance:     Bed Mobility:    Patient presented sitting upright in the w/c upon Ot's arrival    Functional Mobility/Transfers:  Patient completed Sit <> Stand Transfer with SVN-Mod I with  rolling walker   Functional Mobility: Pt completed functional ambulation down to the therapy gym  using the RW for balance and  safety with SVN. Pt completed ambulation around the parking lot square of the hospital  using the RW for balance and safety    Activities of Daily Living:  UE dressing with Mod I  LE dressing with Mod I    AMPAC 6 Click ADL:      Treatment & Education:    Therapeutic exercise:  While sitting unsupported , the pt completed bicep curls 2 sets and 20 rep 5# DB,  chest press 2 sets of 20 reps with 5# dowel to increase BUE strength for ADLs and IADLs.    Therapeutic activity  While sitting unsupported on the mat, the pt [id number] mins on arm ergometer to increase endurance, BUE strength and activity tolerance for ADL performance      Patient left supine with all lines intact and call button in reach    GOALS:   Multidisciplinary Problems       Occupational Therapy Goals          Problem: Occupational Therapy    Goal Priority Disciplines Outcome Interventions   Occupational Therapy Goal     OT, PT/OT Ongoing, Progressing    Description: Goals to be met by: 9/24/23     Patient will increase functional independence with ADLs by performing:    Feeding with Modified [place].  UE Dressing with Modified [place].  LE Dressing with Modified [place].  Grooming while standing with Modified [place].  Toileting from toilet with Modified [place] for hygiene and clothing management.   Standing x5-10 minutes with Modified [place].  Supine to sit with Modified [place].  Step transfer with Modified [place]  Toilet transfer to toilet with Modified [place].                         Time Tracking:     OT Date of Treatment:    OT Start Time:  9:30  OT Stop Time:   10:15  OT Total Time (min):      Billable Minutes:Self Care/Home Management 15  Therapeutic activity:15  Therapeutic Exercise 15        OT/EVELYN: OT          9/7/2023

## 2023-09-07 NOTE — PT/OT/SLP PROGRESS
"Physical Therapy Treatment    Patient Name:  Danny Flood   MRN:  [id number]    Recommendations:     Discharge Recommendations: home with home health  Discharge Equipment Recommendations: none  Barriers to discharge: Decreased caregiver support    Assessment:     Danny Flood is a 78 y.o. male admitted with a medical diagnosis of Infected hernioplasty mesh.  He presents with the following impairments/functional limitations: weakness, impaired endurance, impaired self care skills, impaired functional mobility, gait instability, impaired balance, decreased lower extremity function, decreased safety awareness     Patient tolerated treatment well this morning. He has increased his strength and endurance. His gait adithya and foot clearance have gotten better as well as his standing tolerance and balance. He is planning to discharge tomorrow 9/8/23 home.     Rehab Prognosis: Good; patient would benefit from acute skilled PT services to address these deficits and reach maximum level of function.    Recent Surgery: * No surgery found *      Plan:     During this hospitalization, patient to be seen 5 x/week to address the identified rehab impairments via gait training, therapeutic activities, therapeutic exercises, neuromuscular re-education and progress toward the following goals:    Plan of Care Expires:  09/22/23    Subjective     Chief Complaint: no complaints at this time  Patient/Family Comments/goals: to return home safely  Pain/Comfort:         Objective:     Communicated with jesse prior to session.  Patient found up in chair  upon PT entry to room.     General Precautions: Standard, fall  Orthopedic Precautions: N/A  Braces: N/A  Respiratory Status: Room air     Functional Mobility:  Transfers:     Sit to Stand:  stand by assistance with rolling walker  Chair to mat: stand by assistance with  rolling walker  using  Step Transfer  Gait: 140ft SBA with RW and wheelchair trail  Stairs:  Pt ascended/descended 6" " curb step with No Assistive Device with bilateral handrails with Contact Guard Assistance.       AM-PAC 6 CLICK MOBILITY  Turning over in bed (including adjusting bedclothes, sheets and blankets)?: 4  Sitting down on and standing up from a chair with arms (e.g., wheelchair, bedside commode, etc.): 4  Moving from lying on back to sitting on the side of the bed?: 4  Moving to and from a bed to a chair (including a wheelchair)?: 4  Need to walk in hospital room?: 3  Climbing 3-5 steps with a railing?: 3  Basic Mobility Total Score: 22       Treatment & Education:  Scifit machine to promote cardiovascular fitness, flexibility and strength x 10 minutes  STS x 10 with no rest breaks  Seated LAQ with 2# weight 2 x 20  Seated hip flexion with 2# weight 2 x 20  Standing bal toss with small ball 3 x 1 minute each    Patient left up in chair with all lines intact, call button in reach, and chair alarm on..    GOALS:   Multidisciplinary Problems       Physical Therapy Goals          Problem: Physical Therapy    Goal Priority Disciplines Outcome Goal Variances Interventions   Physical Therapy Goal     PT, PT/OT Ongoing, Progressing     Description: Goals to be met by: 2 weeks     Patient will increase functional independence with mobility by performin. Supine to sit with Stand-by Assistance  2. Sit to supine with Stand-by Assistance  3. Rolling to Left and Right with Modified [place].  4. Sit to stand transfer with Stand-by Assistance  5. Bed to chair transfer with Stand-by Assistance using Rolling Walker  6. Gait  x 200 feet with Stand-by Assistance using Rolling Walker.    Goals to be met by: 4     Patient will increase functional independence with mobility by performin. Supine to sit with Modified [place]  2. Sit to supine with Modified [place]  3. Sit to stand transfer with Modified [place]  4. Bed to chair transfer with Modified [place] using Rolling Walker  5. Gait  x 300 feet with  Modified [place] using Rolling Walker.                             Time Tracking:     PT Received On: 09/07/23  PT Start Time: 1247     PT Stop Time: 1333  PT Total Time (min): 46 min     Billable Minutes: Gait Training 10 and Therapeutic Exercise 36    Treatment Type: Treatment  PT/PTA: PTA     Number of PTA visits since last PT visit: 4     09/07/2023

## 2023-09-08 ENCOUNTER — CLINICAL SUPPORT (OUTPATIENT)
Dept: WOUND CARE | Facility: HOSPITAL | Age: 78
End: 2023-09-08
Payer: MEDICARE

## 2023-09-08 VITALS
TEMPERATURE: 98 F | OXYGEN SATURATION: 97 % | SYSTOLIC BLOOD PRESSURE: 152 MMHG | DIASTOLIC BLOOD PRESSURE: 65 MMHG | HEART RATE: 77 BPM | RESPIRATION RATE: 20 BRPM | BODY MASS INDEX: 27.47 KG/M2 | HEIGHT: 70 IN | WEIGHT: 191.88 LBS

## 2023-09-08 VITALS — RESPIRATION RATE: 18 BRPM

## 2023-09-08 DIAGNOSIS — T81.89XA NON-HEALING SURGICAL WOUND, INITIAL ENCOUNTER: Primary | ICD-10-CM

## 2023-09-08 PROBLEM — Z29.9 ENCOUNTER FOR DEEP VEIN THROMBOSIS (DVT) PROPHYLAXIS: Status: ACTIVE | Noted: 2023-09-08

## 2023-09-08 PROCEDURE — 99316 PR NURSING FAC DISCHRGE DAY,MORE 30 MIN: ICD-10-PCS | Mod: ,,, | Performed by: FAMILY MEDICINE

## 2023-09-08 PROCEDURE — 25000003 PHARM REV CODE 250: Performed by: FAMILY MEDICINE

## 2023-09-08 PROCEDURE — 99316 NF DSCHRG MGMT 30 MIN+: CPT | Mod: ,,, | Performed by: FAMILY MEDICINE

## 2023-09-08 PROCEDURE — 11042 DBRDMT SUBQ TIS 1ST 20SQCM/<: CPT

## 2023-09-08 PROCEDURE — 27000958

## 2023-09-08 PROCEDURE — 94761 N-INVAS EAR/PLS OXIMETRY MLT: CPT

## 2023-09-08 PROCEDURE — 99900035 HC TECH TIME PER 15 MIN (STAT)

## 2023-09-08 PROCEDURE — 25000003 PHARM REV CODE 250: Performed by: PHYSICIAN ASSISTANT

## 2023-09-08 PROCEDURE — 27000981 HC MATTRESS, ACCUCAIR DAILY RENTAL

## 2023-09-08 RX ADMIN — QUETIAPINE 50 MG: 25 TABLET ORAL at 08:09

## 2023-09-08 RX ADMIN — TIMOLOL MALEATE 1 DROP: 5 SOLUTION/ DROPS OPHTHALMIC at 08:09

## 2023-09-08 RX ADMIN — PANTOPRAZOLE SODIUM 40 MG: 40 TABLET, DELAYED RELEASE ORAL at 08:09

## 2023-09-08 RX ADMIN — OMEGA-3 FATTY ACIDS CAP 1000 MG 1 CAPSULE: 1000 CAP at 08:09

## 2023-09-08 RX ADMIN — APIXABAN 5 MG: 5 TABLET, FILM COATED ORAL at 08:09

## 2023-09-08 RX ADMIN — Medication 500 MG: at 08:09

## 2023-09-08 RX ADMIN — Medication 5000 UNITS: at 08:09

## 2023-09-08 RX ADMIN — FOLIC ACID 1 MG: 1 TABLET ORAL at 08:09

## 2023-09-08 RX ADMIN — RIFAXIMIN 550 MG: 550 TABLET ORAL at 08:09

## 2023-09-08 RX ADMIN — METOPROLOL TARTRATE 12.5 MG: 25 TABLET, FILM COATED ORAL at 08:09

## 2023-09-08 NOTE — ASSESSMENT & PLAN NOTE
09/06/2023  PT IS PARTICIPATING WITH PT/OT PERFORMING BED MOBILITY WITH INDEPENDENCE ASSISTANCE, TRANSFERS WITH SBA WITH RW ASSISTANCE, AND AMBULATING 140 FEET WITH SBA WITH RW; PT AMBULATED TO THE THERAPY GYM USING RW FOR BALANCE AND SAFETY WITH SUPERVISION

## 2023-09-08 NOTE — ASSESSMENT & PLAN NOTE
Completed course of Zosyn at previous hospital  Continue Zithromax and Rocephin  09/06/2023  PT CONTINUES WOUND MANAGEMENT PER WOUND CARE

## 2023-09-08 NOTE — ASSESSMENT & PLAN NOTE
Completed course of Zosyn and Vancomycin at previous hospital  We will continue Rocephin and Azithromycin  Weekly CBC, BMP  Incentive spirometry    09/06/2023  PT COMPLETED ABX   O2 SAT 96% ON RA  CONTINUE RT TREATMENT

## 2023-09-08 NOTE — NURSING
1310 Discharge instructions & meds reviewed with pt's daughter; answered questions that were verbalized    1320 Pt d/c at this time via personal vehicle with daughter. Belongings, including cell phone, wound care supplies, clothes, & rifaximin returned. No acute distress noted at time of discharge.

## 2023-09-08 NOTE — PROGRESS NOTES
[facility] - Medical Surgical Unit  Hospital Medicine  Progress Note    Patient Name: Danny Flood  MRN: [id number]  Patient Class: IP- Swing   Admission Date: 8/23/2023  Length of Stay: 16 days  Attending Physician: David Stahl DO  Primary Care Provider: Cameron Valencia MD        Subjective:     Principal Problem:Infected hernioplasty mesh        HPI:  Mr Flood is a 78 year old male with PMH of hypertension, GERD, DM type 2, dementia, hyperammonia, pneumonia, AFib, infected hernioplasty mesh status post surgical removal admitted to [facility] for continuation of IV abx and rehabilitation services following a stay at [facility] for pneumonia. On 8/9 patient presented to ED and noted to have infected hernia mesh. Patient was initially admitted to general surgery and taken to the OR for an exploratory laparotomy for infected mesh removal and small bowel resection with anastomosis on 8/9. Cultures revealed Bacteroides and Anaerococcus as well as Klebsiella, E. Coli, and Proteus. He received a course of Zosyn. While under surgery services, he developed elevated troponin levels and was transferred to the Internal Medicine service for further evaluation. CTA chest showed no evidence of PE. Cardiology was consulted for his elevated troponin. He had a LHC on 8/15 that showed EF 55% with non-obstructive CAD. ECHO showed EF 60%. He has chronic atrial fibrillation and was continued on Eliquis and Metoprolol. LE Doppler did not show evidence of DVT. He was continued on IV antibiotics for his intraabdominal infection. He was discharged home on 8/18 but returned to [facility] ED the next day with confusion, agitation and LE and scrotal swelling. He was found to have pneumonia and subsequently tsf back to [facility] for further work-up. LE and scrotal swelling noted to be due to his low albumin state. Hospital services noted patient had confusion during his hospital stay. He was placed on  Vancomycin and Zosyn with improvement. It is worth noting that he has chronically elevated ammonia levels and confusion at home. CT brain did not show acute abnormality. [facility] IV Zithromax and Rocephin as well as PT/OT eval and treatment plan and wound care. Wound care team to eval patient as well. Patient noted to have ribera. However, I cannot find documentation reflecting insertion reason or further indication for ribera. We will begin bladder training and d/c ribera as soon as possible. He will be closely monitored for any early signs of deterioration.           Overview/Hospital Course:  08/30/2023:   Patient is resting comfortably in bedside chair, after working hard in physical therapy.    He states he is working hard, and has no complaints or concerns today.      09/06/2023 HOSPITAL DAY 14     PT IS PARTICIPATING WITH PT/OT PERFORMING BED MOBILITY WITH INDEPENDENCE ASSISTANCE, TRANSFERS WITH SBA WITH RW ASSISTANCE, AND AMBULATING 140 FEET WITH SBA WITH RW; PT AMBULATED TO THE THERAPY GYM USING RW FOR BALANCE AND SAFETY WITH SUPERVISION.  PT HAS WOUND DEHISCENCE OF ABDOMINAL SURGICAL WOUND   FOLLOWED PER WOUND CARE TEAM WITH IMPROVEMENT. PT HAS HAD AN INDWELLING RIBERA AND BLADDER TRAINING HAS BEEN INITIATED IN ANTICIPATION OF REMOVAL.  PT HAS BEEN EVALUATED PER  RD WITH RECOMMENDATIONS FOR GLUCERNA AND ROSA BID WITH MEAL INTAKE  % WITH WEIGHT 87 KG  (ADMISSION WEIGHT 89.1 KG).  PT IS ON RA WITH SAT 96-97%. PT IS BEING TREATED PER RESPIRATORY THERAPY WITH NEBS, INCENTIVE SPIROMETRY AND O2 MONITORING.  PT IHAS COMPLETED ROCEPHIN/S MAX FOR PNA. PT CONTINUES ELIQUIS FOR VTE PPX. THE  PHARMACY IS MONITORING ALL MEDICATIONS.        Interval History: PT IS PARTICIPATING WITH PT/OT PERFORMING BED MOBILITY WITH INDEPENDENCE ASSISTANCE, TRANSFERS WITH SBA WITH RW ASSISTANCE, AND AMBULATING 140 FEET WITH SBA WITH RW; PT AMBULATED TO THE THERAPY GYM  USING RW FOR BALANCE AND SAFETY WITH SUPERVISION.  PT HAS WOUND DEHISCENCE OF ABDOMINAL SURGICAL WOUND   FOLLOWED PER WOUND CARE TEAM WITH IMPROVEMENT. PT HAS HAD AN INDWELLING AREVALO AND BLADDER TRAINING HAS BEEN INITIATED IN ANTICIPATION OF REMOVAL.  PT HAS BEEN EVALUATED PER  RD WITH RECOMMENDATIONS FOR GLUCERNA AND ROSA BID WITH MEAL INTAKE  % WITH WEIGHT 87 KG  (ADMISSION WEIGHT 89.1 KG).  PT IS ON RA WITH SAT 96-97%. PT IS BEING TREATED PER RESPIRATORY THERAPY WITH NEBS, INCENTIVE SPIROMETRY AND O2 MONITORING.  PT IHAS COMPLETED ROCEPHIN/S MAX FOR PNA. PT CONTINUES ELIQUIS FOR VTE PPX. THE  PHARMACY IS MONITORING ALL MEDICATIONS.    Review of Systems   Constitutional:  Positive for activity change and fatigue.   HENT: Negative.     Eyes: Negative.    Respiratory: Negative.     Gastrointestinal:  Positive for abdominal distention. Negative for abdominal pain.   Endocrine: Negative.    Genitourinary: Negative.    Musculoskeletal:  Positive for gait problem.   Skin:  Positive for wound. Rash: ABDOMINAL.  Allergic/Immunologic: Positive for immunocompromised state.   Hematological: Negative.    Psychiatric/Behavioral: Negative.     All other systems reviewed and are negative.    Objective:     Vital Signs (Most Recent):  Temp: 97.8 °F (36.6 °C) (09/07/23 2000)  Pulse: 79 (09/07/23 2000)  Resp: 20 (09/07/23 2000)  BP: (!) 152/65 (09/07/23 2000)  SpO2: 100 % (09/07/23 2000) Vital Signs (24h Range):  Temp:  [97.8 °F (36.6 °C)] 97.8 °F (36.6 °C)  Pulse:  [68-79] 79  Resp:  [18-20] 20  SpO2:  [96 %-100 %] 100 %  BP: (151-152)/(61-65) 152/65     Weight: 87 kg (191 lb 14.4 oz)  Body mass index is 27.53 kg/m².    Intake/Output Summary (Last 24 hours) at 9/8/2023 0119  Last data filed at 9/7/2023 0206  Gross per 24 hour   Intake --   Output 200 ml   Net -200 ml         Physical Exam  Vitals and nursing note reviewed. Exam conducted with a chaperone present.   Constitutional:       Appearance: Normal appearance. He is  ill-appearing.   HENT:      Head: Normocephalic and atraumatic.      Right Ear: Tympanic membrane normal.      Left Ear: Tympanic membrane normal.      Nose: Nose normal.      Mouth/Throat:      Mouth: Mucous membranes are moist.   Eyes:      Extraocular Movements: Extraocular movements intact.      Pupils: Pupils are equal, round, and reactive to light.   Cardiovascular:      Rate and Rhythm: Normal rate and regular rhythm.      Pulses: Normal pulses.      Heart sounds: Normal heart sounds.   Pulmonary:      Effort: Pulmonary effort is normal.      Breath sounds: Normal breath sounds.   Abdominal:      General: Bowel sounds are normal. There is distension.      Palpations: Abdomen is soft.      Tenderness: There is no abdominal tenderness. There is no guarding.   Musculoskeletal:         General: No swelling or tenderness. Normal range of motion.      Cervical back: Normal range of motion and neck supple.   Skin:     General: Skin is warm and dry.      Capillary Refill: Capillary refill takes less than 2 seconds.      Findings: Lesion (ABDOMINAL WOUND HEALING WITH DRESSING AND ABDOMINAL BINDER INTACT) present.   Neurological:      General: No focal deficit present.      Mental Status: He is alert and oriented to person, place, and time.      Motor: Weakness present.   Psychiatric:         Mood and Affect: Mood normal.         Behavior: Behavior normal.         Thought Content: Thought content normal.         Judgment: Judgment normal.        Significant Labs: All pertinent labs within the past 24 hours have been reviewed. PENDING    Significant Imaging: I have reviewed all pertinent imaging results/findings within the past 24 hours.NONE      Assessment/Plan:      * Infected hernioplasty mesh  Completed course of Zosyn at previous hospital  Continue Zithromax and Rocephin  09/06/2023  PT CONTINUES WOUND MANAGEMENT PER WOUND CARE      Encounter for deep vein thrombosis (DVT) prophylaxis  PT IS AT RISK FOR VTE  VTE  "PPX  ELIQUIS/TEDS/CONTINUED AMBULATION      Presence of surgical incision  Wound care  Wound care team consult  09/06/2023  PT HAS WOUND DEHISCENCE  WOUND CARE TEAM CONTINUES TO FOLLOW  PT IS IMPROVING      Edema due to hypoalbuminemia  Close monitoring  Weekly lab monitoring  09/06/2023  IMPROVED      Pneumonia  Completed course of Zosyn and Vancomycin at previous hospital  We will continue Rocephin and Azithromycin  Weekly CBC, BMP  Incentive spirometry    09/06/2023  PT COMPLETED ABX   O2 SAT 96% ON RA  CONTINUE RT TREATMENT       Type 2 diabetes mellitus without complication, without long-term current use of insulin  Patient's FSGs are controlled on current medication regimen.  Last A1c reviewed-   Lab Results   Component Value Date    HGBA1C 5.6 11/06/2021     Most recent fingerstick glucose reviewed- No results for input(s): "POCTGLUCOSE" in the last 24 hours.  Current correctional scale  Low  Maintain anti-hyperglycemic dose as follows-   Antihyperglycemics (From admission, onward)    None        DM diet  SSI- low correctional scale  Hypoglycemia protocol  09/06/2023  CONTINUE POC  GLU WELL CONTROLLED; PT ADVISED THE STAFF HE IS NOT DIABETIC   CONTINUE DM DIET    Muscle weakness  09/06/2023  PT IS PARTICIPATING WITH PT/OT PERFORMING BED MOBILITY WITH INDEPENDENCE ASSISTANCE, TRANSFERS WITH SBA WITH RW ASSISTANCE, AND AMBULATING 140 FEET WITH SBA WITH RW; PT AMBULATED TO THE THERAPY GYM USING RW FOR BALANCE AND SAFETY WITH SUPERVISION      Hypertension  Continue home meds  Routine VS monitoring  09/06/2023  CONTINUE LOPRESSOR  /64      Hyperammonemia  09/06/2023  PT HAS HX HYPERAMMONIEMIA  CONTINUE RIFAXIMIN      VTE Risk Mitigation (From admission, onward)         Ordered     apixaban tablet 5 mg  2 times daily         08/23/23 1736     IP VTE HIGH RISK PATIENT  Once         08/23/23 1640     Place sequential compression device  Until discontinued         08/23/23 1640                Discharge Planning "   AMBAR: 9/8/2023     Code Status: Prior   Is the patient medically ready for discharge?:     Reason for patient still in hospital (select all that apply): Patient trending condition, Laboratory test, Treatment, Consult recommendations and PT / OT recommendations  Discharge Plan A: Home with family                  Jennifer Razo MD  Department of Hospital Medicine   [facility] - [facility]

## 2023-09-08 NOTE — PLAN OF CARE
[facility]  Discharge Final Note    Primary Care Provider: Cameron Valencia MD    Expected Discharge Date: 9/8/2023    Final Discharge Note (most recent)       Final Note - 09/08/23 0942          Final Note    Assessment Type Final Discharge Note     Anticipated Discharge Disposition Home-Health Care Svc   [facility]    What phone number can be called within the next 1-3 days to see how you are doing after discharge? [phone]     Hospital Resources/Appts/Education Provided Provided patient/caregiver with written discharge plan information;Appointments scheduled and added to AVS        Post-Acute Status    Post-Acute Authorization HME;Home Health     HME Status Referrals Sent   [facility] Store    Home Health Status Referrals Sent   [facility]    Coverage Medicare     Patient choice form signed by patient/caregiver List with quality metrics by geographic area provided;List from CMS Compare;List from System Post-Acute Care     Discharge Delays None known at this time                 Pt. Will d/c home today to f/u with Dr. Valencia next week as scheduled and with [facility] on 9/18/23. Hospital bed was ordered for home delivery. Pt. Referred to [facility]. No other d/c needs were identified.    Important Message from Medicare  Important Message from Medicare regarding Discharge Appeal Rights: Given to patient/caregiver, Explained to patient/caregiver, Signed/date by patient/caregiver     Date IMM was signed: 09/06/23  Time IMM was signed: 1300    Contact Info       Cameron Valencia MD   Specialty: Hematology and Oncology   Relationship: [address]  [place] [address]   Phone: [phone]       Next Steps: Go on 9/13/2023    Instructions: at 1:30pm for hospital follow up appointment

## 2023-09-08 NOTE — PROGRESS NOTES
.Nurses Note -- 4 Eyes      9/7/2023   7:27 PM      Skin assessed during: Q Shift Change      [] No Altered Skin Integrity Present    [x]Prevention Measures Documented      [x] Yes- Altered Skin Integrity Present or Discovered   [] LDA Added if Not in Epic (Describe Wound)   [] New Altered Skin Integrity was Present on Admit and Documented in LDA   [] Wound Image Taken    Wound Care Consulted? No    Attending Nurse:  JACQUELYN Thakkar RN    Second RN/Staff Member:  ROMAIN Zheng RN

## 2023-09-08 NOTE — PROGRESS NOTES
This note has been moved to another encounter. If you have any questions, please contact HIM Chart Correction at [phone].

## 2023-09-08 NOTE — DISCHARGE SUMMARY
[facility] Surgical Unit  Hospital Medicine  Discharge Summary      Patient Name: Danny Flood  MRN: [id number]  [facility]: [id number]  Patient Class: IP- Swing  Admission Date: 8/23/2023  Hospital Length of Stay: 16 days  Discharge Date and Time:  09/08/2023 12:35 PM  Attending Physician: David Stahl DO   Discharging Provider: David Stahl DO  Primary Care Provider: Cameron Valencia MD    Primary Care Team: Networked reference to record PCT     HPI:   Mr Flood is a 78 year old male with PMH of hypertension, GERD, DM type 2, dementia, hyperammonia, pneumonia, AFib, infected hernioplasty mesh status post surgical removal admitted to [facility] for continuation of IV abx and rehabilitation services following a stay at [facility] for pneumonia. On 8/9 patient presented to ED and noted to have infected hernia mesh. Patient was initially admitted to general surgery and taken to the OR for an exploratory laparotomy for infected mesh removal and small bowel resection with anastomosis on 8/9. Cultures revealed Bacteroides and Anaerococcus as well as Klebsiella, E. Coli, and Proteus. He received a course of Zosyn. While under surgery services, he developed elevated troponin levels and was transferred to the Internal Medicine service for further evaluation. CTA chest showed no evidence of PE. Cardiology was consulted for his elevated troponin. He had a LHC on 8/15 that showed EF 55% with non-obstructive CAD. ECHO showed EF 60%. He has chronic atrial fibrillation and was continued on Eliquis and Metoprolol. LE Doppler did not show evidence of DVT. He was continued on IV antibiotics for his intraabdominal infection. He was discharged home on 8/18 but returned to [facility] ED the next day with confusion, agitation and LE and scrotal swelling. He was found to have pneumonia and subsequently tsf back to [facility] for further work-up. LE and scrotal swelling noted to be due to his  low albumin state. Hospital services noted patient had confusion during his hospital stay. He was placed on Vancomycin and Zosyn with improvement. It is worth noting that he has chronically elevated ammonia levels and confusion at home. CT brain did not show acute abnormality. [facility] for continuation of IV Zithromax and Rocephin as well as PT/OT eval and treatment plan and wound care. Wound care team to eval patient as well. Patient noted to have ribera. However, I cannot find documentation reflecting insertion reason or further indication for ribera. We will begin bladder training and d/c ribera as soon as possible. He will be closely monitored for any early signs of deterioration.           * No surgery found *      Hospital Course:   08/30/2023:   Patient is resting comfortably in bedside chair, after working hard in physical therapy.    He states he is working hard, and has no complaints or concerns today.      09/06/2023 HOSPITAL DAY 14     PT IS PARTICIPATING WITH PT/OT PERFORMING BED MOBILITY WITH INDEPENDENCE ASSISTANCE, TRANSFERS WITH SBA WITH RW ASSISTANCE, AND AMBULATING 140 FEET WITH SBA WITH RW; PT AMBULATED TO THE THERAPY GYM USING RW FOR BALANCE AND SAFETY WITH SUPERVISION.  PT HAS WOUND DEHISCENCE OF ABDOMINAL SURGICAL WOUND   FOLLOWED PER WOUND CARE TEAM WITH IMPROVEMENT. PT HAS HAD AN INDWELLING RIBERA AND BLADDER TRAINING HAS BEEN INITIATED IN ANTICIPATION OF REMOVAL.  PT HAS BEEN EVALUATED PER  RD WITH RECOMMENDATIONS FOR GLUCERNA AND ROSA BID WITH MEAL INTAKE  % WITH WEIGHT 87 KG  (ADMISSION WEIGHT 89.1 KG).  PT IS ON RA WITH SAT 96-97%. PT IS BEING TREATED PER RESPIRATORY THERAPY WITH NEBS, INCENTIVE SPIROMETRY AND O2 MONITORING.  PT IHAS COMPLETED ROCEPHIN/S MAX FOR PNA. PT CONTINUES ELIQUIS FOR VTE PPX. THE  PHARMACY IS MONITORING ALL MEDICATIONS.      Discharge summary 09/08/2023 patient doing much better after PT and OT.  He had initially had  an infected hernioplasty mesh.  He is done well.  No sign of infection at present.  He will be discharged home on lactulose.  To prevent hepatic encephalopathy.  Otherwise the patient is doing fine       Goals of Care Treatment Preferences:  Code Status: Full Code      Consults:   Consults (From admission, onward)        Status Ordering Provider     Inpatient consult to Registered Dietitian/Nutritionist  Once        Provider:  (Not yet assigned)    LULA Martinez          No new Assessment & Plan notes have been filed under this hospital service since the last note was generated.  Service: Hospital Medicine    Final Active Diagnoses:    Diagnosis Date Noted POA    PRINCIPAL PROBLEM:  Infected hernioplasty mesh [T85.79XA] 08/13/2023 Yes    Encounter for deep vein thrombosis (DVT) prophylaxis [Z29.9] 09/08/2023 Not Applicable    Presence of surgical incision [Z78.9] 08/25/2023 Yes    Edema due to hypoalbuminemia [E88.09] 08/19/2023 Yes    Pneumonia [J18.9] 08/18/2023 Yes    Type 2 diabetes mellitus without complication, without long-term current use of insulin [E11.9] 08/13/2023 Yes    Muscle weakness [M62.81] 11/26/2021 Yes    Hypertension [I10]  Yes    Hyperammonemia [E72.20] 11/07/2021 Yes      Problems Resolved During this Admission:       Discharged Condition: good    Disposition:     Follow Up:   Follow-up Information     Cameron Valencia MD. Go on 9/13/2023.    Specialty: Hematology and Oncology  Why: at 1:30pm for hospital follow up appointment  Contact information:  [address] [place] [address].  [place] [address]  [phone]                       Patient Instructions:   No discharge procedures on file.    Significant Diagnostic Studies: Labs:   BMP:   Recent Labs   Lab 09/07/23  0542   *      K 3.4*      CO2 28   BUN 28*   CREATININE 0.77   CALCIUM 8.4*   , CMP   Recent Labs   Lab 09/07/23  0542      K 3.4*      CO2 28   *   BUN 28*   CREATININE 0.77    CALCIUM 8.4*   ANIONGAP 10   , CBC   Recent Labs   Lab 09/07/23  0542   WBC 4.46*   HGB 8.7*   HCT 27.8*      , INR   Lab Results   Component Value Date    INR 1.37 08/19/2023    INR 1.29 08/14/2023    INR 1.11 (H) 11/05/2021    and Lipid Panel   Lab Results   Component Value Date    CHOL 104 08/14/2023    HDL 36 (L) 08/14/2023    LDLCALC 52 08/14/2023    TRIG 82 08/14/2023    CHOLHDL 2.9 08/14/2023       Pending Diagnostic Studies:     None         Medications:  Reconciled Home Medications:      Medication List      CONTINUE taking these medications    apixaban 5 mg Tab  Commonly known as: ELIQUIS  Take 1 tablet (5 mg total) by mouth 2 (two) times daily. for 30 doses     fish oil-omega-3 fatty acids 300-1,000 mg capsule  Take 1 capsule by mouth once daily.     FOLIC ACID-VITAMIN B6-VIT B12 ORAL  Take 1 tablet by mouth once daily.     lactulose 10 gram/15 mL solution  Commonly known as: CHRONULAC  Take 30 mLs (20 g total) by mouth 3 (three) times daily.     metoprolol tartrate 25 MG tablet  Commonly known as: LOPRESSOR  Take 0.5 tablets (12.5 mg total) by mouth 2 (two) times daily.     niacin 500 mg tablet  Commonly known as: SLO-NIACIN  Take 500 mg by mouth once daily. With meal     pantoprazole 40 MG tablet  Commonly known as: PROTONIX  Take 1 tablet (40 mg total) by mouth 2 (two) times daily.     PROBIOTIC 10 billion cell Cap  Generic drug: Lactobacillus acidophilus  Take by mouth.     QUEtiapine 50 MG tablet  Commonly known as: SEROQUEL  1 tablet (50 mg total) by Per NG tube route 2 (two) times daily.     rifAXIMin 550 mg Tab  Commonly known as: XIFAXAN  Take 1 tablet (550 mg total) by mouth 2 (two) times daily.     timolol maleate 0.5% 0.5 % Drop  Commonly known as: TIMOPTIC  1 drop once daily.        ASK your doctor about these medications    dextrose 5 % (D5W) SolP 250 mL with azithromycin 500 mg SolR 500 mg  Inject 500 mg into the vein once daily. for 5 days  Ask about: Should I take this  medication?     dextrose 5 % in water (D5W) PgBk 100 mL with cefTRIAXone 1 gram SolR 1 g  Inject 1 g into the vein once daily. for 5 days  Ask about: Should I take this medication?            Indwelling Lines/Drains at time of discharge:   Lines/Drains/Airways     None                 Time spent on the discharge of patient: 45 minutes         David Stahl DO  Department of Hospital Medicine  [facility]

## 2023-09-08 NOTE — RESPIRATORY THERAPY
Tolerated Incentive Spirometer fairly well this eveing taking slower deeper breaths =  x 10 breaths.  Breath sounds = clear.  HR 79  RR 20  Room Air SAT= 100%

## 2023-09-08 NOTE — PROGRESS NOTES
WOUND CARE CONSULT          Patient Name: Danny Flood is a 78 y.o. male       Chief Complaint:   Wound Care Follow Up    Review of patient's allergies indicates:  No Known Allergies     I have reviewed the patient's medical, surgical, family and social history.      Subjective:    HPI     Wound Information: surgical wound to midline abdomen onset 8/9/23. Initial surgery was exp. Lap. With subsequent removal of infected hernia mesh and small bowel resection. Pt reports mild pain to area.   9/8/23- Pt denies any remaining pain. Wound measurements are significantly improved. No new issues are reported.       Medications:    Current Facility-Administered Medications on File Prior to Visit   Medication Dose Route Frequency Provider Last Rate Last Admin    acetaminophen tablet 650 mg  650 mg Oral Q6H PRN Nuno Ward PA   650 mg at 08/30/23 0905    apixaban tablet 5 mg  5 mg Oral BID Nuno Ward PA   5 mg at 09/08/23 0826    calcium carbonate 200 mg calcium (500 mg) chewable tablet 500 mg  500 mg Oral BID PRN Nuno Ward PA        cholecalciferol (vitamin D3) 125 mcg (5,000 unit) capsule 5,000 Units  5,000 Units Oral Daily David Stahl, DO   5,000 Units at 09/08/23 0826    folic acid tablet 1 mg  1 mg Oral Daily David Stahl, DO   1 mg at 09/08/23 0826    lactulose 20 gram/30 mL solution Soln 20 g  20 g Oral TID Nuno Ward PA   20 g at 09/07/23 2031    melatonin tablet 6 mg  6 mg Oral Nightly PRN Nuno Ward PA        metoprolol tartrate (LOPRESSOR) split tablet 12.5 mg  12.5 mg Oral BID Nuno Ward PA   12.5 mg at 09/08/23 0825    niacin SR tablet 500 mg  500 mg Oral Daily David Stahl DO   500 mg at 09/08/23 0826    omega 3-dha-epa-fish oil 1,000 mg (120 mg-180 mg) Cap 1 capsule  1 capsule Oral Daily David Stahl, DO   1 capsule at 09/08/23 0826    ondansetron disintegrating tablet 4 mg  4 mg Oral Q8H PRN Nuno Ward PA         pantoprazole EC tablet 40 mg  40 mg Oral BID Nuno Ward PA   40 mg at 09/08/23 0826    QUEtiapine tablet 50 mg  50 mg Per NG tube BID Nuno Ward PA   50 mg at 09/08/23 0826    rifAXIMin tablet 550 mg  550 mg Oral BID Nuno Ward PA   550 mg at 09/08/23 0826    senna-docusate 8.6-50 mg per tablet 1 tablet  1 tablet Oral BID Nuno Ward PA   1 tablet at 09/07/23 2030    timolol maleate 0.5% ophthalmic solution 1 drop  1 drop Both Eyes Daily Nuno Ward PA   1 drop at 09/08/23 0828     Current Outpatient Medications on File Prior to Visit   Medication Sig Dispense Refill    FOLIC ACID-VITAMIN B6-VIT B12 ORAL Take 1 tablet by mouth once daily.      Lactobacillus acidophilus (PROBIOTIC) 10 billion cell Cap Take by mouth.      lactulose (CHRONULAC) 10 gram/15 mL solution Take 30 mLs (20 g total) by mouth 3 (three) times daily. 2700 mL 0    metoprolol tartrate (LOPRESSOR) 25 MG tablet Take 0.5 tablets (12.5 mg total) by mouth 2 (two) times daily. 30 tablet 11    niacin (SLO-NIACIN) 500 mg tablet Take 500 mg by mouth once daily. With meal      omega-3 fatty acids/fish oil (FISH OIL-OMEGA-3 FATTY ACIDS) 300-1,000 mg capsule Take 1 capsule by mouth once daily.      pantoprazole (PROTONIX) 40 MG tablet Take 1 tablet (40 mg total) by mouth 2 (two) times daily. 180 tablet 3    QUEtiapine (SEROQUEL) 50 MG tablet 1 tablet (50 mg total) by Per NG tube route 2 (two) times daily. 60 tablet 11    rifAXIMin (XIFAXAN) 550 mg Tab Take 1 tablet (550 mg total) by mouth 2 (two) times daily. 60 tablet 0    timolol maleate 0.5% (TIMOPTIC) 0.5 % Drop 1 drop once daily.            Physical Exam  Vitals reviewed.   Constitutional:       Appearance: Normal appearance.   HENT:      Head: Normocephalic and atraumatic.      Right Ear: External ear normal.      Left Ear: External ear normal.   Pulmonary:      Effort: Pulmonary effort is normal.   Abdominal:      Palpations: Abdomen is soft.    Musculoskeletal:         General: Normal range of motion.   Skin:     General: Skin is warm and dry.      Findings: Wound present.          Neurological:      Mental Status: He is alert.          Please see Wound Docs for complete Wound Assessment and lower extremity assessment when applicable.    Documentation of any procedures can be viewed in Wound Docs if applicable.         Assessment and Plan:    1. Non-healing surgical wound, initial encounter  Continue wet to dry dressings with Vashe moistened gauze.   F/U with Dr. Natarajan as scheduled.             Please see Wound Docs for complete plan including patient instructions.     Follow Up & Goals:  Follow up in about 1 week (around 9/15/2023).        Short term goals  Reduction of devitalized tissue  Reduction of bacterial burden  Stimulate and/or maintain acute phases of wound healing  Promote granulation formation  Promote epithelization  Promote compliance with plan of care  Address factors affecting wound healing  Optimize nutritional status    Long term goals  Prevent infection  Conservative Wound Treatment  Resolve wound

## 2023-09-08 NOTE — PLAN OF CARE
Problem: Adult Inpatient Plan of Care  Goal: Plan of Care Review  9/8/2023 1302 by Lory Arcos RN  Outcome: Adequate for Care Transition  9/8/2023 0802 by Lory Arcos RN  Outcome: Ongoing, Progressing  Goal: Patient-Specific Goal (Individualized)  9/8/2023 1302 by Lory Arcos RN  Outcome: Adequate for Care Transition  9/8/2023 0802 by Lory Arcos RN  Outcome: Ongoing, Progressing  Goal: Absence of Hospital-Acquired Illness or Injury  9/8/2023 1302 by Lory Arcos RN  Outcome: Adequate for Care Transition  9/8/2023 0802 by Lory Arcos RN  Outcome: Ongoing, Progressing  Goal: Optimal Comfort and Wellbeing  9/8/2023 1302 by Lory Arcos RN  Outcome: Adequate for Care Transition  9/8/2023 0802 by Lory Arcos RN  Outcome: Ongoing, Progressing  Goal: Readiness for Transition of Care  9/8/2023 1302 by Lory Arcos RN  Outcome: Adequate for Care Transition  9/8/2023 0802 by Lory Arcos RN  Outcome: Ongoing, Progressing     Problem: Diabetes Comorbidity  Goal: Blood Glucose Level Within Targeted Range  9/8/2023 1302 by Lory Arcos RN  Outcome: Adequate for Care Transition  9/8/2023 0802 by Lory Arcos RN  Outcome: Ongoing, Progressing     Problem: Fluid Imbalance (Pneumonia)  Goal: Fluid Balance  9/8/2023 1302 by Lory Arcos RN  Outcome: Adequate for Care Transition  9/8/2023 0802 by Lory Arcos RN  Outcome: Ongoing, Progressing     Problem: Infection (Pneumonia)  Goal: Resolution of Infection Signs and Symptoms  9/8/2023 1302 by Lory Arcos RN  Outcome: Adequate for Care Transition  9/8/2023 0802 by Lory Arcos RN  Outcome: Ongoing, Progressing     Problem: Respiratory Compromise (Pneumonia)  Goal: Effective Oxygenation and Ventilation  9/8/2023 1302 by Lory Arcos RN  Outcome: Adequate for Care Transition  9/8/2023 0802 by Lory Arcos RN  Outcome: Ongoing, Progressing     Problem: Impaired Wound Healing  Goal: Optimal Wound  Healing  9/8/2023 1302 by Lory Arcos RN  Outcome: Adequate for Care Transition  9/8/2023 0802 by Lory Arcos RN  Outcome: Ongoing, Progressing     Problem: Skin Injury Risk Increased  Goal: Skin Health and Integrity  9/8/2023 1302 by Lory Arcos, BROCK  Outcome: Adequate for Care Transition  9/8/2023 0802 by Lory Arcos RN  Outcome: Ongoing, Progressing     Problem: Fall Injury Risk  Goal: Absence of Fall and Fall-Related Injury  9/8/2023 1302 by Lory Arcos, BROCK  Outcome: Adequate for Care Transition  9/8/2023 0802 by Lory Arcos RN  Outcome: Ongoing, Progressing     Problem: Infection  Goal: Absence of Infection Signs and Symptoms  9/8/2023 1302 by Lory Arcos, BROCK  Outcome: Adequate for Care Transition  9/8/2023 0802 by Lory Arcos RN  Outcome: Ongoing, Progressing

## 2023-09-08 NOTE — SUBJECTIVE & OBJECTIVE
Interval History: PT IS PARTICIPATING WITH PT/OT PERFORMING BED MOBILITY WITH INDEPENDENCE ASSISTANCE, TRANSFERS WITH SBA WITH RW ASSISTANCE, AND AMBULATING 140 FEET WITH SBA WITH RW; PT AMBULATED TO THE THERAPY GYM USING RW FOR BALANCE AND SAFETY WITH SUPERVISION.  PT HAS WOUND DEHISCENCE OF ABDOMINAL SURGICAL WOUND   FOLLOWED PER WOUND CARE TEAM WITH IMPROVEMENT. PT HAS HAD AN INDWELLING AREVALO AND BLADDER TRAINING HAS BEEN INITIATED IN ANTICIPATION OF REMOVAL.  PT HAS BEEN EVALUATED PER  RD WITH RECOMMENDATIONS FOR GLUCERNA AND ROSA BID WITH MEAL INTAKE  % WITH WEIGHT 87 KG  (ADMISSION WEIGHT 89.1 KG).  PT IS ON RA WITH SAT 96-97%. PT IS BEING TREATED PER RESPIRATORY THERAPY WITH NEBS, INCENTIVE SPIROMETRY AND O2 MONITORING.  PT IHAS COMPLETED ROCEPHIN/S MAX FOR PNA. PT CONTINUES ELIQUIS FOR VTE PPX. THE  PHARMACY IS MONITORING ALL MEDICATIONS.    Review of Systems   Constitutional:  Positive for activity change and fatigue.   HENT: Negative.     Eyes: Negative.    Respiratory: Negative.     Gastrointestinal:  Positive for abdominal distention. Negative for abdominal pain.   Endocrine: Negative.    Genitourinary: Negative.    Musculoskeletal:  Positive for gait problem.   Skin:  Positive for wound. Rash: ABDOMINAL.  Allergic/Immunologic: Positive for immunocompromised state.   Hematological: Negative.    Psychiatric/Behavioral: Negative.     All other systems reviewed and are negative.    Objective:     Vital Signs (Most Recent):  Temp: 97.8 °F (36.6 °C) (09/07/23 2000)  Pulse: 79 (09/07/23 2000)  Resp: 20 (09/07/23 2000)  BP: (!) 152/65 (09/07/23 2000)  SpO2: 100 % (09/07/23 2000) Vital Signs (24h Range):  Temp:  [97.8 °F (36.6 °C)] 97.8 °F (36.6 °C)  Pulse:  [68-79] 79  Resp:  [18-20] 20  SpO2:  [96 %-100 %] 100 %  BP: (151-152)/(61-65) 152/65     Weight: 87 kg (191 lb 14.4 oz)  Body mass index is 27.53 kg/m².    Intake/Output Summary (Last 24 hours) at 9/8/2023 0119  Last data filed at 9/7/2023  0206  Gross per 24 hour   Intake --   Output 200 ml   Net -200 ml         Physical Exam  Vitals and nursing note reviewed. Exam conducted with a chaperone present.   Constitutional:       Appearance: Normal appearance. He is ill-appearing.   HENT:      Head: Normocephalic and atraumatic.      Right Ear: Tympanic membrane normal.      Left Ear: Tympanic membrane normal.      Nose: Nose normal.      Mouth/Throat:      Mouth: Mucous membranes are moist.   Eyes:      Extraocular Movements: Extraocular movements intact.      Pupils: Pupils are equal, round, and reactive to light.   Cardiovascular:      Rate and Rhythm: Normal rate and regular rhythm.      Pulses: Normal pulses.      Heart sounds: Normal heart sounds.   Pulmonary:      Effort: Pulmonary effort is normal.      Breath sounds: Normal breath sounds.   Abdominal:      General: Bowel sounds are normal. There is distension.      Palpations: Abdomen is soft.      Tenderness: There is no abdominal tenderness. There is no guarding.   Musculoskeletal:         General: No swelling or tenderness. Normal range of motion.      Cervical back: Normal range of motion and neck supple.   Skin:     General: Skin is warm and dry.      Capillary Refill: Capillary refill takes less than 2 seconds.      Findings: Lesion (ABDOMINAL WOUND HEALING WITH DRESSING AND ABDOMINAL BINDER INTACT) present.   Neurological:      General: No focal deficit present.      Mental Status: He is alert and oriented to person, place, and time.      Motor: Weakness present.   Psychiatric:         Mood and Affect: Mood normal.         Behavior: Behavior normal.         Thought Content: Thought content normal.         Judgment: Judgment normal.        Significant Labs: All pertinent labs within the past 24 hours have been reviewed. PENDING    Significant Imaging: I have reviewed all pertinent imaging results/findings within the past 24 hours.NONE

## 2023-09-08 NOTE — ASSESSMENT & PLAN NOTE
Wound care  Wound care team consult  09/06/2023  PT HAS WOUND DEHISCENCE  WOUND CARE TEAM CONTINUES TO FOLLOW  PT IS IMPROVING

## 2023-09-08 NOTE — ASSESSMENT & PLAN NOTE
"Patient's FSGs are controlled on current medication regimen.  Last A1c reviewed-   Lab Results   Component Value Date    HGBA1C 5.6 11/06/2021     Most recent fingerstick glucose reviewed- No results for input(s): "POCTGLUCOSE" in the last 24 hours.  Current correctional scale  Low  Maintain anti-hyperglycemic dose as follows-   Antihyperglycemics (From admission, onward)    None        DM diet  SSI- low correctional scale  Hypoglycemia protocol  09/06/2023  CONTINUE POC  GLU WELL CONTROLLED; PT ADVISED THE STAFF HE IS NOT DIABETIC   CONTINUE DM DIET  "

## 2023-09-11 ENCOUNTER — PATIENT OUTREACH (OUTPATIENT)
Dept: ADMINISTRATIVE | Facility: CLINIC | Age: 78
End: 2023-09-11

## 2023-09-11 NOTE — PROGRESS NOTES
C3 nurse attempted to contact Danny Flood  for a TCC post hospital discharge follow up call. No answer. Left voicemail with callback information. The patient has a scheduled HOSFU appointment with Cameron Valencia MD  on 9/13/2023 @ 130.   Unable to route to provide due to being Non [facility] provider.

## 2023-09-11 NOTE — PLAN OF CARE
Received phone call from Dr. Natarajan's office and pt. Has been scheduled for appt. To f/u in his office on 9/25/23 at 1:15pm. Called to inform pt. And spoke with pt. Son, Nikolay Flood, who stated that they would make sure to take pt. To appt. As scheduled.

## 2023-09-12 NOTE — PROGRESS NOTES
C3 nurse spoke with Danny Flood's son , Nikolay,  for a TCC post hospital discharge follow up call. The patient has a scheduled HOSFU appointment with Cameron Valencia MD  on 9/13/2023 @ 130.    Unable to complete medication reconciliation due to Nikolay not knowing patient's medication.   Nikolay instructed me to call his sister, Mary.  Mary called with no answer.   Left call back information

## 2023-09-13 NOTE — PROGRESS NOTES
Attempted to call patient's daughter, Mary Guerra, to completed medication reconciliation with no answer.  Left voicemail with call back information.

## 2023-09-19 ENCOUNTER — OFFICE VISIT (OUTPATIENT)
Dept: CARDIOLOGY | Facility: CLINIC | Age: 78
End: 2023-09-19
Payer: MEDICARE

## 2023-09-19 VITALS
BODY MASS INDEX: 27.2 KG/M2 | RESPIRATION RATE: 18 BRPM | WEIGHT: 190 LBS | SYSTOLIC BLOOD PRESSURE: 120 MMHG | OXYGEN SATURATION: 98 % | DIASTOLIC BLOOD PRESSURE: 70 MMHG | HEIGHT: 70 IN | HEART RATE: 61 BPM

## 2023-09-19 DIAGNOSIS — D64.9 ANEMIA, UNSPECIFIED TYPE: ICD-10-CM

## 2023-09-19 DIAGNOSIS — R60.0 BILATERAL LOWER EXTREMITY EDEMA: Primary | ICD-10-CM

## 2023-09-19 DIAGNOSIS — I34.0 MITRAL VALVE INSUFFICIENCY, UNSPECIFIED ETIOLOGY: ICD-10-CM

## 2023-09-19 DIAGNOSIS — I21.A1 MYOCARDIAL INFARCTION TYPE 2: ICD-10-CM

## 2023-09-19 DIAGNOSIS — I48.0 PAROXYSMAL ATRIAL FIBRILLATION: ICD-10-CM

## 2023-09-19 DIAGNOSIS — Z79.01 ON ANTICOAGULANT THERAPY: ICD-10-CM

## 2023-09-19 PROCEDURE — 3074F SYST BP LT 130 MM HG: CPT | Mod: CPTII,,, | Performed by: NURSE PRACTITIONER

## 2023-09-19 PROCEDURE — 1111F PR DISCHARGE MEDS RECONCILED W/ CURRENT OUTPATIENT MED LIST: ICD-10-PCS | Mod: CPTII,,, | Performed by: NURSE PRACTITIONER

## 2023-09-19 PROCEDURE — 1159F MED LIST DOCD IN RCRD: CPT | Mod: CPTII,,, | Performed by: NURSE PRACTITIONER

## 2023-09-19 PROCEDURE — 99213 PR OFFICE/OUTPT VISIT, EST, LEVL III, 20-29 MIN: ICD-10-PCS | Mod: S$PBB,,, | Performed by: NURSE PRACTITIONER

## 2023-09-19 PROCEDURE — 3078F DIAST BP <80 MM HG: CPT | Mod: CPTII,,, | Performed by: NURSE PRACTITIONER

## 2023-09-19 PROCEDURE — 99213 OFFICE O/P EST LOW 20 MIN: CPT | Mod: S$PBB,,, | Performed by: NURSE PRACTITIONER

## 2023-09-19 PROCEDURE — 1159F PR MEDICATION LIST DOCUMENTED IN MEDICAL RECORD: ICD-10-PCS | Mod: CPTII,,, | Performed by: NURSE PRACTITIONER

## 2023-09-19 PROCEDURE — 3074F PR MOST RECENT SYSTOLIC BLOOD PRESSURE < 130 MM HG: ICD-10-PCS | Mod: CPTII,,, | Performed by: NURSE PRACTITIONER

## 2023-09-19 PROCEDURE — 99215 OFFICE O/P EST HI 40 MIN: CPT | Mod: PBBFAC | Performed by: NURSE PRACTITIONER

## 2023-09-19 PROCEDURE — 1111F DSCHRG MED/CURRENT MED MERGE: CPT | Mod: CPTII,,, | Performed by: NURSE PRACTITIONER

## 2023-09-19 PROCEDURE — 3078F PR MOST RECENT DIASTOLIC BLOOD PRESSURE < 80 MM HG: ICD-10-PCS | Mod: CPTII,,, | Performed by: NURSE PRACTITIONER

## 2023-09-19 RX ORDER — SPIRONOLACTONE 50 MG/1
TABLET, FILM COATED ORAL
COMMUNITY
Start: 2023-09-13 | End: 2023-09-19 | Stop reason: SDUPTHER

## 2023-09-19 RX ORDER — FUROSEMIDE 20 MG/1
20 TABLET ORAL 2 TIMES DAILY WITH MEALS
Qty: 10 TABLET | Refills: 0 | Status: SHIPPED | OUTPATIENT
Start: 2023-09-19 | End: 2023-10-16

## 2023-09-19 RX ORDER — SPIRONOLACTONE 50 MG/1
50 TABLET, FILM COATED ORAL DAILY
Qty: 30 TABLET | Refills: 1 | Status: SHIPPED | OUTPATIENT
Start: 2023-09-19

## 2023-09-19 NOTE — PATIENT INSTRUCTIONS
Take Lasix 20mg twice a day for 5 days, call office if lower extremity edema improves  [facility] to [facility] for evaluation of lower extremity edema  Refer to GI for anemia, recently started on Eliquis for paroxsymal afib  Labs today; CBC & BMP  Decrease spironolactone to 50mg ONCE daily  Follow up with Dr. Batres in 1 month

## 2023-09-19 NOTE — PROGRESS NOTES
PCP: Cameron Valencia MD    Referring Provider:     Subjective:   Danny Flood is a 78 y.o. male with hx of HTN, DM, GERD, and dementia who presents for 2 week hospital discharge follow up. He was admitted with infected mesh and underwent mesh removal and small bowel resection with anastomosis and had episode of tachycardia on post op day 5 with troponin elevation 1296, 1820, 1528 and cardiology consulted for NSTEMI.    CT PE significantly limited due to motion, no segmental or larger PE (small emboli could not be excluded). He underwent LHC which revealed trivial non-obstructive CAD and normal EF, 55%.    Post op day 7 he was found to have paroxysmal atrial fibrillation with controlled rate. Now on Eliquis 5mg BID and metoprolol tartrate 12.5mg BID.    Patient denies palpitations. Reports continued/worsening lower extremity edema. He admits to chronic left lower extremity edema, but now present in both legs.       Fhx: Noncontributory  Shx: Nonsmoker, denies alcohol or drug abuse    EKG   Results for orders placed or performed during the hospital encounter of 08/18/23   EKG 12-lead    Collection Time: 08/18/23  8:42 PM    Narrative    Test Reason : R07.9,    Vent. Rate : 075 BPM     Atrial Rate : 000 BPM     P-R Int : 132 ms          QRS Dur : 080 ms      QT Int : 384 ms       P-R-T Axes : 054 053 053 degrees     QTc Int : 410 ms    Sinus rhythm  Normal ECG    Confirmed by Shady Trejo MD ([id number]) on 8/31/2023 3:21:37 PM    Referred By: OLVIN SOLANO           Confirmed By:Shady Trejo MD     ECHO Results for orders placed during the hospital encounter of 08/09/23    Echo    Interpretation Summary    Left Ventricle: The left ventricle is normal in size. Ventricular mass is normal. Normal wall thickness. Normal wall motion. There is normal systolic function. Biplane (2D) method of discs ejection fraction is 60%. Global longitudinal strain is normal. Inconclusive left ventricular filling pressure.    Left Atrium:  Left atrium is mildly dilated.    Right Ventricle: Mild right ventricular enlargement. Systolic function is normal.    Right Atrium: Right atrium is mildly dilated.    Aortic Valve: The aortic valve is a trileaflet valve. Mildly calcified cusps.    Mitral Valve: Mildly thickened leaflets. There is mild to moderate regurgitation.    Tricuspid Valve: There is moderate regurgitation.    [facility] Results for orders placed during the hospital encounter of 08/09/23    Cardiac catheterization    Conclusion    The ejection fraction was calculated to be 55%.    The left ventricular systolic function was normal.    The left ventricular end diastolic pressure was normal.    The pre-procedure left ventricular end diastolic pressure was 12.    The estimated blood loss was none.    There was non-obstructive coronary artery disease..    The procedure log was documented by Documenter: Anni Vega RN and verified by Edinson Torres DO.    Date: 8/15/2023  Time: 11:29 AM    Normal LV systolic function, ER 55%, trivial non-obstructive CAD.        Lab Results   Component Value Date     09/19/2023    K 4.4 09/19/2023     09/19/2023    CO2 26 09/19/2023    BUN 18 09/19/2023    CREATININE 0.68 (L) 09/19/2023    CALCIUM 9.2 09/19/2023    ANIONGAP 11 09/19/2023    EGFRNONAA 113 12/21/2021       Lab Results   Component Value Date    CHOL 104 08/14/2023     Lab Results   Component Value Date    HDL 36 (L) 08/14/2023     Lab Results   Component Value Date    LDLCALC 52 08/14/2023     Lab Results   Component Value Date    TRIG 82 08/14/2023     Lab Results   Component Value Date    CHOLHDL 2.9 08/14/2023       Lab Results   Component Value Date    WBC 4.43 (L) 09/19/2023    HGB 10.8 (L) 09/19/2023    HCT 33.9 (L) 09/19/2023    MCV 99.4 (H) 09/19/2023     09/19/2023           Current Outpatient Medications:     Lactobacillus acidophilus (PROBIOTIC) 10 billion cell Cap, Take by mouth., Disp: , Rfl:     metoprolol tartrate  "(LOPRESSOR) 25 MG tablet, Take 0.5 tablets (12.5 mg total) by mouth 2 (two) times daily., Disp: 30 tablet, Rfl: 11    omega-3 fatty acids/fish oil (FISH OIL-OMEGA-3 FATTY ACIDS) 300-1,000 mg capsule, Take 1 capsule by mouth once daily., Disp: , Rfl:     rifAXIMin (XIFAXAN) 550 mg Tab, Take 1 tablet (550 mg total) by mouth 2 (two) times daily., Disp: 60 tablet, Rfl: 0    timolol maleate 0.5% (TIMOPTIC) 0.5 % Drop, 1 drop once daily., Disp: , Rfl:     apixaban (ELIQUIS) 5 mg Tab, Take 1 tablet (5 mg total) by mouth 2 (two) times daily., Disp: 60 tablet, Rfl: 5    FOLIC ACID-VITAMIN B6-VIT B12 ORAL, Take 1 tablet by mouth once daily., Disp: , Rfl:     furosemide (LASIX) 20 MG tablet, Take 1 tablet (20 mg total) by mouth 2 (two) times daily with meals., Disp: 10 tablet, Rfl: 0    niacin (SLO-NIACIN) 500 mg tablet, Take 500 mg by mouth once daily. With meal, Disp: , Rfl:     pantoprazole (PROTONIX) 40 MG tablet, Take 1 tablet (40 mg total) by mouth 2 (two) times daily., Disp: 180 tablet, Rfl: 3    QUEtiapine (SEROQUEL) 50 MG tablet, 1 tablet (50 mg total) by Per NG tube route 2 (two) times daily., Disp: 60 tablet, Rfl: 11    spironolactone (ALDACTONE) 50 MG tablet, Take 1 tablet (50 mg total) by mouth once daily., Disp: 30 tablet, Rfl: 1    Review of Systems   Constitutional:  Negative for chills and fever.   HENT: Negative.     Eyes: Negative.    Respiratory:  Negative for cough and shortness of breath.    Cardiovascular:  Positive for leg swelling. Negative for chest pain, palpitations and orthopnea.   Gastrointestinal:  Negative for blood in stool, nausea and vomiting.   Genitourinary:  Negative for hematuria.         Objective:   /70   Pulse 61   Resp 18   Ht 5' 10" (1.778 m)   Wt 86.2 kg (190 lb)   SpO2 98%   BMI 27.26 kg/m²     Physical Exam  Vitals reviewed.   Eyes:      General: No scleral icterus.     Pupils: Pupils are equal, round, and reactive to light.   Neck:      Vascular: No JVD. "   Cardiovascular:      Rate and Rhythm: Normal rate and regular rhythm.      Heart sounds: Normal heart sounds.   Pulmonary:      Effort: Pulmonary effort is normal.      Breath sounds: Normal breath sounds. No wheezing, rhonchi or rales.   Musculoskeletal:      Right lower leg: Edema present.      Left lower leg: Edema (L > R) present.   Skin:     General: Skin is warm and dry.   Neurological:      Mental Status: He is alert. Mental status is at baseline.           Assessment:     1. Bilateral lower extremity edema  furosemide (LASIX) 20 MG tablet    Basic Metabolic Panel    Ambulatory referral/consult to [facility]      2. Anemia, unspecified type  CBC Auto Differential    Ambulatory referral/consult to Gastroenterology      3. On anticoagulant therapy  CBC Auto Differential    Ambulatory referral/consult to Gastroenterology      4. Paroxysmal atrial fibrillation        5. Mitral valve insufficiency, unspecified etiology        6. Myocardial infarction type 2              Plan:   Anemia  Chronic; H/H 10/29 on 8/21/2023  Now on Eliquis, H/H 8/27  Repeat CBC today  Will refer to GI for further evaluation    Paroxysmal atrial fibrillation  Atrial fibrillation diagnosed by cardiology 8/17/2023  No EKG with documented afib (likely seen on telemetry monitoring)    Currently sinus rhythm  Continue Eliquis 5mg BID (referring to GI for anemia, H/H stable)  Continue metoprolol 12.5mg BID  CBC today      Mitral regurgitation  Mild-moderate MR & moderate TR by echo 8/14/2023    Myocardial infarction type 2  C 8/15/2023 with trivial non obstructive CAD    Bilateral lower extremity edema  Lower extremity edema, L< R  Take Lasix 20mg twice a day for 5 days, call office if lower extremity edema improves  Refer to [facility] for evaluation of lower extremity edema      Follow up with Dr. Batres in 1 month  Patient taking spironolactone 50mg BID; Potassium 4.4 today, decreased to 50mg once daily

## 2023-09-25 PROBLEM — D64.9 ANEMIA: Status: ACTIVE | Noted: 2023-09-25

## 2023-09-25 PROBLEM — I34.0 MODERATE MITRAL REGURGITATION: Status: ACTIVE | Noted: 2023-09-25

## 2023-09-25 PROBLEM — R60.0 BILATERAL LOWER EXTREMITY EDEMA: Status: ACTIVE | Noted: 2023-09-25

## 2023-09-25 PROBLEM — I48.0 PAROXYSMAL ATRIAL FIBRILLATION: Status: ACTIVE | Noted: 2023-08-16

## 2023-09-25 NOTE — ASSESSMENT & PLAN NOTE
Chronic; H/H 10/29 on 8/21/2023  Now on Eliquis, H/H 8/27  Repeat CBC today  Will refer to GI for further evaluation

## 2023-09-25 NOTE — ASSESSMENT & PLAN NOTE
Atrial fibrillation diagnosed by cardiology 8/17/2023  No EKG with documented afib (likely seen on telemetry monitoring)    Currently sinus rhythm  Continue Eliquis 5mg BID (referring to GI for anemia, H/H stable)  Continue metoprolol 12.5mg BID  CBC today

## 2023-09-25 NOTE — ASSESSMENT & PLAN NOTE
Lower extremity edema, L< R  Take Lasix 20mg twice a day for 5 days, call office if lower extremity edema improves  Refer to [facility] for evaluation of lower extremity edema

## 2023-09-27 ENCOUNTER — HOSPITAL ENCOUNTER (OUTPATIENT)
Dept: RADIOLOGY | Facility: HOSPITAL | Age: 78
Discharge: HOME | End: 2023-09-27
Attending: INTERNAL MEDICINE
Payer: MEDICARE

## 2023-09-27 DIAGNOSIS — K74.60 CIRRHOSIS OF LIVER: ICD-10-CM

## 2023-09-27 PROCEDURE — 76705 ECHO EXAM OF ABDOMEN: CPT | Mod: 26,,, | Performed by: RADIOLOGY

## 2023-09-27 PROCEDURE — 76705 US ABDOMEN LIMITED: ICD-10-PCS | Mod: 26,,, | Performed by: RADIOLOGY

## 2023-09-27 PROCEDURE — 76705 ECHO EXAM OF ABDOMEN: CPT | Mod: TC

## 2023-09-27 NOTE — PROGRESS NOTES
Paracentesis  Patient presents this a.m. for ultrasound paracentesis.  Limited 4 quadrant abdominal ultrasound demonstrates no ascites present today.  I have discussed this with the patient and his son, they are aware of why procedure will not be done today.  We will get in touch with Dr. Valencia's Nurse and make them aware of ultrasound results.

## 2023-10-09 ENCOUNTER — TELEPHONE (OUTPATIENT)
Dept: GASTROENTEROLOGY | Facility: CLINIC | Age: 78
End: 2023-10-09
Payer: MEDICARE

## 2023-10-09 ENCOUNTER — OFFICE VISIT (OUTPATIENT)
Dept: VASCULAR SURGERY | Facility: CLINIC | Age: 78
End: 2023-10-09
Payer: MEDICARE

## 2023-10-09 ENCOUNTER — HOSPITAL ENCOUNTER (OUTPATIENT)
Dept: RADIOLOGY | Facility: HOSPITAL | Age: 78
Discharge: HOME | End: 2023-10-09
Attending: FAMILY MEDICINE
Payer: MEDICARE

## 2023-10-09 ENCOUNTER — OFFICE VISIT (OUTPATIENT)
Dept: GASTROENTEROLOGY | Facility: CLINIC | Age: 78
End: 2023-10-09
Payer: MEDICARE

## 2023-10-09 VITALS
HEART RATE: 60 BPM | WEIGHT: 178.81 LBS | SYSTOLIC BLOOD PRESSURE: 138 MMHG | HEIGHT: 70 IN | RESPIRATION RATE: 16 BRPM | BODY MASS INDEX: 25.6 KG/M2 | DIASTOLIC BLOOD PRESSURE: 60 MMHG

## 2023-10-09 VITALS
HEIGHT: 70 IN | OXYGEN SATURATION: 98 % | DIASTOLIC BLOOD PRESSURE: 62 MMHG | BODY MASS INDEX: 25.48 KG/M2 | SYSTOLIC BLOOD PRESSURE: 146 MMHG | HEART RATE: 56 BPM | WEIGHT: 178 LBS

## 2023-10-09 DIAGNOSIS — M79.605 LEG PAIN, BILATERAL: ICD-10-CM

## 2023-10-09 DIAGNOSIS — I87.2 VENOUS INSUFFICIENCY: ICD-10-CM

## 2023-10-09 DIAGNOSIS — D64.9 ANEMIA, UNSPECIFIED TYPE: Primary | ICD-10-CM

## 2023-10-09 DIAGNOSIS — R60.0 BILATERAL LOWER EXTREMITY EDEMA: Primary | ICD-10-CM

## 2023-10-09 DIAGNOSIS — M79.604 LEG PAIN, BILATERAL: ICD-10-CM

## 2023-10-09 DIAGNOSIS — L81.9 HYPERPIGMENTATION OF SKIN: ICD-10-CM

## 2023-10-09 DIAGNOSIS — Z79.01 ON ANTICOAGULANT THERAPY: ICD-10-CM

## 2023-10-09 DIAGNOSIS — K74.69 OTHER CIRRHOSIS OF LIVER: ICD-10-CM

## 2023-10-09 DIAGNOSIS — R60.0 BILATERAL LOWER EXTREMITY EDEMA: ICD-10-CM

## 2023-10-09 PROCEDURE — 3288F FALL RISK ASSESSMENT DOCD: CPT | Mod: CPTII,,, | Performed by: NURSE PRACTITIONER

## 2023-10-09 PROCEDURE — 93970 EXTREMITY STUDY: CPT | Mod: 26,,, | Performed by: FAMILY MEDICINE

## 2023-10-09 PROCEDURE — 1160F PR REVIEW ALL MEDS BY PRESCRIBER/CLIN PHARMACIST DOCUMENTED: ICD-10-PCS | Mod: CPTII,,, | Performed by: FAMILY MEDICINE

## 2023-10-09 PROCEDURE — 3078F PR MOST RECENT DIASTOLIC BLOOD PRESSURE < 80 MM HG: ICD-10-PCS | Mod: CPTII,,, | Performed by: FAMILY MEDICINE

## 2023-10-09 PROCEDURE — 1160F RVW MEDS BY RX/DR IN RCRD: CPT | Mod: CPTII,,, | Performed by: FAMILY MEDICINE

## 2023-10-09 PROCEDURE — 3288F PR FALLS RISK ASSESSMENT DOCUMENTED: ICD-10-PCS | Mod: CPTII,,, | Performed by: FAMILY MEDICINE

## 2023-10-09 PROCEDURE — 99204 PR OFFICE/OUTPT VISIT, NEW, LEVL IV, 45-59 MIN: ICD-10-PCS | Mod: S$PBB,,, | Performed by: FAMILY MEDICINE

## 2023-10-09 PROCEDURE — 3075F SYST BP GE 130 - 139MM HG: CPT | Mod: CPTII,,, | Performed by: FAMILY MEDICINE

## 2023-10-09 PROCEDURE — 3288F FALL RISK ASSESSMENT DOCD: CPT | Mod: CPTII,,, | Performed by: FAMILY MEDICINE

## 2023-10-09 PROCEDURE — 3077F SYST BP >= 140 MM HG: CPT | Mod: CPTII,,, | Performed by: NURSE PRACTITIONER

## 2023-10-09 PROCEDURE — 3077F PR MOST RECENT SYSTOLIC BLOOD PRESSURE >= 140 MM HG: ICD-10-PCS | Mod: CPTII,,, | Performed by: NURSE PRACTITIONER

## 2023-10-09 PROCEDURE — 1101F PT FALLS ASSESS-DOCD LE1/YR: CPT | Mod: CPTII,,, | Performed by: NURSE PRACTITIONER

## 2023-10-09 PROCEDURE — 99204 OFFICE O/P NEW MOD 45 MIN: CPT | Mod: S$PBB,,, | Performed by: FAMILY MEDICINE

## 2023-10-09 PROCEDURE — 3078F DIAST BP <80 MM HG: CPT | Mod: CPTII,,, | Performed by: NURSE PRACTITIONER

## 2023-10-09 PROCEDURE — 99214 PR OFFICE/OUTPT VISIT, EST, LEVL IV, 30-39 MIN: ICD-10-PCS | Mod: S$PBB,,, | Performed by: NURSE PRACTITIONER

## 2023-10-09 PROCEDURE — 93970 US VENOUS REFLUX STUDY BILATERAL: ICD-10-PCS | Mod: 26,,, | Performed by: FAMILY MEDICINE

## 2023-10-09 PROCEDURE — 1101F PR PT FALLS ASSESS DOC 0-1 FALLS W/OUT INJ PAST YR: ICD-10-PCS | Mod: CPTII,,, | Performed by: FAMILY MEDICINE

## 2023-10-09 PROCEDURE — 3078F PR MOST RECENT DIASTOLIC BLOOD PRESSURE < 80 MM HG: ICD-10-PCS | Mod: CPTII,,, | Performed by: NURSE PRACTITIONER

## 2023-10-09 PROCEDURE — 3075F PR MOST RECENT SYSTOLIC BLOOD PRESS GE 130-139MM HG: ICD-10-PCS | Mod: CPTII,,, | Performed by: FAMILY MEDICINE

## 2023-10-09 PROCEDURE — 99214 OFFICE O/P EST MOD 30 MIN: CPT | Mod: S$PBB,,, | Performed by: NURSE PRACTITIONER

## 2023-10-09 PROCEDURE — 1101F PT FALLS ASSESS-DOCD LE1/YR: CPT | Mod: CPTII,,, | Performed by: FAMILY MEDICINE

## 2023-10-09 PROCEDURE — 1159F PR MEDICATION LIST DOCUMENTED IN MEDICAL RECORD: ICD-10-PCS | Mod: CPTII,,, | Performed by: FAMILY MEDICINE

## 2023-10-09 PROCEDURE — 1159F MED LIST DOCD IN RCRD: CPT | Mod: CPTII,,, | Performed by: NURSE PRACTITIONER

## 2023-10-09 PROCEDURE — 99214 OFFICE O/P EST MOD 30 MIN: CPT | Mod: PBBFAC,25 | Performed by: FAMILY MEDICINE

## 2023-10-09 PROCEDURE — 1159F MED LIST DOCD IN RCRD: CPT | Mod: CPTII,,, | Performed by: FAMILY MEDICINE

## 2023-10-09 PROCEDURE — 3288F PR FALLS RISK ASSESSMENT DOCUMENTED: ICD-10-PCS | Mod: CPTII,,, | Performed by: NURSE PRACTITIONER

## 2023-10-09 PROCEDURE — 1101F PR PT FALLS ASSESS DOC 0-1 FALLS W/OUT INJ PAST YR: ICD-10-PCS | Mod: CPTII,,, | Performed by: NURSE PRACTITIONER

## 2023-10-09 PROCEDURE — 99215 OFFICE O/P EST HI 40 MIN: CPT | Mod: PBBFAC,25 | Performed by: NURSE PRACTITIONER

## 2023-10-09 PROCEDURE — 1159F PR MEDICATION LIST DOCUMENTED IN MEDICAL RECORD: ICD-10-PCS | Mod: CPTII,,, | Performed by: NURSE PRACTITIONER

## 2023-10-09 PROCEDURE — 93970 EXTREMITY STUDY: CPT | Mod: TC

## 2023-10-09 PROCEDURE — 3078F DIAST BP <80 MM HG: CPT | Mod: CPTII,,, | Performed by: FAMILY MEDICINE

## 2023-10-09 RX ORDER — LACTULOSE 10 G/15ML
SOLUTION ORAL; RECTAL
COMMUNITY
Start: 2023-09-25

## 2023-10-09 NOTE — PROGRESS NOTES
[facility] CLINIC NOTE    Patient ID: Danny Flood is a 78 y.o. male.    I. HISTORY     Chief Complaint:   Chief Complaint   Patient presents with    Edema     NP REFERAL PHIL FORREST RE: LAURIE LOWER EXTER EDEMA        HPI: Danny Flood is a 78 y.o. male who is referred here today by PCP for evaluation of bilateral lower extremity swelling, heaviness and discomfort.  Symptoms are progressive/worsening and began about 1 years ago.  Location is bilateral lower extremities below the knees. Symptoms are worse at the end of the day.  History of venous interventions includes none.  Denies family history of venous disease.  Denies history of DVT or CHF or cellulitis.    The patient underwent a bilateral complete venous reflux study today, 10/09/2023, and the results were discussed with the patient.  This study shows no evidence of acute DVT bilaterally.  Post thrombotic changes noted in the left popliteal vein.  Dilation and axial reflux noted in the bilateral great and small saphenous veins.  Refluxing varicosities also noted of the medial lower legs.    Venous Disease Medical Necessity Documentation Initial Visit Date:  10/09/2023 Return Check Date:    Have you ever had a rupture or bleed from a varicose vein in your leg(s)?              [] Yes  [x] No   [] Yes   [] No   Have you ever been diagnosed with phlebitis, cellulitis, or inflammation in the area of the varicose veins of  your leg(s)?  [] Yes  [x] No    [] Yes   [] No   Do you have darkened or inflamed skin on your legs?   [x] Yes   [] No   [] Yes   [] No   Do you have leg swelling?     [x] Yes   [] No   [] Yes   [] No   Do you have leg pain?   [x] Yes   [] No   [] Yes   [] No   If yes, describe the type of pain?    []   Stabbing []  Radiating [x]  Aching   []  Tightness []  Throbbing               []  Burning []  Cramping              Do you have leg discomfort?   [] Yes   [x] No   [] Yes   [] No   If yes, describe the type of discomfort?    []   Heaviness []  Fullness   []  Restlessness [] Tired/Fatigued [] Itching              Have you ever worn compression hose?   [] Yes   [x] No   [] Yes   [] No   If yes, how long?           Do you elevate your legs while sitting?   [x] Yes   [] No   [] Yes   [] No   Does venous disease (varicose veins, ulcers, skin changes, leg pain/swelling) interfere with your daily life?  If yes, check activities you are limited or unable to do.    []  Shower  [x]   Walk  []  Exercise  [] Play with children/grandchildren  []  Shop [] Work [x] Stand for any period of time [] Sleep                               [] Sitting for an extended period of time.           [x] Yes   [] No   [] Yes   [] No   Do you exercise/have you tried to exercise (i.e.  Walk our participate in a regular exercise routine)?  [] Yes  [x] No   [] Yes   [] No   BMI   25.66           Past Medical History:   Diagnosis Date    Diverticulitis     Hyperlipemia     Hypokalemia 11/09/2021        Past Surgical History:   Procedure Laterality Date    COLON SURGERY      partial colon resection    EXCISION, SMALL INTESTINE N/A 8/9/2023    Procedure: EXCISION, SMALL INTESTINE;  Surgeon: Tesfaye Natarajan MD;  Location: [facility] OR;  Service: General;  Laterality: N/A;    HERNIA REPAIR      LAPAROTOMY, EXPLORATORY N/A 8/9/2023    Procedure: LAPAROTOMY, EXPLORATORY;  Surgeon: Tesfaye Natarajan MD;  Location: [facility] OR;  Service: General;  Laterality: N/A;    LEFT HEART CATHETERIZATION Left 8/15/2023    Procedure: Left heart cath;  Surgeon: Edinson Torres DO;  Location: [facility] CATH LAB;  Service: Cardiology;  Laterality: Left;    REMOVAL OF IMPLANT N/A 8/9/2023    Procedure: REMOVAL, IMPLANT;  Surgeon: Tesfaye Natarajan MD;  Location: [facility] OR;  Service: General;  Laterality: N/A;  removal mesh       Social History     Tobacco Use   Smoking Status Never   Smokeless Tobacco Never         Current Outpatient Medications:     apixaban (ELIQUIS) 5 mg Tab, Take 1 tablet  (5 mg total) by mouth 2 (two) times daily., Disp: 60 tablet, Rfl: 5    FOLIC ACID-VITAMIN B6-VIT B12 ORAL, Take 1 tablet by mouth once daily., Disp: , Rfl:     furosemide (LASIX) 20 MG tablet, Take 1 tablet (20 mg total) by mouth 2 (two) times daily with meals., Disp: 10 tablet, Rfl: 0    Lactobacillus acidophilus (PROBIOTIC) 10 billion cell Cap, Take by mouth., Disp: , Rfl:     metoprolol tartrate (LOPRESSOR) 25 MG tablet, Take 0.5 tablets (12.5 mg total) by mouth 2 (two) times daily., Disp: 30 tablet, Rfl: 11    niacin (SLO-NIACIN) 500 mg tablet, Take 500 mg by mouth once daily. With meal, Disp: , Rfl:     omega-3 fatty acids/fish oil (FISH OIL-OMEGA-3 FATTY ACIDS) 300-1,000 mg capsule, Take 1 capsule by mouth once daily., Disp: , Rfl:     rifAXIMin (XIFAXAN) 550 mg Tab, Take 1 tablet (550 mg total) by mouth 2 (two) times daily., Disp: 60 tablet, Rfl: 0    spironolactone (ALDACTONE) 50 MG tablet, Take 1 tablet (50 mg total) by mouth once daily., Disp: 30 tablet, Rfl: 1    timolol maleate 0.5% (TIMOPTIC) 0.5 % Drop, 1 drop once daily., Disp: , Rfl:     lactulose (CHRONULAC) 10 gram/15 mL solution, SMARTSIG:Milliliter(s) By Mouth, Disp: , Rfl:     pantoprazole (PROTONIX) 40 MG tablet, Take 1 tablet (40 mg total) by mouth 2 (two) times daily., Disp: 180 tablet, Rfl: 3    QUEtiapine (SEROQUEL) 50 MG tablet, 1 tablet (50 mg total) by Per NG tube route 2 (two) times daily., Disp: 60 tablet, Rfl: 11    Review of Systems   Constitutional:  Negative for activity change, chills, diaphoresis, fatigue and fever.   Respiratory:  Negative for cough and shortness of breath.    Cardiovascular:  Positive for leg swelling. Negative for chest pain and claudication.        Hyperpigmentation LE   Gastrointestinal:  Negative for nausea and vomiting.   Musculoskeletal:  Positive for leg pain. Negative for joint swelling.   Integumentary:  Negative for rash and wound.   Neurological:  Negative for weakness and numbness.          II.  PHYSICAL EXAM     Physical Exam  Constitutional:       General: He is awake. He is not in acute distress.     Appearance: Normal appearance. He is not ill-appearing or toxic-appearing.   HENT:      Head: Normocephalic and atraumatic.   Eyes:      Extraocular Movements: Extraocular movements intact.      Conjunctiva/sclera: Conjunctivae normal.      Pupils: Pupils are equal, round, and reactive to light.   Neck:      Vascular: No carotid bruit or JVD.   Cardiovascular:      Rate and Rhythm: Normal rate and regular rhythm.      Pulses:           Dorsalis pedis pulses are detected w/ Doppler on the right side and detected w/ Doppler on the left side.        Posterior tibial pulses are detected w/ Doppler on the right side and detected w/ Doppler on the left side.      Heart sounds: No murmur heard.  Pulmonary:      Effort: Pulmonary effort is normal. No respiratory distress.      Breath sounds: No stridor. No wheezing, rhonchi or rales.   Musculoskeletal:         General: No swelling, tenderness or deformity.      Right lower le+ Edema present.      Left lower le+ Edema present.      Comments: No evidence of active cellulitis, weeping or open ulceration.   Feet:      Comments: Triphasic hand-held dopplerable pulses of the bilateral dorsalis pedis and posterior tibial arteries.  Skin:     General: Skin is warm.      Capillary Refill: Capillary refill takes less than 2 seconds.      Coloration: Skin is not ashen.      Findings: No bruising, erythema, lesion, rash or wound.   Neurological:      Mental Status: He is alert and oriented to person, place, and time.      Motor: No weakness.   Psychiatric:         Speech: Speech normal.         Behavior: Behavior normal. Behavior is cooperative.         Reticular/Spider veins noted:  RLE: ankle and foot  LLE: ankle and foot    Varicose veins noted:  RLE: none  LLE:  none    CEAP Classification  Clinical Signs: Class 4 - Skin changes ascribed to venous disease  Etiologic  Classification: Primary  Anatomic distribution: Superficial  Pathophysiologic dysfunction: Reflux                       Venous Clinical Severity Score  Pain:2=Daily, moderate activity limitation, occasional analgesics  Varicose Veins: 2=Multiple. GS varicose veins confined to calf or thigh  Venous Edema: 2=Afternoon edema, above ankle  Pigmentation: 1=Diffuse, but limited in area and old (brown)  Inflammation: 0=None  Induration: 0=None  Number of Active Ulcers: 0=0  Active Ulceration, Duration: 0=None  Active Ulcer Size: 0=None  Compressive Therapy: 0=Not used or not compliant  Total Score: 7       III. ASSESSMENT & PLAN (MEDICAL DECISION MAKING)     1. Bilateral lower extremity edema    2. Hyperpigmentation of skin    3. Leg pain, bilateral    4. Venous insufficiency        Assessment/Diagnosis and Plan:  Ultrasound of lower extremities reveals positive evidence of venous insufficiency in the bilateral great saphenous and small saphenous veins.  Plan for conservative medical treatment at this time. The patient may benefit from endovenous ablation in the future.     - Start compression with 15-20 mmHg Rx stockings  - Therapeutic leg elevation  - Calf pumping exercises  - RTC 3 months for further evaluation      Orders Placed This Encounter    US Venous Reflux Study Bilateral          Amaris Mills MA

## 2023-10-09 NOTE — TELEPHONE ENCOUNTER
Attempted to call results. Left message.            ----- Message from PHIL So sent at 10/9/2023  4:11 PM CDT -----  MELD score is normal. Platelets are normal. Anemia has improved. Some labs are pending

## 2023-10-09 NOTE — PROGRESS NOTES
Danny Flood is a 78 y.o. male here for Anemia        PCP: Cameron Valencia  Referring Provider: Sachi Sharma, Roselia  [id number]  [facility]  [place] [id number]     HPI:  Presents in referral due to anemia.  HGB is 10.8 and HCT is 33.9.  No iron studies for review.  He does have a history of upper GI bleed.  Last EGD was 11/06/2021, report as follows, Multiple ulcers seen in the duodenal sweep. The largest was 15mm, with evidence of nonbleeding visible vessel. This was treated with bipolar coagulation. Random gastric biopsies obtained. Long, linear clean based ulcer seen in the esophagus. Suspect this esophageal ulceration secondary to NGT pressure. Patient has been recently been hospitalized due to a hernia mesh infection which required surgery. He continues to have an open abdominal wound that is healing.  Area today is covered with a dry abdominal bandage.  He is receiving home health.  Abdominal ultrasound on 09/22, no ascites.  Abdominal ultrasound on 08/19/2023 does not show any evidence of cirrhosis.  He has a history of chronically elevated ammonia level with hepatic encephalopathy.  He is taking lactulose and is having multiple loose stools per day.  No history of EtOH.  The son reports that they have been told that the patient does have cirrhosis. 8/23/23, labs reviewed, alkaline phos is 73, ALT 39, AST 45.  On 08/21/2023, ammonia level is 85.  Denies any visible hematochezia or melena.  He is currently taking Eliquis.  Dr. Schwartz today for lower extremity edema.  He has also been evaluated by Cardiology.  He did have a left heart catheterization on 08/15/2023.  Nonobstructive coronary artery disease.  Ejection fraction 55-60%.    Anemia  There has been no abdominal pain, bruising/bleeding easily or fever.         ROS:  Review of Systems   Constitutional:  Positive for appetite change (decreased) and fatigue. Negative for activity change, fever and unexpected weight change.   HENT:   Negative for trouble swallowing.    Respiratory:  Negative for cough.    Cardiovascular:  Positive for leg swelling. Negative for chest pain.   Gastrointestinal:  Positive for diarrhea (loose). Negative for abdominal distention, abdominal pain, anal bleeding, blood in stool, change in bowel habit, constipation, nausea, vomiting and reflux.   Musculoskeletal:  Positive for leg pain. Negative for gait problem.   Integumentary:  Positive for wound. Negative for color change.   Neurological:  Positive for weakness (global).   Hematological:  Does not bruise/bleed easily.   Psychiatric/Behavioral:  Negative for sleep disturbance. The patient is not nervous/anxious.           PMHX:  has a past medical history of Diverticulitis, Hyperlipemia, and Hypokalemia (11/09/2021).    PSHX:  has a past surgical history that includes Hernia repair; Colon surgery; laparotomy, exploratory (N/A, 8/9/2023); Removal of implant (N/A, 8/9/2023); excision, small intestine (N/A, 8/9/2023); and Left heart catheterization (Left, 8/15/2023).    PFHX: family history includes Diabetes in his father and mother.    PSlHX:  reports that he has never smoked. He has never used smokeless tobacco. He reports that he does not drink alcohol and does not use drugs.        Review of patient's allergies indicates:  No Known Allergies    Medication List with Changes/Refills   Current Medications    APIXABAN (ELIQUIS) 5 MG TAB    Take 1 tablet (5 mg total) by mouth 2 (two) times daily.    FOLIC ACID-VITAMIN B6-VIT B12 ORAL    Take 1 tablet by mouth once daily.    FUROSEMIDE (LASIX) 20 MG TABLET    Take 1 tablet (20 mg total) by mouth 2 (two) times daily with meals.    LACTOBACILLUS ACIDOPHILUS (PROBIOTIC) 10 BILLION CELL CAP    Take by mouth.    LACTULOSE (CHRONULAC) 10 GRAM/15 ML SOLUTION    SMARTSIG:Milliliter(s) By Mouth    METOPROLOL TARTRATE (LOPRESSOR) 25 MG TABLET    Take 0.5 tablets (12.5 mg total) by mouth 2 (two) times daily.    NIACIN (SLO-NIACIN)  "500 MG TABLET    Take 500 mg by mouth once daily. With meal    OMEGA-3 FATTY ACIDS/FISH OIL (FISH OIL-OMEGA-3 FATTY ACIDS) 300-1,000 MG CAPSULE    Take 1 capsule by mouth once daily.    PANTOPRAZOLE (PROTONIX) 40 MG TABLET    Take 1 tablet (40 mg total) by mouth 2 (two) times daily.    QUETIAPINE (SEROQUEL) 50 MG TABLET    1 tablet (50 mg total) by Per NG tube route 2 (two) times daily.    RIFAXIMIN (XIFAXAN) 550 MG TAB    Take 1 tablet (550 mg total) by mouth 2 (two) times daily.    SPIRONOLACTONE (ALDACTONE) 50 MG TABLET    Take 1 tablet (50 mg total) by mouth once daily.    TIMOLOL MALEATE 0.5% (TIMOPTIC) 0.5 % DROP    1 drop once daily.        Objective Findings:  Vital Signs:  BP (!) 146/62   Pulse (!) 56   Ht 5' 10" (1.778 m)   Wt 80.7 kg (178 lb) Comment: est wt per pt son  SpO2 98%   BMI 25.54 kg/m²  Body mass index is 25.54 kg/m².    Physical Exam:  Physical Exam  Vitals and nursing note reviewed.   Constitutional:       General: He is not in acute distress.     Appearance: Normal appearance.   HENT:      Mouth/Throat:      Mouth: Mucous membranes are moist.   Cardiovascular:      Rate and Rhythm: Normal rate.   Pulmonary:      Effort: Pulmonary effort is normal.      Breath sounds: No wheezing, rhonchi or rales.   Abdominal:      General: Bowel sounds are normal. There is no distension.      Palpations: Abdomen is soft. There is no mass.      Tenderness: There is no abdominal tenderness. There is no guarding.   Musculoskeletal:      Right lower leg: Edema present.      Left lower leg: Edema present.   Skin:     General: Skin is warm and dry.      Coloration: Skin is not jaundiced or pale.   Neurological:      Mental Status: He is alert and oriented to person, place, and time.   Psychiatric:         Mood and Affect: Mood normal.          Labs:  Lab Results   Component Value Date    WBC 4.43 (L) 09/19/2023    HGB 10.8 (L) 09/19/2023    HCT 33.9 (L) 09/19/2023    MCV 99.4 (H) 09/19/2023    RDW 13.2 " 09/19/2023     09/19/2023    LYMPH 24.8 (L) 09/19/2023    LYMPH 1.10 09/19/2023    MONO 10.2 (H) 09/19/2023    EOS 0.38 09/19/2023    BASO 0.05 09/19/2023     Lab Results   Component Value Date     09/19/2023    K 4.4 09/19/2023     09/19/2023    CO2 26 09/19/2023     09/19/2023    BUN 18 09/19/2023    CREATININE 0.68 (L) 09/19/2023    CALCIUM 9.2 09/19/2023    PROT 6.2 (L) 08/23/2023    ALBUMIN 2.0 (L) 08/23/2023    BILITOT 0.7 08/23/2023    ALKPHOS 73 08/23/2023    AST 45 (H) 08/23/2023    ALT 39 08/23/2023         Imaging: US Abdomen Limited    Result Date: 9/27/2023  EXAMINATION: US ABDOMEN LIMITED CLINICAL HISTORY: Unspecified cirrhosis of liver TECHNIQUE: Limited ultrasound of the of the abdomen was performed for ascites evaluation.  Ultrasound images captured and stored. COMPARISON: None. FINDINGS: No ascites identified on 4 quadrant ultrasound.     No ascites identified. Electronically signed by: Frank Lopez Date:    09/27/2023 Time:    08:24        Assessment:  Danny Flood is a 78 y.o. male here with:  1. Anemia, unspecified type    2. On anticoagulant therapy    3. Other cirrhosis of liver          Recommendations:  1. Schedule EGD  2. CT Triphasic  3. Liver labs  4. Limit salt to 2000 mg per day  Avoid alcohol  Limit tylenol to 2000 mg per day  Avoid raw seafood      Follow up in about 4 weeks (around 11/6/2023).      Order summary:  Orders Placed This Encounter    CT Abdomen w/o Contrast Plus Triphasic w/Contrast    Ceruloplasmin    Hepatitis B Core Antibody, IgM    TERA EIA w/ Reflex to dsDNA/AZAEL    Alpha-1-Antitrypsin    Hepatitis C Antibody    Hepatitis B Surface Ab, Qualitative    Hepatitis B Surface Antigen    Ferritin    Iron and TIBC    Protein Electrophoresis, Serum with Reflex JOSE    CBC Auto Differential    Comprehensive Metabolic Panel    Protime-INR    Hepatitis A Antibody, Total    IgM    IgG    EGD       Thank you for allowing me to participate in the care of Danny  PHIL Hill-C

## 2023-10-09 NOTE — PROGRESS NOTES
VE[facility] CLINIC NOTE    Patient ID: Danny Flood is a 78 y.o. male.    I. HISTORY     Chief Complaint:   Chief Complaint   Patient presents with    Edema     NP REFERAL PHIL FORREST RE: LAURIE LOWER EXTER EDEMA        HPI: Danny Flood is a 78 y.o. male who is referred here today by PCP for evaluation of bilateral lower extremity swelling, heaviness and discomfort.  Symptoms are progressive/worsening and began about 1 years ago.  Location is bilateral lower extremities below the knees. Symptoms are worse at the end of the day.  History of venous interventions includes none.  Denies family history of venous disease.  Denies history of DVT or CHF or cellulitis.    The patient underwent a bilateral complete venous reflux study today, 10/09/2023, and the results were discussed with the patient.  This study shows no evidence of acute DVT bilaterally.  Post thrombotic changes noted in the left popliteal vein.  Dilation and axial reflux noted in the bilateral great and small saphenous veins.  Refluxing varicosities also noted of the medial lower legs.    Venous Disease Medical Necessity Documentation Initial Visit Date:  10/09/2023 Return Check Date:    Have you ever had a rupture or bleed from a varicose vein in your leg(s)?              [] Yes  [x] No   [] Yes   [] No   Have you ever been diagnosed with phlebitis, cellulitis, or inflammation in the area of the varicose veins of  your leg(s)?  [] Yes  [x] No    [] Yes   [] No   Do you have darkened or inflamed skin on your legs?   [x] Yes   [] No   [] Yes   [] No   Do you have leg swelling?     [x] Yes   [] No   [] Yes   [] No   Do you have leg pain?   [x] Yes   [] No   [] Yes   [] No   If yes, describe the type of pain?    []   Stabbing []  Radiating [x]  Aching   []  Tightness []  Throbbing               []  Burning []  Cramping              Do you have leg discomfort?   [] Yes   [x] No   [] Yes   [] No   If yes, describe the type of discomfort?    []   Heaviness []  Fullness   []  Restlessness [] Tired/Fatigued [] Itching              Have you ever worn compression hose?   [] Yes   [x] No   [] Yes   [] No   If yes, how long?           Do you elevate your legs while sitting?   [x] Yes   [] No   [] Yes   [] No   Does venous disease (varicose veins, ulcers, skin changes, leg pain/swelling) interfere with your daily life?  If yes, check activities you are limited or unable to do.    []  Shower  [x]   Walk  []  Exercise  [] Play with children/grandchildren  []  Shop [] Work [x] Stand for any period of time [] Sleep                               [] Sitting for an extended period of time.           [x] Yes   [] No   [] Yes   [] No   Do you exercise/have you tried to exercise (i.e.  Walk our participate in a regular exercise routine)?  [] Yes  [x] No   [] Yes   [] No   BMI   25.66           Past Medical History:   Diagnosis Date    Diverticulitis     Hyperlipemia     Hypokalemia 11/09/2021        Past Surgical History:   Procedure Laterality Date    COLON SURGERY      partial colon resection    EXCISION, SMALL INTESTINE N/A 8/9/2023    Procedure: EXCISION, SMALL INTESTINE;  Surgeon: Tesfaye Natarajan MD;  Location: [facility] OR;  Service: General;  Laterality: N/A;    HERNIA REPAIR      LAPAROTOMY, EXPLORATORY N/A 8/9/2023    Procedure: LAPAROTOMY, EXPLORATORY;  Surgeon: Tesfaye Natarajan MD;  Location: [facility] OR;  Service: General;  Laterality: N/A;    LEFT HEART CATHETERIZATION Left 8/15/2023    Procedure: Left heart cath;  Surgeon: Edinson Torres DO;  Location: [facility] CATH LAB;  Service: Cardiology;  Laterality: Left;    REMOVAL OF IMPLANT N/A 8/9/2023    Procedure: REMOVAL, IMPLANT;  Surgeon: Tesfaye Natarajan MD;  Location: [facility] OR;  Service: General;  Laterality: N/A;  removal mesh       Social History     Tobacco Use   Smoking Status Never   Smokeless Tobacco Never         Current Outpatient Medications:     apixaban (ELIQUIS) 5 mg Tab, Take 1 tablet  (5 mg total) by mouth 2 (two) times daily., Disp: 60 tablet, Rfl: 5    FOLIC ACID-VITAMIN B6-VIT B12 ORAL, Take 1 tablet by mouth once daily., Disp: , Rfl:     furosemide (LASIX) 20 MG tablet, Take 1 tablet (20 mg total) by mouth 2 (two) times daily with meals., Disp: 10 tablet, Rfl: 0    Lactobacillus acidophilus (PROBIOTIC) 10 billion cell Cap, Take by mouth., Disp: , Rfl:     metoprolol tartrate (LOPRESSOR) 25 MG tablet, Take 0.5 tablets (12.5 mg total) by mouth 2 (two) times daily., Disp: 30 tablet, Rfl: 11    niacin (SLO-NIACIN) 500 mg tablet, Take 500 mg by mouth once daily. With meal, Disp: , Rfl:     omega-3 fatty acids/fish oil (FISH OIL-OMEGA-3 FATTY ACIDS) 300-1,000 mg capsule, Take 1 capsule by mouth once daily., Disp: , Rfl:     rifAXIMin (XIFAXAN) 550 mg Tab, Take 1 tablet (550 mg total) by mouth 2 (two) times daily., Disp: 60 tablet, Rfl: 0    spironolactone (ALDACTONE) 50 MG tablet, Take 1 tablet (50 mg total) by mouth once daily., Disp: 30 tablet, Rfl: 1    timolol maleate 0.5% (TIMOPTIC) 0.5 % Drop, 1 drop once daily., Disp: , Rfl:     lactulose (CHRONULAC) 10 gram/15 mL solution, SMARTSIG:Milliliter(s) By Mouth, Disp: , Rfl:     pantoprazole (PROTONIX) 40 MG tablet, Take 1 tablet (40 mg total) by mouth 2 (two) times daily., Disp: 180 tablet, Rfl: 3    QUEtiapine (SEROQUEL) 50 MG tablet, 1 tablet (50 mg total) by Per NG tube route 2 (two) times daily., Disp: 60 tablet, Rfl: 11    Review of Systems   Constitutional:  Negative for activity change, chills, diaphoresis, fatigue and fever.   Respiratory:  Negative for cough and shortness of breath.    Cardiovascular:  Positive for leg swelling. Negative for chest pain and claudication.        Hyperpigmentation LE   Gastrointestinal:  Negative for nausea and vomiting.   Musculoskeletal:  Positive for leg pain. Negative for joint swelling.   Integumentary:  Negative for rash and wound.   Neurological:  Negative for weakness and numbness.          II.  PHYSICAL EXAM     Physical Exam  Constitutional:       General: He is awake. He is not in acute distress.     Appearance: Normal appearance. He is not ill-appearing or toxic-appearing.   HENT:      Head: Normocephalic and atraumatic.   Eyes:      Extraocular Movements: Extraocular movements intact.      Conjunctiva/sclera: Conjunctivae normal.      Pupils: Pupils are equal, round, and reactive to light.   Neck:      Vascular: No carotid bruit or JVD.   Cardiovascular:      Rate and Rhythm: Normal rate and regular rhythm.      Pulses:           Dorsalis pedis pulses are detected w/ Doppler on the right side and detected w/ Doppler on the left side.        Posterior tibial pulses are detected w/ Doppler on the right side and detected w/ Doppler on the left side.      Heart sounds: No murmur heard.  Pulmonary:      Effort: Pulmonary effort is normal. No respiratory distress.      Breath sounds: No stridor. No wheezing, rhonchi or rales.   Musculoskeletal:         General: No swelling, tenderness or deformity.      Right lower le+ Edema present.      Left lower le+ Edema present.      Comments: No evidence of active cellulitis, weeping or open ulceration.   Feet:      Comments: Triphasic hand-held dopplerable pulses of the bilateral dorsalis pedis and posterior tibial arteries.  Skin:     General: Skin is warm.      Capillary Refill: Capillary refill takes less than 2 seconds.      Coloration: Skin is not ashen.      Findings: No bruising, erythema, lesion, rash or wound.   Neurological:      Mental Status: He is alert and oriented to person, place, and time.      Motor: No weakness.   Psychiatric:         Speech: Speech normal.         Behavior: Behavior normal. Behavior is cooperative.         Reticular/Spider veins noted:  RLE: ankle and foot  LLE: ankle and foot    Varicose veins noted:  RLE: none  LLE:  none    CEAP Classification  Clinical Signs: Class 4 - Skin changes ascribed to venous disease  Etiologic  Classification: Primary  Anatomic distribution: Superficial  Pathophysiologic dysfunction: Reflux       Venous Clinical Severity Score  Pain:2=Daily, moderate activity limitation, occasional analgesics  Varicose Veins: 2=Multiple. GS varicose veins confined to calf or thigh  Venous Edema: 2=Afternoon edema, above ankle  Pigmentation: 1=Diffuse, but limited in area and old (brown)  Inflammation: 0=None  Induration: 0=None  Number of Active Ulcers: 0=0  Active Ulceration, Duration: 0=None  Active Ulcer Size: 0=None  Compressive Therapy: 0=Not used or not compliant  Total Score: 7       III. ASSESSMENT & PLAN (MEDICAL DECISION MAKING)     1. Bilateral lower extremity edema    2. Hyperpigmentation of skin    3. Leg pain, bilateral    4. Venous insufficiency        Assessment/Diagnosis and Plan:  Ultrasound of lower extremities reveals positive evidence of venous insufficiency in the bilateral great saphenous and small saphenous veins.  Plan for conservative medical treatment at this time. The patient may benefit from endovenous ablation in the future.     - Start compression with 15-20 mmHg Rx stockings  - Therapeutic leg elevation  - Calf pumping exercises  - RTC 3 months for further evaluation      Orders Placed This Encounter    US Venous Reflux Study Bilateral          Praneeth Marquez DO

## 2023-10-09 NOTE — PATIENT INSTRUCTIONS
Limit salt to 2000 mg per day  Avoid alcohol  Limit tylenol to 2000 mg per day  Avoid raw seafood

## 2023-10-10 ENCOUNTER — TELEPHONE (OUTPATIENT)
Dept: GASTROENTEROLOGY | Facility: CLINIC | Age: 78
End: 2023-10-10
Payer: MEDICARE

## 2023-10-10 NOTE — TELEPHONE ENCOUNTER
Results called to patients son. Verbalized understanding.              ----- Message from PHIL So sent at 10/9/2023  4:11 PM CDT -----  MELD score is normal. Platelets are normal. Anemia has improved. Some labs are pending

## 2023-10-10 NOTE — TELEPHONE ENCOUNTER
Attempted to call results. No answer or voicemail noted.            ----- Message from PHIL So sent at 10/9/2023  4:11 PM CDT -----  MELD score is normal. Platelets are normal. Anemia has improved. Some labs are pending

## 2023-10-16 DIAGNOSIS — R60.0 BILATERAL LOWER EXTREMITY EDEMA: ICD-10-CM

## 2023-10-16 NOTE — TELEPHONE ENCOUNTER
Message sent from Kathrin Robles with [employer] regarding pt having a 6lb weight gain in 1 week.  She noted that patient had faint crackles in bilateral lower lobes with 1+ pitting edema in LLE. She is requesting a refill on Lasix for PRN usage.

## 2023-10-17 RX ORDER — FUROSEMIDE 20 MG/1
20 TABLET ORAL DAILY PRN
Qty: 20 TABLET | Refills: 0 | Status: SHIPPED | OUTPATIENT
Start: 2023-10-17 | End: 2024-10-16

## 2023-10-24 ENCOUNTER — HOSPITAL ENCOUNTER (OUTPATIENT)
Dept: RADIOLOGY | Facility: HOSPITAL | Age: 78
Discharge: HOME | End: 2023-10-24
Attending: NURSE PRACTITIONER
Payer: MEDICARE

## 2023-10-24 DIAGNOSIS — K74.69 OTHER CIRRHOSIS OF LIVER: ICD-10-CM

## 2023-10-24 PROCEDURE — 74170 CT ABD WO CNTRST FLWD CNTRST: CPT | Mod: TC

## 2023-10-24 PROCEDURE — 74170 CT ABDOMEN W/O CONTRAST PLUS TRIPHASIC W/CONTRAST: ICD-10-PCS | Mod: 26,,, | Performed by: RADIOLOGY

## 2023-10-24 PROCEDURE — 25500020 PHARM REV CODE 255: Performed by: NURSE PRACTITIONER

## 2023-10-24 PROCEDURE — 74170 CT ABD WO CNTRST FLWD CNTRST: CPT | Mod: 26,,, | Performed by: RADIOLOGY

## 2023-10-24 RX ADMIN — IOPAMIDOL 100 ML: 755 INJECTION, SOLUTION INTRAVENOUS at 08:10

## 2023-10-25 ENCOUNTER — ANESTHESIA EVENT (OUTPATIENT)
Dept: GASTROENTEROLOGY | Facility: HOSPITAL | Age: 78
End: 2023-10-25
Payer: MEDICARE

## 2023-10-25 ENCOUNTER — ANESTHESIA (OUTPATIENT)
Dept: GASTROENTEROLOGY | Facility: HOSPITAL | Age: 78
End: 2023-10-25
Payer: MEDICARE

## 2023-10-25 ENCOUNTER — HOSPITAL ENCOUNTER (OUTPATIENT)
Dept: GASTROENTEROLOGY | Facility: HOSPITAL | Age: 78
Discharge: HOME | End: 2023-10-25
Attending: NURSE PRACTITIONER
Payer: MEDICARE

## 2023-10-25 VITALS
HEART RATE: 67 BPM | DIASTOLIC BLOOD PRESSURE: 81 MMHG | SYSTOLIC BLOOD PRESSURE: 164 MMHG | TEMPERATURE: 99 F | OXYGEN SATURATION: 100 % | RESPIRATION RATE: 10 BRPM

## 2023-10-25 DIAGNOSIS — I81 PORTAL VEIN THROMBOSIS: ICD-10-CM

## 2023-10-25 DIAGNOSIS — K74.69 OTHER CIRRHOSIS OF LIVER: ICD-10-CM

## 2023-10-25 DIAGNOSIS — K76.6 PORTAL HYPERTENSION: ICD-10-CM

## 2023-10-25 DIAGNOSIS — K29.00 ACUTE SUPERFICIAL GASTRITIS WITHOUT HEMORRHAGE: Primary | ICD-10-CM

## 2023-10-25 DIAGNOSIS — D64.9 ANEMIA, UNSPECIFIED TYPE: ICD-10-CM

## 2023-10-25 PROCEDURE — 43239 EGD BIOPSY SINGLE/MULTIPLE: CPT | Performed by: INTERNAL MEDICINE

## 2023-10-25 PROCEDURE — 37000008 HC ANESTHESIA 1ST 15 MINUTES

## 2023-10-25 PROCEDURE — 43239 PR EGD, FLEX, W/BIOPSY, SGL/MULTI: ICD-10-PCS | Mod: ,,, | Performed by: INTERNAL MEDICINE

## 2023-10-25 PROCEDURE — 88342 IMHCHEM/IMCYTCHM 1ST ANTB: CPT | Mod: 26,,, | Performed by: PATHOLOGY

## 2023-10-25 PROCEDURE — 25000003 PHARM REV CODE 250: Performed by: NURSE ANESTHETIST, CERTIFIED REGISTERED

## 2023-10-25 PROCEDURE — 43239 EGD BIOPSY SINGLE/MULTIPLE: CPT | Mod: ,,, | Performed by: INTERNAL MEDICINE

## 2023-10-25 PROCEDURE — 27201423 OPTIME MED/SURG SUP & DEVICES STERILE SUPPLY

## 2023-10-25 PROCEDURE — D9220A PRA ANESTHESIA: ICD-10-PCS | Mod: ,,, | Performed by: NURSE ANESTHETIST, CERTIFIED REGISTERED

## 2023-10-25 PROCEDURE — 88342 SURGICAL PATHOLOGY: ICD-10-PCS | Mod: 26,,, | Performed by: PATHOLOGY

## 2023-10-25 PROCEDURE — D9220A PRA ANESTHESIA: Mod: ,,, | Performed by: NURSE ANESTHETIST, CERTIFIED REGISTERED

## 2023-10-25 PROCEDURE — 88305 SURGICAL PATHOLOGY: ICD-10-PCS | Mod: 26,,, | Performed by: PATHOLOGY

## 2023-10-25 PROCEDURE — 88305 TISSUE EXAM BY PATHOLOGIST: CPT | Mod: TC,SUR | Performed by: INTERNAL MEDICINE

## 2023-10-25 PROCEDURE — 63600175 PHARM REV CODE 636 W HCPCS: Performed by: NURSE ANESTHETIST, CERTIFIED REGISTERED

## 2023-10-25 PROCEDURE — 88305 TISSUE EXAM BY PATHOLOGIST: CPT | Mod: 26,,, | Performed by: PATHOLOGY

## 2023-10-25 RX ORDER — PROPOFOL 10 MG/ML
VIAL (ML) INTRAVENOUS
Status: DISCONTINUED | OUTPATIENT
Start: 2023-10-25 | End: 2023-10-25

## 2023-10-25 RX ORDER — LIDOCAINE HYDROCHLORIDE 20 MG/ML
INJECTION, SOLUTION EPIDURAL; INFILTRATION; INTRACAUDAL; PERINEURAL
Status: DISCONTINUED | OUTPATIENT
Start: 2023-10-25 | End: 2023-10-25

## 2023-10-25 RX ORDER — SODIUM CHLORIDE 0.9 % (FLUSH) 0.9 %
10 SYRINGE (ML) INJECTION
Status: DISCONTINUED | OUTPATIENT
Start: 2023-10-25 | End: 2023-10-26 | Stop reason: HOSPADM

## 2023-10-25 RX ORDER — PANTOPRAZOLE SODIUM 40 MG/1
40 TABLET, DELAYED RELEASE ORAL DAILY
Qty: 90 TABLET | Refills: 3 | Status: SHIPPED | OUTPATIENT
Start: 2023-10-25 | End: 2024-01-23

## 2023-10-25 RX ADMIN — SODIUM CHLORIDE: 9 INJECTION, SOLUTION INTRAVENOUS at 02:10

## 2023-10-25 RX ADMIN — LIDOCAINE HYDROCHLORIDE 60 MG: 20 INJECTION, SOLUTION INTRAVENOUS at 02:10

## 2023-10-25 RX ADMIN — PROPOFOL 60 MG: 10 INJECTION, EMULSION INTRAVENOUS at 02:10

## 2023-10-25 NOTE — H&P
[facility]  Gastroenterology  H&P    Patient Name: Danny Flood  MRN: [id number]  Admission Date: 10/25/2023  Code Status: Full Code    Attending Provider: Jennifer Esquivel FNP   Primary Care Physician: Cameron Valencia MD  Principal Problem:<principal problem not specified>    Subjective:     History of Present Illness: Pt has cirrhosis and portal htn; for egd.    Past Medical History:   Diagnosis Date    Diverticulitis     Hyperlipemia     Hypokalemia 11/09/2021       Past Surgical History:   Procedure Laterality Date    COLON SURGERY      partial colon resection    EXCISION, SMALL INTESTINE N/A 8/9/2023    Procedure: EXCISION, SMALL INTESTINE;  Surgeon: Tesfaye Natarajan MD;  Location: [facility] OR;  Service: General;  Laterality: N/A;    HERNIA REPAIR      LAPAROTOMY, EXPLORATORY N/A 8/9/2023    Procedure: LAPAROTOMY, EXPLORATORY;  Surgeon: Tesfaye Natarajan MD;  Location: [facility] OR;  Service: General;  Laterality: N/A;    LEFT HEART CATHETERIZATION Left 8/15/2023    Procedure: Left heart cath;  Surgeon: Edinson Torres DO;  Location: [facility] CATH LAB;  Service: Cardiology;  Laterality: Left;    REMOVAL OF IMPLANT N/A 8/9/2023    Procedure: REMOVAL, IMPLANT;  Surgeon: Tesfaye Natarajan MD;  Location: [facility] OR;  Service: General;  Laterality: N/A;  removal mesh       Review of patient's allergies indicates:  No Known Allergies  Family History       Problem Relation (Age of Onset)    Diabetes Mother, Father          Tobacco Use    Smoking status: Never    Smokeless tobacco: Never   Substance and Sexual Activity    Alcohol use: Never    Drug use: Never    Sexual activity: Not Currently     Review of Systems   Respiratory: Negative.     Cardiovascular: Negative.    Gastrointestinal: Negative.    Skin:  Positive for wound.     Objective:     Vital Signs (Most Recent):  Pulse: 64 (10/25/23 1404)  Resp: 15 (10/25/23 1404)  BP: 132/61 (10/25/23 1404)  SpO2: 99 % (10/25/23 1404) Vital Signs (24h  "Range):  Pulse:  [64] 64  Resp:  [15] 15  SpO2:  [99 %] 99 %  BP: (132)/(61) 132/61        There is no height or weight on file to calculate BMI.    No intake or output data in the 24 hours ending 10/25/23 1437    Lines/Drains/Airways       Peripheral Intravenous Line  Duration                  Peripheral IV - Single Lumen 10/25/23 1404 22 G Anterior;Right Forearm <1 day                    Physical Exam  Vitals reviewed.   Constitutional:       General: He is not in acute distress.     Appearance: Normal appearance. He is well-developed. He is not ill-appearing.   HENT:      Head: Normocephalic and atraumatic.      Nose: Nose normal.   Eyes:      Pupils: Pupils are equal, round, and reactive to light.   Cardiovascular:      Rate and Rhythm: Normal rate and regular rhythm.   Pulmonary:      Effort: Pulmonary effort is normal.      Breath sounds: Normal breath sounds. No wheezing.   Abdominal:      General: Abdomen is flat. Bowel sounds are normal. There is no distension.      Palpations: Abdomen is soft.      Tenderness: There is no abdominal tenderness. There is no guarding.   Skin:     General: Skin is warm and dry.      Coloration: Skin is not jaundiced.   Neurological:      Mental Status: He is alert.   Psychiatric:         Attention and Perception: Attention normal.         Mood and Affect: Affect normal.         Speech: Speech normal.         Behavior: Behavior is cooperative.      Comments: Pt was calm while speaking.         Significant Labs:  CBC: No results for input(s): "WBC", "HGB", "HCT", "PLT" in the last 48 hours.  CMP: No results for input(s): "GLU", "CALCIUM", "ALBUMIN", "PROT", "NA", "K", "CO2", "CL", "BUN", "CREATININE", "ALKPHOS", "ALT", "AST", "BILITOT" in the last 48 hours.    Significant Imaging:  Imaging results within the past 24 hours have been reviewed.    Assessment/Plan:     There are no hospital problems to display for this patient.        Imp: cirrhosis, portal htn  Plan: ligia Frazier " BOBBY Jean Baptiste MD  Gastroenterology  [facility] End[facility]

## 2023-10-25 NOTE — ANESTHESIA PREPROCEDURE EVALUATION
10/25/2023  Danny Flood is a 78 y.o., male.  Past Medical History:   Diagnosis Date    Diverticulitis     Hyperlipemia     Hypokalemia 11/09/2021       Past Surgical History:   Procedure Laterality Date    COLON SURGERY      partial colon resection    EXCISION, SMALL INTESTINE N/A 8/9/2023    Procedure: EXCISION, SMALL INTESTINE;  Surgeon: Tesfaye Natarajan MD;  Location: [facility] OR;  Service: General;  Laterality: N/A;    HERNIA REPAIR      LAPAROTOMY, EXPLORATORY N/A 8/9/2023    Procedure: LAPAROTOMY, EXPLORATORY;  Surgeon: Tesfaye Natarajan MD;  Location: [facility] OR;  Service: General;  Laterality: N/A;    LEFT HEART CATHETERIZATION Left 8/15/2023    Procedure: Left heart cath;  Surgeon: Edinson Torres DO;  Location: [facility] CATH LAB;  Service: Cardiology;  Laterality: Left;    REMOVAL OF IMPLANT N/A 8/9/2023    Procedure: REMOVAL, IMPLANT;  Surgeon: Tesfaye Natarajan MD;  Location: [facility] OR;  Service: General;  Laterality: N/A;  removal mesh       Family History   Problem Relation Age of Onset    Diabetes Mother     Diabetes Father        Social History     Socioeconomic History    Marital status:    Tobacco Use    Smoking status: Never    Smokeless tobacco: Never   Substance and Sexual Activity    Alcohol use: Never    Drug use: Never    Sexual activity: Not Currently     Social Determinants of Health     Financial Resource Strain: Low Risk  (8/24/2023)    Overall Financial Resource Strain (CARDIA)     Difficulty of Paying Living Expenses: Not hard at all   Food Insecurity: No Food Insecurity (8/24/2023)    Hunger Vital Sign     Worried About Running Out of Food in the Last Year: Never true     Ran Out of Food in the Last Year: Never true   Transportation Needs: No Transportation Needs (8/24/2023)    PRAPARE - Transportation     Lack of Transportation (Medical):  No     Lack of Transportation (Non-Medical): No   Physical Activity: Inactive (8/24/2023)    Exercise Vital Sign     Days of Exercise per Week: 0 days     Minutes of Exercise per Session: 0 min   Stress: No Stress Concern Present (8/24/2023)    [employer] - Occupational Stress Questionnaire     Feeling of Stress : Only a little   Social Connections: Moderately Integrated (8/24/2023)    Social Connection and Isolation Panel [NHANES]     Frequency of Communication with Friends and Family: More than three times a week     Frequency of Social Gatherings with Friends and Family: More than three times a week     Attends [employer] Services: More than 4 times per year     Active Member of Clubs or Organizations: No     Attends Club or Organization Meetings: Never     Marital Status:    Housing Stability: Low Risk  (8/24/2023)    Housing Stability Vital Sign     Unable to Pay for Housing in the Last Year: No     Number of Places Lived in the Last Year: 1     Unstable Housing in the Last Year: No       Current Outpatient Medications   Medication Sig Dispense Refill    FOLIC ACID-VITAMIN B6-VIT B12 ORAL Take 1 tablet by mouth once daily.      furosemide (LASIX) 20 MG tablet Take 1 tablet (20 mg total) by mouth daily as needed (for weight gain of more than 5 lbs in 1 week or pitting edema in lower exts.). 20 tablet 0    Lactobacillus acidophilus (PROBIOTIC) 10 billion cell Cap Take by mouth.      lactulose (CHRONULAC) 10 gram/15 mL solution SMARTSIG:Milliliter(s) By Mouth      metoprolol tartrate (LOPRESSOR) 25 MG tablet Take 0.5 tablets (12.5 mg total) by mouth 2 (two) times daily. 30 tablet 11    niacin (SLO-NIACIN) 500 mg tablet Take 500 mg by mouth once daily. With meal      omega-3 fatty acids/fish oil (FISH OIL-OMEGA-3 FATTY ACIDS) 300-1,000 mg capsule Take 1 capsule by mouth once daily.      pantoprazole (PROTONIX) 40 MG tablet Take 1 tablet (40 mg total) by mouth  2 (two) times daily. 180 tablet 3    QUEtiapine (SEROQUEL) 50 MG tablet 1 tablet (50 mg total) by Per NG tube route 2 (two) times daily. 60 tablet 11    rifAXIMin (XIFAXAN) 550 mg Tab Take 1 tablet (550 mg total) by mouth 2 (two) times daily. 60 tablet 0    spironolactone (ALDACTONE) 50 MG tablet Take 1 tablet (50 mg total) by mouth once daily. 30 tablet 1    timolol maleate 0.5% (TIMOPTIC) 0.5 % Drop 1 drop once daily.       No current facility-administered medications for this encounter.       Review of patient's allergies indicates:  No Known Allergies  Patient Active Problem List   Diagnosis    Encephalopathy, metabolic    Hyperammonemia    Hypertension    Hypokalemia    Dysphagia    Muscle weakness    Hyperlipidemia    Type 2 diabetes mellitus without complication, without long-term current use of insulin    Infected hernioplasty mesh    Dementia without behavioral disturbance, psychotic disturbance, mood disturbance, or anxiety    Hypernatremia    Myocardial infarction type 2    Elevated troponin level    Paroxysmal atrial fibrillation    Pneumonia    Splenorenal shunt malfunction    Edema due to hypoalbuminemia    Compression fracture of body of thoracic vertebra    NSTEMI (non-ST elevated myocardial infarction)    Presence of surgical incision    Encounter for deep vein thrombosis (DVT) prophylaxis    Anemia    Mitral regurgitation    Bilateral lower extremity edema    Leg pain, bilateral    Hyperpigmentation of skin    Venous insufficiency    On anticoagulant therapy         Pre-op Assessment    I have reviewed the Patient Summary Reports.     I have reviewed the Nursing Notes. I have reviewed the NPO Status.   I have reviewed the Medications.     Review of Systems  Anesthesia Hx:  No problems with previous Anesthesia    Cardiovascular:   Hypertension Past MI     Pulmonary:   Pneumonia    Endocrine:   Diabetes    Psych:   Psychiatric History          Physical Exam  General:  Well nourished, Alert, Oriented and Cooperative    Airway:  Mallampati: II   Mouth Opening: Normal  Neck ROM: Normal ROM    Dental:  Intact    Chest/Lungs:  Normal Respiratory Rate    Heart:  Rate: Normal        Anesthesia Plan  Type of Anesthesia, risks & benefits discussed:    Anesthesia Type: Gen Natural Airway, MAC  Intra-op Monitoring Plan: Standard ASA Monitors  Post Op Pain Control Plan: multimodal analgesia and IV/PO Opioids PRN  Induction:  IV  Informed Consent: Informed consent signed with the Patient and all parties understand the risks and agree with anesthesia plan.  All questions answered. Patient consented to blood products? Yes  ASA Score: 3  Day of Surgery Review of History & Physical: I have interviewed and examined the patient. I have reviewed the patient's H&P dated: There are no significant changes.     Ready For Surgery From Anesthesia Perspective.     .

## 2023-10-25 NOTE — DISCHARGE INSTRUCTIONS
Procedure Date  10/25/23     Impression  Overall Impression:   Performed forceps biopsies in the stomach  Erythematous mucosa in the fundus of the stomach and antrum; performed cold forceps biopsy  Mucosa of the esophagus was normal without varices. The stomach has mild erythema without ulcers and biopsies were obtained. No gastric varices were seen. Mucosa of the duodenum was normal appearing.     Recommendation    Await pathology results      Avoid nsaids; PPI daily; return to GI clinic with ANASTASIA Fuentes within 2 months.  Discharge: disp: DC to home. Resume low sodium diet. No driving x 24 hours. F/U with PCP  as scheduled and GI clinic as above.  Dx: gastritis, cirrhosis, portal hypertension, portal vein thrombosis.     Indication  Other cirrhosis of liver, Anemia, unspecified type      Spoke with pt's wife, gave Dr Sanchez's phone number to pt's wife to call an schedule an appt as ordered by Dr Urena

## 2023-10-25 NOTE — TRANSFER OF CARE
Anesthesia Transfer of Care Note    Patient: Danny Flood    Procedure(s) Performed: * EGD with biopsy*    Patient location: GI    Anesthesia Type: general    Transport from OR: Transported from OR on room air with adequate spontaneous ventilation. Continuous ECG monitoring in transport. Continuous SpO2 monitoring in transport    Post pain: adequate analgesia    Post assessment: no apparent anesthetic complications    Post vital signs: stable    Level of consciousness: sedated and responds to stimulation    Nausea/Vomiting: no nausea/vomiting    Complications: none    Transfer of care protocol was followedComments: Good SV continue, NAD, VSS, RTRN      Last vitals:   Visit Vitals  BP (!) 164/81   Pulse 67   Temp 37 °C (98.6 °F)   Resp 14   SpO2 98%

## 2023-10-26 LAB
DHEA SERPL-MCNC: NORMAL
ESTROGEN SERPL-MCNC: NORMAL PG/ML
INSULIN SERPL-ACNC: NORMAL U[IU]/ML
LAB AP GROSS DESCRIPTION: NORMAL
LAB AP LABORATORY NOTES: NORMAL
T3RU NFR SERPL: NORMAL %

## 2023-10-26 NOTE — PROGRESS NOTES
EGD biopsy shows gastritis. Pt has cirrhosis and will need to f/u in GI clinic with ANASTASIA Fuentes.

## 2023-10-27 ENCOUNTER — TELEPHONE (OUTPATIENT)
Dept: GASTROENTEROLOGY | Facility: CLINIC | Age: 78
End: 2023-10-27
Payer: MEDICARE

## 2023-10-27 NOTE — TELEPHONE ENCOUNTER
Results called to patient. Verbalized understanding. Has upcoming appointment with Jennifer Esquivel NP            ----- Message from PHIL So sent at 10/26/2023  3:31 PM CDT -----  Evidence of portal hypertension with a shunt.  Liver is small in size.  Reviewed by Dr. Jean Baptiste. Chronic occlusion of the main portal vein as previously seen

## 2023-11-06 NOTE — ANESTHESIA POSTPROCEDURE EVALUATION
Anesthesia Post Evaluation    Patient: Danny Flood    Procedure(s) Performed: * EGD *    Final Anesthesia Type: general      Patient location during evaluation: GI PACU  Patient participation: Yes- Able to Participate  Level of consciousness: awake and alert  Post-procedure vital signs: reviewed and stable  Pain management: adequate  Airway patency: patent    PONV status at discharge: No PONV  Anesthetic complications: no      Cardiovascular status: blood pressure returned to baseline and hemodynamically stable  Respiratory status: spontaneous ventilation  Hydration status: euvolemic  Follow-up not needed.  Comments: Refer to nursing notes for pain/ignacio score upon discharge from recovery.          Vitals Value Taken Time   /81 10/25/23 1450   Temp 37 °C (98.6 °F) 10/25/23 1450   Pulse 67 10/25/23 1455   Resp 10 10/25/23 1455   SpO2 100 % 10/25/23 1455         Event Time   Out of Recovery 15:26:53         Pain/Ignacio Score: No data recorded

## 2023-11-07 ENCOUNTER — OFFICE VISIT (OUTPATIENT)
Dept: GASTROENTEROLOGY | Facility: CLINIC | Age: 78
End: 2023-11-07
Payer: MEDICARE

## 2023-11-07 VITALS
WEIGHT: 175 LBS | DIASTOLIC BLOOD PRESSURE: 71 MMHG | HEIGHT: 70 IN | BODY MASS INDEX: 25.05 KG/M2 | SYSTOLIC BLOOD PRESSURE: 150 MMHG | HEART RATE: 77 BPM

## 2023-11-07 DIAGNOSIS — K76.6 PORTAL HYPERTENSION: Primary | ICD-10-CM

## 2023-11-07 DIAGNOSIS — I81 PORTAL VEIN THROMBOSIS: ICD-10-CM

## 2023-11-07 PROCEDURE — 1101F PR PT FALLS ASSESS DOC 0-1 FALLS W/OUT INJ PAST YR: ICD-10-PCS | Mod: CPTII,,, | Performed by: NURSE PRACTITIONER

## 2023-11-07 PROCEDURE — 85610 PROTHROMBIN TIME: CPT | Performed by: NURSE PRACTITIONER

## 2023-11-07 PROCEDURE — 3288F FALL RISK ASSESSMENT DOCD: CPT | Mod: CPTII,,, | Performed by: NURSE PRACTITIONER

## 2023-11-07 PROCEDURE — 1159F PR MEDICATION LIST DOCUMENTED IN MEDICAL RECORD: ICD-10-PCS | Mod: CPTII,,, | Performed by: NURSE PRACTITIONER

## 2023-11-07 PROCEDURE — 3078F PR MOST RECENT DIASTOLIC BLOOD PRESSURE < 80 MM HG: ICD-10-PCS | Mod: CPTII,,, | Performed by: NURSE PRACTITIONER

## 2023-11-07 PROCEDURE — 3077F PR MOST RECENT SYSTOLIC BLOOD PRESSURE >= 140 MM HG: ICD-10-PCS | Mod: CPTII,,, | Performed by: NURSE PRACTITIONER

## 2023-11-07 PROCEDURE — 85730 THROMBOPLASTIN TIME PARTIAL: CPT | Performed by: NURSE PRACTITIONER

## 2023-11-07 PROCEDURE — 3078F DIAST BP <80 MM HG: CPT | Mod: CPTII,,, | Performed by: NURSE PRACTITIONER

## 2023-11-07 PROCEDURE — 1159F MED LIST DOCD IN RCRD: CPT | Mod: CPTII,,, | Performed by: NURSE PRACTITIONER

## 2023-11-07 PROCEDURE — 99214 OFFICE O/P EST MOD 30 MIN: CPT | Mod: S$PBB,,, | Performed by: NURSE PRACTITIONER

## 2023-11-07 PROCEDURE — 99214 PR OFFICE/OUTPT VISIT, EST, LEVL IV, 30-39 MIN: ICD-10-PCS | Mod: S$PBB,,, | Performed by: NURSE PRACTITIONER

## 2023-11-07 PROCEDURE — 3077F SYST BP >= 140 MM HG: CPT | Mod: CPTII,,, | Performed by: NURSE PRACTITIONER

## 2023-11-07 PROCEDURE — 3288F PR FALLS RISK ASSESSMENT DOCUMENTED: ICD-10-PCS | Mod: CPTII,,, | Performed by: NURSE PRACTITIONER

## 2023-11-07 PROCEDURE — 1101F PT FALLS ASSESS-DOCD LE1/YR: CPT | Mod: CPTII,,, | Performed by: NURSE PRACTITIONER

## 2023-11-07 PROCEDURE — 99214 OFFICE O/P EST MOD 30 MIN: CPT | Mod: PBBFAC | Performed by: NURSE PRACTITIONER

## 2023-11-07 NOTE — PROGRESS NOTES
Danny Flood is a 78 y.o. male here for No chief complaint on file.        PCP: Cameron Valencia  Referring Provider: No referring provider defined for this encounter.     HPI:  Presents for follow up after CT triphasic. 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  Liver is small in size.  No ascites.  No definite cirrhosis seen on CT.  He did have an EGD on 10/25, no esophageal or gastric varices.  He has a history of hepatic encephalopathy.  He is taking Xifaxan as well as lactulose.  Advised would like to see 2-3 stools per day.  Liver labs were performed.  IgG was elevated at 17 80, positive total antibody hepatitis a, hepatitis-B surface antibody is negative.  TERA is negative.  Iron is 54, saturation is 18, HGB 12.6 and HCT is 38.7.  Platelets 176.  Meld score 11.  Long discussion with patient and son.  Because CT does not show direct reading of cirrhosis.  They would like to proceed with liver biopsy for confirmation.  We did discuss the risks of liver biopsy including bleeding, hematoma, and infection.  Verbalized understanding and would like to proceed.  Denies any hematochezia or melena.  Son does not feel that the patient is still taking Eliquis. He underwent LHC which revealed trivial non-obstructive CAD and normal EF, 55%.          ROS:  Review of Systems   Constitutional:  Positive for fatigue. Negative for activity change, appetite change, fever and unexpected weight change.   HENT:  Negative for nosebleeds and trouble swallowing.    Respiratory:  Negative for cough, shortness of breath and wheezing.    Cardiovascular:  Positive for leg swelling. Negative for chest pain and palpitations.   Gastrointestinal:  Negative for abdominal distention, abdominal pain, blood in stool, change in bowel habit, constipation, diarrhea, nausea,  vomiting and reflux.   Musculoskeletal:  Positive for gait problem.   Integumentary:  Negative for color change.   Neurological:  Negative for syncope.   Hematological:  Does not bruise/bleed easily.   Psychiatric/Behavioral:  Negative for sleep disturbance. The patient is not nervous/anxious.           PMHX:  has a past medical history of Diverticulitis, Hyperlipemia, and Hypokalemia (11/09/2021).    PSHX:  has a past surgical history that includes Hernia repair; Colon surgery; laparotomy, exploratory (N/A, 8/9/2023); Removal of implant (N/A, 8/9/2023); excision, small intestine (N/A, 8/9/2023); and Left heart catheterization (Left, 8/15/2023).    PFHX: family history includes Diabetes in his father and mother.    PSlHX:  reports that he has never smoked. He has never used smokeless tobacco. He reports that he does not drink alcohol and does not use drugs.        Review of patient's allergies indicates:  No Known Allergies    Medication List with Changes/Refills   Current Medications    FOLIC ACID-VITAMIN B6-VIT B12 ORAL    Take 1 tablet by mouth once daily.    FUROSEMIDE (LASIX) 20 MG TABLET    Take 1 tablet (20 mg total) by mouth daily as needed (for weight gain of more than 5 lbs in 1 week or pitting edema in lower exts.).    LACTOBACILLUS ACIDOPHILUS (PROBIOTIC) 10 BILLION CELL CAP    Take by mouth.    LACTULOSE (CHRONULAC) 10 GRAM/15 ML SOLUTION    SMARTSIG:Milliliter(s) By Mouth    METOPROLOL TARTRATE (LOPRESSOR) 25 MG TABLET    Take 0.5 tablets (12.5 mg total) by mouth 2 (two) times daily.    NIACIN (SLO-NIACIN) 500 MG TABLET    Take 500 mg by mouth once daily. With meal    OMEGA-3 FATTY ACIDS/FISH OIL (FISH OIL-OMEGA-3 FATTY ACIDS) 300-1,000 MG CAPSULE    Take 1 capsule by mouth once daily.    PANTOPRAZOLE (PROTONIX) 40 MG TABLET    Take 1 tablet (40 mg total) by mouth once daily.    QUETIAPINE (SEROQUEL) 50 MG TABLET    1 tablet (50 mg total) by Per NG tube route 2 (two) times daily.    RIFAXIMIN (XIFAXAN)  "550 MG TAB    Take 1 tablet (550 mg total) by mouth 2 (two) times daily.    SPIRONOLACTONE (ALDACTONE) 50 MG TABLET    Take 1 tablet (50 mg total) by mouth once daily.    TIMOLOL MALEATE 0.5% (TIMOPTIC) 0.5 % DROP    1 drop once daily.        Objective Findings:  Vital Signs:  BP (!) 150/71   Pulse 77   Ht 5' 10" (1.778 m)   Wt 79.4 kg (175 lb)   BMI 25.11 kg/m²  Body mass index is 25.11 kg/m².    Physical Exam:  Physical Exam  Vitals and nursing note reviewed.   Constitutional:       General: He is not in acute distress.     Appearance: Normal appearance.   HENT:      Mouth/Throat:      Mouth: Mucous membranes are moist.   Cardiovascular:      Rate and Rhythm: Normal rate.   Pulmonary:      Effort: Pulmonary effort is normal.      Breath sounds: No wheezing, rhonchi or rales.   Abdominal:      General: Bowel sounds are normal. There is no distension.      Palpations: Abdomen is soft. There is no mass.      Tenderness: There is no abdominal tenderness. There is no guarding.      Hernia: No hernia is present.   Musculoskeletal:      Right lower leg: Edema present.      Left lower leg: Edema present.   Skin:     General: Skin is warm and dry.      Coloration: Skin is pale. Skin is not jaundiced.      Findings: No bruising.   Neurological:      Mental Status: He is alert and oriented to person, place, and time.   Psychiatric:         Mood and Affect: Mood normal.          Labs:  Lab Results   Component Value Date    WBC 4.91 10/09/2023    HGB 12.6 (L) 10/09/2023    HCT 38.7 (L) 10/09/2023    MCV 97.2 (H) 10/09/2023    RDW 13.2 10/09/2023     10/09/2023    LYMPH 27.3 10/09/2023    LYMPH 1.34 10/09/2023    MONO 8.6 (H) 10/09/2023    EOS 0.26 10/09/2023    BASO 0.05 10/09/2023     Lab Results   Component Value Date     10/09/2023    K 4.0 10/09/2023     (H) 10/09/2023    CO2 29 10/09/2023     (H) 10/09/2023    BUN 13 10/09/2023    CREATININE 1.16 10/09/2023    CALCIUM 9.1 10/09/2023    PROT " 7.7 10/09/2023    PROT 8.1 10/09/2023    ALBUMIN 3.5 10/09/2023    BILITOT 0.7 10/09/2023    ALKPHOS 94 10/09/2023    AST 27 10/09/2023    ALT 34 10/09/2023         Imaging: EGD    Result Date: 10/25/2023  Table formatting from the original result was not included. Procedure Date 10/25/23 Impression Overall      Performed forceps biopsies in the stomach Erythematous mucosa in the fundus of the stomach and antrum; performed cold forceps biopsy Mucosa of the esophagus was normal without varices. The stomach has mild erythema without ulcers and biopsies were obtained. No gastric varices were seen. Mucosa of the duodenum was normal appearing. Recommendation  Await pathology results Avoid nsaids; PPI daily; return to GI clinic with ANASTASIA Fuentes within 2 months. Discharge: disp: DC to home. Resume low sodium diet. No driving x 24 hours. F/U with PCP  as scheduled and GI clinic as above. Dx: gastritis, cirrhosis, portal hypertension, portal vein thrombosis. Indication Other cirrhosis of liver, Anemia, unspecified type Providers Flori Chicas Technician Marilyn Orourke RN Registered Nurse Emeterio Crawford CRNA CRNA Dungan, Vincent C., MD Proceduralist Medications Moderate sedation administered by anesthesia staff - See anesthesia record. Preprocedure A history and physical has been performed, and patient medication allergies have been reviewed. The patient's tolerance of previous anesthesia has been reviewed. The risks and benefits of the procedure and the sedation options and risks were discussed with the patient. All questions were answered and informed consent obtained. ASA Score: ASA 3 - Patient with moderate systemic disease with functional limitations Mallampati Airway Score: II (hard and soft palate, upper portion of tonsils anduvula visible) Details of the Procedure The patient underwent monitored anesthesia care, which was administered by an anesthesia professional. The patient's blood pressure, heart rate,  level of consciousness, oxygen, respirations, ECG and ETCO2 were monitored throughout the procedure. The scope was introduced through the mouth and advanced to the third part of the duodenum. Retroflexion was performed in the cardia. Prior to the procedure, the patient's H. Pylori status was unknown. The patient's estimated blood loss was minimal (<5 mL). The procedure was not difficult. The patient tolerated the procedure well. There were no apparent adverse events. Scope: Colonoscope Scope Serial: [id number] Events Procedure Events Event Event Time Procedure Events Event Event Time SCOPE IN 10/25/2023  2:46 PM SCOPE OUT 10/25/2023  2:49 PM Findings Performed multiple forceps biopsies in the stomach Erythematous mucosa in the fundus of the stomach and antrum; performed cold forceps biopsy     CT Abdomen w/o Contrast Plus Triphasic w/Contrast    Result Date: 10/24/2023  EXAMINATION: CT ABDOMEN AND PELVIS with and without IV contrast CLINICAL HISTORY: Other cirrhosis of liver.  Anemia TECHNIQUE: Axial CT images were obtained from the lung bases to the iliac crests before and after the IV administration of 100 mL of Isovue 370 contrast. Oral contrast was not given.  Coronal and sagittal reconstructions submitted and interpreted.  Total DLP 1547 mGycm.  Automated exposure control utilized. COMPARISON: July 28, 2023 FINDINGS: CT abdomen: Partially visualized lung bases are clear.  There is no gross pleural or pericardial effusion. There is no evidence of pneumoperitoneum. Previous cholecystectomy. Liver is smaller in size.  There is no focal hepatic lesion.  There is evidence of chronic occlusion of the main portal vein with cavernous transformation.  There is stable mild ectasia of the intrahepatic biliary ducts. Spleen is normal in size and is without focal mass lesion. Pancreas, adrenal glands, and kidneys are unchanged.  There are 2 simple cysts measuring 20 and 16 mm diameter lower pole left kidney which are  unchanged; there is no recommendation for additional follow-up. There are splenic varices.  There is evidence of spleno renal shunt formation as can be seen with underlying portal venous hypertension. There is no aneurysm of the abdominal aorta. Stomach is grossly unremarkable but is not well distended.  There is no juan luis bowel obstruction.  There is diverticulosis of the colon without juan luis diverticulitis. There is no lymphadenopathy by short-axis diameter criteria. There is hernia of the anterior abdominal wall in the right upper quadrant with a broad necked which contains a partial knuckle of normal caliber colon.  This may be an old incisional type hernia; this is unchanged. There is chronic moderate T12 compression which is unchanged.     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 TECHNIQUE: Complete venous evaluation performed with the patient in the standing and supine position.  The deep system was evaluated for augmentation, phasicity, compressibility and presence or absence of DVT.  The superficial system was evaluated with spectral analysis and color-flow to determine reflux times. COMPARISON: None FINDINGS: Bilateral common femoral veins, femoral veins, saphenous veins, popliteal veins, deep femoral veins, peroneal veins, and posterior tibial veins appear to be widely patent with no evidence of DVT.  There is normal augmentation and phasicity of the sampled vessels.  Post thrombotic changes noted within the left  popliteal vein. The right saphenofemoral junction measures 7.6mm in diameter with 0.51 seconds of reflux time noted. The right great saphenous vein at the proximal thigh measures 6.3 mm in diameter with 0.56 seconds of reflux time noted. Maximum reflux time noted in the right great saphenous vein is 2.1 seconds noted at the level of theknee. The right saphenopopliteal junction measures 3.8 mm in diameter with 0 seconds of reflux time noted. The right small saphenous vein measures 3.5 mm in diameter with 2.3 seconds of reflux time noted. Refluxing varicose veins right medial leg measuring 4.5 mm in diameter with 1.8 seconds of reflux time noted. Right vein of Giacomini measuring 4.2 mm in diameter with 0.87 seconds of reflux time noted. The left saphenofemoral junction measures 6.6mm in diameter with 1.1 seconds of reflux time noted. The left great saphenous vein at the proximal thigh measures 6.0 mm in diameter with 0 seconds of reflux time noted. Maximum reflux time noted in the left great saphenous vein is 4.0 seconds noted at the level of theankle. The left saphenopopliteal junction measures 5.1 mm in diameter with 3.7 seconds of reflux time noted. The left small saphenous vein measures 3.4 mm in diameter with 2.0 seconds of reflux time noted. Varicose veins left lower medial leg measuring 7.3 mm in diameter with 2.5 seconds of reflux time noted. Varicose veins located more distally left medial leg measuring 4.0 mm in diameter with 0.57 seconds of reflux time noted. There are no areas of pre-rupture or pathological perforators noted bilaterally.     No evidence of acute DVT noted bilaterally.  Post thrombotic changes noted within the left popliteal vein.  Pulsatile deep venous flow also noted bilaterally consistent with congestive heart failure. Dilation and axial reflux of the bilateral great and bilateral small saphenous veins with associated refluxing varicosities as detailed above. Electronically signed  by: Praneeth Marquez Date:    10/09/2023 Time:    16:56        Assessment:  Danny Flood is a 78 y.o. male here with:  1. Portal hypertension    2. Portal vein thrombosis          Recommendations:  1. Would like to schedule liver biopsy  2. Cardiac clearance needs to be documented prior to liver biopsy  3. CBC, CMP, PTT, INR  4. Continue lactulose and Xifaxan  5. Limit salt to 2000 mg per day  Avoid alcohol  Limit tylenol to 2000 mg per day  Avoid raw seafood      Follow up in about 4 weeks (around 12/5/2023).      Order summary:  Orders Placed This Encounter    CT Biopsy Liver    APTT    Protime-INR    CBC Auto Differential    Comprehensive Metabolic Panel       Thank you for allowing me to participate in the care of Danny Flood.      RAMONA Fung

## 2023-11-08 ENCOUNTER — TELEPHONE (OUTPATIENT)
Dept: GASTROENTEROLOGY | Facility: CLINIC | Age: 78
End: 2023-11-08
Payer: MEDICARE

## 2023-11-08 NOTE — TELEPHONE ENCOUNTER
Results called to patient. Verbalized understanding.                ----- Message from PHIL So sent at 11/8/2023 12:33 PM CST -----  MELD score is 10. Platelets are normal. Liver enzymes and Bilirubin is normal

## 2023-11-14 ENCOUNTER — HOSPITAL ENCOUNTER (OUTPATIENT)
Dept: RADIOLOGY | Facility: HOSPITAL | Age: 78
Discharge: HOME | End: 2023-11-14
Attending: NURSE PRACTITIONER
Payer: MEDICARE

## 2023-11-14 VITALS
SYSTOLIC BLOOD PRESSURE: 146 MMHG | HEART RATE: 75 BPM | HEIGHT: 70 IN | TEMPERATURE: 98 F | RESPIRATION RATE: 18 BRPM | DIASTOLIC BLOOD PRESSURE: 70 MMHG | OXYGEN SATURATION: 99 % | WEIGHT: 180 LBS | BODY MASS INDEX: 25.77 KG/M2

## 2023-11-14 DIAGNOSIS — I81 PORTAL VEIN THROMBOSIS: ICD-10-CM

## 2023-11-14 DIAGNOSIS — K76.6 PORTAL HYPERTENSION: ICD-10-CM

## 2023-11-14 PROCEDURE — 47000 US BIOPSY LIVER BY RADIOLOGIST: ICD-10-PCS | Mod: ,,, | Performed by: STUDENT IN AN ORGANIZED HEALTH CARE EDUCATION/TRAINING PROGRAM

## 2023-11-14 PROCEDURE — 63600175 PHARM REV CODE 636 W HCPCS: Performed by: STUDENT IN AN ORGANIZED HEALTH CARE EDUCATION/TRAINING PROGRAM

## 2023-11-14 PROCEDURE — 76942 US BIOPSY LIVER BY RADIOLOGIST: ICD-10-PCS | Mod: 26,,, | Performed by: STUDENT IN AN ORGANIZED HEALTH CARE EDUCATION/TRAINING PROGRAM

## 2023-11-14 PROCEDURE — 88313 SURGICAL PATHOLOGY: ICD-10-PCS | Mod: 26,,, | Performed by: PATHOLOGY

## 2023-11-14 PROCEDURE — 88313 SPECIAL STAINS GROUP 2: CPT | Mod: TC,SUR,59 | Performed by: STUDENT IN AN ORGANIZED HEALTH CARE EDUCATION/TRAINING PROGRAM

## 2023-11-14 PROCEDURE — 99152 MOD SED SAME PHYS/QHP 5/>YRS: CPT

## 2023-11-14 PROCEDURE — 88307 SURGICAL PATHOLOGY: ICD-10-PCS | Mod: 26,,, | Performed by: PATHOLOGY

## 2023-11-14 PROCEDURE — 47000 NEEDLE BIOPSY OF LIVER PERQ: CPT | Mod: ,,, | Performed by: STUDENT IN AN ORGANIZED HEALTH CARE EDUCATION/TRAINING PROGRAM

## 2023-11-14 PROCEDURE — 76942 ECHO GUIDE FOR BIOPSY: CPT | Mod: 26,,, | Performed by: STUDENT IN AN ORGANIZED HEALTH CARE EDUCATION/TRAINING PROGRAM

## 2023-11-14 PROCEDURE — 88313 SPECIAL STAINS GROUP 2: CPT | Mod: 26,,, | Performed by: PATHOLOGY

## 2023-11-14 PROCEDURE — 47000 NEEDLE BIOPSY OF LIVER PERQ: CPT

## 2023-11-14 PROCEDURE — 99153 MOD SED SAME PHYS/QHP EA: CPT

## 2023-11-14 PROCEDURE — 88307 TISSUE EXAM BY PATHOLOGIST: CPT | Mod: 26,,, | Performed by: PATHOLOGY

## 2023-11-14 RX ORDER — MIDAZOLAM HYDROCHLORIDE 1 MG/ML
INJECTION INTRAMUSCULAR; INTRAVENOUS
Status: COMPLETED | OUTPATIENT
Start: 2023-11-14 | End: 2023-11-14

## 2023-11-14 RX ORDER — FENTANYL CITRATE 50 UG/ML
INJECTION, SOLUTION INTRAMUSCULAR; INTRAVENOUS
Status: COMPLETED | OUTPATIENT
Start: 2023-11-14 | End: 2023-11-14

## 2023-11-14 RX ADMIN — MIDAZOLAM HYDROCHLORIDE 1 MG: 1 INJECTION, SOLUTION INTRAMUSCULAR; INTRAVENOUS at 10:11

## 2023-11-14 RX ADMIN — FENTANYL CITRATE 50 MCG: 50 INJECTION INTRAMUSCULAR; INTRAVENOUS at 10:11

## 2023-11-14 NOTE — PROCEDURES
Patient presents for US guided liver biopsy. Procedure performed by Dr. Valencia with assistance from Umesh BARTLETT. Informed consent has been obtained. Timeout called with everyone present in agreement. Patient prepped with chlor prep and draped in a sterile manner. 6 cc of 1% lidocaine infused. With a 18 gauge temno biopsy device a single core specimen was obtained and sent to the lab for analysis. Specimen transported to the lab by Daja Sharma RN. Needle withdrawn and pressure held on puncture site. Bandage then placed. Patient tolerated procedure well with no immediate complications. Total sedation time from 1055 til 1125.

## 2023-11-15 LAB
ESTROGEN SERPL-MCNC: NORMAL PG/ML
INSULIN SERPL-ACNC: NORMAL U[IU]/ML
LAB AP CLINICAL INFORMATION: NORMAL
LAB AP GROSS DESCRIPTION: NORMAL
LAB AP LABORATORY NOTES: NORMAL
T3RU NFR SERPL: NORMAL %

## 2023-11-16 ENCOUNTER — TELEPHONE (OUTPATIENT)
Dept: GASTROENTEROLOGY | Facility: CLINIC | Age: 78
End: 2023-11-16
Payer: MEDICARE

## 2023-11-16 DIAGNOSIS — K76.82 HEPATIC ENCEPHALOPATHY: ICD-10-CM

## 2023-11-16 DIAGNOSIS — I81 PORTAL VEIN THROMBOSIS: Primary | ICD-10-CM

## 2023-11-16 DIAGNOSIS — K76.6 PORTAL HYPERTENSION: ICD-10-CM

## 2023-11-16 NOTE — TELEPHONE ENCOUNTER
Call to patient's son Nikolay barillas, notified of appointment with Dr. Arcos on 11/29 at 1:30 p.m. son verbalizes understanding and agrees to keep this scheduled appointment.

## 2023-11-16 NOTE — TELEPHONE ENCOUNTER
Call to patient's son Nikolay barillas to discuss results of liver biopsy.  Liver biopsy does not show cirrhosis or fibrosis.  Mr. Barillas has a chronic main portal vein thrombosis with portal hypertension and a natural splenorenal shunt.  He has been hospitalized multiple times with hepatic encephalopathy.  CT triphasic did not show any liver lesions.  He is no history of malignancy.  No heart failure.  Discuss this with the patient's son.  Dr. Jean Baptiste has reviewed.  We will recommend referral to vascular surgeon and interventional radiology for options due to main portal vein thrombosis.  In hopes that intervention may reduce the patient's recurrent hepatic encephalopathy as well as the risk of variceal bleed.  I did call and speak to Macy Calles NP to discuss referral to Dr. Arcos.  The patient is son is in agreeance to start the referral process.

## 2023-11-20 PROBLEM — I21.A1 MYOCARDIAL INFARCTION TYPE 2: Status: RESOLVED | Noted: 2023-08-14 | Resolved: 2023-11-20

## 2023-11-27 PROBLEM — I21.4 NSTEMI (NON-ST ELEVATED MYOCARDIAL INFARCTION): Status: RESOLVED | Noted: 2023-08-23 | Resolved: 2023-11-27

## 2023-11-27 PROBLEM — J18.9 PNEUMONIA: Status: RESOLVED | Noted: 2023-08-18 | Resolved: 2023-11-27

## 2024-01-03 ENCOUNTER — OFFICE VISIT (OUTPATIENT)
Dept: VASCULAR SURGERY | Facility: CLINIC | Age: 79
End: 2024-01-03
Payer: MEDICARE

## 2024-01-03 VITALS — HEIGHT: 70 IN | WEIGHT: 172 LBS | BODY MASS INDEX: 24.62 KG/M2

## 2024-01-03 DIAGNOSIS — I81 PORTAL VEIN THROMBOSIS: Primary | ICD-10-CM

## 2024-01-03 PROCEDURE — 3288F FALL RISK ASSESSMENT DOCD: CPT | Mod: CPTII,,, | Performed by: SURGERY

## 2024-01-03 PROCEDURE — 1101F PT FALLS ASSESS-DOCD LE1/YR: CPT | Mod: CPTII,,, | Performed by: SURGERY

## 2024-01-03 PROCEDURE — 99203 OFFICE O/P NEW LOW 30 MIN: CPT | Mod: S$PBB,,, | Performed by: SURGERY

## 2024-01-03 PROCEDURE — 1159F MED LIST DOCD IN RCRD: CPT | Mod: CPTII,,, | Performed by: SURGERY

## 2024-01-03 PROCEDURE — 99213 OFFICE O/P EST LOW 20 MIN: CPT | Mod: PBBFAC | Performed by: SURGERY

## 2024-01-03 NOTE — PROGRESS NOTES
Subjective:       Patient ID: Danny Flood is a 78 y.o. male.    Chief Complaint: Consult (Review CT scan)  New patient referred to me.  CT findings of chronic occlusion of the portal vein with cavernous transformation states he is last 5 months started lactulose in his said no hospital admissions regarding his ammonia.  His transaminases normal albumin is 3.3 he has had some esophageal bleeding in the remote past but no problems recently he has had recent EGD the made no mention of esophageal varices he has no ascites on exam were CT scan.  Has had DVT in his leg in the past unknown time but they are sequela seen on an ultrasound he is nursing hematologist offer both hematology evaluation and consideration be seen by the hepatobiliary surgeon the [facility] Dr. Gibbs a stage doing well at this time desire to sit tight he will continue the lactulose  family history includes Diabetes in his father and mother.  Past Medical History:   Diagnosis Date    Diverticulitis     Hyperlipemia     Hypokalemia 11/09/2021      Past Surgical History:   Procedure Laterality Date    COLON SURGERY      partial colon resection    EXCISION, SMALL INTESTINE N/A 8/9/2023    Procedure: EXCISION, SMALL INTESTINE;  Surgeon: Tesfaye Natarajan MD;  Location: [facility] OR;  Service: General;  Laterality: N/A;    HERNIA REPAIR      LAPAROTOMY, EXPLORATORY N/A 8/9/2023    Procedure: LAPAROTOMY, EXPLORATORY;  Surgeon: Tesfaye Natarajan MD;  Location: [facility] OR;  Service: General;  Laterality: N/A;    LEFT HEART CATHETERIZATION Left 8/15/2023    Procedure: Left heart cath;  Surgeon: Edinson Torres DO;  Location: [facility] CATH LAB;  Service: Cardiology;  Laterality: Left;    REMOVAL OF IMPLANT N/A 8/9/2023    Procedure: REMOVAL, IMPLANT;  Surgeon: Tesfaye Natarajan MD;  Location: [facility] OR;  Service: General;  Laterality: N/A;  removal mesh       reports that he has never smoked. He has never used smokeless tobacco. He reports that he does  "not drink alcohol and does not use drugs.   HPI  Review of Systems      Objective:      Ht 5' 10" (1.778 m)   Wt 78 kg (172 lb)   BMI 24.68 kg/m²    Physical Exam  Exam conducted with a chaperone present.   Constitutional:       Appearance: Normal appearance.   Cardiovascular:      Rate and Rhythm: Normal rate.   Pulmonary:      Effort: Pulmonary effort is normal.   Abdominal:      General: Abdomen is flat.      Palpations: Abdomen is soft.      Comments: Binder is has a nontender abdomen he has had a known hernia in the past   Musculoskeletal:      Comments: He has in a wheelchair   Skin:     General: Skin is warm.   Neurological:      Mental Status: He is alert.   Psychiatric:         Mood and Affect: Mood normal.         Behavior: Behavior normal.         Thought Content: Thought content normal.         Judgment: Judgment normal.           Assessment:       1. Portal vein thrombosis        Plan:       Offered hematology and hepatobiliary surgeon evaluation of the desires sit tight this time he is functioning very well without recent esophageal bleeds or ammonia difficulties while on his lactulose his transaminases are normal his albumin is 3.3           " EMR

## 2024-09-23 ENCOUNTER — TELEPHONE (OUTPATIENT)
Dept: GASTROENTEROLOGY | Facility: CLINIC | Age: 79
End: 2024-09-23
Payer: MEDICARE

## 2024-09-23 ENCOUNTER — TELEPHONE (OUTPATIENT)
Dept: VASCULAR SURGERY | Facility: CLINIC | Age: 79
End: 2024-09-23
Payer: MEDICARE

## 2024-09-23 NOTE — TELEPHONE ENCOUNTER
Returned call to patients son to let him know that Jennifer Esquivel NP recommends reporting to the ER for increasing SOB and/or fluid build up since patient does not have a diagnosis of cirrhosis, or follow up with Dr. Arcos, who also recommended referral to Dr. Gibbs at [facility], when the patient was seen in the office by him. Son states that they do have an appointment coming up on Wed, 9/25/24 with their primary care. Encouraged to keep that appointment and to report to ER if needed for any worsening symptoms. Verbalized understanding.              ----- Message from PHIL So sent at 9/23/2024  3:51 PM CDT -----  Patient can be seen in ER. He does not have liver cirrhosis. He was evaluated by Dr. Arcos. He was offered referral to hematology but declined. He has a natural shunt that contributes elevated ammonia etc.  ----- Message -----  From: Shobha Varghese RN  Sent: 9/23/2024   3:36 PM CDT  To: PHIL So    Patient has upcoming appointment with you for 10/1. Says he has fluid building up again. Can I schedule him sooner or does he need to go to ER if he can't make it until 10/1? Please advise.  ----- Message -----  From: Erin Harrington  Sent: 9/23/2024   2:56 PM CDT  To: Shobha Varghese RN    Pts son made him appt with Syl for 10/1 but he has fluid build up again (he has not been here in a year or so)  asked nurse to call   [phone]

## 2024-09-23 NOTE — TELEPHONE ENCOUNTER
----- Message from Jacqueline Corado sent at 9/23/2024  9:38 AM CDT -----  Pts son calling. He wants an appt for dad. Looks like some fluid buildup. Gaining weight. Call son Nikolay with appt at [phone].  Who Called: Nikolay for Danny JANE Remigio    Caller is requesting assistance/information from provider's office.    Symptoms (please be specific): Fluid buildup   How long has patient had these symptoms:    List of preferred pharmacies on file (remove unneeded): [unfilled]  If different, enter pharmacy into here including location and phone number:         Patient's Preferred Phone Number on File: [phone]   Best Call Back Number, if different:  Additional Information    Spoke with Patient's son and he stated patient is having a build up of fluid. The son is going to call patient's PCP and call our office back if needed.

## 2025-05-01 ENCOUNTER — HOSPITAL ENCOUNTER (INPATIENT)
Facility: HOSPITAL | Age: 80
LOS: 5 days | Discharge: HOME HEALTH | DRG: 389 | End: 2025-05-06
Attending: SURGERY | Admitting: SURGERY
Payer: MEDICARE

## 2025-05-01 DIAGNOSIS — K56.609 SBO (SMALL BOWEL OBSTRUCTION): Primary | ICD-10-CM

## 2025-05-01 DIAGNOSIS — K76.6 PORTAL HYPERTENSION: ICD-10-CM

## 2025-05-01 DIAGNOSIS — K56.50 SMALL BOWEL OBSTRUCTION DUE TO ADHESIONS: ICD-10-CM

## 2025-05-01 DIAGNOSIS — I48.0 PAROXYSMAL ATRIAL FIBRILLATION: ICD-10-CM

## 2025-05-01 DIAGNOSIS — I48.20 CHRONIC A-FIB: ICD-10-CM

## 2025-05-01 PROCEDURE — 63600175 PHARM REV CODE 636 W HCPCS: Performed by: SURGERY

## 2025-05-01 PROCEDURE — 25000003 PHARM REV CODE 250: Performed by: SURGERY

## 2025-05-01 PROCEDURE — 36415 COLL VENOUS BLD VENIPUNCTURE: CPT | Performed by: SURGERY

## 2025-05-01 PROCEDURE — 83036 HEMOGLOBIN GLYCOSYLATED A1C: CPT | Performed by: SURGERY

## 2025-05-01 PROCEDURE — 11000001 HC ACUTE MED/SURG PRIVATE ROOM

## 2025-05-01 RX ORDER — QUETIAPINE FUMARATE 25 MG/1
50 TABLET, FILM COATED ORAL 2 TIMES DAILY
Status: DISCONTINUED | OUTPATIENT
Start: 2025-05-01 | End: 2025-05-03

## 2025-05-01 RX ORDER — DEXTROSE MONOHYDRATE, SODIUM CHLORIDE, AND POTASSIUM CHLORIDE 50; 1.49; 4.5 G/1000ML; G/1000ML; G/1000ML
INJECTION, SOLUTION INTRAVENOUS CONTINUOUS
Status: DISCONTINUED | OUTPATIENT
Start: 2025-05-01 | End: 2025-05-05

## 2025-05-01 RX ORDER — TALC
6 POWDER (GRAM) TOPICAL NIGHTLY PRN
Status: DISCONTINUED | OUTPATIENT
Start: 2025-05-01 | End: 2025-05-06 | Stop reason: HOSPADM

## 2025-05-01 RX ORDER — LIDOCAINE HYDROCHLORIDE 10 MG/ML
1 INJECTION, SOLUTION EPIDURAL; INFILTRATION; INTRACAUDAL; PERINEURAL ONCE
Status: DISCONTINUED | OUTPATIENT
Start: 2025-05-01 | End: 2025-05-06 | Stop reason: HOSPADM

## 2025-05-01 RX ORDER — MORPHINE SULFATE 2 MG/ML
2 INJECTION, SOLUTION INTRAMUSCULAR; INTRAVENOUS EVERY 4 HOURS PRN
Status: DISCONTINUED | OUTPATIENT
Start: 2025-05-01 | End: 2025-05-06 | Stop reason: HOSPADM

## 2025-05-01 RX ORDER — ONDANSETRON HYDROCHLORIDE 2 MG/ML
4 INJECTION, SOLUTION INTRAVENOUS EVERY 6 HOURS PRN
Status: DISCONTINUED | OUTPATIENT
Start: 2025-05-01 | End: 2025-05-06 | Stop reason: HOSPADM

## 2025-05-01 RX ORDER — ENOXAPARIN SODIUM 100 MG/ML
40 INJECTION SUBCUTANEOUS EVERY 24 HOURS
Status: DISCONTINUED | OUTPATIENT
Start: 2025-05-01 | End: 2025-05-06 | Stop reason: HOSPADM

## 2025-05-01 RX ORDER — METOPROLOL TARTRATE 25 MG/1
12.5 TABLET ORAL 2 TIMES DAILY
Status: DISCONTINUED | OUTPATIENT
Start: 2025-05-01 | End: 2025-05-06 | Stop reason: HOSPADM

## 2025-05-01 RX ORDER — MUPIROCIN 20 MG/G
OINTMENT TOPICAL 2 TIMES DAILY
Status: DISCONTINUED | OUTPATIENT
Start: 2025-05-02 | End: 2025-05-06 | Stop reason: HOSPADM

## 2025-05-01 RX ORDER — MORPHINE SULFATE 4 MG/ML
4 INJECTION, SOLUTION INTRAMUSCULAR; INTRAVENOUS EVERY 4 HOURS PRN
Status: DISCONTINUED | OUTPATIENT
Start: 2025-05-01 | End: 2025-05-06 | Stop reason: HOSPADM

## 2025-05-01 RX ORDER — ACETAMINOPHEN 325 MG/1
650 TABLET ORAL EVERY 8 HOURS PRN
Status: DISCONTINUED | OUTPATIENT
Start: 2025-05-01 | End: 2025-05-06 | Stop reason: HOSPADM

## 2025-05-01 RX ADMIN — METOPROLOL TARTRATE 12.5 MG: 25 TABLET, FILM COATED ORAL at 11:05

## 2025-05-01 RX ADMIN — QUETIAPINE FUMARATE 50 MG: 25 TABLET ORAL at 11:05

## 2025-05-01 RX ADMIN — ENOXAPARIN SODIUM 40 MG: 40 INJECTION SUBCUTANEOUS at 11:05

## 2025-05-02 PROBLEM — K56.50 SMALL BOWEL OBSTRUCTION DUE TO ADHESIONS: Status: ACTIVE | Noted: 2025-05-02

## 2025-05-02 PROBLEM — K76.6 PORTAL HYPERTENSION: Status: ACTIVE | Noted: 2023-08-13

## 2025-05-02 PROBLEM — G93.41 ACUTE METABOLIC ENCEPHALOPATHY: Status: ACTIVE | Noted: 2025-05-02

## 2025-05-02 LAB
ALBUMIN SERPL BCP-MCNC: 3.2 G/DL (ref 3.4–4.8)
ALBUMIN/GLOB SERPL: 0.9 {RATIO}
ALP SERPL-CCNC: 57 U/L (ref 40–150)
ALT SERPL W P-5'-P-CCNC: 11 U/L
ANION GAP SERPL CALCULATED.3IONS-SCNC: 15 MMOL/L (ref 7–16)
ANION GAP SERPL CALCULATED.3IONS-SCNC: 15 MMOL/L (ref 7–16)
ANISOCYTOSIS BLD QL SMEAR: ABNORMAL
AST SERPL W P-5'-P-CCNC: 44 U/L (ref 11–45)
BASOPHILS # BLD AUTO: 0.04 K/UL (ref 0–0.2)
BASOPHILS NFR BLD AUTO: 0.4 % (ref 0–1)
BILIRUB SERPL-MCNC: 1.8 MG/DL
BUN SERPL-MCNC: 28 MG/DL (ref 8–26)
BUN SERPL-MCNC: 36 MG/DL (ref 8–26)
BUN/CREAT SERPL: 25 (ref 6–20)
BUN/CREAT SERPL: 27 (ref 6–20)
CALCIUM SERPL-MCNC: 8.7 MG/DL (ref 8.8–10)
CALCIUM SERPL-MCNC: 9.8 MG/DL (ref 8.8–10)
CHLORIDE SERPL-SCNC: 105 MMOL/L (ref 98–107)
CHLORIDE SERPL-SCNC: 106 MMOL/L (ref 98–107)
CO2 SERPL-SCNC: 27 MMOL/L (ref 23–31)
CO2 SERPL-SCNC: 30 MMOL/L (ref 23–31)
CREAT SERPL-MCNC: 1.12 MG/DL (ref 0.72–1.25)
CREAT SERPL-MCNC: 1.33 MG/DL (ref 0.72–1.25)
DIFFERENTIAL METHOD BLD: ABNORMAL
EGFR (NO RACE VARIABLE) (RUSH/TITUS): 54 ML/MIN/1.73M2
EGFR (NO RACE VARIABLE) (RUSH/TITUS): 67 ML/MIN/1.73M2
EOSINOPHIL # BLD AUTO: 0.02 K/UL (ref 0–0.5)
EOSINOPHIL NFR BLD AUTO: 0.2 % (ref 1–4)
ERYTHROCYTE [DISTWIDTH] IN BLOOD BY AUTOMATED COUNT: 12.4 % (ref 11.5–14.5)
EST. AVERAGE GLUCOSE BLD GHB EST-MCNC: 105 MG/DL
GLOBULIN SER-MCNC: 3.6 G/DL (ref 2–4)
GLUCOSE SERPL-MCNC: 161 MG/DL (ref 70–105)
GLUCOSE SERPL-MCNC: 165 MG/DL (ref 82–115)
GLUCOSE SERPL-MCNC: 202 MG/DL (ref 82–115)
HBA1C MFR BLD HPLC: 5.3 %
HCT VFR BLD AUTO: 33.8 % (ref 40–54)
HGB BLD-MCNC: 11.2 G/DL (ref 13.5–18)
IMM GRANULOCYTES # BLD AUTO: 0.01 K/UL (ref 0–0.04)
IMM GRANULOCYTES NFR BLD: 0.1 % (ref 0–0.4)
INR BLD: 1.52
LYMPHOCYTES # BLD AUTO: 1.27 K/UL (ref 1–4.8)
LYMPHOCYTES NFR BLD AUTO: 13.2 % (ref 27–41)
LYMPHOCYTES NFR BLD MANUAL: 14 % (ref 27–41)
MACROCYTES BLD QL SMEAR: ABNORMAL
MCH RBC QN AUTO: 33.4 PG (ref 27–31)
MCHC RBC AUTO-ENTMCNC: 33.1 G/DL (ref 32–36)
MCV RBC AUTO: 100.9 FL (ref 80–96)
MONOCYTES # BLD AUTO: 0.8 K/UL (ref 0–0.8)
MONOCYTES NFR BLD AUTO: 8.3 % (ref 2–6)
MONOCYTES NFR BLD MANUAL: 10 % (ref 2–6)
MPC BLD CALC-MCNC: 9.6 FL (ref 9.4–12.4)
NEUTROPHILS # BLD AUTO: 7.51 K/UL (ref 1.8–7.7)
NEUTROPHILS NFR BLD AUTO: 77.8 % (ref 53–65)
NEUTS BAND NFR BLD MANUAL: 16 % (ref 1–5)
NEUTS SEG NFR BLD MANUAL: 60 % (ref 50–62)
NRBC # BLD AUTO: 0 X10E3/UL
NRBC, AUTO (.00): 0 %
PLATELET # BLD AUTO: 182 K/UL (ref 150–400)
PLATELET MORPHOLOGY: NORMAL
POTASSIUM SERPL-SCNC: 3.7 MMOL/L (ref 3.5–5.1)
POTASSIUM SERPL-SCNC: 3.8 MMOL/L (ref 3.5–5.1)
PROT SERPL-MCNC: 6.8 G/DL (ref 5.8–7.6)
PROTHROMBIN TIME: 18.1 SECONDS (ref 11.7–14.7)
RBC # BLD AUTO: 3.35 M/UL (ref 4.6–6.2)
SODIUM SERPL-SCNC: 144 MMOL/L (ref 136–145)
SODIUM SERPL-SCNC: 146 MMOL/L (ref 136–145)
WBC # BLD AUTO: 9.65 K/UL (ref 4.5–11)

## 2025-05-02 PROCEDURE — 80053 COMPREHEN METABOLIC PANEL: CPT

## 2025-05-02 PROCEDURE — 94761 N-INVAS EAR/PLS OXIMETRY MLT: CPT

## 2025-05-02 PROCEDURE — 85025 COMPLETE CBC W/AUTO DIFF WBC: CPT

## 2025-05-02 PROCEDURE — 99900035 HC TECH TIME PER 15 MIN (STAT)

## 2025-05-02 PROCEDURE — 36415 COLL VENOUS BLD VENIPUNCTURE: CPT | Performed by: SURGERY

## 2025-05-02 PROCEDURE — 25000003 PHARM REV CODE 250: Performed by: SURGERY

## 2025-05-02 PROCEDURE — 85610 PROTHROMBIN TIME: CPT

## 2025-05-02 PROCEDURE — 93005 ELECTROCARDIOGRAM TRACING: CPT

## 2025-05-02 PROCEDURE — 63600175 PHARM REV CODE 636 W HCPCS: Performed by: SURGERY

## 2025-05-02 PROCEDURE — 99233 SBSQ HOSP IP/OBS HIGH 50: CPT | Mod: ,,, | Performed by: NURSE PRACTITIONER

## 2025-05-02 PROCEDURE — 36415 COLL VENOUS BLD VENIPUNCTURE: CPT

## 2025-05-02 PROCEDURE — 80048 BASIC METABOLIC PNL TOTAL CA: CPT | Performed by: SURGERY

## 2025-05-02 PROCEDURE — 82962 GLUCOSE BLOOD TEST: CPT

## 2025-05-02 PROCEDURE — 11000001 HC ACUTE MED/SURG PRIVATE ROOM

## 2025-05-02 PROCEDURE — 94799 UNLISTED PULMONARY SVC/PX: CPT

## 2025-05-02 PROCEDURE — 93010 ELECTROCARDIOGRAM REPORT: CPT | Mod: ,,, | Performed by: INTERNAL MEDICINE

## 2025-05-02 PROCEDURE — 99222 1ST HOSP IP/OBS MODERATE 55: CPT | Mod: ,,, | Performed by: HOSPITALIST

## 2025-05-02 RX ORDER — CHOLECALCIFEROL (VITAMIN D3) 25 MCG
1000 TABLET ORAL DAILY
COMMUNITY

## 2025-05-02 RX ORDER — VITAMIN B COMPLEX
1 CAPSULE ORAL DAILY
COMMUNITY

## 2025-05-02 RX ORDER — IBUPROFEN 100 MG/5ML
1000 SUSPENSION, ORAL (FINAL DOSE FORM) ORAL DAILY
COMMUNITY

## 2025-05-02 RX ORDER — MULTIVITAMIN
1 TABLET ORAL DAILY
COMMUNITY

## 2025-05-02 RX ORDER — VITAMIN E 268 MG
400 CAPSULE ORAL DAILY
COMMUNITY

## 2025-05-02 RX ADMIN — METOPROLOL TARTRATE 12.5 MG: 25 TABLET, FILM COATED ORAL at 09:05

## 2025-05-02 RX ADMIN — QUETIAPINE FUMARATE 50 MG: 25 TABLET ORAL at 09:05

## 2025-05-02 RX ADMIN — MUPIROCIN: 20 OINTMENT TOPICAL at 09:05

## 2025-05-02 RX ADMIN — ENOXAPARIN SODIUM 40 MG: 40 INJECTION SUBCUTANEOUS at 05:05

## 2025-05-02 RX ADMIN — DEXTROSE MONOHYDRATE, SODIUM CHLORIDE, AND POTASSIUM CHLORIDE: 50; 4.5; 1.49 INJECTION, SOLUTION INTRAVENOUS at 12:05

## 2025-05-02 RX ADMIN — METOPROLOL TARTRATE 12.5 MG: 25 TABLET, FILM COATED ORAL at 08:05

## 2025-05-02 RX ADMIN — QUETIAPINE FUMARATE 50 MG: 25 TABLET ORAL at 08:05

## 2025-05-02 RX ADMIN — DEXTROSE MONOHYDRATE, SODIUM CHLORIDE, AND POTASSIUM CHLORIDE: 50; 4.5; 1.49 INJECTION, SOLUTION INTRAVENOUS at 08:05

## 2025-05-02 NOTE — SUBJECTIVE & OBJECTIVE
No current facility-administered medications on file prior to encounter.     Current Outpatient Medications on File Prior to Encounter   Medication Sig    apixaban (ELIQUIS) 5 mg Tab Take 5 mg by mouth 2 (two) times daily.    b complex vitamins capsule Take 1 capsule by mouth once daily.    furosemide (LASIX) 20 MG tablet Take 1 tablet (20 mg total) by mouth daily as needed (for weight gain of more than 5 lbs in 1 week or pitting edema in lower exts.).    Lactobacillus acidophilus (PROBIOTIC) 10 billion cell Cap Take by mouth.    lactulose (CHRONULAC) 10 gram/15 mL solution SMARTSIG:Milliliter(s) By Mouth    metoprolol tartrate (LOPRESSOR) 25 MG tablet Take 0.5 tablets (12.5 mg total) by mouth 2 (two) times daily.    multivitamin (ONE DAILY MULTIVITAMIN) per tablet Take 1 tablet by mouth once daily.    omega-3 fatty acids/fish oil (FISH OIL-OMEGA-3 FATTY ACIDS) 300-1,000 mg capsule Take 1 capsule by mouth once daily.    pantoprazole (PROTONIX) 40 MG tablet Take 1 tablet (40 mg total) by mouth once daily.    QUEtiapine (SEROQUEL) 50 MG tablet 1 tablet (50 mg total) by Per NG tube route 2 (two) times daily.    spironolactone (ALDACTONE) 50 MG tablet Take 1 tablet (50 mg total) by mouth once daily.    timolol maleate 0.5% (TIMOPTIC) 0.5 % Drop 1 drop once daily.    vitamin D (VITAMIN D3) 1000 units Tab Take 1,000 Units by mouth once daily.    vitamin E 400 UNIT capsule Take 400 Units by mouth once daily.    ascorbic acid, vitamin C, (VITAMIN C) 1000 MG tablet Take 1,000 mg by mouth once daily.    rifAXIMin (XIFAXAN) 550 mg Tab Take 1 tablet (550 mg total) by mouth 2 (two) times daily.    [DISCONTINUED] FOLIC ACID-VITAMIN B6-VIT B12 ORAL Take 1 tablet by mouth once daily.    [DISCONTINUED] niacin (SLO-NIACIN) 500 mg tablet Take 500 mg by mouth once daily. With meal       Review of patient's allergies indicates:  No Known Allergies    Past Medical History:   Diagnosis Date    Diverticulitis     Hyperlipemia      Hypokalemia 11/09/2021     Past Surgical History:   Procedure Laterality Date    COLON SURGERY      partial colon resection    EXCISION, SMALL INTESTINE N/A 8/9/2023    Procedure: EXCISION, SMALL INTESTINE;  Surgeon: Tesfaye Natarajan MD;  Location: [facility] OR;  Service: General;  Laterality: N/A;    HERNIA REPAIR      LAPAROTOMY, EXPLORATORY N/A 8/9/2023    Procedure: LAPAROTOMY, EXPLORATORY;  Surgeon: Tesfaye Natarajan MD;  Location: [facility] OR;  Service: General;  Laterality: N/A;    LEFT HEART CATHETERIZATION Left 8/15/2023    Procedure: Left heart cath;  Surgeon: Edinson Torres DO;  Location: [facility] CATH LAB;  Service: Cardiology;  Laterality: Left;    REMOVAL OF IMPLANT N/A 8/9/2023    Procedure: REMOVAL, IMPLANT;  Surgeon: Tesfaye Natarajan MD;  Location: [facility] OR;  Service: General;  Laterality: N/A;  removal mesh     Family History       Problem Relation (Age of Onset)    Diabetes Mother, Father          Tobacco Use    Smoking status: Never    Smokeless tobacco: Never   Substance and Sexual Activity    Alcohol use: Never    Drug use: Never    Sexual activity: Not Currently     Review of Systems   Constitutional:  Positive for activity change, appetite change and fatigue.   Gastrointestinal:  Positive for abdominal distention, abdominal pain, constipation, nausea and vomiting. Negative for diarrhea.   Neurological:  Positive for weakness.   All other systems reviewed and are negative.    Objective:     Vital Signs (Most Recent):  Temp: 97.4 °F (36.3 °C) (05/02/25 0710)  Pulse: 85 (05/02/25 0710)  Resp: 18 (05/02/25 0710)  BP: (!) 160/81 (05/02/25 0710)  SpO2: 96 % (05/02/25 0710) Vital Signs (24h Range):  Temp:  [97.4 °F (36.3 °C)-98.2 °F (36.8 °C)] 97.4 °F (36.3 °C)  Pulse:  [] 85  Resp:  [18-20] 18  SpO2:  [95 %-98 %] 96 %  BP: (137-160)/(73-93) 160/81     Weight: 95.7 kg (210 lb 15.7 oz)  Body mass index is 30.27 kg/m².     Physical Exam  Vitals and nursing note reviewed.  "  Constitutional:       Appearance: Normal appearance.   HENT:      Head: Normocephalic.      Nose: Nose normal.      Mouth/Throat:      Mouth: Mucous membranes are dry.   Eyes:      Extraocular Movements: Extraocular movements intact.   Cardiovascular:      Rate and Rhythm: Normal rate.      Pulses: Normal pulses.   Pulmonary:      Effort: Pulmonary effort is normal.      Breath sounds: Normal breath sounds.   Abdominal:      General: Abdomen is protuberant. Bowel sounds are decreased. There is distension.      Palpations: Abdomen is soft.      Tenderness: There is generalized abdominal tenderness and tenderness in the right lower quadrant and periumbilical area.      Hernia: A hernia is present. Hernia is present in the ventral area.       Musculoskeletal:         General: Normal range of motion.      Cervical back: Normal range of motion.   Skin:     General: Skin is warm and dry.      Capillary Refill: Capillary refill takes less than 2 seconds.   Neurological:      General: No focal deficit present.      Mental Status: He is alert and oriented to person, place, and time.   Psychiatric:         Mood and Affect: Mood normal.         Behavior: Behavior normal.            I have reviewed all pertinent lab results within the past 24 hours.  CBC: No results for input(s): "WBC", "RBC", "HGB", "HCT", "PLT", "MCV", "MCH", "MCHC" in the last 168 hours.  BMP:   Recent Labs   Lab 05/02/25  0458   *      K 3.7      CO2 27   BUN 28*   CREATININE 1.12   CALCIUM 9.8     CMP:   Recent Labs   Lab 05/02/25  0458   *   CALCIUM 9.8      K 3.7   CO2 27      BUN 28*   CREATININE 1.12     Microbiology Results (last 7 days)       ** No results found for the last 168 hours. **          Specimen (24h ago, onward)      None          No results for input(s): "COLORU", "CLARITYU", "SPECGRAV", "PHUR", "PROTEINUA", "GLUCOSEU", "BILIRUBINCON", "BLOODU", "WBCU", "RBCU", "BACTERIA", "MUCUS", "NITRITE", " ""LEUKOCYTESUR", "UROBILINOGEN", "HYALINECASTS" in the last 168 hours.    Significant Diagnostics:  I have reviewed all pertinent imaging results/findings within the past 24 hours.    "

## 2025-05-02 NOTE — H&P
[facility]  General Surgery  History & Physical    Patient Name: Danny Flood  MRN: [id number]  Admission Date: 5/1/2025  Attending Physician: Tesfaye Natarajan MD   Primary Care Provider: Cameron Valencia MD    Patient information was obtained from patient, past medical records, ER records, and primary team.     Subjective:     Chief Complaint: No chief complaint on file.       History of Present Illness: Patient is transferred to [facility] to service of Dr Natarajan. Daughter is at bedside.  Patient reports 1 week history of abdominal pain.  He states he started vomiting a few days ago and symptoms have progressively worsened.  He denies passing gas.  He marion bowel movements.  Last bowel movement was 3-4 days ago. Patient denies fever. Past medical history includes, HTN, DM, DVT, cirrhosis, portal vein thrombosis, anemia, mitral regurg, anemia, dementia, hyperammonemia, and dysphagia.  Patient has NG tube with 900 cc of dark bilious drainage noted in canister.  Patient vomited just prior to exam.  Abdomen is distended.  Large right lower quadrant hernia noted.  Abdomen is soft.  CT abdomen pelvis disc to be uploaded for review.  Will continue npo.  We will continue NG tube to low intermittent suction.  We will continue to monitor patient's progress.    No current facility-administered medications on file prior to encounter.     Current Outpatient Medications on File Prior to Encounter   Medication Sig    apixaban (ELIQUIS) 5 mg Tab Take 5 mg by mouth 2 (two) times daily.    b complex vitamins capsule Take 1 capsule by mouth once daily.    furosemide (LASIX) 20 MG tablet Take 1 tablet (20 mg total) by mouth daily as needed (for weight gain of more than 5 lbs in 1 week or pitting edema in lower exts.).    Lactobacillus acidophilus (PROBIOTIC) 10 billion cell Cap Take by mouth.    lactulose (CHRONULAC) 10 gram/15 mL solution SMARTSIG:Milliliter(s) By Mouth    metoprolol  tartrate (LOPRESSOR) 25 MG tablet Take 0.5 tablets (12.5 mg total) by mouth 2 (two) times daily.    multivitamin (ONE DAILY MULTIVITAMIN) per tablet Take 1 tablet by mouth once daily.    omega-3 fatty acids/fish oil (FISH OIL-OMEGA-3 FATTY ACIDS) 300-1,000 mg capsule Take 1 capsule by mouth once daily.    pantoprazole (PROTONIX) 40 MG tablet Take 1 tablet (40 mg total) by mouth once daily.    QUEtiapine (SEROQUEL) 50 MG tablet 1 tablet (50 mg total) by Per NG tube route 2 (two) times daily.    spironolactone (ALDACTONE) 50 MG tablet Take 1 tablet (50 mg total) by mouth once daily.    timolol maleate 0.5% (TIMOPTIC) 0.5 % Drop 1 drop once daily.    vitamin D (VITAMIN D3) 1000 units Tab Take 1,000 Units by mouth once daily.    vitamin E 400 UNIT capsule Take 400 Units by mouth once daily.    ascorbic acid, vitamin C, (VITAMIN C) 1000 MG tablet Take 1,000 mg by mouth once daily.    rifAXIMin (XIFAXAN) 550 mg Tab Take 1 tablet (550 mg total) by mouth 2 (two) times daily.    [DISCONTINUED] FOLIC ACID-VITAMIN B6-VIT B12 ORAL Take 1 tablet by mouth once daily.    [DISCONTINUED] niacin (SLO-NIACIN) 500 mg tablet Take 500 mg by mouth once daily. With meal       Review of patient's allergies indicates:  No Known Allergies    Past Medical History:   Diagnosis Date    Diverticulitis     Hyperlipemia     Hypokalemia 11/09/2021     Past Surgical History:   Procedure Laterality Date    COLON SURGERY      partial colon resection    EXCISION, SMALL INTESTINE N/A 8/9/2023    Procedure: EXCISION, SMALL INTESTINE;  Surgeon: Tesfaye Natarajan MD;  Location: [facility] OR;  Service: General;  Laterality: N/A;    HERNIA REPAIR      LAPAROTOMY, EXPLORATORY N/A 8/9/2023    Procedure: LAPAROTOMY, EXPLORATORY;  Surgeon: Tesfaye Natarajan MD;  Location: [facility] OR;  Service: General;  Laterality: N/A;    LEFT HEART CATHETERIZATION Left 8/15/2023    Procedure: Left heart cath;  Surgeon: Edinson Torres DO;  Location: [facility] CATH LAB;   Service: Cardiology;  Laterality: Left;    REMOVAL OF IMPLANT N/A 8/9/2023    Procedure: REMOVAL, IMPLANT;  Surgeon: Tesfaye Natarajan MD;  Location: [facility];  Service: General;  Laterality: N/A;  removal mesh     Family History       Problem Relation (Age of Onset)    Diabetes Mother, Father          Tobacco Use    Smoking status: Never    Smokeless tobacco: Never   Substance and Sexual Activity    Alcohol use: Never    Drug use: Never    Sexual activity: Not Currently     Review of Systems   Constitutional:  Positive for activity change, appetite change and fatigue.   Gastrointestinal:  Positive for abdominal distention, abdominal pain, constipation, nausea and vomiting. Negative for diarrhea.   Neurological:  Positive for weakness.   All other systems reviewed and are negative.    Objective:     Vital Signs (Most Recent):  Temp: 97.4 °F (36.3 °C) (05/02/25 0710)  Pulse: 85 (05/02/25 0710)  Resp: 18 (05/02/25 0710)  BP: (!) 160/81 (05/02/25 0710)  SpO2: 96 % (05/02/25 0710) Vital Signs (24h Range):  Temp:  [97.4 °F (36.3 °C)-98.2 °F (36.8 °C)] 97.4 °F (36.3 °C)  Pulse:  [] 85  Resp:  [18-20] 18  SpO2:  [95 %-98 %] 96 %  BP: (137-160)/(73-93) 160/81     Weight: 95.7 kg (210 lb 15.7 oz)  Body mass index is 30.27 kg/m².     Physical Exam  Vitals and nursing note reviewed.   Constitutional:       Appearance: Normal appearance.   HENT:      Head: Normocephalic.      Nose: Nose normal.      Mouth/Throat:      Mouth: Mucous membranes are dry.   Eyes:      Extraocular Movements: Extraocular movements intact.   Cardiovascular:      Rate and Rhythm: Normal rate.      Pulses: Normal pulses.   Pulmonary:      Effort: Pulmonary effort is normal.      Breath sounds: Normal breath sounds.   Abdominal:      General: Abdomen is protuberant. Bowel sounds are decreased. There is distension.      Palpations: Abdomen is soft.      Tenderness: There is generalized abdominal tenderness and tenderness in the right lower  "quadrant and periumbilical area.      Hernia: A hernia is present. Hernia is present in the ventral area.       Musculoskeletal:         General: Normal range of motion.      Cervical back: Normal range of motion.   Skin:     General: Skin is warm and dry.      Capillary Refill: Capillary refill takes less than 2 seconds.   Neurological:      General: No focal deficit present.      Mental Status: He is alert and oriented to person, place, and time.   Psychiatric:         Mood and Affect: Mood normal.         Behavior: Behavior normal.            I have reviewed all pertinent lab results within the past 24 hours.  CBC: No results for input(s): "WBC", "RBC", "HGB", "HCT", "PLT", "MCV", "MCH", "MCHC" in the last 168 hours.  BMP:   Recent Labs   Lab 05/02/25  0458   *      K 3.7      CO2 27   BUN 28*   CREATININE 1.12   CALCIUM 9.8     CMP:   Recent Labs   Lab 05/02/25  0458   *   CALCIUM 9.8      K 3.7   CO2 27      BUN 28*   CREATININE 1.12     Microbiology Results (last 7 days)       ** No results found for the last 168 hours. **          Specimen (24h ago, onward)      None          No results for input(s): "COLORU", "CLARITYU", "SPECGRAV", "PHUR", "PROTEINUA", "GLUCOSEU", "BILIRUBINCON", "BLOODU", "WBCU", "RBCU", "BACTERIA", "MUCUS", "NITRITE", "LEUKOCYTESUR", "UROBILINOGEN", "HYALINECASTS" in the last 168 hours.    Significant Diagnostics:  I have reviewed all pertinent imaging results/findings within the past 24 hours.    Assessment/Plan:     No notes have been filed under this hospital service.  Service: General Surgery    VTE Risk Mitigation (From admission, onward)           Ordered     enoxaparin injection 40 mg  Every 24 hours         05/01/25 2321     IP VTE LOW RISK PATIENT  Once         05/01/25 2321     Place sequential compression device  Until discontinued         05/01/25 2321                    PHIL Eng  General Surgery  [facility]

## 2025-05-02 NOTE — HPI
Patient is transferred to [facility] from [facility] to service of Dr Natarajan. Daughter is at bedside.  Patient reports 1 week history of abdominal pain.  He states he started vomiting a few days ago and symptoms have progressively worsened.  He denies passing gas.  He marion bowel movements.  Last bowel movement was 3-4 days ago. Patient denies fever. Past medical history includes, HTN, DM, DVT, cirrhosis, portal vein thrombosis, anemia, mitral regurg, anemia, dementia, hyperammonemia, and dysphagia.  Patient has NG tube with 900 cc of dark bilious drainage noted in canister.  Patient vomited just prior to exam.  Abdomen is distended.  Large right lower quadrant hernia noted.  Abdomen is soft.  CT abdomen pelvis disc to be uploaded for review.  Will continue npo.  We will continue NG tube to low intermittent suction.  We will continue to monitor patient's progress.

## 2025-05-02 NOTE — PROGRESS NOTES
[facility]  Wound Care    Patient Name:  Danny Flood   MRN:  [id number]  Date: 5/2/2025  Diagnosis: Small bowel obstruction due to adhesions    History:     Past Medical History:   Diagnosis Date    Diverticulitis     Hyperlipemia     Hypokalemia 11/09/2021       Social History[1]    Precautions:     Allergies as of 05/01/2025    (No Known Allergies)       WO Assessment Details/Treatment     Narrative: Seen patient for initial preventative skin care measures.  Patient ambulates with walker.  No foam borders, redness, or open wounds noted to bilateral heels and sacral.  Consult wound care of any findings.      05/02/2025        [1]   Social History  Socioeconomic History    Marital status:    Tobacco Use    Smoking status: Never    Smokeless tobacco: Never   Substance and Sexual Activity    Alcohol use: Never    Drug use: Never    Sexual activity: Not Currently     Social Drivers of Health     Financial Resource Strain: Low Risk  (5/2/2025)    Overall Financial Resource Strain (CARDIA)     Difficulty of Paying Living Expenses: Not hard at all   Food Insecurity: No Food Insecurity (5/2/2025)    Hunger Vital Sign     Worried About Running Out of Food in the Last Year: Never true     Ran Out of Food in the Last Year: Never true   Transportation Needs: No Transportation Needs (5/2/2025)    PRAPARE - Transportation     Lack of Transportation (Medical): No     Lack of Transportation (Non-Medical): No   Physical Activity: Inactive (5/2/2025)    Exercise Vital Sign     Days of Exercise per Week: 0 days     Minutes of Exercise per Session: 0 min   Stress: No Stress Concern Present (5/2/2025)    [employer] [place] [employer] - Occupational Stress Questionnaire     Feeling of Stress : Not at all   Housing Stability: Low Risk  (5/2/2025)    Housing Stability Vital Sign     Unable to Pay for Housing in the Last Year: No     Number of Times Moved in the Last Year: 0     Homeless in the  Last Year: No

## 2025-05-02 NOTE — PLAN OF CARE
[facility]  Initial Discharge Assessment       Primary Care Provider: Cameron Valencia MD    Admission Diagnosis: Vomiting [R11.10]  Abdominal distension [R14.0]  SBO (small bowel obstruction) [K56.609]    Admission Date: 5/1/2025  Expected Discharge Date: 5/5/2025    Transition of Care Barriers: None    Payor: HU[employer] MANAGED MEDICARE / Plan: HU[employer] MEDICARE PPO / Product Type: Medicare Advantage /     Extended Emergency Contact Information  Primary Emergency Contact: Nikolay Flood  Mobile Phone: [phone]  Relation: Son  Preferred language: English   needed? No  Secondary Emergency Contact: Nubia Flood  Address: [address]  Home Phone: [phone]  Mobile Phone: [phone]  Relation: Spouse  Preferred language: English   needed? No    Discharge Plan A: Home  Discharge Plan B: Home, Home Health, Long-term acute care facility (LTAC), Rehab, Skilled Nursing Facility      [address] [address]  Phone: [phone] Fax: [phone]    [facility] - [facility] [address]  [address] [facility] [address]  Phone: [phone] Fax: [phone]    [facility]  [address] [place] [address]  Phone: [phone] Fax: [phone]      Initial Assessment (most recent)       Adult Discharge Assessment - 05/02/25 1012          Discharge Assessment    Assessment Type Discharge Planning Assessment     Source of Information patient;family     Communicated AMBAR with patient/caregiver Date not available/Unable to determine     People in Home alone     Facility Arrived From: Home     Do you expect to return to your current living situation? Yes     Do you have help at home or someone to help you manage your care at home? Yes     Who are your caregiver(s) and their  phone number(s)? daughter helps as needed     Prior to hospitilization cognitive status: Unable to Assess     Current cognitive status: Alert/Oriented     Walking or Climbing Stairs Difficulty yes     Walking or Climbing Stairs ambulation difficulty, requires equipment     Mobility Management uses cane or RW     Dressing/Bathing Difficulty no     Home Accessibility wheelchair accessible     Home Layout Able to live on 1st floor     Equipment Currently Used at Home cane, straight;rollator     Patient currently being followed by outpatient case management? No     Do you currently have service(s) that help you manage your care at home? No     Do you take prescription medications? Yes     Do you have prescription coverage? Yes     Coverage [employer]     Do you have any problems affording any of your prescribed medications? No     Is the patient taking medications as prescribed? yes     Who is going to help you get home at discharge? daughter - Mary     How do you get to doctors appointments? car, drives self;family or friend will provide     Are you on dialysis? No     Do you take coumadin? No     Discharge Plan A Home     Discharge Plan B Home;Home Health;Long-term acute care facility (LTAC);Rehab;Skilled Nursing Facility     DME Needed Upon Discharge  none     Discharge Plan discussed with: Patient;Adult children     Transition of Care Barriers None        Physical Activity    On average, how many days per week do you engage in moderate to strenuous exercise (like a brisk walk)? 0 days     On average, how many minutes do you engage in exercise at this level? 0 min        Financial Resource Strain    How hard is it for you to pay for the very basics like food, housing, medical care, and heating? Not hard at all        Housing Stability    In the last 12 months, was there a time when you were not able to pay the mortgage or rent on time? No     In the past 12 months, how many times have you moved where you were living? 0      At any time in the past 12 months, were you homeless or living in a shelter (including now)? No        Transportation Needs    In the past 12 months, has lack of transportation kept you from medical appointments or from getting medications? No     In the past 12 months, has lack of transportation kept you from meetings, work, or from getting things needed for daily living? No        Food Insecurity    Within the past 12 months, you worried that your food would run out before you got the money to buy more. Never true     Within the past 12 months, the food you bought just didn't last and you didn't have money to get more. Never true        Stress    Do you feel stress - tense, restless, nervous, or anxious, or unable to sleep at night because your mind is troubled all the time - these days? Not at all        Social Isolation    How often do you feel lonely or isolated from those around you?  Never        Alcohol Use    Q1: How often do you have a drink containing alcohol? Never     Q2: How many drinks containing alcohol do you have on a typical day when you are drinking? Patient does not drink     Q3: How often do you have six or more drinks on one occasion? Never        [employer]    In the past 12 months has the electric, gas, oil, or water company threatened to shut off services in your home? No        Health Literacy    How often do you need to have someone help you when you read instructions, pamphlets, or other written material from your doctor or pharmacy? Never                      CM spoke with pt and his daughter, Mary in room. Pt lives at home alone. Not current with home health. Pt does use a rollator at home. Discharge plan currently is to return home at discharge. No voiced needs. Daughter will transport pt home when medically ready. SDOH updated IM obtained

## 2025-05-03 PROBLEM — I48.0 PAROXYSMAL ATRIAL FIBRILLATION: Status: RESOLVED | Noted: 2023-08-16 | Resolved: 2025-05-03

## 2025-05-03 PROBLEM — I48.20 CHRONIC A-FIB: Status: ACTIVE | Noted: 2025-05-03

## 2025-05-03 PROBLEM — F39 MOOD DISORDER: Status: ACTIVE | Noted: 2025-05-03

## 2025-05-03 PROBLEM — K74.60 CIRRHOSIS: Status: ACTIVE | Noted: 2025-05-03

## 2025-05-03 LAB
ALBUMIN SERPL BCP-MCNC: 3 G/DL (ref 3.4–4.8)
ALBUMIN/GLOB SERPL: 0.9 {RATIO}
ALP SERPL-CCNC: 59 U/L (ref 40–150)
ALT SERPL W P-5'-P-CCNC: 14 U/L
ANION GAP SERPL CALCULATED.3IONS-SCNC: 11 MMOL/L (ref 7–16)
AST SERPL W P-5'-P-CCNC: 46 U/L (ref 11–45)
BASOPHILS # BLD AUTO: 0.04 K/UL (ref 0–0.2)
BASOPHILS NFR BLD AUTO: 0.5 % (ref 0–1)
BASOPHILS NFR BLD MANUAL: 1 % (ref 0–1)
BILIRUB SERPL-MCNC: 1.6 MG/DL
BUN SERPL-MCNC: 35 MG/DL (ref 8–26)
BUN/CREAT SERPL: 35 (ref 6–20)
CALCIUM SERPL-MCNC: 8.4 MG/DL (ref 8.8–10)
CHLORIDE SERPL-SCNC: 107 MMOL/L (ref 98–107)
CO2 SERPL-SCNC: 29 MMOL/L (ref 23–31)
CREAT SERPL-MCNC: 1 MG/DL (ref 0.72–1.25)
DIFFERENTIAL METHOD BLD: ABNORMAL
EGFR (NO RACE VARIABLE) (RUSH/TITUS): 77 ML/MIN/1.73M2
EOSINOPHIL # BLD AUTO: 0.08 K/UL (ref 0–0.5)
EOSINOPHIL NFR BLD AUTO: 0.9 % (ref 1–4)
EOSINOPHIL NFR BLD MANUAL: 1 % (ref 1–4)
ERYTHROCYTE [DISTWIDTH] IN BLOOD BY AUTOMATED COUNT: 12.4 % (ref 11.5–14.5)
GLOBULIN SER-MCNC: 3.5 G/DL (ref 2–4)
GLUCOSE SERPL-MCNC: 135 MG/DL (ref 70–105)
GLUCOSE SERPL-MCNC: 147 MG/DL (ref 82–115)
HCT VFR BLD AUTO: 33.8 % (ref 40–54)
HGB BLD-MCNC: 11.1 G/DL (ref 13.5–18)
IMM GRANULOCYTES # BLD AUTO: 0.03 K/UL (ref 0–0.04)
IMM GRANULOCYTES NFR BLD: 0.3 % (ref 0–0.4)
LYMPHOCYTES # BLD AUTO: 1.22 K/UL (ref 1–4.8)
LYMPHOCYTES NFR BLD AUTO: 14.1 % (ref 27–41)
LYMPHOCYTES NFR BLD MANUAL: 15 % (ref 27–41)
MCH RBC QN AUTO: 32.7 PG (ref 27–31)
MCHC RBC AUTO-ENTMCNC: 32.8 G/DL (ref 32–36)
MCV RBC AUTO: 99.7 FL (ref 80–96)
MONOCYTES # BLD AUTO: 0.64 K/UL (ref 0–0.8)
MONOCYTES NFR BLD AUTO: 7.4 % (ref 2–6)
MONOCYTES NFR BLD MANUAL: 5 % (ref 2–6)
MPC BLD CALC-MCNC: 9.7 FL (ref 9.4–12.4)
NEUTROPHILS # BLD AUTO: 6.66 K/UL (ref 1.8–7.7)
NEUTROPHILS NFR BLD AUTO: 76.8 % (ref 53–65)
NEUTS BAND NFR BLD MANUAL: 13 % (ref 1–5)
NEUTS SEG NFR BLD MANUAL: 65 % (ref 50–62)
NRBC # BLD AUTO: 0 X10E3/UL
NRBC, AUTO (.00): 0 %
PLATELET # BLD AUTO: 177 K/UL (ref 150–400)
PLATELET MORPHOLOGY: NORMAL
POTASSIUM SERPL-SCNC: 3.6 MMOL/L (ref 3.5–5.1)
PROT SERPL-MCNC: 6.5 G/DL (ref 5.8–7.6)
RBC # BLD AUTO: 3.39 M/UL (ref 4.6–6.2)
RBC MORPH BLD: NORMAL
SODIUM SERPL-SCNC: 143 MMOL/L (ref 136–145)
WBC # BLD AUTO: 8.67 K/UL (ref 4.5–11)

## 2025-05-03 PROCEDURE — 80053 COMPREHEN METABOLIC PANEL: CPT

## 2025-05-03 PROCEDURE — 63600175 PHARM REV CODE 636 W HCPCS: Performed by: SURGERY

## 2025-05-03 PROCEDURE — 99233 SBSQ HOSP IP/OBS HIGH 50: CPT | Mod: ,,, | Performed by: STUDENT IN AN ORGANIZED HEALTH CARE EDUCATION/TRAINING PROGRAM

## 2025-05-03 PROCEDURE — 25000003 PHARM REV CODE 250: Performed by: SURGERY

## 2025-05-03 PROCEDURE — 99233 SBSQ HOSP IP/OBS HIGH 50: CPT | Mod: GC,,, | Performed by: STUDENT IN AN ORGANIZED HEALTH CARE EDUCATION/TRAINING PROGRAM

## 2025-05-03 PROCEDURE — 85025 COMPLETE CBC W/AUTO DIFF WBC: CPT

## 2025-05-03 PROCEDURE — 99900035 HC TECH TIME PER 15 MIN (STAT)

## 2025-05-03 PROCEDURE — 11000001 HC ACUTE MED/SURG PRIVATE ROOM

## 2025-05-03 PROCEDURE — 94761 N-INVAS EAR/PLS OXIMETRY MLT: CPT

## 2025-05-03 PROCEDURE — 82962 GLUCOSE BLOOD TEST: CPT

## 2025-05-03 PROCEDURE — 36415 COLL VENOUS BLD VENIPUNCTURE: CPT

## 2025-05-03 RX ORDER — MICONAZOLE NITRATE 2 G/100G
POWDER TOPICAL 2 TIMES DAILY PRN
Status: DISCONTINUED | OUTPATIENT
Start: 2025-05-03 | End: 2025-05-06 | Stop reason: HOSPADM

## 2025-05-03 RX ORDER — QUETIAPINE FUMARATE 25 MG/1
50 TABLET, FILM COATED ORAL 2 TIMES DAILY
Status: DISCONTINUED | OUTPATIENT
Start: 2025-05-03 | End: 2025-05-06 | Stop reason: HOSPADM

## 2025-05-03 RX ADMIN — MUPIROCIN: 20 OINTMENT TOPICAL at 09:05

## 2025-05-03 RX ADMIN — DEXTROSE MONOHYDRATE, SODIUM CHLORIDE, AND POTASSIUM CHLORIDE: 50; 4.5; 1.49 INJECTION, SOLUTION INTRAVENOUS at 01:05

## 2025-05-03 RX ADMIN — MUPIROCIN: 20 OINTMENT TOPICAL at 08:05

## 2025-05-03 RX ADMIN — QUETIAPINE FUMARATE 50 MG: 25 TABLET ORAL at 09:05

## 2025-05-03 RX ADMIN — METOPROLOL TARTRATE 12.5 MG: 25 TABLET, FILM COATED ORAL at 08:05

## 2025-05-03 RX ADMIN — DEXTROSE MONOHYDRATE, SODIUM CHLORIDE, AND POTASSIUM CHLORIDE: 50; 4.5; 1.49 INJECTION, SOLUTION INTRAVENOUS at 09:05

## 2025-05-03 RX ADMIN — DEXTROSE MONOHYDRATE, SODIUM CHLORIDE, AND POTASSIUM CHLORIDE: 50; 4.5; 1.49 INJECTION, SOLUTION INTRAVENOUS at 05:05

## 2025-05-03 RX ADMIN — QUETIAPINE FUMARATE 50 MG: 25 TABLET ORAL at 08:05

## 2025-05-03 RX ADMIN — ENOXAPARIN SODIUM 40 MG: 40 INJECTION SUBCUTANEOUS at 05:05

## 2025-05-03 RX ADMIN — METOPROLOL TARTRATE 12.5 MG: 25 TABLET, FILM COATED ORAL at 09:05

## 2025-05-03 NOTE — SUBJECTIVE & OBJECTIVE
Past Medical History:   Diagnosis Date    Acquired portal-systemic shunt due to cirrhosis     Anticoagulant long-term use     Edema due to hypoalbuminemia     Infected hernioplasty mesh 08/13/2023    Mixed hyperlipidemia     Paroxysmal atrial fibrillation 08/16/2023    Peripheral venous insufficiency     Dr. Moshe Marquez    Portal hypertension        Past Surgical History:   Procedure Laterality Date    COLON SURGERY      partial colon resection    EXCISION, SMALL INTESTINE N/A 8/9/2023    Procedure: EXCISION, SMALL INTESTINE;  Surgeon: Tesfaye Natarajan MD;  Location: [facility];  Service: General;  Laterality: N/A;    HERNIA REPAIR      LAPAROTOMY, EXPLORATORY N/A 8/9/2023    Procedure: LAPAROTOMY, EXPLORATORY;  Surgeon: Tesfaye Natarajan MD;  Location: [facility];  Service: General;  Laterality: N/A;    LEFT HEART CATHETERIZATION Left 8/15/2023    Procedure: Left heart cath;  Surgeon: Edinson Torres DO;  Location: [facility];  Service: Cardiology;  Laterality: Left;    REMOVAL OF IMPLANT N/A 8/9/2023    Procedure: REMOVAL, IMPLANT;  Surgeon: Tesfaye Natarajan MD;  Location: [facility];  Service: General;  Laterality: N/A;  removal mesh       Review of patient's allergies indicates:  No Known Allergies    No current facility-administered medications on file prior to encounter.     Current Outpatient Medications on File Prior to Encounter   Medication Sig    apixaban (ELIQUIS) 5 mg Tab Take 5 mg by mouth 2 (two) times daily.    b complex vitamins capsule Take 1 capsule by mouth once daily.    furosemide (LASIX) 20 MG tablet Take 1 tablet (20 mg total) by mouth daily as needed (for weight gain of more than 5 lbs in 1 week or pitting edema in lower exts.).    Lactobacillus acidophilus (PROBIOTIC) 10 billion cell Cap Take by mouth.    lactulose (CHRONULAC) 10 gram/15 mL solution SMARTSIG:Milliliter(s) By Mouth    metoprolol tartrate (LOPRESSOR) 25 MG tablet Take 0.5 tablets (12.5 mg total) by mouth 2  (two) times daily.    multivitamin (ONE DAILY MULTIVITAMIN) per tablet Take 1 tablet by mouth once daily.    omega-3 fatty acids/fish oil (FISH OIL-OMEGA-3 FATTY ACIDS) 300-1,000 mg capsule Take 1 capsule by mouth once daily.    pantoprazole (PROTONIX) 40 MG tablet Take 1 tablet (40 mg total) by mouth once daily.    QUEtiapine (SEROQUEL) 50 MG tablet 1 tablet (50 mg total) by Per NG tube route 2 (two) times daily.    spironolactone (ALDACTONE) 50 MG tablet Take 1 tablet (50 mg total) by mouth once daily.    timolol maleate 0.5% (TIMOPTIC) 0.5 % Drop 1 drop once daily.    vitamin D (VITAMIN D3) 1000 units Tab Take 1,000 Units by mouth once daily.    vitamin E 400 UNIT capsule Take 400 Units by mouth once daily.    ascorbic acid, vitamin C, (VITAMIN C) 1000 MG tablet Take 1,000 mg by mouth once daily.    rifAXIMin (XIFAXAN) 550 mg Tab Take 1 tablet (550 mg total) by mouth 2 (two) times daily.    [DISCONTINUED] FOLIC ACID-VITAMIN B6-VIT B12 ORAL Take 1 tablet by mouth once daily.    [DISCONTINUED] niacin (SLO-NIACIN) 500 mg tablet Take 500 mg by mouth once daily. With meal     Family History       Problem Relation (Age of Onset)    Diabetes Mother, Father          Tobacco Use    Smoking status: Never    Smokeless tobacco: Never   Substance and Sexual Activity    Alcohol use: Never    Drug use: Never    Sexual activity: Not Currently     Review of Systems   Constitutional:  Negative for activity change, appetite change, chills, diaphoresis, fatigue and fever.   HENT:  Negative for congestion.    Eyes: Negative.    Respiratory:  Negative for cough, choking, shortness of breath, wheezing and stridor.    Cardiovascular:  Negative for chest pain, palpitations and leg swelling.   Gastrointestinal:  Positive for abdominal distention, constipation, nausea and vomiting. Negative for abdominal pain, anal bleeding and blood in stool.   Genitourinary:  Negative for dysuria, flank pain, frequency, hematuria and urgency.    Musculoskeletal:  Negative for back pain.   Skin: Negative.    Neurological:  Negative for numbness and headaches.   Hematological:  Negative for adenopathy.   Psychiatric/Behavioral: Negative.       Objective:     Vital Signs (Most Recent):  Temp: 98 °F (36.7 °C) (05/02/25 1916)  Pulse: 72 (05/02/25 2006)  Resp: 18 (05/02/25 2006)  BP: 127/67 (05/02/25 1916)  SpO2: 95 % (05/02/25 2006) Vital Signs (24h Range):  Temp:  [97.4 °F (36.3 °C)-98.6 °F (37 °C)] 98 °F (36.7 °C)  Pulse:  [] 72  Resp:  [18-19] 18  SpO2:  [95 %-98 %] 95 %  BP: (127-160)/(67-93) 127/67     Weight: 95.7 kg (210 lb 15.7 oz)  Body mass index is 30.27 kg/m².     Physical Exam  Vitals and nursing note reviewed.   Constitutional:       General: He is not in acute distress.     Appearance: Normal appearance. He is not ill-appearing.   HENT:      Head: Normocephalic and atraumatic.      Nose: No congestion or rhinorrhea.   Eyes:      Extraocular Movements: Extraocular movements intact.      Conjunctiva/sclera: Conjunctivae normal.      Pupils: Pupils are equal, round, and reactive to light.   Cardiovascular:      Rate and Rhythm: Normal rate and regular rhythm.      Pulses: Normal pulses.      Heart sounds: Normal heart sounds. No murmur heard.  Pulmonary:      Effort: Pulmonary effort is normal. No respiratory distress.      Breath sounds: Normal breath sounds. No wheezing.   Abdominal:      General: Bowel sounds are normal. There is distension.      Palpations: Abdomen is soft.      Tenderness: There is no abdominal tenderness.   Musculoskeletal:      Cervical back: Normal range of motion and neck supple. No rigidity.      Right lower leg: No edema.      Left lower leg: No edema.   Skin:     General: Skin is warm.      Capillary Refill: Capillary refill takes less than 2 seconds.   Neurological:      General: No focal deficit present.      Mental Status: He is alert and oriented to person, place, and time.          Significant Labs: All  pertinent labs within the past 24 hours have been reviewed.    Significant Imaging: I have reviewed all pertinent imaging results/findings within the past 24 hours.

## 2025-05-03 NOTE — SUBJECTIVE & OBJECTIVE
Interval History: No significant events overnight. On am round, no acute concerns voiced from the patient. Patient is on NG tube in situ. Feels little better today. Vitals and labs are stable.    Review of Systems   Constitutional:  Negative for fever.   HENT:  Negative for congestion.    Eyes: Negative.    Respiratory:  Negative for cough, choking, shortness of breath and wheezing.    Cardiovascular:  Negative for chest pain, palpitations and leg swelling.   Gastrointestinal:  Positive for abdominal distention and nausea (improving). Negative for abdominal pain, anal bleeding and blood in stool.   Genitourinary:  Negative for dysuria, flank pain, frequency, hematuria and urgency.   Neurological:  Negative for headaches.   Psychiatric/Behavioral: Negative.       Objective:     Vital Signs (Most Recent):  Temp: 98.4 °F (36.9 °C) (05/03/25 0720)  Pulse: 78 (05/03/25 0720)  Resp: 18 (05/03/25 0720)  BP: 132/61 (05/03/25 0720)  SpO2: 95 % (05/03/25 0720) Vital Signs (24h Range):  Temp:  [97.7 °F (36.5 °C)-98.6 °F (37 °C)] 98.4 °F (36.9 °C)  Pulse:  [72-80] 78  Resp:  [18] 18  SpO2:  [95 %-98 %] 95 %  BP: (127-158)/(61-75) 132/61     Weight: 95.7 kg (210 lb 15.7 oz)  Body mass index is 30.27 kg/m².    Intake/Output Summary (Last 24 hours) at 5/3/2025 1041  Last data filed at 5/3/2025 0645  Gross per 24 hour   Intake --   Output 250 ml   Net -250 ml         Physical Exam  Vitals reviewed.   Constitutional:       General: He is not in acute distress.     Appearance: He is obese. He is not ill-appearing.   HENT:      Head: Normocephalic and atraumatic.      Nose: No congestion or rhinorrhea.      Comments: NG tube in situ  Eyes:      Extraocular Movements: Extraocular movements intact.      Conjunctiva/sclera: Conjunctivae normal.      Pupils: Pupils are equal, round, and reactive to light.   Cardiovascular:      Rate and Rhythm: Normal rate and regular rhythm.      Pulses: Normal pulses.      Heart sounds: Normal heart  sounds. No murmur heard.  Pulmonary:      Effort: Pulmonary effort is normal. No respiratory distress.      Breath sounds: Normal breath sounds. No wheezing.   Abdominal:      General: Bowel sounds are normal. There is distension.      Palpations: Abdomen is soft.      Tenderness: There is no abdominal tenderness.   Musculoskeletal:      Cervical back: Normal range of motion and neck supple. No rigidity.      Right lower leg: No edema.      Left lower leg: No edema.   Skin:     General: Skin is warm.      Capillary Refill: Capillary refill takes less than 2 seconds.   Neurological:      General: No focal deficit present.      Mental Status: He is alert and oriented to person, place, and time.               Significant Labs: All pertinent labs within the past 24 hours have been reviewed.    Significant Imaging: I have reviewed all pertinent imaging results/findings within the past 24 hours.

## 2025-05-03 NOTE — SUBJECTIVE & OBJECTIVE
Interval History:  Stable, no acute events overnight.  NG tube to low intermittent suction with bilious output.  No gas or stool.  Abdomen is still distended, but improved.  No significant pain    Medications:  Continuous Infusions:   dextrose 5 % and 0.45 % NaCl with KCl 20 mEq   Intravenous Continuous 125 mL/hr at 05/03/25 0504 New Bag at 05/03/25 0504     Scheduled Meds:   enoxparin  40 mg Subcutaneous Q24H (prophylaxis, 1700)    LIDOcaine (PF) 10 mg/ml (1%)  1 mL Other Once    metoprolol tartrate  12.5 mg Oral BID    mupirocin   Nasal BID    QUEtiapine  50 mg Per NG tube BID     PRN Meds:  Current Facility-Administered Medications:     acetaminophen, 650 mg, Oral, Q8H PRN    melatonin, 6 mg, Oral, Nightly PRN    morphine, 2 mg, Intravenous, Q4H PRN    morphine, 4 mg, Intravenous, Q4H PRN    ondansetron, 4 mg, Intravenous, Q6H PRN     Review of patient's allergies indicates:  No Known Allergies  Objective:     Vital Signs (Most Recent):  Temp: 98.4 °F (36.9 °C) (05/03/25 0720)  Pulse: 78 (05/03/25 0720)  Resp: 18 (05/03/25 0720)  BP: 132/61 (05/03/25 0720)  SpO2: 95 % (05/03/25 0720) Vital Signs (24h Range):  Temp:  [97.7 °F (36.5 °C)-98.6 °F (37 °C)] 98.4 °F (36.9 °C)  Pulse:  [72-80] 78  Resp:  [18] 18  SpO2:  [95 %-98 %] 95 %  BP: (127-158)/(61-75) 132/61     Weight: 95.7 kg (210 lb 15.7 oz)  Body mass index is 30.27 kg/m².    Intake/Output - Last 3 Shifts         05/01 0700 05/02 0659 05/02 0700 05/03 0659 05/03 0700 05/04 0659    Urine (mL/kg/hr) 400 0 (0)     Drains 2050 250     Stool  0     Total Output 2450 250     Net -2450 -250            Urine Occurrence  4 x     Stool Occurrence  1 x              Physical Exam  Constitutional:       General: He is not in acute distress.     Appearance: Normal appearance.   HENT:      Head: Normocephalic.   Cardiovascular:      Rate and Rhythm: Normal rate.   Pulmonary:      Effort: Pulmonary effort is normal. No respiratory distress.   Abdominal:      General:  There is distension.      Tenderness: There is no abdominal tenderness.      Comments: NG tube to low intermittent suction with bilious output   Musculoskeletal:         General: Normal range of motion.   Skin:     General: Skin is warm.      Coloration: Skin is not jaundiced.   Neurological:      General: No focal deficit present.      Mental Status: He is alert and oriented to person, place, and time.      Cranial Nerves: No cranial nerve deficit.          Significant Labs:  I have reviewed all pertinent lab results within the past 24 hours.  CBC:   Recent Labs   Lab 05/03/25  0522   WBC 8.67   RBC 3.39*   HGB 11.1*   HCT 33.8*      MCV 99.7*   MCH 32.7*   MCHC 32.8     BMP:   Recent Labs   Lab 05/03/25  0522   *      K 3.6      CO2 29   BUN 35*   CREATININE 1.00   CALCIUM 8.4*       Significant Diagnostics:  I have reviewed all pertinent imaging results/findings within the past 24 hours.

## 2025-05-03 NOTE — ASSESSMENT & PLAN NOTE
Patient has long standing persistent (>12 months) atrial fibrillation. Patient is currently in sinus rhythm. [id number] Score: 2. The patients heart rate in the last 24 hours is as follows:  Pulse  Min: 72  Max: 80     Antiarrhythmics  metoprolol tartrate (LOPRESSOR) split tablet 12.5 mg, 2 times daily, Oral    Anticoagulants  enoxaparin injection 40 mg, Every 24 hours, Subcutaneous  , 2 times daily, Oral  At home-Eliquis 5mg PO BID which is on hold now    Plan  - Replete jerardotes with a goal of K>4, Mg >2  - Patient is anticoagulated, see medications listed above.  - Patient's afib is currently controlled  - Will monitor

## 2025-05-03 NOTE — PROGRESS NOTES
[facility]  [facility] Medicine  Progress Note    Patient Name: Danny Flood  MRN: [id number]  Patient Class: IP- Inpatient   Admission Date: 5/1/2025  Length of Stay: 2 days  Attending Physician: Tesfaye Natarajan MD  Primary Care Provider: Cameron Valencia MD        Subjective     Principal Problem:Small bowel obstruction due to adhesions        HPI:  Pt is a 80 y.o. male transferred to [facility] from [facility] to service of Dr Natarajan for small bowel obstruction. Patient reports 1 week history of abdominal pain. Abdominal pain was generalized and achy in nature. He graded it as 5/10 on pain scale. He states he started vomiting a few days ago and symptoms have progressively worsened. He denies passing gas or bowel movements. Last bowel movement was 3-4 days ago. Patient takes lactulose to keep his BM regular and had his last dose before coming to the ED. He has noticed abdominal distension as well.   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 Med Rec was completed at bedside. Current outpatient medications will be restarted as tolerated and are listed below:        This consult was performed under the direct supervision of Dr. Weiss. Thank you for allowing the [facility] to be involved in the care of this patient.    Overview/Hospital Course:  No notes on file    Interval History: No significant events overnight. On am round, no acute concerns voiced from the patient. Patient is on NG tube in situ. Feels little better today. Vitals and labs are stable.    Review of Systems   Constitutional:  Negative for fever.   HENT:  Negative for congestion.    Eyes: Negative.    Respiratory:  Negative for cough, choking, shortness of breath and  wheezing.    Cardiovascular:  Negative for chest pain, palpitations and leg swelling.   Gastrointestinal:  Positive for abdominal distention and nausea (improving). Negative for abdominal pain, anal bleeding and blood in stool.   Genitourinary:  Negative for dysuria, flank pain, frequency, hematuria and urgency.   Neurological:  Negative for headaches.   Psychiatric/Behavioral: Negative.       Objective:     Vital Signs (Most Recent):  Temp: 98.4 °F (36.9 °C) (05/03/25 0720)  Pulse: 78 (05/03/25 0720)  Resp: 18 (05/03/25 0720)  BP: 132/61 (05/03/25 0720)  SpO2: 95 % (05/03/25 0720) Vital Signs (24h Range):  Temp:  [97.7 °F (36.5 °C)-98.6 °F (37 °C)] 98.4 °F (36.9 °C)  Pulse:  [72-80] 78  Resp:  [18] 18  SpO2:  [95 %-98 %] 95 %  BP: (127-158)/(61-75) 132/61     Weight: 95.7 kg (210 lb 15.7 oz)  Body mass index is 30.27 kg/m².    Intake/Output Summary (Last 24 hours) at 5/3/2025 1041  Last data filed at 5/3/2025 0645  Gross per 24 hour   Intake --   Output 250 ml   Net -250 ml         Physical Exam  Vitals reviewed.   Constitutional:       General: He is not in acute distress.     Appearance: He is obese. He is not ill-appearing.   HENT:      Head: Normocephalic and atraumatic.      Nose: No congestion or rhinorrhea.      Comments: NG tube in situ  Eyes:      Extraocular Movements: Extraocular movements intact.      Conjunctiva/sclera: Conjunctivae normal.      Pupils: Pupils are equal, round, and reactive to light.   Cardiovascular:      Rate and Rhythm: Normal rate and regular rhythm.      Pulses: Normal pulses.      Heart sounds: Normal heart sounds. No murmur heard.  Pulmonary:      Effort: Pulmonary effort is normal. No respiratory distress.      Breath sounds: Normal breath sounds. No wheezing.   Abdominal:      General: Bowel sounds are normal. There is distension.      Palpations: Abdomen is soft.      Tenderness: There is no abdominal tenderness.   Musculoskeletal:      Cervical back: Normal range of motion  and neck supple. No rigidity.      Right lower leg: No edema.      Left lower leg: No edema.   Skin:     General: Skin is warm.      Capillary Refill: Capillary refill takes less than 2 seconds.   Neurological:      General: No focal deficit present.      Mental Status: He is alert and oriented to person, place, and time.               Significant Labs: All pertinent labs within the past 24 hours have been reviewed.    Significant Imaging: I have reviewed all pertinent imaging results/findings within the past 24 hours.      Assessment & Plan  Small bowel obstruction due to adhesions  Patient was admitted by the General surgery for a suspected SBO.  NG tube in situ by primary team for decompression, patient is place NPO  Pain managed by PRN medications      Portal hypertension  Past medical history of portal hypertension secondary to cirrhosis of liver from unknown cause.   -Patient is established with Gastroenterology outpatient.  -Patient is at his baseline as far as mentation is concerned  -Monitor daily CMP      Chronic a-fib  Patient has long standing persistent (>12 months) atrial fibrillation. Patient is currently in sinus rhythm. [id number] Score: 2. The patients heart rate in the last 24 hours is as follows:  Pulse  Min: 72  Max: 80     Antiarrhythmics  metoprolol tartrate (LOPRESSOR) split tablet 12.5 mg, 2 times daily, Oral    Anticoagulants  enoxaparin injection 40 mg, Every 24 hours, Subcutaneous  , 2 times daily, Oral  At home-Eliquis 5mg PO BID which is on hold now    Plan  - Replete lytes with a goal of K>4, Mg >2  - Patient is anticoagulated, see medications listed above.  - Patient's afib is currently controlled  - Will monitor      Cirrhosis  Patient with known Cirrhosis of unknown etiology.  MELD-Na score calculated; MELD 3.0: 13 at 5/3/2025  5:22 AM  MELD-Na: 13 at 5/3/2025  5:22 AM  Calculated from:  Serum Creatinine: 1 mg/dL at 5/3/2025  5:22 AM  Serum Sodium: 143 mmol/L (Using max of 137  mmol/L) at 5/3/2025  5:22 AM  Total Bilirubin: 1.6 mg/dL at 5/3/2025  5:22 AM  Serum Albumin: 3 g/dL at 5/3/2025  5:22 AM  INR(ratio): 1.52 at 5/2/2025  9:11 PM  Age at listing (hypothetical): 80 years  Sex: Male at 5/3/2025  5:22 AM      Continue chronic meds.. Will avoid any hepatotoxic meds, and monitor CBC/CMP/INR for synthetic function.     Mood disorder  Will continue with Quetiapine     VTE Risk Mitigation (From admission, onward)           Ordered     enoxaparin injection 40 mg  Every 24 hours         05/01/25 2321     IP VTE LOW RISK PATIENT  Once         05/01/25 2321     Place sequential compression device  Until discontinued         05/01/25 2321                    Discharge Planning   AMBAR: 5/5/2025     Code Status: Full Code   Medical Readiness for Discharge Date:   Discharge Plan A: Home                        Trista Lowery MD  Department of Hospital Medicine   [facility]

## 2025-05-03 NOTE — ASSESSMENT & PLAN NOTE
Patient has paroxysmal (<7 days) atrial fibrillation. Patient is currently in EKG pending. [id number] Score: 2. The patients heart rate in the last 24 hours is as follows:  Pulse  Min: 70  Max: 100     Antiarrhythmics  metoprolol tartrate (LOPRESSOR) split tablet 12.5 mg, 2 times daily, Oral    Anticoagulants  enoxaparin injection 40 mg, Every 24 hours, Subcutaneous  , 2 times daily, Oral    Plan  - Replete lytes with a goal of K>4, Mg >2  - Patient is anticoagulated, see medications listed above.  - Patient's afib is currently controlled  - will monitor

## 2025-05-03 NOTE — NURSING
Contacted Dr. Weiss regarding Hospitalist Consult, Also informed of pt's and family's concerns as previously stated in previous not, Dr. Weiss is aware.

## 2025-05-03 NOTE — HPI
Pt is a 80 y.o. male transferred to [facility] from [facility] to service of Dr Natarajan for small bowel obstruction. Patient reports 1 week history of abdominal pain. Abdominal pain was generalized and achy in nature. He graded it as 5/10 on pain scale. He states he started vomiting a few days ago and symptoms have progressively worsened. He denies passing gas or bowel movements. Last bowel movement was 3-4 days ago. Patient takes lactulose to keep his BM regular and had his last dose before coming to the ED. He has noticed abdominal distension as well.   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 Med Rec was completed at bedside. Current outpatient medications will be restarted as tolerated and are listed below:        This consult was performed under the direct supervision of Dr. Weiss. Thank you for allowing the [facility] to be involved in the care of this patient.

## 2025-05-03 NOTE — NURSING
Patient's daughter stated that she is concerned that pt's Ammonia Level might be elevated. Stating that pt has a history of elevated ammonia levels due to increased confusion. Pt is also noted to have redness and slight swelling to scrotum and inner thighs. upon assessment , Foul odor, what appears to be Yeast was noted to inner thigh folds. Daughter states that pt has had yeast infections in the past. Pt noted to have slight bleeding after daughter gave him a bath and scant amount of blood noted after aniceto area being wipped with wash clothe. No active bleeding noted after being cleaned, ointment applied to area. Pt denies pain to that area.   Dr. Natarajan Notified, Instructed to contact hospitalist. New Order for Hospitalist Consult for Management of Medical Care.

## 2025-05-03 NOTE — ASSESSMENT & PLAN NOTE
Patient has persistent atrial fibrillation. Patient is currently in EKG NSR. [id number] Score: 2. The patients heart rate in the last 24 hours is as follows:  Pulse  Min: 71  Max: 87     Antiarrhythmics  metoprolol tartrate (LOPRESSOR) split tablet 12.5 mg, 2 times daily, Oral    Anticoagulants  enoxaparin injection 40 mg, Every 24 hours, Subcutaneous  , 2 times daily, Oral  At home-Eliquis 5mg PO BID which is on hold now    Plan  - Replete alexi with a goal of K>4, Mg >2  - Patient is anticoagulated, see medications listed above.  - Patient's afib is currently controlled  - will monitor

## 2025-05-03 NOTE — CONSULTS
[facility]  [facility] [place] [facility] Medicine  Consult Note    Patient Name: Danny Flood  MRN: [id number]  Admission Date: 5/1/2025  Hospital Length of Stay: 1 days  Attending Physician: Tesfaye Natarajan MD   Primary Care Provider: Cameron Valencia MD           Patient information was obtained from patient, relative(s), past medical records, ER records, and primary team.     Inpatient consult to Hospital Medicine  Consult performed by: Von Cotton DO  Consult ordered by: Tesfaye Natarajan MD        Subjective:     Principal Problem: Small bowel obstruction due to adhesions    Chief Complaint: No chief complaint on file.       HPI: Pt is a 80 y.o. male transferred to [facility] from [facility] to service of Dr Natarajan for small bowel obstruction. Patient reports 1 week history of abdominal pain. Abdominal pain was generalized and achy in nature. He graded it as 5/10 on pain scale. He states he started vomiting a few days ago and symptoms have progressively worsened. He denies passing gas or bowel movements. Last bowel movement was 3-4 days ago. Patient takes lactulose to keep his BM regular and had his last dose before coming to the ED. He has noticed abdominal distension as well.   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 Med Rec was completed at bedside. Current outpatient medications will be restarted as tolerated and are listed below:        This consult was performed under the direct supervision of Dr. Cotton. Thank you for allowing the [facility] to be involved in the care of this patient.    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 Moshe Marquez    Portal hypertension        Past Surgical History:   Procedure Laterality Date    COLON SURGERY      partial colon resection    EXCISION, SMALL INTESTINE N/A 8/9/2023    Procedure: EXCISION, SMALL INTESTINE;  Surgeon: Tesfaye Natarajan MD;  Location: [facility] OR;  Service: General;  Laterality: N/A;    HERNIA REPAIR      LAPAROTOMY, EXPLORATORY N/A 8/9/2023    Procedure: LAPAROTOMY, EXPLORATORY;  Surgeon: Tesfaye Natarajan MD;  Location: [facility] OR;  Service: General;  Laterality: N/A;    LEFT HEART CATHETERIZATION Left 8/15/2023    Procedure: Left heart cath;  Surgeon: Edinson Torres DO;  Location: [facility] CATH LAB;  Service: Cardiology;  Laterality: Left;    REMOVAL OF IMPLANT N/A 8/9/2023    Procedure: REMOVAL, IMPLANT;  Surgeon: Tesfaye Natarajan MD;  Location: [facility] OR;  Service: General;  Laterality: N/A;  removal mesh       Review of patient's allergies indicates:  No Known Allergies    No current facility-administered medications on file prior to encounter.     Current Outpatient Medications on File Prior to Encounter   Medication Sig    apixaban (ELIQUIS) 5 mg Tab Take 5 mg by mouth 2 (two) times daily.    b complex vitamins capsule Take 1 capsule by mouth once daily.    furosemide (LASIX) 20 MG tablet Take 1 tablet (20 mg total) by mouth daily as needed (for weight gain of more than 5 lbs in 1 week or pitting edema in lower exts.).    Lactobacillus acidophilus (PROBIOTIC) 10 billion cell Cap Take by mouth.    lactulose (CHRONULAC) 10 gram/15 mL solution SMARTSIG:Milliliter(s) By Mouth    metoprolol tartrate (LOPRESSOR) 25 MG tablet Take 0.5 tablets (12.5 mg total) by mouth 2 (two) times daily.    multivitamin (ONE DAILY MULTIVITAMIN) per tablet Take 1 tablet by mouth once daily.    omega-3 fatty acids/fish oil  (FISH OIL-OMEGA-3 FATTY ACIDS) 300-1,000 mg capsule Take 1 capsule by mouth once daily.    pantoprazole (PROTONIX) 40 MG tablet Take 1 tablet (40 mg total) by mouth once daily.    QUEtiapine (SEROQUEL) 50 MG tablet 1 tablet (50 mg total) by Per NG tube route 2 (two) times daily.    spironolactone (ALDACTONE) 50 MG tablet Take 1 tablet (50 mg total) by mouth once daily.    timolol maleate 0.5% (TIMOPTIC) 0.5 % Drop 1 drop once daily.    vitamin D (VITAMIN D3) 1000 units Tab Take 1,000 Units by mouth once daily.    vitamin E 400 UNIT capsule Take 400 Units by mouth once daily.    ascorbic acid, vitamin C, (VITAMIN C) 1000 MG tablet Take 1,000 mg by mouth once daily.    rifAXIMin (XIFAXAN) 550 mg Tab Take 1 tablet (550 mg total) by mouth 2 (two) times daily.    [DISCONTINUED] FOLIC ACID-VITAMIN B6-VIT B12 ORAL Take 1 tablet by mouth once daily.    [DISCONTINUED] niacin (SLO-NIACIN) 500 mg tablet Take 500 mg by mouth once daily. With meal     Family History       Problem Relation (Age of Onset)    Diabetes Mother, Father          Tobacco Use    Smoking status: Never    Smokeless tobacco: Never   Substance and Sexual Activity    Alcohol use: Never    Drug use: Never    Sexual activity: Not Currently     Review of Systems   Constitutional:  Negative for activity change, appetite change, chills, diaphoresis, fatigue and fever.   HENT:  Negative for congestion.    Eyes: Negative.    Respiratory:  Negative for cough, choking, shortness of breath, wheezing and stridor.    Cardiovascular:  Negative for chest pain, palpitations and leg swelling.   Gastrointestinal:  Positive for abdominal distention, constipation, nausea and vomiting. Negative for abdominal pain, anal bleeding and blood in stool.   Genitourinary:  Negative for dysuria, flank pain, frequency, hematuria and urgency.   Musculoskeletal:  Negative for back pain.   Skin: Negative.    Neurological:  Negative for numbness and headaches.   Hematological:  Negative for  adenopathy.   Psychiatric/Behavioral: Negative.       Objective:     Vital Signs (Most Recent):  Temp: 98 °F (36.7 °C) (05/02/25 1916)  Pulse: 72 (05/02/25 2006)  Resp: 18 (05/02/25 2006)  BP: 127/67 (05/02/25 1916)  SpO2: 95 % (05/02/25 2006) Vital Signs (24h Range):  Temp:  [97.4 °F (36.3 °C)-98.6 °F (37 °C)] 98 °F (36.7 °C)  Pulse:  [] 72  Resp:  [18-19] 18  SpO2:  [95 %-98 %] 95 %  BP: (127-160)/(67-93) 127/67     Weight: 95.7 kg (210 lb 15.7 oz)  Body mass index is 30.27 kg/m².     Physical Exam  Vitals and nursing note reviewed.   Constitutional:       General: He is not in acute distress.     Appearance: Normal appearance. He is not ill-appearing.   HENT:      Head: Normocephalic and atraumatic.      Nose: No congestion or rhinorrhea.   Eyes:      Extraocular Movements: Extraocular movements intact.      Conjunctiva/sclera: Conjunctivae normal.      Pupils: Pupils are equal, round, and reactive to light.   Cardiovascular:      Rate and Rhythm: Normal rate and regular rhythm.      Pulses: Normal pulses.      Heart sounds: Normal heart sounds. No murmur heard.  Pulmonary:      Effort: Pulmonary effort is normal. No respiratory distress.      Breath sounds: Normal breath sounds. No wheezing.   Abdominal:      General: Bowel sounds are normal. There is distension.      Palpations: Abdomen is soft.      Tenderness: There is no abdominal tenderness.   Musculoskeletal:      Cervical back: Normal range of motion and neck supple. No rigidity.      Right lower leg: No edema.      Left lower leg: No edema.   Skin:     General: Skin is warm.      Capillary Refill: Capillary refill takes less than 2 seconds.   Neurological:      General: No focal deficit present.      Mental Status: He is alert and oriented to person, place, and time.          Significant Labs: All pertinent labs within the past 24 hours have been reviewed.    Significant Imaging: I have reviewed all pertinent imaging results/findings within the  past 24 hours.  Assessment/Plan:     * Small bowel obstruction due to adhesions  Patient was admitted by the General surgery for a suspected SBO.  NG tube in situ by primary team for decompression, patient is place NPO  CT abdomen pelvis pending  Pain managed by PRN medications        Portal hypertension  Past medical history of portal hypertension secondary to cirrhosis of liver from unknown cause. Patient is established with Gastroenterology outpatient.  Patient is at his baseline as far as mentation is concerned  Monitor daily CMP  CT abdomen pelvis pending     Paroxysmal atrial fibrillation  Patient has paroxysmal (<7 days) atrial fibrillation. Patient is currently in EKG pending. [id number] Score: 2. The patients heart rate in the last 24 hours is as follows:  Pulse  Min: 70  Max: 100     Antiarrhythmics  metoprolol tartrate (LOPRESSOR) split tablet 12.5 mg, 2 times daily, Oral    Anticoagulants  enoxaparin injection 40 mg, Every 24 hours, Subcutaneous  , 2 times daily, Oral    Plan  - Replete lytes with a goal of K>4, Mg >2  - Patient is anticoagulated, see medications listed above.  - Patient's afib is currently controlled  - will monitor          VTE Risk Mitigation (From admission, onward)           Ordered     enoxaparin injection 40 mg  Every 24 hours         05/01/25 2321     IP VTE LOW RISK PATIENT  Once         05/01/25 2321     Place sequential compression device  Until discontinued         05/01/25 2321                        Thank you for your consult. I will follow-up with patient. Please contact us if you have any additional questions.    Kimberlee Lacey MD  Department of Hospital Medicine   [facility]

## 2025-05-03 NOTE — ASSESSMENT & PLAN NOTE
Past medical history of portal hypertension secondary to cirrhosis of liver from unknown cause. Patient is established with Gastroenterology outpatient.  Patient is at his baseline as far as mentation is concerned  Monitor daily CMP  CT abdomen pelvis pending

## 2025-05-03 NOTE — ASSESSMENT & PLAN NOTE
Patient was admitted by the General surgery for a suspected SBO.  NG tube in situ by primary team for decompression, patient is place NPO  Pain managed by PRN medications

## 2025-05-03 NOTE — ASSESSMENT & PLAN NOTE
Past medical history of portal hypertension secondary to cirrhosis of liver from unknown cause.   -Patient is established with Gastroenterology outpatient.  -Patient is at his baseline as far as mentation is concerned  -Monitor daily CMP

## 2025-05-03 NOTE — ASSESSMENT & PLAN NOTE
Patient was admitted by the General surgery for a suspected SBO.  NG tube in situ by primary team for decompression, patient is place NPO  CT abdomen pelvis pending  Pain managed by PRN medications

## 2025-05-03 NOTE — ASSESSMENT & PLAN NOTE
Patient with known Cirrhosis of unknown etiology.  MELD-Na score calculated; MELD 3.0: 13 at 5/3/2025  5:22 AM  MELD-Na: 13 at 5/3/2025  5:22 AM  Calculated from:  Serum Creatinine: 1 mg/dL at 5/3/2025  5:22 AM  Serum Sodium: 143 mmol/L (Using max of 137 mmol/L) at 5/3/2025  5:22 AM  Total Bilirubin: 1.6 mg/dL at 5/3/2025  5:22 AM  Serum Albumin: 3 g/dL at 5/3/2025  5:22 AM  INR(ratio): 1.52 at 5/2/2025  9:11 PM  Age at listing (hypothetical): 80 years  Sex: Male at 5/3/2025  5:22 AM      Continue chronic meds.. Will avoid any hepatotoxic meds, and monitor CBC/CMP/INR for synthetic function.

## 2025-05-03 NOTE — PROGRESS NOTES
[facility]  General Surgery  Progress Note    Subjective:     History of Present Illness:  Patient is transferred to [facility] from [facility] to service of Dr Natarajan. Daughter is at bedside.  Patient reports 1 week history of abdominal pain.  He states he started vomiting a few days ago and symptoms have progressively worsened.  He denies passing gas.  He marion bowel movements.  Last bowel movement was 3-4 days ago. Patient denies fever. Past medical history includes, HTN, DM, DVT, cirrhosis, portal vein thrombosis, anemia, mitral regurg, anemia, dementia, hyperammonemia, and dysphagia.  Patient has NG tube with 900 cc of dark bilious drainage noted in canister.  Patient vomited just prior to exam.  Abdomen is distended.  Large right lower quadrant hernia noted.  Abdomen is soft.  CT abdomen pelvis disc to be uploaded for review.  Will continue npo.  We will continue NG tube to low intermittent suction.  We will continue to monitor patient's progress.    Post-Op Info:  * No surgery found *         Interval History:  Stable, no acute events overnight.  NG tube to low intermittent suction with bilious output.  No gas or stool.  Abdomen is still distended, but improved.  No significant pain    Medications:  Continuous Infusions:   dextrose 5 % and 0.45 % NaCl with KCl 20 mEq   Intravenous Continuous 125 mL/hr at 05/03/25 0504 New Bag at 05/03/25 0504     Scheduled Meds:   enoxparin  40 mg Subcutaneous Q24H (prophylaxis, 1700)    LIDOcaine (PF) 10 mg/ml (1%)  1 mL Other Once    metoprolol tartrate  12.5 mg Oral BID    mupirocin   Nasal BID    QUEtiapine  50 mg Per NG tube BID     PRN Meds:  Current Facility-Administered Medications:     acetaminophen, 650 mg, Oral, Q8H PRN    melatonin, 6 mg, Oral, Nightly PRN    morphine, 2 mg, Intravenous, Q4H PRN    morphine, 4 mg, Intravenous, Q4H PRN    ondansetron, 4 mg, Intravenous, Q6H PRN     Review of patient's allergies indicates:  No Known  Allergies  Objective:     Vital Signs (Most Recent):  Temp: 98.4 °F (36.9 °C) (05/03/25 0720)  Pulse: 78 (05/03/25 0720)  Resp: 18 (05/03/25 0720)  BP: 132/61 (05/03/25 0720)  SpO2: 95 % (05/03/25 0720) Vital Signs (24h Range):  Temp:  [97.7 °F (36.5 °C)-98.6 °F (37 °C)] 98.4 °F (36.9 °C)  Pulse:  [72-80] 78  Resp:  [18] 18  SpO2:  [95 %-98 %] 95 %  BP: (127-158)/(61-75) 132/61     Weight: 95.7 kg (210 lb 15.7 oz)  Body mass index is 30.27 kg/m².    Intake/Output - Last 3 Shifts         05/01 0700 05/02 0659 05/02 0700 05/03 0659 05/03 0700 05/04 0659    Urine (mL/kg/hr) 400 0 (0)     Drains 2050 250     Stool  0     Total Output 2450 250     Net -2450 -250            Urine Occurrence  4 x     Stool Occurrence  1 x              Physical Exam  Constitutional:       General: He is not in acute distress.     Appearance: Normal appearance.   HENT:      Head: Normocephalic.   Cardiovascular:      Rate and Rhythm: Normal rate.   Pulmonary:      Effort: Pulmonary effort is normal. No respiratory distress.   Abdominal:      General: There is distension.      Tenderness: There is no abdominal tenderness.      Comments: NG tube to low intermittent suction with bilious output   Musculoskeletal:         General: Normal range of motion.   Skin:     General: Skin is warm.      Coloration: Skin is not jaundiced.   Neurological:      General: No focal deficit present.      Mental Status: He is alert and oriented to person, place, and time.      Cranial Nerves: No cranial nerve deficit.          Significant Labs:  I have reviewed all pertinent lab results within the past 24 hours.  CBC:   Recent Labs   Lab 05/03/25 0522   WBC 8.67   RBC 3.39*   HGB 11.1*   HCT 33.8*      MCV 99.7*   MCH 32.7*   MCHC 32.8     BMP:   Recent Labs   Lab 05/03/25 0522   *      K 3.6      CO2 29   BUN 35*   CREATININE 1.00   CALCIUM 8.4*       Significant Diagnostics:  I have reviewed all pertinent imaging results/findings  within the past 24 hours.  Assessment/Plan:     * Small bowel obstruction due to adhesions  05/02/25  continue npo, NG to LIS,  possible Gastrograffin challenge when can be tolerated.         Moshe Rodriguez, DO  General Surgery  [facility]

## 2025-05-03 NOTE — PLAN OF CARE
Problem: Adult Inpatient Plan of Care  Goal: Plan of Care Review  Outcome: Ongoing  Goal: Patient-Specific Goal (Individualized)  Outcome: Ongoing  Goal: Absence of Hospital-Acquired Illness or Injury  Outcome: Ongoing  Goal: Optimal Comfort and Wellbeing  Outcome: Ongoing  Goal: Readiness for Transition of Care  Outcome: Ongoing     Problem: Diabetes Comorbidity  Goal: Blood Glucose Level Within Targeted Range  Outcome: Ongoing     Problem: Wound  Goal: Optimal Coping  Outcome: Ongoing  Goal: Optimal Functional Ability  Outcome: Ongoing  Goal: Improved Oral Intake  Outcome: Ongoing  Goal: Optimal Pain Control and Function  Outcome: Ongoing  Goal: Skin Health and Integrity  Outcome: Ongoing

## 2025-05-04 LAB
ALBUMIN SERPL BCP-MCNC: 2.7 G/DL (ref 3.4–4.8)
ALBUMIN/GLOB SERPL: 0.8 {RATIO}
ALP SERPL-CCNC: 55 U/L (ref 40–150)
ALT SERPL W P-5'-P-CCNC: 15 U/L
ANION GAP SERPL CALCULATED.3IONS-SCNC: 11 MMOL/L (ref 7–16)
AST SERPL W P-5'-P-CCNC: 37 U/L (ref 11–45)
BASOPHILS # BLD AUTO: 0.05 K/UL (ref 0–0.2)
BASOPHILS NFR BLD AUTO: 0.6 % (ref 0–1)
BILIRUB SERPL-MCNC: 1.7 MG/DL
BUN SERPL-MCNC: 18 MG/DL (ref 8–26)
BUN/CREAT SERPL: 24 (ref 6–20)
CALCIUM SERPL-MCNC: 8.1 MG/DL (ref 8.8–10)
CHLORIDE SERPL-SCNC: 107 MMOL/L (ref 98–107)
CO2 SERPL-SCNC: 25 MMOL/L (ref 23–31)
CREAT SERPL-MCNC: 0.76 MG/DL (ref 0.72–1.25)
DIFFERENTIAL METHOD BLD: ABNORMAL
EGFR (NO RACE VARIABLE) (RUSH/TITUS): 91 ML/MIN/1.73M2
EOSINOPHIL # BLD AUTO: 0.17 K/UL (ref 0–0.5)
EOSINOPHIL NFR BLD AUTO: 2.1 % (ref 1–4)
EOSINOPHIL NFR BLD MANUAL: 1 % (ref 1–4)
ERYTHROCYTE [DISTWIDTH] IN BLOOD BY AUTOMATED COUNT: 11.8 % (ref 11.5–14.5)
GLOBULIN SER-MCNC: 3.6 G/DL (ref 2–4)
GLUCOSE SERPL-MCNC: 146 MG/DL (ref 70–105)
GLUCOSE SERPL-MCNC: 162 MG/DL (ref 82–115)
HCT VFR BLD AUTO: 32.3 % (ref 40–54)
HGB BLD-MCNC: 10.5 G/DL (ref 13.5–18)
IMM GRANULOCYTES # BLD AUTO: 0.07 K/UL (ref 0–0.04)
IMM GRANULOCYTES NFR BLD: 0.9 % (ref 0–0.4)
LYMPHOCYTES # BLD AUTO: 1.21 K/UL (ref 1–4.8)
LYMPHOCYTES NFR BLD AUTO: 15.2 % (ref 27–41)
LYMPHOCYTES NFR BLD MANUAL: 16 % (ref 27–41)
MCH RBC QN AUTO: 32.8 PG (ref 27–31)
MCHC RBC AUTO-ENTMCNC: 32.5 G/DL (ref 32–36)
MCV RBC AUTO: 100.9 FL (ref 80–96)
MONOCYTES # BLD AUTO: 0.56 K/UL (ref 0–0.8)
MONOCYTES NFR BLD AUTO: 7 % (ref 2–6)
MONOCYTES NFR BLD MANUAL: 4 % (ref 2–6)
MPC BLD CALC-MCNC: 9.8 FL (ref 9.4–12.4)
NEUTROPHILS # BLD AUTO: 5.89 K/UL (ref 1.8–7.7)
NEUTROPHILS NFR BLD AUTO: 74.2 % (ref 53–65)
NEUTS BAND NFR BLD MANUAL: 7 % (ref 1–5)
NEUTS SEG NFR BLD MANUAL: 72 % (ref 50–62)
NRBC # BLD AUTO: 0 X10E3/UL
NRBC, AUTO (.00): 0 %
OHS QRS DURATION: 80 MS
OHS QTC CALCULATION: 462 MS
PLATELET # BLD AUTO: 171 K/UL (ref 150–400)
PLATELET MORPHOLOGY: NORMAL
POTASSIUM SERPL-SCNC: 3.7 MMOL/L (ref 3.5–5.1)
PROT SERPL-MCNC: 6.3 G/DL (ref 5.8–7.6)
RBC # BLD AUTO: 3.2 M/UL (ref 4.6–6.2)
RBC MORPH BLD: NORMAL
SODIUM SERPL-SCNC: 139 MMOL/L (ref 136–145)
WBC # BLD AUTO: 7.95 K/UL (ref 4.5–11)

## 2025-05-04 PROCEDURE — 25500020 PHARM REV CODE 255: Performed by: SURGERY

## 2025-05-04 PROCEDURE — 80053 COMPREHEN METABOLIC PANEL: CPT

## 2025-05-04 PROCEDURE — 82962 GLUCOSE BLOOD TEST: CPT

## 2025-05-04 PROCEDURE — 11000001 HC ACUTE MED/SURG PRIVATE ROOM

## 2025-05-04 PROCEDURE — 99233 SBSQ HOSP IP/OBS HIGH 50: CPT | Mod: ,,, | Performed by: STUDENT IN AN ORGANIZED HEALTH CARE EDUCATION/TRAINING PROGRAM

## 2025-05-04 PROCEDURE — 25000003 PHARM REV CODE 250: Performed by: SURGERY

## 2025-05-04 PROCEDURE — 63600175 PHARM REV CODE 636 W HCPCS: Performed by: SURGERY

## 2025-05-04 PROCEDURE — 36415 COLL VENOUS BLD VENIPUNCTURE: CPT

## 2025-05-04 PROCEDURE — 85025 COMPLETE CBC W/AUTO DIFF WBC: CPT

## 2025-05-04 RX ORDER — DIATRIZOATE MEGLUMINE AND DIATRIZOATE SODIUM 660; 100 MG/ML; MG/ML
180 SOLUTION ORAL; RECTAL
Status: COMPLETED | OUTPATIENT
Start: 2025-05-04 | End: 2025-05-04

## 2025-05-04 RX ADMIN — MUPIROCIN: 20 OINTMENT TOPICAL at 08:05

## 2025-05-04 RX ADMIN — METOPROLOL TARTRATE 12.5 MG: 25 TABLET, FILM COATED ORAL at 08:05

## 2025-05-04 RX ADMIN — DIATRIZOATE MEGLUMINE AND DIATRIZOATE SODIUM 180 ML: 600; 100 SOLUTION ORAL; RECTAL at 09:05

## 2025-05-04 RX ADMIN — QUETIAPINE FUMARATE 50 MG: 25 TABLET ORAL at 08:05

## 2025-05-04 RX ADMIN — DEXTROSE MONOHYDRATE, SODIUM CHLORIDE, AND POTASSIUM CHLORIDE: 50; 4.5; 1.49 INJECTION, SOLUTION INTRAVENOUS at 05:05

## 2025-05-04 RX ADMIN — ENOXAPARIN SODIUM 40 MG: 40 INJECTION SUBCUTANEOUS at 04:05

## 2025-05-04 RX ADMIN — DEXTROSE MONOHYDRATE, SODIUM CHLORIDE, AND POTASSIUM CHLORIDE: 50; 4.5; 1.49 INJECTION, SOLUTION INTRAVENOUS at 02:05

## 2025-05-04 RX ADMIN — DEXTROSE MONOHYDRATE, SODIUM CHLORIDE, AND POTASSIUM CHLORIDE: 50; 4.5; 1.49 INJECTION, SOLUTION INTRAVENOUS at 10:05

## 2025-05-04 NOTE — NURSING
Gastograffin study completed. New Canister placed at 1700. Per Dr. Natarajan Please monitor output overnight, may have NG tube removed 5/5/25

## 2025-05-04 NOTE — ASSESSMENT & PLAN NOTE
Patient was admitted by the General surgery for a suspected SBO.  NG tube in situ by primary team for decompression, patient is place NPO  Pain managed by PRN medications    5/4  No abdominal pain, n/v   NGT to suction for bowel decompression   Gastrografin challenge today per surgery   Con't prn pain meds

## 2025-05-04 NOTE — ASSESSMENT & PLAN NOTE
Patient with known Cirrhosis of unknown etiology.  MELD-Na score calculated; MELD 3.0: 14 at 5/4/2025  5:20 AM  MELD-Na: 13 at 5/4/2025  5:20 AM  Calculated from:  Serum Creatinine: 0.76 mg/dL (Using min of 1 mg/dL) at 5/4/2025  5:20 AM  Serum Sodium: 139 mmol/L (Using max of 137 mmol/L) at 5/4/2025  5:20 AM  Total Bilirubin: 1.7 mg/dL at 5/4/2025  5:20 AM  Serum Albumin: 2.7 g/dL at 5/4/2025  5:20 AM  INR(ratio): 1.52 at 5/2/2025  9:11 PM  Age at listing (hypothetical): 80 years  Sex: Male at 5/4/2025  5:20 AM      Continue chronic meds.. Will avoid any hepatotoxic meds, and monitor CBC/CMP/INR for synthetic function.

## 2025-05-04 NOTE — SUBJECTIVE & OBJECTIVE
Interval History: Overnight no acute events. Patient is on NG tube in situ. Feels little better today. Vitals and labs are stable.    Review of Systems   Constitutional:  Negative for fever.   HENT:  Negative for congestion.    Eyes: Negative.    Respiratory:  Negative for cough, choking, shortness of breath and wheezing.    Cardiovascular:  Negative for chest pain, palpitations and leg swelling.   Gastrointestinal:  Positive for abdominal distention and nausea (improving). Negative for abdominal pain, anal bleeding and blood in stool.   Genitourinary:  Negative for dysuria, flank pain, frequency, hematuria and urgency.   Neurological:  Negative for headaches.   Psychiatric/Behavioral: Negative.       Objective:     Vital Signs (Most Recent):  Temp: 98.4 °F (36.9 °C) (05/04/25 0722)  Pulse: 77 (05/04/25 0722)  Resp: 18 (05/04/25 0722)  BP: (!) 146/71 (05/04/25 0722)  SpO2: 96 % (05/04/25 0722) Vital Signs (24h Range):  Temp:  [97.5 °F (36.4 °C)-98.4 °F (36.9 °C)] 98.4 °F (36.9 °C)  Pulse:  [71-87] 77  Resp:  [16-18] 18  SpO2:  [94 %-97 %] 96 %  BP: (126-166)/(63-71) 146/71     Weight: 95.7 kg (210 lb 15.7 oz)  Body mass index is 30.27 kg/m².    Intake/Output Summary (Last 24 hours) at 5/4/2025 0830  Last data filed at 5/4/2025 0400  Gross per 24 hour   Intake --   Output 1350 ml   Net -1350 ml         Physical Exam  Vitals reviewed.   Constitutional:       General: He is not in acute distress.     Appearance: He is obese. He is not ill-appearing.   HENT:      Head: Normocephalic and atraumatic.      Nose: No congestion or rhinorrhea.      Comments: NG tube in situ  Eyes:      Extraocular Movements: Extraocular movements intact.      Conjunctiva/sclera: Conjunctivae normal.      Pupils: Pupils are equal, round, and reactive to light.   Cardiovascular:      Rate and Rhythm: Normal rate and regular rhythm.      Pulses: Normal pulses.      Heart sounds: Normal heart sounds. No murmur heard.  Pulmonary:      Effort:  Pulmonary effort is normal. No respiratory distress.      Breath sounds: Normal breath sounds. No wheezing.   Abdominal:      General: Bowel sounds are normal. There is distension.      Palpations: Abdomen is soft.      Tenderness: There is no abdominal tenderness.   Musculoskeletal:      Cervical back: Normal range of motion and neck supple. No rigidity.      Right lower leg: No edema.      Left lower leg: No edema.   Skin:     General: Skin is warm.      Capillary Refill: Capillary refill takes less than 2 seconds.   Neurological:      General: No focal deficit present.      Mental Status: He is alert and oriented to person, place, and time.               Significant Labs: All pertinent labs within the past 24 hours have been reviewed.    Significant Imaging: I have reviewed all pertinent imaging results/findings within the past 24 hours.

## 2025-05-04 NOTE — PLAN OF CARE
Problem: Adult Inpatient Plan of Care  Goal: Plan of Care Review  Outcome: Progressing  Goal: Patient-Specific Goal (Individualized)  Outcome: Progressing  Goal: Absence of Hospital-Acquired Illness or Injury  Outcome: Progressing  Goal: Optimal Comfort and Wellbeing  Outcome: Progressing     Problem: Diabetes Comorbidity  Goal: Blood Glucose Level Within Targeted Range  Outcome: Progressing     Problem: Wound  Goal: Optimal Coping  Outcome: Progressing  Goal: Optimal Functional Ability  Outcome: Progressing  Goal: Absence of Infection Signs and Symptoms  Outcome: Progressing  Goal: Improved Oral Intake  Outcome: Progressing  Goal: Optimal Pain Control and Function  Outcome: Progressing

## 2025-05-04 NOTE — SUBJECTIVE & OBJECTIVE
Interval History:  Stable, no acute events overnight.  NG tube to low intermittent suction with bilious output.  No gas or stool.  Abdomen is still distended, but improved.  No significant pain    Medications:  Continuous Infusions:   dextrose 5 % and 0.45 % NaCl with KCl 20 mEq   Intravenous Continuous 125 mL/hr at 05/04/25 0550 New Bag at 05/04/25 0550     Scheduled Meds:   enoxparin  40 mg Subcutaneous Q24H (prophylaxis, 1700)    LIDOcaine (PF) 10 mg/ml (1%)  1 mL Other Once    metoprolol tartrate  12.5 mg Oral BID    mupirocin   Nasal BID    QUEtiapine  50 mg Oral BID     PRN Meds:  Current Facility-Administered Medications:     acetaminophen, 650 mg, Oral, Q8H PRN    melatonin, 6 mg, Oral, Nightly PRN    miconazole NITRATE 2 %, , Topical (Top), BID PRN    morphine, 2 mg, Intravenous, Q4H PRN    morphine, 4 mg, Intravenous, Q4H PRN    ondansetron, 4 mg, Intravenous, Q6H PRN     Review of patient's allergies indicates:  No Known Allergies  Objective:     Vital Signs (Most Recent):  Temp: 98.4 °F (36.9 °C) (05/04/25 0722)  Pulse: 77 (05/04/25 0722)  Resp: 18 (05/04/25 0722)  BP: (!) 146/71 (05/04/25 0722)  SpO2: 96 % (05/04/25 0722) Vital Signs (24h Range):  Temp:  [97.5 °F (36.4 °C)-98.4 °F (36.9 °C)] 98.4 °F (36.9 °C)  Pulse:  [71-87] 77  Resp:  [16-18] 18  SpO2:  [94 %-97 %] 96 %  BP: (126-166)/(63-71) 146/71     Weight: 95.7 kg (210 lb 15.7 oz)  Body mass index is 30.27 kg/m².    Intake/Output - Last 3 Shifts         05/02 0700  05/03 0659 05/03 0700  05/04 0659 05/04 0700  05/05 0659    Urine (mL/kg/hr) 0 (0) 550 (0.2)     Drains 250 800     Stool 0      Total Output 250 1350     Net -250 -1350            Urine Occurrence 4 x 1 x     Stool Occurrence 1 x  1 x             Physical Exam  Constitutional:       General: He is not in acute distress.     Appearance: Normal appearance.   HENT:      Head: Normocephalic.   Cardiovascular:      Rate and Rhythm: Normal rate.   Pulmonary:      Effort: Pulmonary effort is  normal. No respiratory distress.   Abdominal:      General: There is distension.      Tenderness: There is no abdominal tenderness.      Comments: NG tube to low intermittent suction with bilious output   Musculoskeletal:         General: Normal range of motion.   Skin:     General: Skin is warm.      Coloration: Skin is not jaundiced.   Neurological:      General: No focal deficit present.      Mental Status: He is alert and oriented to person, place, and time.      Cranial Nerves: No cranial nerve deficit.          Significant Labs:  I have reviewed all pertinent lab results within the past 24 hours.  CBC:   Recent Labs   Lab 05/04/25  0520   WBC 7.95   RBC 3.20*   HGB 10.5*   HCT 32.3*      .9*   MCH 32.8*   MCHC 32.5     BMP:   Recent Labs   Lab 05/04/25  0520   *      K 3.7      CO2 25   BUN 18   CREATININE 0.76   CALCIUM 8.1*       Significant Diagnostics:  I have reviewed all pertinent imaging results/findings within the past 24 hours.

## 2025-05-04 NOTE — ASSESSMENT & PLAN NOTE
05/02/25  continue npo, NG to LIS,  possible Gastrograffin challenge when can be tolerated.     5/4: Will order gastrograffin challenge

## 2025-05-04 NOTE — PROGRESS NOTES
[facility]  [facility]  Progress Note    Patient Name: Danny Flood  MRN: [id number]  Patient Class: IP- Inpatient   Admission Date: 5/1/2025  Length of Stay: 3 days  Attending Physician: Tesfaye Natarajan MD  Primary Care Provider: Cameron Valencia MD        Subjective     Principal Problem:Small bowel obstruction due to adhesions        HPI:  Pt is a 80 y.o. male transferred to [facility] from [facility] to service of Dr Natarajan for small bowel obstruction. Patient reports 1 week history of abdominal pain. Abdominal pain was generalized and achy in nature. He graded it as 5/10 on pain scale. He states he started vomiting a few days ago and symptoms have progressively worsened. He denies passing gas or bowel movements. Last bowel movement was 3-4 days ago. Patient takes lactulose to keep his BM regular and had his last dose before coming to the ED. He has noticed abdominal distension as well.   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 Med Rec was completed at bedside. Current outpatient medications will be restarted as tolerated and are listed below:        This consult was performed under the direct supervision of Dr. Weiss. Thank you for allowing the [facility] to be involved in the care of this patient.    Overview/Hospital Course:  No notes on file    Interval History: Overnight no acute events. Patient is on NG tube in situ. Feels little better today. Vitals and labs are stable.    Review of Systems   Constitutional:  Negative for fever.   HENT:  Negative for congestion.    Eyes: Negative.    Respiratory:  Negative for cough, choking, shortness of breath and wheezing.    Cardiovascular:  Negative for chest pain,  palpitations and leg swelling.   Gastrointestinal:  Positive for abdominal distention and nausea (improving). Negative for abdominal pain, anal bleeding and blood in stool.   Genitourinary:  Negative for dysuria, flank pain, frequency, hematuria and urgency.   Neurological:  Negative for headaches.   Psychiatric/Behavioral: Negative.       Objective:     Vital Signs (Most Recent):  Temp: 98.4 °F (36.9 °C) (05/04/25 0722)  Pulse: 77 (05/04/25 0722)  Resp: 18 (05/04/25 0722)  BP: (!) 146/71 (05/04/25 0722)  SpO2: 96 % (05/04/25 0722) Vital Signs (24h Range):  Temp:  [97.5 °F (36.4 °C)-98.4 °F (36.9 °C)] 98.4 °F (36.9 °C)  Pulse:  [71-87] 77  Resp:  [16-18] 18  SpO2:  [94 %-97 %] 96 %  BP: (126-166)/(63-71) 146/71     Weight: 95.7 kg (210 lb 15.7 oz)  Body mass index is 30.27 kg/m².    Intake/Output Summary (Last 24 hours) at 5/4/2025 0849  Last data filed at 5/4/2025 0400  Gross per 24 hour   Intake --   Output 1350 ml   Net -1350 ml         Physical Exam  Vitals reviewed.   Constitutional:       General: He is not in acute distress.     Appearance: He is obese. He is not ill-appearing.   HENT:      Head: Normocephalic and atraumatic.      Nose: No congestion or rhinorrhea.      Comments: NG tube in situ  Eyes:      Extraocular Movements: Extraocular movements intact.      Conjunctiva/sclera: Conjunctivae normal.      Pupils: Pupils are equal, round, and reactive to light.   Cardiovascular:      Rate and Rhythm: Normal rate and regular rhythm.      Pulses: Normal pulses.      Heart sounds: Normal heart sounds. No murmur heard.  Pulmonary:      Effort: Pulmonary effort is normal. No respiratory distress.      Breath sounds: Normal breath sounds. No wheezing.   Abdominal:      General: Bowel sounds are normal. There is distension.      Palpations: Abdomen is soft.      Tenderness: There is no abdominal tenderness.   Musculoskeletal:      Cervical back: Normal range of motion and neck supple. No rigidity.      Right  lower leg: No edema.      Left lower leg: No edema.   Skin:     General: Skin is warm.      Capillary Refill: Capillary refill takes less than 2 seconds.   Neurological:      General: No focal deficit present.      Mental Status: He is alert and oriented to person, place, and time.               Significant Labs: All pertinent labs within the past 24 hours have been reviewed.    Significant Imaging: I have reviewed all pertinent imaging results/findings within the past 24 hours.      Assessment & Plan  Small bowel obstruction due to adhesions  Patient was admitted by the General surgery for a suspected SBO.  NG tube in situ by primary team for decompression, patient is place NPO  Pain managed by PRN medications    5/4  No abdominal pain, n/v   NGT to suction for bowel decompression   Gastrografin challenge today per surgery   Con't prn pain meds     Portal hypertension  Past medical history of portal hypertension secondary to cirrhosis of liver from unknown cause.   -Patient is established with Gastroenterology outpatient.  -Patient is at his baseline as far as mentation is concerned  -Monitor daily CMP      Chronic a-fib  Patient has long standing persistent (>12 months) atrial fibrillation. Patient is currently in sinus rhythm. [id number] Score: 2. The patients heart rate in the last 24 hours is as follows:  Pulse  Min: 71  Max: 87     Antiarrhythmics  metoprolol tartrate (LOPRESSOR) split tablet 12.5 mg, 2 times daily, Oral    Anticoagulants  enoxaparin injection 40 mg, Every 24 hours, Subcutaneous  , 2 times daily, Oral  At home-Eliquis 5mg PO BID which is on hold now    Plan  - Replete lytes with a goal of K>4, Mg >2  - Patient is anticoagulated, see medications listed above.  - Patient's afib is currently controlled  - Will monitor    5/4  Lytes wnl; will con't to monitor with daily CMP  Con't metoprolol for rate control   Con't lovenox for anticoag        Cirrhosis  Patient with known Cirrhosis of unknown  etiology.  MELD-Na score calculated; MELD 3.0: 14 at 5/4/2025  5:20 AM  MELD-Na: 13 at 5/4/2025  5:20 AM  Calculated from:  Serum Creatinine: 0.76 mg/dL (Using min of 1 mg/dL) at 5/4/2025  5:20 AM  Serum Sodium: 139 mmol/L (Using max of 137 mmol/L) at 5/4/2025  5:20 AM  Total Bilirubin: 1.7 mg/dL at 5/4/2025  5:20 AM  Serum Albumin: 2.7 g/dL at 5/4/2025  5:20 AM  INR(ratio): 1.52 at 5/2/2025  9:11 PM  Age at listing (hypothetical): 80 years  Sex: Male at 5/4/2025  5:20 AM      Continue chronic meds.. Will avoid any hepatotoxic meds, and monitor CBC/CMP/INR for synthetic function.     Mood disorder  Will continue with Quetiapine     Paroxysmal atrial fibrillation (Resolved: 5/3/2025)  Patient has persistent atrial fibrillation. Patient is currently in EKG NSR. [id number] Score: 2. The patients heart rate in the last 24 hours is as follows:  Pulse  Min: 71  Max: 87     Antiarrhythmics  metoprolol tartrate (LOPRESSOR) split tablet 12.5 mg, 2 times daily, Oral    Anticoagulants  enoxaparin injection 40 mg, Every 24 hours, Subcutaneous  , 2 times daily, Oral  At home-Eliquis 5mg PO BID which is on hold now    Plan  - Replete lytes with a goal of K>4, Mg >2  - Patient is anticoagulated, see medications listed above.  - Patient's afib is currently controlled  - will monitor      VTE Risk Mitigation (From admission, onward)           Ordered     enoxaparin injection 40 mg  Every 24 hours         05/01/25 2321     IP VTE LOW RISK PATIENT  Once         05/01/25 2321     Place sequential compression device  Until discontinued         05/01/25 2321                    Discharge Planning   AMBAR: 5/5/2025     Code Status: Full Code   Medical Readiness for Discharge Date:   Discharge Plan A: Home                        Lala Antonio MD  Department of Hospital Medicine   [facility]

## 2025-05-04 NOTE — ASSESSMENT & PLAN NOTE
Patient has long standing persistent (>12 months) atrial fibrillation. Patient is currently in sinus rhythm. [id number] Score: 2. The patients heart rate in the last 24 hours is as follows:  Pulse  Min: 71  Max: 87     Antiarrhythmics  metoprolol tartrate (LOPRESSOR) split tablet 12.5 mg, 2 times daily, Oral    Anticoagulants  enoxaparin injection 40 mg, Every 24 hours, Subcutaneous  , 2 times daily, Oral  At home-Eliquis 5mg PO BID which is on hold now    Plan  - Replete lytes with a goal of K>4, Mg >2  - Patient is anticoagulated, see medications listed above.  - Patient's afib is currently controlled  - Will monitor    5/4  Lytes wnl; will con't to monitor with daily CMP  Con't metoprolol for rate control   Con't lovenox for anticoag

## 2025-05-04 NOTE — PROGRESS NOTES
[facility]  General Surgery  Progress Note    Subjective:     History of Present Illness:  Patient is transferred to [facility] from [facility] to service of Dr Natarajan. Daughter is at bedside.  Patient reports 1 week history of abdominal pain.  He states he started vomiting a few days ago and symptoms have progressively worsened.  He denies passing gas.  He marion bowel movements.  Last bowel movement was 3-4 days ago. Patient denies fever. Past medical history includes, HTN, DM, DVT, cirrhosis, portal vein thrombosis, anemia, mitral regurg, anemia, dementia, hyperammonemia, and dysphagia.  Patient has NG tube with 900 cc of dark bilious drainage noted in canister.  Patient vomited just prior to exam.  Abdomen is distended.  Large right lower quadrant hernia noted.  Abdomen is soft.  CT abdomen pelvis disc to be uploaded for review.  Will continue npo.  We will continue NG tube to low intermittent suction.  We will continue to monitor patient's progress.    Post-Op Info:  * No surgery found *         Interval History:  Stable, no acute events overnight.  NG tube to low intermittent suction with bilious output.  No gas or stool.  Abdomen is still distended, but improved.  No significant pain    Medications:  Continuous Infusions:   dextrose 5 % and 0.45 % NaCl with KCl 20 mEq   Intravenous Continuous 125 mL/hr at 05/04/25 0550 New Bag at 05/04/25 0550     Scheduled Meds:   enoxparin  40 mg Subcutaneous Q24H (prophylaxis, 1700)    LIDOcaine (PF) 10 mg/ml (1%)  1 mL Other Once    metoprolol tartrate  12.5 mg Oral BID    mupirocin   Nasal BID    QUEtiapine  50 mg Oral BID     PRN Meds:  Current Facility-Administered Medications:     acetaminophen, 650 mg, Oral, Q8H PRN    melatonin, 6 mg, Oral, Nightly PRN    miconazole NITRATE 2 %, , Topical (Top), BID PRN    morphine, 2 mg, Intravenous, Q4H PRN    morphine, 4 mg, Intravenous, Q4H PRN    ondansetron, 4 mg, Intravenous, Q6H PRN     Review of  patient's allergies indicates:  No Known Allergies  Objective:     Vital Signs (Most Recent):  Temp: 98.4 °F (36.9 °C) (05/04/25 0722)  Pulse: 77 (05/04/25 0722)  Resp: 18 (05/04/25 0722)  BP: (!) 146/71 (05/04/25 0722)  SpO2: 96 % (05/04/25 0722) Vital Signs (24h Range):  Temp:  [97.5 °F (36.4 °C)-98.4 °F (36.9 °C)] 98.4 °F (36.9 °C)  Pulse:  [71-87] 77  Resp:  [16-18] 18  SpO2:  [94 %-97 %] 96 %  BP: (126-166)/(63-71) 146/71     Weight: 95.7 kg (210 lb 15.7 oz)  Body mass index is 30.27 kg/m².    Intake/Output - Last 3 Shifts         05/02 0700 05/03 0659 05/03 0700 05/04 0659 05/04 0700 05/05 0659    Urine (mL/kg/hr) 0 (0) 550 (0.2)     Drains 250 800     Stool 0      Total Output 250 1350     Net -250 -1350            Urine Occurrence 4 x 1 x     Stool Occurrence 1 x  1 x             Physical Exam  Constitutional:       General: He is not in acute distress.     Appearance: Normal appearance.   HENT:      Head: Normocephalic.   Cardiovascular:      Rate and Rhythm: Normal rate.   Pulmonary:      Effort: Pulmonary effort is normal. No respiratory distress.   Abdominal:      General: There is distension.      Tenderness: There is no abdominal tenderness.      Comments: NG tube to low intermittent suction with bilious output   Musculoskeletal:         General: Normal range of motion.   Skin:     General: Skin is warm.      Coloration: Skin is not jaundiced.   Neurological:      General: No focal deficit present.      Mental Status: He is alert and oriented to person, place, and time.      Cranial Nerves: No cranial nerve deficit.          Significant Labs:  I have reviewed all pertinent lab results within the past 24 hours.  CBC:   Recent Labs   Lab 05/04/25 0520   WBC 7.95   RBC 3.20*   HGB 10.5*   HCT 32.3*      .9*   MCH 32.8*   MCHC 32.5     BMP:   Recent Labs   Lab 05/04/25  0520   *      K 3.7      CO2 25   BUN 18   CREATININE 0.76   CALCIUM 8.1*       Significant  Diagnostics:  I have reviewed all pertinent imaging results/findings within the past 24 hours.  Assessment/Plan:     * Small bowel obstruction due to adhesions  05/02/25  continue npo, NG to LIS,  possible Gastrograffin challenge when can be tolerated.     5/4: Will order gastrograffin challenge        Moshe Rodriguez, DO  General Surgery  [facility]

## 2025-05-05 LAB
ALBUMIN SERPL BCP-MCNC: 2.6 G/DL (ref 3.4–4.8)
ALBUMIN/GLOB SERPL: 0.7 {RATIO}
ALP SERPL-CCNC: 57 U/L (ref 40–150)
ALT SERPL W P-5'-P-CCNC: 18 U/L
AMMONIA PLAS-SCNC: 26 ΜMOL/L (ref 18–72)
ANION GAP SERPL CALCULATED.3IONS-SCNC: 11 MMOL/L (ref 7–16)
AST SERPL W P-5'-P-CCNC: 29 U/L (ref 11–45)
BASOPHILS # BLD AUTO: 0.06 K/UL (ref 0–0.2)
BASOPHILS NFR BLD AUTO: 0.7 % (ref 0–1)
BILIRUB SERPL-MCNC: 1.4 MG/DL
BUN SERPL-MCNC: 13 MG/DL (ref 8–26)
BUN/CREAT SERPL: 19 (ref 6–20)
CALCIUM SERPL-MCNC: 8.1 MG/DL (ref 8.8–10)
CHLORIDE SERPL-SCNC: 110 MMOL/L (ref 98–107)
CO2 SERPL-SCNC: 22 MMOL/L (ref 23–31)
CREAT SERPL-MCNC: 0.69 MG/DL (ref 0.72–1.25)
DIFFERENTIAL METHOD BLD: ABNORMAL
EGFR (NO RACE VARIABLE) (RUSH/TITUS): 94 ML/MIN/1.73M2
EOSINOPHIL # BLD AUTO: 0.27 K/UL (ref 0–0.5)
EOSINOPHIL NFR BLD AUTO: 3.4 % (ref 1–4)
ERYTHROCYTE [DISTWIDTH] IN BLOOD BY AUTOMATED COUNT: 11.7 % (ref 11.5–14.5)
GLOBULIN SER-MCNC: 3.7 G/DL (ref 2–4)
GLUCOSE SERPL-MCNC: 106 MG/DL (ref 70–105)
GLUCOSE SERPL-MCNC: 156 MG/DL (ref 82–115)
HCT VFR BLD AUTO: 31.8 % (ref 40–54)
HGB BLD-MCNC: 10.5 G/DL (ref 13.5–18)
IMM GRANULOCYTES # BLD AUTO: 0.03 K/UL (ref 0–0.04)
IMM GRANULOCYTES NFR BLD: 0.4 % (ref 0–0.4)
LYMPHOCYTES # BLD AUTO: 1.24 K/UL (ref 1–4.8)
LYMPHOCYTES NFR BLD AUTO: 15.4 % (ref 27–41)
MCH RBC QN AUTO: 33.1 PG (ref 27–31)
MCHC RBC AUTO-ENTMCNC: 33 G/DL (ref 32–36)
MCV RBC AUTO: 100.3 FL (ref 80–96)
MONOCYTES # BLD AUTO: 0.64 K/UL (ref 0–0.8)
MONOCYTES NFR BLD AUTO: 8 % (ref 2–6)
MPC BLD CALC-MCNC: 9.9 FL (ref 9.4–12.4)
NEUTROPHILS # BLD AUTO: 5.8 K/UL (ref 1.8–7.7)
NEUTROPHILS NFR BLD AUTO: 72.1 % (ref 53–65)
NRBC # BLD AUTO: 0 X10E3/UL
NRBC, AUTO (.00): 0 %
PLATELET # BLD AUTO: 179 K/UL (ref 150–400)
POTASSIUM SERPL-SCNC: 3.8 MMOL/L (ref 3.5–5.1)
PROT SERPL-MCNC: 6.3 G/DL (ref 5.8–7.6)
RBC # BLD AUTO: 3.17 M/UL (ref 4.6–6.2)
SODIUM SERPL-SCNC: 139 MMOL/L (ref 136–145)
WBC # BLD AUTO: 8.04 K/UL (ref 4.5–11)

## 2025-05-05 PROCEDURE — 63600175 PHARM REV CODE 636 W HCPCS: Performed by: SURGERY

## 2025-05-05 PROCEDURE — 94761 N-INVAS EAR/PLS OXIMETRY MLT: CPT

## 2025-05-05 PROCEDURE — 99232 SBSQ HOSP IP/OBS MODERATE 35: CPT | Mod: GC,,, | Performed by: STUDENT IN AN ORGANIZED HEALTH CARE EDUCATION/TRAINING PROGRAM

## 2025-05-05 PROCEDURE — 82962 GLUCOSE BLOOD TEST: CPT

## 2025-05-05 PROCEDURE — 82140 ASSAY OF AMMONIA: CPT | Performed by: NURSE PRACTITIONER

## 2025-05-05 PROCEDURE — 80053 COMPREHEN METABOLIC PANEL: CPT

## 2025-05-05 PROCEDURE — 85025 COMPLETE CBC W/AUTO DIFF WBC: CPT

## 2025-05-05 PROCEDURE — 11000001 HC ACUTE MED/SURG PRIVATE ROOM

## 2025-05-05 PROCEDURE — 36415 COLL VENOUS BLD VENIPUNCTURE: CPT | Performed by: NURSE PRACTITIONER

## 2025-05-05 PROCEDURE — 97161 PT EVAL LOW COMPLEX 20 MIN: CPT

## 2025-05-05 PROCEDURE — 36415 COLL VENOUS BLD VENIPUNCTURE: CPT

## 2025-05-05 PROCEDURE — 25000003 PHARM REV CODE 250: Performed by: SURGERY

## 2025-05-05 PROCEDURE — 99233 SBSQ HOSP IP/OBS HIGH 50: CPT | Mod: ,,, | Performed by: NURSE PRACTITIONER

## 2025-05-05 RX ADMIN — QUETIAPINE FUMARATE 50 MG: 25 TABLET ORAL at 08:05

## 2025-05-05 RX ADMIN — DEXTROSE MONOHYDRATE, SODIUM CHLORIDE, AND POTASSIUM CHLORIDE: 50; 4.5; 1.49 INJECTION, SOLUTION INTRAVENOUS at 06:05

## 2025-05-05 RX ADMIN — MUPIROCIN: 20 OINTMENT TOPICAL at 08:05

## 2025-05-05 RX ADMIN — METOPROLOL TARTRATE 12.5 MG: 25 TABLET, FILM COATED ORAL at 08:05

## 2025-05-05 RX ADMIN — ENOXAPARIN SODIUM 40 MG: 40 INJECTION SUBCUTANEOUS at 04:05

## 2025-05-05 NOTE — ASSESSMENT & PLAN NOTE
Patient was admitted by the General surgery for a suspected SBO.  NG tube in situ by primary team for decompression, patient is place NPO  Pain managed by PRN medications    5/4  No abdominal pain, n/v   NGT to suction for bowel decompression   Gastrografin challenge today per surgery   Con't prn pain meds   5/5   Gastrografin challenge done yesterday. No evidence for complete small bowel obstruction.   Pt had two reported BM. NGT with suction removed today . Pt now on clear liquid diet  Will continue to monitor. Plan for discharge 5/7/25

## 2025-05-05 NOTE — SUBJECTIVE & OBJECTIVE
Interval History: No acute events overnight    Review of Systems   Constitutional:  Negative for fever.   HENT:  Negative for congestion.    Eyes: Negative.    Respiratory:  Negative for cough, choking, shortness of breath and wheezing.    Cardiovascular:  Negative for chest pain, palpitations and leg swelling.   Gastrointestinal:  Positive for abdominal distention. Negative for abdominal pain, anal bleeding and blood in stool. Nausea: improving.  Genitourinary:  Negative for dysuria, flank pain, frequency, hematuria and urgency.   Neurological:  Negative for headaches.   Psychiatric/Behavioral: Negative.       Objective:     Vital Signs (Most Recent):  Temp: 98.4 °F (36.9 °C) (05/05/25 0434)  Pulse: 73 (05/05/25 0434)  Resp: 20 (05/04/25 1628)  BP: (!) 148/73 (05/05/25 0434)  SpO2: (!) 92 % (05/05/25 0434) Vital Signs (24h Range):  Temp:  [97.6 °F (36.4 °C)-98.4 °F (36.9 °C)] 98.4 °F (36.9 °C)  Pulse:  [] 73  Resp:  [20] 20  SpO2:  [92 %-97 %] 92 %  BP: (139-171)/(69-77) 148/73     Weight: 95.7 kg (210 lb 15.7 oz)  Body mass index is 30.27 kg/m².    Intake/Output Summary (Last 24 hours) at 5/5/2025 0808  Last data filed at 5/5/2025 0206  Gross per 24 hour   Intake 8000 ml   Output 400 ml   Net 7600 ml         Physical Exam  Constitutional:       General: He is not in acute distress.     Appearance: Normal appearance.   HENT:      Head: Normocephalic.   Cardiovascular:      Rate and Rhythm: Normal rate.   Pulmonary:      Effort: Pulmonary effort is normal. No respiratory distress.   Abdominal:      General: There is distension.      Tenderness: There is no abdominal tenderness.   Musculoskeletal:         General: Normal range of motion.   Skin:     General: Skin is warm.      Coloration: Skin is not jaundiced.   Neurological:      General: No focal deficit present.      Mental Status: He is alert and oriented to person, place, and time.      Cranial Nerves: No cranial nerve deficit.               Significant  Labs: All pertinent labs within the past 24 hours have been reviewed.    Significant Imaging: I have reviewed all pertinent imaging results/findings within the past 24 hours.

## 2025-05-05 NOTE — PROGRESS NOTES
[facility]  [facility]  Progress Note    Patient Name: Danny Flood  MRN: [id number]  Patient Class: IP- Inpatient   Admission Date: 5/1/2025  Length of Stay: 4 days  Attending Physician: Tesfaye Natarajan MD  Primary Care Provider: Cameron Valencia MD        Subjective     Principal Problem:Small bowel obstruction due to adhesions        HPI:  Pt is a 80 y.o. male transferred to [facility] from [facility] to service of Dr Natarajan for small bowel obstruction. Patient reports 1 week history of abdominal pain. Abdominal pain was generalized and achy in nature. He graded it as 5/10 on pain scale. He states he started vomiting a few days ago and symptoms have progressively worsened. He denies passing gas or bowel movements. Last bowel movement was 3-4 days ago. Patient takes lactulose to keep his BM regular and had his last dose before coming to the ED. He has noticed abdominal distension as well.   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 Med Rec was completed at bedside. Current outpatient medications will be restarted as tolerated and are listed below:        This consult was performed under the direct supervision of Dr. Weiss. Thank you for allowing the [facility] to be involved in the care of this patient.    Overview/Hospital Course:  No notes on file    Interval History: No acute events overnight    Review of Systems   Constitutional:  Negative for fever.   HENT:  Negative for congestion.    Eyes: Negative.    Respiratory:  Negative for cough, choking, shortness of breath and wheezing.    Cardiovascular:  Negative for chest pain, palpitations and leg swelling.   Gastrointestinal:  Positive for abdominal distention.  Negative for abdominal pain, anal bleeding and blood in stool. Nausea: improving.  Genitourinary:  Negative for dysuria, flank pain, frequency, hematuria and urgency.   Neurological:  Negative for headaches.   Psychiatric/Behavioral: Negative.       Objective:     Vital Signs (Most Recent):  Temp: 98.4 °F (36.9 °C) (05/05/25 0434)  Pulse: 73 (05/05/25 0434)  Resp: 20 (05/04/25 1628)  BP: (!) 148/73 (05/05/25 0434)  SpO2: (!) 92 % (05/05/25 0434) Vital Signs (24h Range):  Temp:  [97.6 °F (36.4 °C)-98.4 °F (36.9 °C)] 98.4 °F (36.9 °C)  Pulse:  [] 73  Resp:  [20] 20  SpO2:  [92 %-97 %] 92 %  BP: (139-171)/(69-77) 148/73     Weight: 95.7 kg (210 lb 15.7 oz)  Body mass index is 30.27 kg/m².    Intake/Output Summary (Last 24 hours) at 5/5/2025 0808  Last data filed at 5/5/2025 0206  Gross per 24 hour   Intake 8000 ml   Output 400 ml   Net 7600 ml         Physical Exam  Constitutional:       General: He is not in acute distress.     Appearance: Normal appearance.   HENT:      Head: Normocephalic.   Cardiovascular:      Rate and Rhythm: Normal rate.   Pulmonary:      Effort: Pulmonary effort is normal. No respiratory distress.   Abdominal:      General: There is distension.      Tenderness: There is no abdominal tenderness.   Musculoskeletal:         General: Normal range of motion.   Skin:     General: Skin is warm.      Coloration: Skin is not jaundiced.   Neurological:      General: No focal deficit present.      Mental Status: He is alert and oriented to person, place, and time.      Cranial Nerves: No cranial nerve deficit.               Significant Labs: All pertinent labs within the past 24 hours have been reviewed.    Significant Imaging: I have reviewed all pertinent imaging results/findings within the past 24 hours.      Assessment & Plan  Small bowel obstruction due to adhesions  Patient was admitted by the General surgery for a suspected SBO.  NG tube in situ by primary team for decompression, patient is  place NPO  Pain managed by PRN medications    5/4  No abdominal pain, n/v   NGT to suction for bowel decompression   Gastrografin challenge today per surgery   Con't prn pain meds   5/5   Gastrografin challenge done yesterday. No evidence for complete small bowel obstruction.   Pt had two reported BM. NGT with suction removed today . Pt now on clear liquid diet  Will continue to monitor. Plan for discharge 5/7/25    Portal hypertension  Past medical history of portal hypertension secondary to cirrhosis of liver from unknown cause.   -Patient is established with Gastroenterology outpatient.  -Patient is at his baseline as far as mentation is concerned  -Monitor daily CMP      Chronic a-fib  Patient has long standing persistent (>12 months) atrial fibrillation. Patient is currently in sinus rhythm. [id number] Score: 2. The patients heart rate in the last 24 hours is as follows:  Pulse  Min: 50  Max: 105     Antiarrhythmics  metoprolol tartrate (LOPRESSOR) split tablet 12.5 mg, 2 times daily, Oral    Anticoagulants  enoxaparin injection 40 mg, Every 24 hours, Subcutaneous  , 2 times daily, Oral  At home-Eliquis 5mg PO BID which is on hold now    Plan  - Replete lytes with a goal of K>4, Mg >2  - Patient is anticoagulated, see medications listed above.  - Patient's afib is currently controlled  - Will monitor    5/5  Lytes wnl; will con't to monitor with daily CMP  Con't metoprolol for rate control   Con't lovenox for anticoag        Cirrhosis  Patient with known Cirrhosis of unknown etiology.  MELD-Na score calculated; MELD 3.0: 14 at 5/4/2025  5:20 AM  MELD-Na: 13 at 5/4/2025  5:20 AM  Calculated from:  Serum Creatinine: 0.76 mg/dL (Using min of 1 mg/dL) at 5/4/2025  5:20 AM  Serum Sodium: 139 mmol/L (Using max of 137 mmol/L) at 5/4/2025  5:20 AM  Total Bilirubin: 1.7 mg/dL at 5/4/2025  5:20 AM  Serum Albumin: 2.7 g/dL at 5/4/2025  5:20 AM  INR(ratio): 1.52 at 5/2/2025  9:11 PM  Age at listing (hypothetical): 80  years  Sex: Male at 5/4/2025  5:20 AM      Continue chronic meds.. Will avoid any hepatotoxic meds, and monitor CBC/CMP/INR for synthetic function.     Mood disorder  Will continue with Quetiapine     VTE Risk Mitigation (From admission, onward)           Ordered     enoxaparin injection 40 mg  Every 24 hours         05/01/25 2321     IP VTE LOW RISK PATIENT  Once         05/01/25 2321     Place sequential compression device  Until discontinued         05/01/25 2321                    Discharge Planning   AMBAR: 5/5/2025     Code Status: Full Code   Medical Readiness for Discharge Date:   Discharge Plan A: Home                        Cheryle Godinez MD  Department of Hospital Medicine   [facility]

## 2025-05-05 NOTE — ASSESSMENT & PLAN NOTE
Patient has long standing persistent (>12 months) atrial fibrillation. Patient is currently in sinus rhythm. [id number] Score: 2. The patients heart rate in the last 24 hours is as follows:  Pulse  Min: 50  Max: 105     Antiarrhythmics  metoprolol tartrate (LOPRESSOR) split tablet 12.5 mg, 2 times daily, Oral    Anticoagulants  enoxaparin injection 40 mg, Every 24 hours, Subcutaneous  , 2 times daily, Oral  At home-Eliquis 5mg PO BID which is on hold now    Plan  - Replete lytes with a goal of K>4, Mg >2  - Patient is anticoagulated, see medications listed above.  - Patient's afib is currently controlled  - Will monitor    5/5  Lytes wnl; will con't to monitor with daily CMP  Con't metoprolol for rate control   Con't lovenox for anticoag

## 2025-05-05 NOTE — PROGRESS NOTES
[facility]  General Surgery  Progress Note    Subjective:     History of Present Illness:  Patient is transferred to [facility] from [facility] to service of Dr Natarajan. Daughter is at bedside.  Patient reports 1 week history of abdominal pain.  He states he started vomiting a few days ago and symptoms have progressively worsened.  He denies passing gas.  He marion bowel movements.  Last bowel movement was 3-4 days ago. Patient denies fever. Past medical history includes, HTN, DM, DVT, cirrhosis, portal vein thrombosis, anemia, mitral regurg, anemia, dementia, hyperammonemia, and dysphagia.  Patient has NG tube with 900 cc of dark bilious drainage noted in canister.  Patient vomited just prior to exam.  Abdomen is distended.  Large right lower quadrant hernia noted.  Abdomen is soft.  CT abdomen pelvis disc to be uploaded for review.  Will continue npo.  We will continue NG tube to low intermittent suction.  We will continue to monitor patient's progress.    Post-Op Info:  * No surgery found *         Interval History: 05/05/25 Patient is awake and alert this morning on exam.  NG with less than 100ml output overnight.  Patient denies pain this morning. Abdomen soft and mildly distended.  Reports 3 bowel movements overnight after Gastrograffin challenge yesterday.  No obstruction noted on GC challenge.  Will remove ng today.  Will order clear liquid diet as tolerated.     Medications:  Continuous Infusions:   dextrose 5 % and 0.45 % NaCl with KCl 20 mEq   Intravenous Continuous 125 mL/hr at 05/05/25 0647 New Bag at 05/05/25 0647     Scheduled Meds:   enoxparin  40 mg Subcutaneous Q24H (prophylaxis, 1700)    LIDOcaine (PF) 10 mg/ml (1%)  1 mL Other Once    metoprolol tartrate  12.5 mg Oral BID    mupirocin   Nasal BID    QUEtiapine  50 mg Oral BID     PRN Meds:  Current Facility-Administered Medications:     acetaminophen, 650 mg, Oral, Q8H PRN    melatonin, 6 mg, Oral, Nightly PRN     miconazole NITRATE 2 %, , Topical (Top), BID PRN    morphine, 2 mg, Intravenous, Q4H PRN    morphine, 4 mg, Intravenous, Q4H PRN    ondansetron, 4 mg, Intravenous, Q6H PRN     Review of patient's allergies indicates:  No Known Allergies  Objective:     Vital Signs (Most Recent):  Temp: 98.1 °F (36.7 °C) (05/05/25 0809)  Pulse: 73 (05/05/25 0809)  Resp: 18 (05/05/25 0809)  BP: (!) 167/73 (05/05/25 0809)  SpO2: 96 % (05/05/25 0809) Vital Signs (24h Range):  Temp:  [97.6 °F (36.4 °C)-98.4 °F (36.9 °C)] 98.1 °F (36.7 °C)  Pulse:  [] 73  Resp:  [18-20] 18  SpO2:  [92 %-97 %] 96 %  BP: (139-171)/(69-77) 167/73     Weight: 95.7 kg (210 lb 15.7 oz)  Body mass index is 30.27 kg/m².    Intake/Output - Last 3 Shifts         05/03 0700  05/04 0659 05/04 0700  05/05 0659 05/05 0700  05/06 0659    I.V. (mL/kg)  8000 (83.6)     Total Intake(mL/kg)  8000 (83.6)     Urine (mL/kg/hr) 550 (0.2)      Drains 800 400     Stool  0     Total Output 1350 400     Net -1350 +7600            Urine Occurrence 1 x 1 x     Stool Occurrence  10 x              Physical Exam  Vitals and nursing note reviewed.   HENT:      Head: Normocephalic.      Nose: Nose normal.      Mouth/Throat:      Mouth: Mucous membranes are moist.   Eyes:      Extraocular Movements: Extraocular movements intact.   Cardiovascular:      Rate and Rhythm: Normal rate and regular rhythm.   Pulmonary:      Effort: Pulmonary effort is normal.   Abdominal:      General: Bowel sounds are normal. There is distension.      Tenderness: There is abdominal tenderness.   Musculoskeletal:         General: Normal range of motion.      Cervical back: Normal range of motion.   Skin:     General: Skin is warm and dry.      Capillary Refill: Capillary refill takes less than 2 seconds.   Neurological:      General: No focal deficit present.      Mental Status: He is alert and oriented to person, place, and time.   Psychiatric:         Mood and Affect: Mood normal.         Behavior:  "Behavior normal.          Significant Labs:  I have reviewed all pertinent lab results within the past 24 hours.  CBC:   Recent Labs   Lab 05/05/25  0445   WBC 8.04   RBC 3.17*   HGB 10.5*   HCT 31.8*      .3*   MCH 33.1*   MCHC 33.0     BMP:   Recent Labs   Lab 05/05/25  0445   *      K 3.8   *   CO2 22*   BUN 13   CREATININE 0.69*   CALCIUM 8.1*     CMP:   Recent Labs   Lab 05/05/25  0445   *   CALCIUM 8.1*   ALBUMIN 2.6*   PROT 6.3      K 3.8   CO2 22*   *   BUN 13   CREATININE 0.69*   ALKPHOS 57   ALT 18   AST 29   BILITOT 1.4     Microbiology Results (last 7 days)       ** No results found for the last 168 hours. **          Specimen (24h ago, onward)      None          No results for input(s): "COLORU", "CLARITYU", "SPECGRAV", "PHUR", "PROTEINUA", "GLUCOSEU", "BILIRUBINCON", "BLOODU", "WBCU", "RBCU", "BACTERIA", "MUCUS", "NITRITE", "LEUKOCYTESUR", "UROBILINOGEN", "HYALINECASTS" in the last 168 hours.    Significant Diagnostics:  I have reviewed all pertinent imaging results/findings within the past 24 hours.  Assessment/Plan:     * Small bowel obstruction due to adhesions  05/02/25  continue npo, NG to LIS,  possible Gastrograffin challenge when can be tolerated.     5/4: Will order gastrograffin challenge        PHIL Eng  General Surgery  [facility]  "

## 2025-05-05 NOTE — SUBJECTIVE & OBJECTIVE
Interval History: 05/05/25 Patient is awake and alert this morning on exam.  NG with less than 100ml output overnight.  Patient denies pain this morning. Abdomen soft and mildly distended.  Reports 3 bowel movements overnight after Gastrograffin challenge yesterday.  No obstruction noted on GC challenge.  Will remove ng today.  Will order clear liquid diet as tolerated.     Medications:  Continuous Infusions:   dextrose 5 % and 0.45 % NaCl with KCl 20 mEq   Intravenous Continuous 125 mL/hr at 05/05/25 0647 New Bag at 05/05/25 0647     Scheduled Meds:   enoxparin  40 mg Subcutaneous Q24H (prophylaxis, 1700)    LIDOcaine (PF) 10 mg/ml (1%)  1 mL Other Once    metoprolol tartrate  12.5 mg Oral BID    mupirocin   Nasal BID    QUEtiapine  50 mg Oral BID     PRN Meds:  Current Facility-Administered Medications:     acetaminophen, 650 mg, Oral, Q8H PRN    melatonin, 6 mg, Oral, Nightly PRN    miconazole NITRATE 2 %, , Topical (Top), BID PRN    morphine, 2 mg, Intravenous, Q4H PRN    morphine, 4 mg, Intravenous, Q4H PRN    ondansetron, 4 mg, Intravenous, Q6H PRN     Review of patient's allergies indicates:  No Known Allergies  Objective:     Vital Signs (Most Recent):  Temp: 98.1 °F (36.7 °C) (05/05/25 0809)  Pulse: 73 (05/05/25 0809)  Resp: 18 (05/05/25 0809)  BP: (!) 167/73 (05/05/25 0809)  SpO2: 96 % (05/05/25 0809) Vital Signs (24h Range):  Temp:  [97.6 °F (36.4 °C)-98.4 °F (36.9 °C)] 98.1 °F (36.7 °C)  Pulse:  [] 73  Resp:  [18-20] 18  SpO2:  [92 %-97 %] 96 %  BP: (139-171)/(69-77) 167/73     Weight: 95.7 kg (210 lb 15.7 oz)  Body mass index is 30.27 kg/m².    Intake/Output - Last 3 Shifts         05/03 0700 05/04 0659 05/04 0700 05/05 0659 05/05 0700 05/06 0659    I.V. (mL/kg)  8000 (83.6)     Total Intake(mL/kg)  8000 (83.6)     Urine (mL/kg/hr) 550 (0.2)      Drains 800 400     Stool  0     Total Output 1350 400     Net -1350 +7600            Urine Occurrence 1 x 1 x     Stool Occurrence  10 x             "  Physical Exam  Vitals and nursing note reviewed.   HENT:      Head: Normocephalic.      Nose: Nose normal.      Mouth/Throat:      Mouth: Mucous membranes are moist.   Eyes:      Extraocular Movements: Extraocular movements intact.   Cardiovascular:      Rate and Rhythm: Normal rate and regular rhythm.   Pulmonary:      Effort: Pulmonary effort is normal.   Abdominal:      General: Bowel sounds are normal. There is distension.      Tenderness: There is abdominal tenderness.   Musculoskeletal:         General: Normal range of motion.      Cervical back: Normal range of motion.   Skin:     General: Skin is warm and dry.      Capillary Refill: Capillary refill takes less than 2 seconds.   Neurological:      General: No focal deficit present.      Mental Status: He is alert and oriented to person, place, and time.   Psychiatric:         Mood and Affect: Mood normal.         Behavior: Behavior normal.          Significant Labs:  I have reviewed all pertinent lab results within the past 24 hours.  CBC:   Recent Labs   Lab 05/05/25  0445   WBC 8.04   RBC 3.17*   HGB 10.5*   HCT 31.8*      .3*   MCH 33.1*   MCHC 33.0     BMP:   Recent Labs   Lab 05/05/25  0445   *      K 3.8   *   CO2 22*   BUN 13   CREATININE 0.69*   CALCIUM 8.1*     CMP:   Recent Labs   Lab 05/05/25  0445   *   CALCIUM 8.1*   ALBUMIN 2.6*   PROT 6.3      K 3.8   CO2 22*   *   BUN 13   CREATININE 0.69*   ALKPHOS 57   ALT 18   AST 29   BILITOT 1.4     Microbiology Results (last 7 days)       ** No results found for the last 168 hours. **          Specimen (24h ago, onward)      None          No results for input(s): "COLORU", "CLARITYU", "SPECGRAV", "PHUR", "PROTEINUA", "GLUCOSEU", "BILIRUBINCON", "BLOODU", "WBCU", "RBCU", "BACTERIA", "MUCUS", "NITRITE", "LEUKOCYTESUR", "UROBILINOGEN", "HYALINECASTS" in the last 168 hours.    Significant Diagnostics:  I have reviewed all pertinent imaging " results/findings within the past 24 hours.

## 2025-05-05 NOTE — PLAN OF CARE
Problem: Physical Therapy  Goal: Physical Therapy Goal  Description: Short term goals:  . Supine to sit with Stand-by Assistance  . Sit to supine with Stand-by Assistance  . Sit to stand transfer with Stand-by Assistance  . Gait  x 150 feet with Contact Guard Assistance using Rolling Walker.     Long term goals:  Patient will gain highest level functional mobility with lowest level of assistive device to return to desired living arrangement and prior ADL's.     Outcome: Progressing

## 2025-05-05 NOTE — PLAN OF CARE
Per MD note: 5/5   Gastrografin challenge done yesterday. No evidence for complete small bowel obstruction.   Pt had two reported BM. NGT with suction removed today . Pt now on clear liquid diet  Will continue to monitor. Plan for discharge 5/7/25    CM will continue to follow for discharge needs as they arise.

## 2025-05-05 NOTE — HOSPITAL COURSE
05/05/25 Patient is awake and alert this morning on exam.  NG with less than 100ml output overnight.  Patient denies pain this morning. Abdomen soft and mildly distended.  Reports 3 bowel movements overnight after Gastrograffin challenge yesterday.  No obstruction noted on GC challenge.  Will remove ng today.  Will order clear liquid diet as tolerated.     05/06/25 Patient is awake and alert this morning on exam.  Abdomen noted soft and non-distended.  Patient states he is hungry. Patient has tolerated clear liquid diet.  Will add regular diet as tolerated.  Patient is passing gas and having bowel movements over night.  Encouraged to work with PT today.  Patient will be discharged home today.  He will follow up in general surgery clinic in 2 weeks. Patient and family verbalize understanding and agree with plan of care.

## 2025-05-05 NOTE — PT/OT/SLP EVAL
Physical Therapy Evaluation    Patient Name:  Danny Flood   MRN:  [id number]    Recommendations:     Discharge Recommendations: Low Intensity Therapy   Discharge Equipment Recommendations: none   Barriers to discharge: none    Assessment:     Danny Flood is a 80 y.o. male admitted with a medical diagnosis of Small bowel obstruction due to adhesions.  He presents with the following impairments/functional limitations: impaired functional mobility, gait instability, weakness     Patient did well with evaluation. He was able to ambulate 110 ft with rollator. Did demonstrate foot drop with L foot which he stated he did not know he had. PT will monitor this. Daughter plans to stay with him after dc. No dme needed    Rehab Prognosis: Good; patient would benefit from acute skilled PT services to address these deficits and reach maximum level of function.    Recent Surgery: * No surgery found *      Plan:     During this hospitalization, patient to be seen 5 x/week to address the identified rehab impairments via gait training, therapeutic activities, therapeutic exercises, neuromuscular re-education and progress toward the following goals:    Plan of Care Expires:  06/09/25    Subjective     Chief Complaint: none  Patient/Family Comments/goals: agreeable  Pain/Comfort:  Pain Rating 1: 0/10    Patients cultural, spiritual, Druze conflicts given the current situation: no    Living Environment:  Home alone but daughter plans to stay with him at dc  Prior to admission, patients level of function was mod I with rollator.  Equipment used at home: rollator, grab bar.  DME owned (not currently used): none.  Upon discharge, patient will have assistance from family.    Objective:     Communicated with nurse prior to session.  Patient found HOB elevated with    upon PT entry to room.    General Precautions: Standard, fall  Orthopedic Precautions:    Braces:    Respiratory Status: Room air    Exams:  RLE ROM: WNL  RLE  Strength: WFL  LLE ROM: WFL except ankle lacking DF  LLE Strength: WFL except Ankle DF 2/5    Functional Mobility:  Bed Mobility:     Supine to Sit: minimum assistance  Transfers:     Sit to Stand:  contact guard assistance with rollator  Gait: 110 ft with rollator cga, steppage gait with LLE due to foot drop  Balance: fair      AM-PAC 6 CLICK MOBILITY  Total Score:22       Treatment & Education:  Patient educated on the role of physical therapy in the acute care setting, call light usage, and safety including calling nurse for mobility  Patient educated on importance of OOB activity and remaining up in chair.  PT answered all patient questions within PT scope of practice  Mobility as noted      Patient left up in chair with all lines intact, call button in reach, nurse notified, and daugther present.    GOALS:   Multidisciplinary Problems       Physical Therapy Goals          Problem: Physical Therapy    Goal Priority Disciplines Outcome Interventions   Physical Therapy Goal     PT, PT/OT Progressing    Description: Short term goals:  . Supine to sit with Stand-by Assistance  . Sit to supine with Stand-by Assistance  . Sit to stand transfer with Stand-by Assistance  . Gait  x 150 feet with Contact Guard Assistance using Rolling Walker.     Long term goals:  Patient will gain highest level functional mobility with lowest level of assistive device to return to desired living arrangement and prior ADL's.                          DME Justifications:  none    History:     Past Medical History:   Diagnosis Date    Acquired portal-systemic shunt due to cirrhosis     Anticoagulant long-term use     Edema due to hypoalbuminemia     Infected hernioplasty mesh 08/13/2023    Mixed hyperlipidemia     Paroxysmal atrial fibrillation 08/16/2023    Peripheral venous insufficiency     Dr. Moshe Marquez    Portal hypertension        Past Surgical History:   Procedure Laterality Date    COLON SURGERY      partial colon resection     EXCISION, SMALL INTESTINE N/A 8/9/2023    Procedure: EXCISION, SMALL INTESTINE;  Surgeon: Tesfaye Natarajan MD;  Location: [facility] OR;  Service: General;  Laterality: N/A;    HERNIA REPAIR      LAPAROTOMY, EXPLORATORY N/A 8/9/2023    Procedure: LAPAROTOMY, EXPLORATORY;  Surgeon: Tesfaye Natarajan MD;  Location: [facility] OR;  Service: General;  Laterality: N/A;    LEFT HEART CATHETERIZATION Left 8/15/2023    Procedure: Left heart cath;  Surgeon: Edinson Torres DO;  Location: [facility] CATH LAB;  Service: Cardiology;  Laterality: Left;    REMOVAL OF IMPLANT N/A 8/9/2023    Procedure: REMOVAL, IMPLANT;  Surgeon: Tesfaye Natarajan MD;  Location: [facility] OR;  Service: General;  Laterality: N/A;  removal mesh       Time Tracking:     PT Received On: 05/05/25  PT Start Time: 1318     PT Stop Time: 1340  PT Total Time (min): 22 min     Billable Minutes: Evaluation 22 05/05/2025

## 2025-05-06 VITALS
HEIGHT: 70 IN | WEIGHT: 211 LBS | OXYGEN SATURATION: 98 % | TEMPERATURE: 98 F | BODY MASS INDEX: 30.21 KG/M2 | HEART RATE: 77 BPM | SYSTOLIC BLOOD PRESSURE: 113 MMHG | DIASTOLIC BLOOD PRESSURE: 60 MMHG | RESPIRATION RATE: 17 BRPM

## 2025-05-06 PROBLEM — D64.9 ANEMIA: Status: ACTIVE | Noted: 2025-05-06

## 2025-05-06 LAB
ALBUMIN SERPL BCP-MCNC: 2.5 G/DL (ref 3.4–4.8)
ALBUMIN/GLOB SERPL: 0.7 {RATIO}
ALP SERPL-CCNC: 59 U/L (ref 40–150)
ALT SERPL W P-5'-P-CCNC: 23 U/L
ANION GAP SERPL CALCULATED.3IONS-SCNC: 13 MMOL/L (ref 7–16)
AST SERPL W P-5'-P-CCNC: 28 U/L (ref 11–45)
BASOPHILS # BLD AUTO: 0.05 K/UL (ref 0–0.2)
BASOPHILS NFR BLD AUTO: 0.7 % (ref 0–1)
BILIRUB SERPL-MCNC: 1.4 MG/DL
BUN SERPL-MCNC: 11 MG/DL (ref 8–26)
BUN/CREAT SERPL: 17 (ref 6–20)
CALCIUM SERPL-MCNC: 8.1 MG/DL (ref 8.8–10)
CHLORIDE SERPL-SCNC: 105 MMOL/L (ref 98–107)
CO2 SERPL-SCNC: 23 MMOL/L (ref 23–31)
CREAT SERPL-MCNC: 0.66 MG/DL (ref 0.72–1.25)
DIFFERENTIAL METHOD BLD: ABNORMAL
EGFR (NO RACE VARIABLE) (RUSH/TITUS): 95 ML/MIN/1.73M2
EOSINOPHIL # BLD AUTO: 0.5 K/UL (ref 0–0.5)
EOSINOPHIL NFR BLD AUTO: 7.1 % (ref 1–4)
ERYTHROCYTE [DISTWIDTH] IN BLOOD BY AUTOMATED COUNT: 11.4 % (ref 11.5–14.5)
GLOBULIN SER-MCNC: 3.7 G/DL (ref 2–4)
GLUCOSE SERPL-MCNC: 115 MG/DL (ref 82–115)
HCT VFR BLD AUTO: 31.5 % (ref 40–54)
HGB BLD-MCNC: 10.8 G/DL (ref 13.5–18)
IMM GRANULOCYTES # BLD AUTO: 0.05 K/UL (ref 0–0.04)
IMM GRANULOCYTES NFR BLD: 0.7 % (ref 0–0.4)
LYMPHOCYTES # BLD AUTO: 1.44 K/UL (ref 1–4.8)
LYMPHOCYTES NFR BLD AUTO: 20.5 % (ref 27–41)
MCH RBC QN AUTO: 33.8 PG (ref 27–31)
MCHC RBC AUTO-ENTMCNC: 34.3 G/DL (ref 32–36)
MCV RBC AUTO: 98.4 FL (ref 80–96)
MONOCYTES # BLD AUTO: 0.84 K/UL (ref 0–0.8)
MONOCYTES NFR BLD AUTO: 12 % (ref 2–6)
MPC BLD CALC-MCNC: 9.7 FL (ref 9.4–12.4)
NEUTROPHILS # BLD AUTO: 4.14 K/UL (ref 1.8–7.7)
NEUTROPHILS NFR BLD AUTO: 59 % (ref 53–65)
NRBC # BLD AUTO: 0 X10E3/UL
NRBC, AUTO (.00): 0 %
PLATELET # BLD AUTO: 185 K/UL (ref 150–400)
POTASSIUM SERPL-SCNC: 3.7 MMOL/L (ref 3.5–5.1)
PROT SERPL-MCNC: 6.2 G/DL (ref 5.8–7.6)
RBC # BLD AUTO: 3.2 M/UL (ref 4.6–6.2)
SODIUM SERPL-SCNC: 137 MMOL/L (ref 136–145)
WBC # BLD AUTO: 7.02 K/UL (ref 4.5–11)

## 2025-05-06 PROCEDURE — 36415 COLL VENOUS BLD VENIPUNCTURE: CPT

## 2025-05-06 PROCEDURE — 99238 HOSP IP/OBS DSCHRG MGMT 30/<: CPT | Mod: ,,, | Performed by: NURSE PRACTITIONER

## 2025-05-06 PROCEDURE — 80053 COMPREHEN METABOLIC PANEL: CPT

## 2025-05-06 PROCEDURE — 97110 THERAPEUTIC EXERCISES: CPT | Mod: CQ

## 2025-05-06 PROCEDURE — 1111F DSCHRG MED/CURRENT MED MERGE: CPT | Mod: CPTII,,, | Performed by: NURSE PRACTITIONER

## 2025-05-06 PROCEDURE — 97530 THERAPEUTIC ACTIVITIES: CPT | Mod: CQ

## 2025-05-06 PROCEDURE — 94761 N-INVAS EAR/PLS OXIMETRY MLT: CPT

## 2025-05-06 PROCEDURE — 25000003 PHARM REV CODE 250: Performed by: SURGERY

## 2025-05-06 PROCEDURE — 85025 COMPLETE CBC W/AUTO DIFF WBC: CPT

## 2025-05-06 PROCEDURE — 97116 GAIT TRAINING THERAPY: CPT | Mod: CQ

## 2025-05-06 PROCEDURE — 99232 SBSQ HOSP IP/OBS MODERATE 35: CPT | Mod: ,,, | Performed by: STUDENT IN AN ORGANIZED HEALTH CARE EDUCATION/TRAINING PROGRAM

## 2025-05-06 RX ADMIN — QUETIAPINE FUMARATE 50 MG: 25 TABLET ORAL at 08:05

## 2025-05-06 RX ADMIN — MUPIROCIN: 20 OINTMENT TOPICAL at 08:05

## 2025-05-06 RX ADMIN — METOPROLOL TARTRATE 12.5 MG: 25 TABLET, FILM COATED ORAL at 08:05

## 2025-05-06 NOTE — SUBJECTIVE & OBJECTIVE
Interval History: No acute events overnight    Review of Systems   Constitutional:  Negative for fever.   HENT:  Negative for congestion.    Eyes: Negative.    Respiratory:  Negative for cough, choking, shortness of breath and wheezing.    Cardiovascular:  Negative for chest pain, palpitations and leg swelling.   Gastrointestinal:  Positive for abdominal distention. Negative for abdominal pain, anal bleeding and blood in stool. Nausea: improving.  Genitourinary:  Negative for dysuria, flank pain, frequency, hematuria and urgency.   Neurological:  Negative for headaches.   Psychiatric/Behavioral: Negative.       Objective:     Vital Signs (Most Recent):  Temp: 97.8 °F (36.6 °C) (05/06/25 1128)  Pulse: 77 (05/06/25 1128)  Resp: 17 (05/06/25 1128)  BP: 113/60 (05/06/25 1128)  SpO2: 98 % (05/06/25 1128) Vital Signs (24h Range):  Temp:  [97.8 °F (36.6 °C)-98.9 °F (37.2 °C)] 97.8 °F (36.6 °C)  Pulse:  [72-84] 77  Resp:  [16-17] 17  SpO2:  [94 %-98 %] 98 %  BP: (113-156)/(60-71) 113/60     Weight: 95.7 kg (210 lb 15.7 oz)  Body mass index is 30.27 kg/m².    Intake/Output Summary (Last 24 hours) at 5/6/2025 1340  Last data filed at 5/5/2025 1910  Gross per 24 hour   Intake 240 ml   Output 200 ml   Net 40 ml         Physical Exam  Constitutional:       General: He is not in acute distress.     Appearance: Normal appearance.   HENT:      Head: Normocephalic.   Cardiovascular:      Rate and Rhythm: Normal rate.   Pulmonary:      Effort: Pulmonary effort is normal. No respiratory distress.   Abdominal:      General: There is distension.      Tenderness: There is no abdominal tenderness.   Musculoskeletal:         General: Normal range of motion.   Skin:     General: Skin is warm.      Coloration: Skin is not jaundiced.   Neurological:      General: No focal deficit present.      Mental Status: He is alert and oriented to person, place, and time.      Cranial Nerves: No cranial nerve deficit.               Significant Labs: All  pertinent labs within the past 24 hours have been reviewed.    Significant Imaging: I have reviewed all pertinent imaging results/findings within the past 24 hours.

## 2025-05-06 NOTE — SUBJECTIVE & OBJECTIVE
Interval History: No acute events overnight    Review of Systems   Constitutional:  Negative for fever.   HENT:  Negative for congestion.    Eyes: Negative.    Respiratory:  Negative for cough, choking, shortness of breath and wheezing.    Cardiovascular:  Negative for chest pain, palpitations and leg swelling.   Gastrointestinal:  Positive for abdominal distention. Negative for abdominal pain, anal bleeding and blood in stool. Nausea: improving.  Genitourinary:  Negative for dysuria, flank pain, frequency, hematuria and urgency.   Neurological:  Negative for headaches.   Psychiatric/Behavioral: Negative.       Objective:     Vital Signs (Most Recent):  Temp: 97.8 °F (36.6 °C) (05/06/25 1128)  Pulse: 77 (05/06/25 1128)  Resp: 17 (05/06/25 1128)  BP: 113/60 (05/06/25 1128)  SpO2: 98 % (05/06/25 1128) Vital Signs (24h Range):  Temp:  [97.8 °F (36.6 °C)-98.9 °F (37.2 °C)] 97.8 °F (36.6 °C)  Pulse:  [72-84] 77  Resp:  [16-17] 17  SpO2:  [94 %-98 %] 98 %  BP: (113-156)/(60-71) 113/60     Weight: 95.7 kg (210 lb 15.7 oz)  Body mass index is 30.27 kg/m².    Intake/Output Summary (Last 24 hours) at 5/6/2025 1325  Last data filed at 5/5/2025 1910  Gross per 24 hour   Intake 240 ml   Output 200 ml   Net 40 ml         Physical Exam  Constitutional:       General: He is not in acute distress.     Appearance: Normal appearance.   HENT:      Head: Normocephalic.   Cardiovascular:      Rate and Rhythm: Normal rate.   Pulmonary:      Effort: Pulmonary effort is normal. No respiratory distress.   Abdominal:      General: There is distension.      Tenderness: There is no abdominal tenderness.   Musculoskeletal:         General: Normal range of motion.   Skin:     General: Skin is warm.      Coloration: Skin is not jaundiced.   Neurological:      General: No focal deficit present.      Mental Status: He is alert and oriented to person, place, and time.      Cranial Nerves: No cranial nerve deficit.               Significant Labs: All  pertinent labs within the past 24 hours have been reviewed.    Significant Imaging: I have reviewed all pertinent imaging results/findings within the past 24 hours.

## 2025-05-06 NOTE — PT/OT/SLP PROGRESS
Physical Therapy Treatment    Patient Name:  Danny Flood   MRN:  [id number]    Recommendations:     Discharge Recommendations: Low Intensity Therapy  Discharge Equipment Recommendations: none  Barriers to discharge: ongoing medical treatment    Assessment:     Danny Flood is a 80 y.o. male admitted with a medical diagnosis of Small bowel obstruction due to adhesions.  He presents with the following impairments/functional limitations: weakness, impaired cognition, impaired self care skills.    Pt demo increased gait distance as compared to eval.  Pt's daughter concerned about level of assistance required for pt to perform sit to stand, daughter was educated on giving proper cueing to facilitate a smoother transfer and decreased assistance on the caregiver part    PT POC discussed with Ramón Díaz,PT      Rehab Prognosis: Good; patient would benefit from acute skilled PT services to address these deficits and reach maximum level of function.    Recent Surgery: * No surgery found *      Plan:     During this hospitalization, patient to be seen 5 x/week to address the identified rehab impairments via gait training, therapeutic activities, therapeutic exercises, neuromuscular re-education and progress toward the following goals:    Plan of Care Expires:  06/09/25    Subjective     Chief Complaint: Small bowel obstruction due to adhesions   Patient/Family Comments/goals: pt's daughter concerned about level of assistance required for pt to come to stance   Pain/Comfort:         Objective:     Communicated with Guadalupe Mercer RN prior to session.  Patient found HOB elevated with peripheral IV upon PT entry to room.     General Precautions: Standard, fall  Orthopedic Precautions:    Braces:    Respiratory Status: Room air     Functional Mobility:  Bed Mobility:     Supine to Sit: contact guard assistance, minimum assistance, and cueing for proper sequence  Transfers:     Sit to Stand:  contact guard assistance and minimum  assistance with rolling walker and x 7 trials  Gait: 115' x 2 trials with RW and contact guard assist; slow adithya, short step length, seated rest break      AM-PAC 6 CLICK MOBILITY  Turning over in bed (including adjusting bedclothes, sheets and blankets)?: 4  Sitting down on and standing up from a chair with arms (e.g., wheelchair, bedside commode, etc.): 3  Moving from lying on back to sitting on the side of the bed?: 4  Moving to and from a bed to a chair (including a wheelchair)?: 3  Need to walk in hospital room?: 3  Climbing 3-5 steps with a railing?: 3 (NT this date)  Basic Mobility Total Score: 20       Treatment & Education:  Pt performed 2 x 15 reps (B) LE exercises: ap, quad set, glut set, straight leg raise, hip ab/adduction, long arc quad, heel slide with active assist      Mass sit to stand with RW and contact guard to minimum assist; heavy cueing for proper sequence, hands placement    Patient left up in chair with all lines intact, call button in reach, and daughter present..    GOALS:   Multidisciplinary Problems       Physical Therapy Goals          Problem: Physical Therapy    Goal Priority Disciplines Outcome Interventions   Physical Therapy Goal     PT, PT/OT Progressing    Description: Short term goals:  . Supine to sit with Stand-by Assistance  . Sit to supine with Stand-by Assistance  . Sit to stand transfer with Stand-by Assistance  . Gait  x 150 feet with Contact Guard Assistance using Rolling Walker.     Long term goals:  Patient will gain highest level functional mobility with lowest level of assistive device to return to desired living arrangement and prior ADL's.                          DME Justifications:      Time Tracking:     PT Received On: 05/06/25  PT Start Time: 0939     PT Stop Time: 1018  PT Total Time (min): 39 min     Billable Minutes: Gait Training 15, Therapeutic Activity 8, and Therapeutic Exercise 15    Treatment Type: Treatment  PT/PTA: PTA     Number of PTA visits  since last PT visit: 1 05/06/2025

## 2025-05-06 NOTE — PLAN OF CARE
[facility]  Discharge Final Note    Primary Care Provider: Cameron Valencia MD    Expected Discharge Date: 5/6/2025    Final Discharge Note (most recent)       Final Note - 05/06/25 1242          Final Note    Assessment Type Final Discharge Note     Anticipated Discharge Disposition Home-Health Care [facility]     Hospital Resources/Appts/Education Provided Provided patient/caregiver with written discharge plan information        Post-Acute Status    Post-Acute Authorization Home Health     Home Health Status Set-up Complete/Auth obtained     Patient choice form signed by patient/caregiver List with quality metrics by geographic area provided;List from CMS Compare;List from System Post-Acute Care     Discharge Delays None known at this time                     Important Message from Medicare  Important Message from Medicare regarding Discharge Appeal Rights: Given to patient/caregiver, Explained to patient/caregiver, Signed/date by patient/caregiver     Date IMM was signed: 05/02/25  Time IMM was signed: 1024     Follow-up providers       Tesfaye Natarajan MD   Specialty: General Surgery, Surgery    [address] [place] [address]   Phone: [phone]       Next Steps: Follow up in 2 week(s)    Instructions: Please follow up on May 20 at 9:15 am    Cameron Valencia MD   Specialty: Hematology and Oncology   Relationship: PCP - [address] [place] [address]   Phone: [phone]       Next Steps: Follow up in 1 week(s)    Instructions: Please follow up on May 14 at 2:30 pm              After-discharge care                Home Medical Care       [facility]   Service: Home Health Services    [address] [place] [address]   Phone: [phone]                             Pt to discharge home today with [facility]

## 2025-05-06 NOTE — ASSESSMENT & PLAN NOTE
Patient was admitted by the General surgery for a suspected SBO.  NG tube in situ by primary team for decompression, patient is place NPO  Pain managed by PRN medications    5/4  No abdominal pain, n/v   NGT to suction for bowel decompression   Gastrografin challenge today per surgery   Con't prn pain meds   5/5   Gastrografin challenge done yesterday. No evidence for complete small bowel obstruction.   Pt had two reported BM. NGT with suction removed today . Pt now on clear liquid diet  Will continue to monitor. Plan for discharge   5/6 heart rate controlled on metoprolol.  Continue

## 2025-05-06 NOTE — DISCHARGE SUMMARY
[facility]  General Surgery  Discharge Summary      Patient Name: Danny Flood  MRN: [id number]  Admission Date: 5/1/2025  Hospital Length of Stay: 5 days  Discharge Date and Time: 05/06/2025 10:55 AM  Attending Physician: Tesfaye Natarajan MD   Discharging Provider: PHIL Eng  Primary Care Provider: Cameron Valencia MD    HPI:   Patient is transferred to [facility] from [facility] to service of Dr Natarajan. Daughter is at bedside.  Patient reports 1 week history of abdominal pain.  He states he started vomiting a few days ago and symptoms have progressively worsened.  He denies passing gas.  He marion bowel movements.  Last bowel movement was 3-4 days ago. Patient denies fever. Past medical history includes, HTN, DM, DVT, cirrhosis, portal vein thrombosis, anemia, mitral regurg, anemia, dementia, hyperammonemia, and dysphagia.  Patient has NG tube with 900 cc of dark bilious drainage noted in canister.  Patient vomited just prior to exam.  Abdomen is distended.  Large right lower quadrant hernia noted.  Abdomen is soft.  CT abdomen pelvis disc to be uploaded for review.  Will continue npo.  We will continue NG tube to low intermittent suction.  We will continue to monitor patient's progress.    * No surgery found *      Indwelling Lines/Drains at time of discharge:   Lines/Drains/Airways       None                 Hospital Course: 05/05/25 Patient is awake and alert this morning on exam.  NG with less than 100ml output overnight.  Patient denies pain this morning. Abdomen soft and mildly distended.  Reports 3 bowel movements overnight after Gastrograffin challenge yesterday.  No obstruction noted on GC challenge.  Will remove ng today.  Will order clear liquid diet as tolerated.     05/06/25 Patient is awake and alert this morning on exam.  Abdomen noted soft and non-distended.  Patient states he is hungry. Patient has tolerated clear liquid diet.  Will add regular diet as  tolerated.  Patient is passing gas and having bowel movements over night.  Encouraged to work with PT today.  Patient will be discharged home today.  He will follow up in general surgery clinic in 2 weeks. Patient and family verbalize understanding and agree with plan of care.     Goals of Care Treatment Preferences:  Code Status: Full Code      Consults:   Consults (From admission, onward)          Status Ordering Provider     Inpatient consult to Hospital Medicine  Once        Provider:  Jeannine Weiss MD    Completed FENG GILES            Significant Diagnostic Studies: Labs: BMP:   Recent Labs   Lab 05/05/25 0445 05/06/25  0436   * 115    137   K 3.8 3.7   * 105   CO2 22* 23   BUN 13 11   CREATININE 0.69* 0.66*   CALCIUM 8.1* 8.1*   , CMP   Recent Labs   Lab 05/05/25 0445 05/06/25  0436    137   K 3.8 3.7   * 105   CO2 22* 23   * 115   BUN 13 11   CREATININE 0.69* 0.66*   CALCIUM 8.1* 8.1*   PROT 6.3 6.2   ALBUMIN 2.6* 2.5*   BILITOT 1.4 1.4   ALKPHOS 57 59   AST 29 28   ALT 18 23   ANIONGAP 11 13   , CBC   Recent Labs   Lab 05/05/25 0445 05/06/25 0436   WBC 8.04 7.02   HGB 10.5* 10.8*   HCT 31.8* 31.5*    185   , and All labs within the past 24 hours have been reviewed    Pending Diagnostic Studies:       None          Final Active Diagnoses:    Diagnosis Date Noted POA    PRINCIPAL PROBLEM:  Small bowel obstruction due to adhesions [K56.50] 05/02/2025 Yes    Chronic a-fib [I48.20] 05/03/2025 Yes    Cirrhosis [K74.60] 05/03/2025 Yes    Mood disorder [F39] 05/03/2025 Yes    Portal hypertension [K76.6] 08/13/2023 Yes      Problems Resolved During this Admission:    Diagnosis Date Noted Date Resolved POA    Paroxysmal atrial fibrillation [I48.0] 08/16/2023 05/03/2025 Yes      Discharged Condition: good    Disposition: Home or Self Care    Follow Up:   Follow-up Information       Feng Giles MD Follow up in 2 week(s).    Specialties: General  Surgery, Surgery  Contact information:  [address] [place] [address] [place] [address]  [phone]                           Patient Instructions:      Diet Adult Regular   Order Comments: Increase water and fiber in diet to prevent constipation.     Notify your health care provider if you experience any of the following:  temperature >100.4     Notify your health care provider if you experience any of the following:  persistent nausea and vomiting or diarrhea     Notify your health care provider if you experience any of the following:  severe uncontrolled pain     Notify your health care provider if you experience any of the following:  redness, tenderness, or signs of infection (pain, swelling, redness, odor or green/yellow discharge around incision site)     Notify your health care provider if you experience any of the following:  difficulty breathing or increased cough     Notify your health care provider if you experience any of the following:  severe persistent headache     Notify your health care provider if you experience any of the following:  worsening rash     Notify your health care provider if you experience any of the following:  persistent dizziness, light-headedness, or visual disturbances     Notify your health care provider if you experience any of the following:  increased confusion or weakness     Notify your health care provider if you experience any of the following:     Activity as tolerated     Medications:  Reconciled Home Medications:      Medication List        CONTINUE taking these medications      b complex vitamins capsule  Take 1 capsule by mouth once daily.     ELIQUIS 5 mg Tab  Generic drug: apixaban  Take 5 mg by mouth 2 (two) times daily.     fish oil-omega-3 fatty acids 300-1,000 mg capsule  Take 1 capsule by mouth once daily.     furosemide 20 MG tablet  Commonly known as: LASIX  Take 1 tablet (20 mg total) by mouth daily as needed (for weight gain of more than 5 lbs in 1 week or pitting  edema in lower exts.).     lactulose 10 gram/15 mL solution  Commonly known as: CHRONULAC  SMARTSIG:Milliliter(s) By Mouth     metoprolol tartrate 25 MG tablet  Commonly known as: LOPRESSOR  Take 0.5 tablets (12.5 mg total) by mouth 2 (two) times daily.     ONE DAILY MULTIVITAMIN per tablet  Generic drug: multivitamin  Take 1 tablet by mouth once daily.     pantoprazole 40 MG tablet  Commonly known as: PROTONIX  Take 1 tablet (40 mg total) by mouth once daily.     PROBIOTIC 10 billion cell Cap  Generic drug: Lactobacillus acidophilus  Take by mouth.     QUEtiapine 50 MG tablet  Commonly known as: SEROQUEL  1 tablet (50 mg total) by Per NG tube route 2 (two) times daily.     rifAXIMin 550 mg Tab  Commonly known as: XIFAXAN  Take 1 tablet (550 mg total) by mouth 2 (two) times daily.     spironolactone 50 MG tablet  Commonly known as: ALDACTONE  Take 1 tablet (50 mg total) by mouth once daily.     timolol maleate 0.5% 0.5 % Drop  Commonly known as: TIMOPTIC  1 drop once daily.     VITAMIN C 1000 MG tablet  Generic drug: ascorbic acid (vitamin C)  Take 1,000 mg by mouth once daily.     vitamin D 1000 units Tab  Commonly known as: VITAMIN D3  Take 1,000 Units by mouth once daily.     vitamin E 400 UNIT capsule  Take 400 Units by mouth once daily.            Time spent on the discharge of patient: 25 minutes    PHIL Eng  General Surgery  [facility]

## 2025-05-06 NOTE — PROGRESS NOTES
[facility]  [facility] Medicine  Progress Note    Patient Name: Danny Flood  MRN: [id number]  Patient Class: IP- Inpatient   Admission Date: 5/1/2025  Length of Stay: 5 days  Attending Physician: No att. providers found  Primary Care Provider: Cameron Valencia MD        Subjective     Principal Problem:Small bowel obstruction due to adhesions        HPI:  Pt is a 80 y.o. male transferred to [facility] from [facility] to service of Dr Natarajan for small bowel obstruction. Patient reports 1 week history of abdominal pain. Abdominal pain was generalized and achy in nature. He graded it as 5/10 on pain scale. He states he started vomiting a few days ago and symptoms have progressively worsened. He denies passing gas or bowel movements. Last bowel movement was 3-4 days ago. Patient takes lactulose to keep his BM regular and had his last dose before coming to the ED. He has noticed abdominal distension as well.   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 Med Rec was completed at bedside. Current outpatient medications will be restarted as tolerated and are listed below:        This consult was performed under the direct supervision of Dr. Weiss. Thank you for allowing the [facility] to be involved in the care of this patient.    Overview/Hospital Course:  No notes on file    Interval History: No acute events overnight    Review of Systems   Constitutional:  Negative for fever.   HENT:  Negative for congestion.    Eyes: Negative.    Respiratory:  Negative for cough, choking, shortness of breath and wheezing.    Cardiovascular:  Negative for chest pain, palpitations and leg swelling.   Gastrointestinal:  Positive for abdominal distention.  Negative for abdominal pain, anal bleeding and blood in stool. Nausea: improving.  Genitourinary:  Negative for dysuria, flank pain, frequency, hematuria and urgency.   Neurological:  Negative for headaches.   Psychiatric/Behavioral: Negative.       Objective:     Vital Signs (Most Recent):  Temp: 97.8 °F (36.6 °C) (05/06/25 1128)  Pulse: 77 (05/06/25 1128)  Resp: 17 (05/06/25 1128)  BP: 113/60 (05/06/25 1128)  SpO2: 98 % (05/06/25 1128) Vital Signs (24h Range):  Temp:  [97.8 °F (36.6 °C)-98.9 °F (37.2 °C)] 97.8 °F (36.6 °C)  Pulse:  [72-84] 77  Resp:  [16-17] 17  SpO2:  [94 %-98 %] 98 %  BP: (113-156)/(60-71) 113/60     Weight: 95.7 kg (210 lb 15.7 oz)  Body mass index is 30.27 kg/m².    Intake/Output Summary (Last 24 hours) at 5/6/2025 1325  Last data filed at 5/5/2025 1910  Gross per 24 hour   Intake 240 ml   Output 200 ml   Net 40 ml         Physical Exam  Constitutional:       General: He is not in acute distress.     Appearance: Normal appearance.   HENT:      Head: Normocephalic.   Cardiovascular:      Rate and Rhythm: Normal rate.   Pulmonary:      Effort: Pulmonary effort is normal. No respiratory distress.   Abdominal:      General: There is distension.      Tenderness: There is no abdominal tenderness.   Musculoskeletal:         General: Normal range of motion.   Skin:     General: Skin is warm.      Coloration: Skin is not jaundiced.   Neurological:      General: No focal deficit present.      Mental Status: He is alert and oriented to person, place, and time.      Cranial Nerves: No cranial nerve deficit.               Significant Labs: All pertinent labs within the past 24 hours have been reviewed.    Significant Imaging: I have reviewed all pertinent imaging results/findings within the past 24 hours.      Assessment & Plan  Small bowel obstruction due to adhesions  Patient was admitted by the General surgery for a suspected SBO.  NG tube in situ by primary team for decompression, patient is place  NPO  Pain managed by PRN medications    5/4  No abdominal pain, n/v   NGT to suction for bowel decompression   Gastrografin challenge today per surgery   Con't prn pain meds   5/5   Gastrografin challenge done yesterday. No evidence for complete small bowel obstruction.   Pt had two reported BM. NGT with suction removed today . Pt now on clear liquid diet  Will continue to monitor. Plan for discharge   5/6 heart rate controlled on metoprolol.  Continue  Portal hypertension  Past medical history of portal hypertension secondary to cirrhosis of liver from unknown cause.   -Patient is established with Gastroenterology outpatient.  -Patient is at his baseline as far as mentation is concerned  -Monitor daily CMP      Chronic a-fib  Patient has long standing persistent (>12 months) atrial fibrillation. Patient is currently in sinus rhythm. [id number] Score: 2. The patients heart rate in the last 24 hours is as follows:  Pulse  Min: 72  Max: 84     Antiarrhythmics  metoprolol tartrate (LOPRESSOR) split tablet 12.5 mg, 2 times daily, Oral    Anticoagulants  enoxaparin injection 40 mg, Every 24 hours, Subcutaneous  , 2 times daily, Oral  At home-Eliquis 5mg PO BID which is on hold now    Plan  - Replete lytes with a goal of K>4, Mg >2  - Patient is anticoagulated, see medications listed above.  - Patient's afib is currently controlled  - Will monitor    5/5  Lytes wnl; will con't to monitor with daily CMP  Con't metoprolol for rate control   Con't lovenox for anticoag        Cirrhosis  Patient with known Cirrhosis of unknown etiology.  MELD-Na score calculated; MELD 3.0: 14 at 5/4/2025  5:20 AM  MELD-Na: 13 at 5/4/2025  5:20 AM  Calculated from:  Serum Creatinine: 0.76 mg/dL (Using min of 1 mg/dL) at 5/4/2025  5:20 AM  Serum Sodium: 139 mmol/L (Using max of 137 mmol/L) at 5/4/2025  5:20 AM  Total Bilirubin: 1.7 mg/dL at 5/4/2025  5:20 AM  Serum Albumin: 2.7 g/dL at 5/4/2025  5:20 AM  INR(ratio): 1.52 at 5/2/2025  9:11  PM  Age at listing (hypothetical): 80 years  Sex: Male at 5/4/2025  5:20 AM      Continue chronic meds.. Will avoid any hepatotoxic meds, and monitor CBC/CMP/INR for synthetic function.     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 Most recent hemoglobin and hematocrit are listed below.  Recent Labs     05/04/25  0520 05/05/25  0445 05/06/25  0436   HGB 10.5* 10.5* 10.8*   HCT 32.3* 31.8* 31.5*     Plan  - Monitor serial CBC: Daily  - Transfuse PRBC if patient becomes hemodynamically unstable, symptomatic or H/H drops below 7/21.  - Patient has not received any PRBC transfusions to date  - Patient's anemia is currently stable  - follow  VTE Risk Mitigation (From admission, onward)           Ordered     enoxaparin injection 40 mg  Every 24 hours         05/01/25 2321     IP VTE LOW RISK PATIENT  Once         05/01/25 2321     Place sequential compression device  Until discontinued         05/01/25 2321                    Discharge Planning   AMBAR: 5/6/2025     Code Status: Full Code   Medical Readiness for Discharge Date: 5/6/2025  Discharge Plan A: Home   Discharge Delays: None known at this time        Continue metoprolol.  Heart rate well-controlled            Von Cotton DO  Department of Hospital Medicine   [facility]

## 2025-05-06 NOTE — PLAN OF CARE
Met with pt and daughter  to review discharge recommendation of Homehealth and is agreeable to plan    Patient/family provided list of facilities in-network with patient's payor plan. Providers that are owned, operated, or affiliated with [facility] are included on the list.     Notified that referral sent to below listed facilities from in-network list based on proximity to home/family support:   1.[facility]  2.  3.  4.  5. (can send more than 5)    Patient/family instructed to identify preference.    Preferred Facility: (if more than 1, listed in order of descending preference)  1.  OR  Patient has declined to select a preferred provider and elects placement with the first accepting in network provider that is available to provide services as ordered by the physician       If an additional preferred facility not listed above is identified, additional referral to be sent. If above facilities unable to accept, will send additional referrals to in-network providers.

## 2025-05-06 NOTE — NURSING
"Patient's daughter at bedside. She reported concern for worsening night time delirium associated with this hospital stay. She states he said he is "at the [email or web address]" and has been asking where his late wife is. Daughter states similar behavior occurred with the past two hospital stays. Delirium precautions ordered.  "

## 2025-05-06 NOTE — ASSESSMENT & PLAN NOTE
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 Most recent hemoglobin and hematocrit are listed below.  Recent Labs     05/04/25  0520 05/05/25  0445 05/06/25  0436   HGB 10.5* 10.5* 10.8*   HCT 32.3* 31.8* 31.5*     Plan  - Monitor serial CBC: Daily  - Transfuse PRBC if patient becomes hemodynamically unstable, symptomatic or H/H drops below 7/21.  - Patient has not received any PRBC transfusions to date  - Patient's anemia is currently stable  - follow

## 2025-05-06 NOTE — ASSESSMENT & PLAN NOTE
Patient has long standing persistent (>12 months) atrial fibrillation. Patient is currently in sinus rhythm. [id number] Score: 2. The patients heart rate in the last 24 hours is as follows:  Pulse  Min: 72  Max: 84     Antiarrhythmics  metoprolol tartrate (LOPRESSOR) split tablet 12.5 mg, 2 times daily, Oral    Anticoagulants  enoxaparin injection 40 mg, Every 24 hours, Subcutaneous  , 2 times daily, Oral  At home-Eliquis 5mg PO BID which is on hold now    Plan  - Replete lytes with a goal of K>4, Mg >2  - Patient is anticoagulated, see medications listed above.  - Patient's afib is currently controlled  - Will monitor    5/5  Lytes wnl; will con't to monitor with daily CMP  Con't metoprolol for rate control   Con't lovenox for anticoag

## 2025-05-08 ENCOUNTER — PATIENT OUTREACH (OUTPATIENT)
Dept: ADMINISTRATIVE | Facility: CLINIC | Age: 80
End: 2025-05-08
Payer: MEDICARE

## 2025-05-08 NOTE — PROGRESS NOTES
C3 nurse spoke with Danny Flood  for a TCC post hospital discharge follow up call. The patient has a scheduled HOSFU appointment with Cameron Valencia MD  on 5/14/25 @ 6026.

## 2025-05-20 ENCOUNTER — OFFICE VISIT (OUTPATIENT)
Dept: SURGERY | Facility: CLINIC | Age: 80
End: 2025-05-20
Attending: SURGERY
Payer: MEDICARE

## 2025-05-20 VITALS — BODY MASS INDEX: 29.62 KG/M2 | WEIGHT: 200 LBS | HEIGHT: 69 IN

## 2025-05-20 DIAGNOSIS — K43.9 VENTRAL HERNIA WITHOUT OBSTRUCTION OR GANGRENE: Primary | ICD-10-CM

## 2025-05-20 DIAGNOSIS — D64.9 ANEMIA, UNSPECIFIED TYPE: ICD-10-CM

## 2025-05-20 DIAGNOSIS — T85.79XA INFECTED HERNIOPLASTY MESH, INITIAL ENCOUNTER: ICD-10-CM

## 2025-05-20 DIAGNOSIS — K76.6 PORTAL HYPERTENSION: ICD-10-CM

## 2025-05-20 DIAGNOSIS — F39 MOOD DISORDER: ICD-10-CM

## 2025-05-20 PROCEDURE — 1101F PT FALLS ASSESS-DOCD LE1/YR: CPT | Mod: CPTII,,, | Performed by: SURGERY

## 2025-05-20 PROCEDURE — 1111F DSCHRG MED/CURRENT MED MERGE: CPT | Mod: CPTII,,, | Performed by: SURGERY

## 2025-05-20 PROCEDURE — 99212 OFFICE O/P EST SF 10 MIN: CPT | Mod: S$PBB,,, | Performed by: SURGERY

## 2025-05-20 PROCEDURE — 99999 PR PBB SHADOW E&M-EST. PATIENT-LVL III: CPT | Mod: PBBFAC,,, | Performed by: SURGERY

## 2025-05-20 PROCEDURE — 1159F MED LIST DOCD IN RCRD: CPT | Mod: CPTII,,, | Performed by: SURGERY

## 2025-05-20 PROCEDURE — 3288F FALL RISK ASSESSMENT DOCD: CPT | Mod: CPTII,,, | Performed by: SURGERY

## 2025-05-20 PROCEDURE — 99213 OFFICE O/P EST LOW 20 MIN: CPT | Mod: PBBFAC | Performed by: SURGERY

## 2025-05-20 NOTE — PROGRESS NOTES
General Surgery Progress Note    Subjective:      Patient ID: Danny Flood is a 80 y.o. male.    Chief Complaint: Follow-up (hernia)      HPI:  80-year-old male recently discharged for small-bowel obstruction treated conservatively.  Known to my service multiple abdominal operations last 1 I did was not infected mesh that required removal.  He has had a loss of domain since that procedure and in his hernias gotten a little bigger.  Currently since discharge he has been doing great with no issues no abdominal pain nausea vomiting and back to his normal self.  Does have history of dementia.  Family agrees no need for urgent surgical intervention    Past Medical History:   Diagnosis Date    Acquired portal-systemic shunt due to cirrhosis     Anticoagulant long-term use     Edema due to hypoalbuminemia     Infected hernioplasty mesh 08/13/2023    Mixed hyperlipidemia     Paroxysmal atrial fibrillation 08/16/2023    Peripheral venous insufficiency     Dr. Moshe Marquez    Portal hypertension      Past Surgical History:   Procedure Laterality Date    COLON SURGERY      partial colon resection    EXCISION, SMALL INTESTINE N/A 8/9/2023    Procedure: EXCISION, SMALL INTESTINE;  Surgeon: Tesfaye Natarajan MD;  Location: [facility] OR;  Service: General;  Laterality: N/A;    HERNIA REPAIR      LAPAROTOMY, EXPLORATORY N/A 8/9/2023    Procedure: LAPAROTOMY, EXPLORATORY;  Surgeon: Tesfaye Natarajan MD;  Location: [facility] OR;  Service: General;  Laterality: N/A;    LEFT HEART CATHETERIZATION Left 8/15/2023    Procedure: Left heart cath;  Surgeon: Edinson Torres DO;  Location: [facility] CATH LAB;  Service: Cardiology;  Laterality: Left;    REMOVAL OF IMPLANT N/A 8/9/2023    Procedure: REMOVAL, IMPLANT;  Surgeon: Tesfaye Natarajan MD;  Location: [facility] OR;  Service: General;  Laterality: N/A;  removal mesh     Social History[1]    Current Medications[2]  Review of  "patient's allergies indicates:  No Known Allergies    Ht 5' 9" (1.753 m)   Wt 90.7 kg (200 lb)   BMI 29.53 kg/m²     Review of Systems   Constitutional: Negative for chills, fever, weight gain and weight loss.   Eyes:  Negative for blurred vision.   Cardiovascular:  Negative for chest pain, claudication and palpitations.   Respiratory:  Negative for cough and shortness of breath.    Musculoskeletal:  Negative for muscle weakness.   Gastrointestinal:  Negative for abdominal pain, diarrhea, nausea and vomiting.   Neurological:  Negative for numbness and paresthesias.         Objective:     Constitutional: Well, No apparent distress  Mental Status: Alert and oriented  x 3  Eyes: Normal sclera, normal eyelid  Neck: Trachea midline, no masses on neck exam  Respiratory: Clear to auscultation bilaterally with no wheezes/crackles/cough  Cardiac: Regular rate and rhythm, no murmur, rub, or gallops  Abdominal: Soft, non-tender, non-distented  Hernia: No appreciated hernias  Musculoskeletal: 5/5 strength no weakess no decreased range of montion  Neurologic: Cranial nerves II - XII intact    Labs/ Imaging: CBC:   Lab Results   Component Value Date/Time    WBC 7.02 05/06/2025 04:36 AM    RBC 3.20 (L) 05/06/2025 04:36 AM    HGB 10.8 (L) 05/06/2025 04:36 AM    HCT 31.5 (L) 05/06/2025 04:36 AM     05/06/2025 04:36 AM    MCV 98.4 (H) 05/06/2025 04:36 AM    MCH 33.8 (H) 05/06/2025 04:36 AM    MCHC 34.3 05/06/2025 04:36 AM     BMP:   Lab Results   Component Value Date/Time     05/06/2025 04:36 AM    CO2 23 05/06/2025 04:36 AM    BUN 11 05/06/2025 04:36 AM    CREATININE 0.66 (L) 05/06/2025 04:36 AM    CALCIUM 8.1 (L) 05/06/2025 04:36 AM    MG 1.6 (L) 08/16/2023 03:37 AM     CMP:   Lab Results   Component Value Date/Time     05/06/2025 04:36 AM    CALCIUM 8.1 (L) 05/06/2025 04:36 AM    ALBUMIN 2.5 (L) 05/06/2025 04:36 AM    PROT 6.2 05/06/2025 04:36 AM     05/06/2025 04:36 AM    K 3.7 05/06/2025 04:36 AM    " CO2 23 05/06/2025 04:36 AM     05/06/2025 04:36 AM    BUN 11 05/06/2025 04:36 AM    CREATININE 0.66 (L) 05/06/2025 04:36 AM    ALKPHOS 59 05/06/2025 04:36 AM    ALT 23 05/06/2025 04:36 AM    AST 28 05/06/2025 04:36 AM    BILITOT 1.4 05/06/2025 04:36 AM           Assessment:             1. Ventral hernia without obstruction or gangrene          Plan:          No follow-ups on file.      Will continue to watch this area conservatively.  Abdominal binder for the patient.  He will come back and see me for any acute issues or go to the ER for any worsening nausea vomiting abdominal pain.  Family understands the overall he is a high-risk patient due to his age and worsening dementia.  If this any changes he will come back and see me otherwise where it binder during the day maybe take it off at night.  All questions were answered.       [1]   Social History  Socioeconomic History    Marital status:    Tobacco Use    Smoking status: Never    Smokeless tobacco: Never   Substance and Sexual Activity    Alcohol use: Never    Drug use: Never    Sexual activity: Not Currently     Social Drivers of Health     Financial Resource Strain: Low Risk  (5/2/2025)    Overall Financial Resource Strain (CARDIA)     Difficulty of Paying Living Expenses: Not hard at all   Food Insecurity: No Food Insecurity (5/2/2025)    Hunger Vital Sign     Worried About Running Out of Food in the Last Year: Never true     Ran Out of Food in the Last Year: Never true   Transportation Needs: No Transportation Needs (5/2/2025)    PRAPARE - Transportation     Lack of Transportation (Medical): No     Lack of Transportation (Non-Medical): No   Physical Activity: Inactive (5/2/2025)    Exercise Vital Sign     Days of Exercise per Week: 0 days     Minutes of Exercise per Session: 0 min   Stress: No Stress Concern Present (5/2/2025)    [employer] [place] [employer] - Occupational Stress Questionnaire     Feeling of Stress : Not at all    Housing Stability: Low Risk  (5/2/2025)    Housing Stability Vital Sign     Unable to Pay for Housing in the Last Year: No     Number of Times Moved in the Last Year: 0     Homeless in the Last Year: No   [2]   Current Outpatient Medications:     apixaban (ELIQUIS) 5 mg Tab, Take 5 mg by mouth 2 (two) times daily., Disp: , Rfl:     ascorbic acid, vitamin C, (VITAMIN C) 1000 MG tablet, Take 1,000 mg by mouth once daily., Disp: , Rfl:     b complex vitamins capsule, Take 1 capsule by mouth once daily., Disp: , Rfl:     furosemide (LASIX) 20 MG tablet, Take 1 tablet (20 mg total) by mouth daily as needed (for weight gain of more than 5 lbs in 1 week or pitting edema in lower exts.)., Disp: 20 tablet, Rfl: 0    Lactobacillus acidophilus (PROBIOTIC) 10 billion cell Cap, Take by mouth., Disp: , Rfl:     lactulose (CHRONULAC) 10 gram/15 mL solution, SMARTSIG:Milliliter(s) By Mouth, Disp: , Rfl:     metoprolol tartrate (LOPRESSOR) 25 MG tablet, Take 0.5 tablets (12.5 mg total) by mouth 2 (two) times daily., Disp: 30 tablet, Rfl: 11    multivitamin (ONE DAILY MULTIVITAMIN) per tablet, Take 1 tablet by mouth once daily., Disp: , Rfl:     omega-3 fatty acids/fish oil (FISH OIL-OMEGA-3 FATTY ACIDS) 300-1,000 mg capsule, Take 1 capsule by mouth once daily., Disp: , Rfl:     pantoprazole (PROTONIX) 40 MG tablet, Take 1 tablet (40 mg total) by mouth once daily., Disp: 90 tablet, Rfl: 3    QUEtiapine (SEROQUEL) 50 MG tablet, 1 tablet (50 mg total) by Per NG tube route 2 (two) times daily., Disp: 60 tablet, Rfl: 11    rifAXIMin (XIFAXAN) 550 mg Tab, Take 1 tablet (550 mg total) by mouth 2 (two) times daily., Disp: 60 tablet, Rfl: 0    spironolactone (ALDACTONE) 50 MG tablet, Take 1 tablet (50 mg total) by mouth once daily., Disp: 30 tablet, Rfl: 1    timolol maleate 0.5% (TIMOPTIC) 0.5 % Drop, 1 drop once daily., Disp: , Rfl:     vitamin D (VITAMIN D3) 1000 units Tab, Take 1,000 Units by mouth once daily., Disp: , Rfl:     vitamin  E 400 UNIT capsule, Take 400 Units by mouth once daily., Disp: , Rfl:

## (undated) DEVICE — Device

## (undated) DEVICE — ETCO2 NC MICROSTR FEM ST ADLT

## (undated) DEVICE — CATH DIAG IMPULSE 6FR FL4

## (undated) DEVICE — SUT CTD VICRYL 3-0 CR/SH

## (undated) DEVICE — DRESSING TRANS 4X4 TEGADERM

## (undated) DEVICE — TRAY CATH FOL SIL URIMTR 16FR

## (undated) DEVICE — GLOVE PROTEXIS PI SYN SURG 7

## (undated) DEVICE — SUT SILK 3-0 SH DETACH 30IN

## (undated) DEVICE — SHEATH INTRODUCER 6FR 11CM

## (undated) DEVICE — SKIN STAPLER PMR35

## (undated) DEVICE — INTRODUCER KIT MICRO 4FR

## (undated) DEVICE — BLADE ELECTRO EDGE INSULATED

## (undated) DEVICE — SUT 1 48IN PDS II VIO MONO

## (undated) DEVICE — GLOVE PROTEXIS PI CRM 8

## (undated) DEVICE — RELOAD PROXIMATE CUT BLUE 55MM

## (undated) DEVICE — SOL NACL IRR 1000ML BTL

## (undated) DEVICE — APPLICATOR CHLORAPREP HI-LITE TINTED ORANGE 26ML

## (undated) DEVICE — SYR IRRIGATION BULB STER 60ML

## (undated) DEVICE — GLOVE 6.5 PROTEXIS PI BLUE

## (undated) DEVICE — CUTTER PROXIMATE BLUE 55MM

## (undated) DEVICE — DECANTER FLUID TRNSF WHITE 9IN

## (undated) DEVICE — GLOVE PROTEXIS PI SYN SURG 6.5

## (undated) DEVICE — GLOVE SURGICAL PROTEXIS PI SIZE 6

## (undated) DEVICE — CHLORAPREP 10.5 ML APPLICATOR

## (undated) DEVICE — GLOVE SURGICAL PROTEXIS PI BLUE SIZE 6.0

## (undated) DEVICE — SET IV PRIMARY

## (undated) DEVICE — TOWEL OR DISP STRL BLUE 4/PK

## (undated) DEVICE — SWAB AEROBIC CULTURETTE

## (undated) DEVICE — SPONGE COTTON TRAY 4X4IN

## (undated) DEVICE — COLLECTOR SPECIMEN ANAEROBIC

## (undated) DEVICE — GOWN NONREINF SET-IN SLV 2XL

## (undated) DEVICE — SEALER LIGASURE IMPACT 18CM

## (undated) DEVICE — SUT 0 18IN SILK BLK BRAIDE

## (undated) DEVICE — CATH DIAG IMPULSE 6FR FR4

## (undated) DEVICE — GLOVE BIOGEL SKINSENSE PI 7.0

## (undated) DEVICE — GOWN POLY REINF BRTH SLV XL

## (undated) DEVICE — GLOVE 7.0 PROTEXIS PI BLUE

## (undated) DEVICE — SET EXTENSION CLEARLINK 2INJ

## (undated) DEVICE — BINDER ABDOMINAL 9 30-45

## (undated) DEVICE — SUTURE SILK 2-0 FS 18 BLACK

## (undated) DEVICE — DRAPE THREE-QTR REINF 53X77IN

## (undated) DEVICE — KIT BASIC RUSH

## (undated) DEVICE — GOWN SURGICAL SMARTGOWN LEVEL 4 / EXTRA LARGE STERILE

## (undated) DEVICE — PROTECTOR ULNAR NERVE FOAM

## (undated) DEVICE — GLOVE SURGICAL PROTEXIS PI SIZE 7

## (undated) DEVICE — CONTRAST ISOVUE 370 100ML

## (undated) DEVICE — SUT PDSII 3-0 SH 27IN CLEAR

## (undated) DEVICE — SUTURE PDS II 3-0 CT-2 VIL MON

## (undated) DEVICE — SPONGE LAP XRAY DTECT 18X18IN

## (undated) DEVICE — OXISENSOR ADULT DIGIT N/S